# Patient Record
Sex: FEMALE | Race: WHITE | Employment: OTHER | URBAN - METROPOLITAN AREA
[De-identification: names, ages, dates, MRNs, and addresses within clinical notes are randomized per-mention and may not be internally consistent; named-entity substitution may affect disease eponyms.]

---

## 2017-02-02 DIAGNOSIS — J44.9 CHRONIC OBSTRUCTIVE PULMONARY DISEASE, UNSPECIFIED COPD TYPE (HCC): ICD-10-CM

## 2017-02-02 RX ORDER — ALBUTEROL SULFATE 90 UG/1
2 AEROSOL, METERED RESPIRATORY (INHALATION) EVERY 6 HOURS PRN
Qty: 1 INHALER | Refills: 1 | Status: SHIPPED | OUTPATIENT
Start: 2017-02-02 | End: 2018-03-15 | Stop reason: SDUPTHER

## 2017-02-15 ENCOUNTER — HOSPITAL ENCOUNTER (EMERGENCY)
Age: 57
Discharge: HOME | End: 2017-02-15
Attending: EMERGENCY MEDICINE
Payer: MEDICARE

## 2017-02-15 ENCOUNTER — APPOINTMENT (OUTPATIENT)
Dept: CT IMAGING | Age: 57
End: 2017-02-15
Payer: MEDICARE

## 2017-02-15 VITALS
WEIGHT: 158 LBS | HEIGHT: 66 IN | TEMPERATURE: 98 F | SYSTOLIC BLOOD PRESSURE: 97 MMHG | DIASTOLIC BLOOD PRESSURE: 62 MMHG | OXYGEN SATURATION: 97 % | RESPIRATION RATE: 16 BRPM | BODY MASS INDEX: 25.39 KG/M2 | HEART RATE: 67 BPM

## 2017-02-15 DIAGNOSIS — N39.0 URINARY TRACT INFECTION WITHOUT HEMATURIA, SITE UNSPECIFIED: ICD-10-CM

## 2017-02-15 DIAGNOSIS — K57.32 DIVERTICULITIS OF COLON: Primary | ICD-10-CM

## 2017-02-15 LAB
-: ABNORMAL
ABSOLUTE EOS #: 0.3 K/UL (ref 0–0.4)
ABSOLUTE LYMPH #: 2.1 K/UL (ref 1–4.8)
ABSOLUTE MONO #: 0.6 K/UL (ref 0.1–1.3)
ALBUMIN SERPL-MCNC: 4 G/DL (ref 3.5–5.2)
ALBUMIN/GLOBULIN RATIO: ABNORMAL (ref 1–2.5)
ALP BLD-CCNC: 86 U/L (ref 35–104)
ALT SERPL-CCNC: 11 U/L (ref 5–33)
AMORPHOUS: ABNORMAL
ANION GAP SERPL CALCULATED.3IONS-SCNC: 12 MMOL/L (ref 9–17)
AST SERPL-CCNC: 14 U/L
BACTERIA: ABNORMAL
BASOPHILS # BLD: 0 % (ref 0–2)
BASOPHILS ABSOLUTE: 0 K/UL (ref 0–0.2)
BILIRUB SERPL-MCNC: 0.16 MG/DL (ref 0.3–1.2)
BILIRUBIN URINE: NEGATIVE
BUN BLDV-MCNC: 8 MG/DL (ref 6–20)
BUN/CREAT BLD: ABNORMAL (ref 9–20)
CALCIUM SERPL-MCNC: 9.2 MG/DL (ref 8.6–10.4)
CASTS UA: ABNORMAL /LPF
CHLORIDE BLD-SCNC: 103 MMOL/L (ref 98–107)
CO2: 26 MMOL/L (ref 20–31)
COLOR: YELLOW
COMMENT UA: ABNORMAL
CREAT SERPL-MCNC: 0.72 MG/DL (ref 0.5–0.9)
CRYSTALS, UA: ABNORMAL /HPF
DIFFERENTIAL TYPE: ABNORMAL
EOSINOPHILS RELATIVE PERCENT: 3 % (ref 0–4)
EPITHELIAL CELLS UA: ABNORMAL /HPF
GFR AFRICAN AMERICAN: >60 ML/MIN
GFR NON-AFRICAN AMERICAN: >60 ML/MIN
GFR SERPL CREATININE-BSD FRML MDRD: ABNORMAL ML/MIN/{1.73_M2}
GFR SERPL CREATININE-BSD FRML MDRD: ABNORMAL ML/MIN/{1.73_M2}
GLUCOSE BLD-MCNC: 88 MG/DL (ref 70–99)
GLUCOSE URINE: NEGATIVE
HCT VFR BLD CALC: 40.7 % (ref 36–46)
HEMOGLOBIN: 13.6 G/DL (ref 12–16)
KETONES, URINE: NEGATIVE
LEUKOCYTE ESTERASE, URINE: ABNORMAL
LIPASE: 24 U/L (ref 13–60)
LYMPHOCYTES # BLD: 21 % (ref 24–44)
MCH RBC QN AUTO: 30 PG (ref 26–34)
MCHC RBC AUTO-ENTMCNC: 33.5 G/DL (ref 31–37)
MCV RBC AUTO: 89.5 FL (ref 80–100)
MONOCYTES # BLD: 6 % (ref 1–7)
MUCUS: ABNORMAL
NITRITE, URINE: POSITIVE
OTHER OBSERVATIONS UA: ABNORMAL
PDW BLD-RTO: 15.1 % (ref 11.5–14.9)
PH UA: 7 (ref 5–8)
PLATELET # BLD: 275 K/UL (ref 150–450)
PLATELET ESTIMATE: ABNORMAL
PMV BLD AUTO: 8.7 FL (ref 6–12)
POTASSIUM SERPL-SCNC: 2.8 MMOL/L (ref 3.7–5.3)
PROTEIN UA: NEGATIVE
RBC # BLD: 4.54 M/UL (ref 4–5.2)
RBC # BLD: ABNORMAL 10*6/UL
RBC UA: ABNORMAL /HPF
RENAL EPITHELIAL, UA: ABNORMAL /HPF
SEG NEUTROPHILS: 70 % (ref 36–66)
SEGMENTED NEUTROPHILS ABSOLUTE COUNT: 6.9 K/UL (ref 1.3–9.1)
SODIUM BLD-SCNC: 141 MMOL/L (ref 135–144)
SPECIFIC GRAVITY UA: 1.01 (ref 1–1.03)
TOTAL PROTEIN: 6.8 G/DL (ref 6.4–8.3)
TRICHOMONAS: ABNORMAL
TURBIDITY: CLEAR
URINE HGB: NEGATIVE
UROBILINOGEN, URINE: NORMAL
WBC # BLD: 9.9 K/UL (ref 3.5–11)
WBC # BLD: ABNORMAL 10*3/UL
WBC UA: ABNORMAL /HPF
YEAST: ABNORMAL

## 2017-02-15 PROCEDURE — 6370000000 HC RX 637 (ALT 250 FOR IP): Performed by: EMERGENCY MEDICINE

## 2017-02-15 PROCEDURE — 74176 CT ABD & PELVIS W/O CONTRAST: CPT

## 2017-02-15 PROCEDURE — 85025 COMPLETE CBC W/AUTO DIFF WBC: CPT

## 2017-02-15 PROCEDURE — 83690 ASSAY OF LIPASE: CPT

## 2017-02-15 PROCEDURE — 80053 COMPREHEN METABOLIC PANEL: CPT

## 2017-02-15 PROCEDURE — 99284 EMERGENCY DEPT VISIT MOD MDM: CPT

## 2017-02-15 PROCEDURE — 2580000003 HC RX 258: Performed by: EMERGENCY MEDICINE

## 2017-02-15 PROCEDURE — 6360000002 HC RX W HCPCS: Performed by: EMERGENCY MEDICINE

## 2017-02-15 PROCEDURE — 36415 COLL VENOUS BLD VENIPUNCTURE: CPT

## 2017-02-15 PROCEDURE — 81001 URINALYSIS AUTO W/SCOPE: CPT

## 2017-02-15 PROCEDURE — 96372 THER/PROPH/DIAG INJ SC/IM: CPT

## 2017-02-15 RX ORDER — METRONIDAZOLE 500 MG/1
500 TABLET ORAL ONCE
Status: COMPLETED | OUTPATIENT
Start: 2017-02-15 | End: 2017-02-15

## 2017-02-15 RX ORDER — CIPROFLOXACIN 500 MG/1
500 TABLET, FILM COATED ORAL 2 TIMES DAILY
Qty: 14 TABLET | Refills: 0 | Status: SHIPPED | OUTPATIENT
Start: 2017-02-15 | End: 2017-02-22

## 2017-02-15 RX ORDER — CIPROFLOXACIN 500 MG/1
500 TABLET, FILM COATED ORAL ONCE
Status: COMPLETED | OUTPATIENT
Start: 2017-02-15 | End: 2017-02-15

## 2017-02-15 RX ORDER — METRONIDAZOLE 500 MG/1
500 TABLET ORAL 3 TIMES DAILY
Qty: 21 TABLET | Refills: 0 | Status: SHIPPED | OUTPATIENT
Start: 2017-02-15 | End: 2017-02-22

## 2017-02-15 RX ORDER — DICYCLOMINE HYDROCHLORIDE 10 MG/ML
20 INJECTION INTRAMUSCULAR ONCE
Status: COMPLETED | OUTPATIENT
Start: 2017-02-15 | End: 2017-02-15

## 2017-02-15 RX ORDER — 0.9 % SODIUM CHLORIDE 0.9 %
1000 INTRAVENOUS SOLUTION INTRAVENOUS ONCE
Status: COMPLETED | OUTPATIENT
Start: 2017-02-15 | End: 2017-02-15

## 2017-02-15 RX ADMIN — SODIUM CHLORIDE 1000 ML: 9 INJECTION, SOLUTION INTRAVENOUS at 12:15

## 2017-02-15 RX ADMIN — METRONIDAZOLE 500 MG: 500 TABLET ORAL at 13:56

## 2017-02-15 RX ADMIN — CIPROFLOXACIN HYDROCHLORIDE 500 MG: 500 TABLET, FILM COATED ORAL at 13:57

## 2017-02-15 RX ADMIN — DICYCLOMINE HYDROCHLORIDE 20 MG: 20 INJECTION, SOLUTION INTRAMUSCULAR at 12:59

## 2017-02-15 ASSESSMENT — ENCOUNTER SYMPTOMS
ABDOMINAL PAIN: 1
BACK PAIN: 0
SHORTNESS OF BREATH: 0
RHINORRHEA: 0
COUGH: 0
EYE REDNESS: 0
NAUSEA: 0
EYE PAIN: 0
VOMITING: 0

## 2017-02-15 ASSESSMENT — PAIN DESCRIPTION - PAIN TYPE: TYPE: ACUTE PAIN

## 2017-02-15 ASSESSMENT — PAIN - FUNCTIONAL ASSESSMENT: PAIN_FUNCTIONAL_ASSESSMENT: 0-10

## 2017-02-15 ASSESSMENT — PAIN SCALES - GENERAL
PAINLEVEL_OUTOF10: 3
PAINLEVEL_OUTOF10: 7

## 2017-02-15 ASSESSMENT — PAIN DESCRIPTION - ORIENTATION: ORIENTATION: LOWER

## 2017-02-15 ASSESSMENT — PAIN DESCRIPTION - DESCRIPTORS: DESCRIPTORS: ACHING

## 2017-02-15 ASSESSMENT — PAIN DESCRIPTION - LOCATION: LOCATION: ABDOMEN

## 2017-02-15 ASSESSMENT — PAIN DESCRIPTION - FREQUENCY: FREQUENCY: CONTINUOUS

## 2017-03-01 ENCOUNTER — HOSPITAL ENCOUNTER (OUTPATIENT)
Dept: CT IMAGING | Age: 57
Discharge: HOME | End: 2017-03-01
Attending: INTERNAL MEDICINE | Admitting: INTERNAL MEDICINE
Payer: MEDICARE

## 2017-03-01 DIAGNOSIS — R91.1 PULMONARY NODULE: ICD-10-CM

## 2017-03-01 DIAGNOSIS — F17.219 CIGARETTE NICOTINE DEPENDENCE WITH NICOTINE-INDUCED DISORDER: ICD-10-CM

## 2017-03-01 PROCEDURE — 71250 CT THORAX DX C-: CPT

## 2017-03-06 ENCOUNTER — OFFICE VISIT (OUTPATIENT)
Dept: FAMILY MEDICINE CLINIC | Facility: CLINIC | Age: 57
End: 2017-03-06

## 2017-03-06 ENCOUNTER — HOSPITAL ENCOUNTER (OUTPATIENT)
Age: 57
Discharge: HOME | End: 2017-03-06
Payer: MEDICARE

## 2017-03-06 ENCOUNTER — TELEPHONE (OUTPATIENT)
Dept: GASTROENTEROLOGY | Facility: CLINIC | Age: 57
End: 2017-03-06

## 2017-03-06 VITALS
HEART RATE: 84 BPM | HEIGHT: 66 IN | RESPIRATION RATE: 18 BRPM | BODY MASS INDEX: 25.71 KG/M2 | SYSTOLIC BLOOD PRESSURE: 116 MMHG | WEIGHT: 160 LBS | TEMPERATURE: 96.8 F | OXYGEN SATURATION: 97 % | DIASTOLIC BLOOD PRESSURE: 84 MMHG

## 2017-03-06 DIAGNOSIS — R10.32 LLQ ABDOMINAL PAIN: ICD-10-CM

## 2017-03-06 DIAGNOSIS — K57.32 DIVERTICULITIS OF LARGE INTESTINE WITHOUT PERFORATION OR ABSCESS WITHOUT BLEEDING: ICD-10-CM

## 2017-03-06 DIAGNOSIS — E78.2 MIXED HYPERLIPIDEMIA: ICD-10-CM

## 2017-03-06 DIAGNOSIS — I10 ESSENTIAL HYPERTENSION: Primary | ICD-10-CM

## 2017-03-06 DIAGNOSIS — E87.6 HYPOKALEMIA: ICD-10-CM

## 2017-03-06 DIAGNOSIS — Z11.4 SCREENING FOR HIV (HUMAN IMMUNODEFICIENCY VIRUS): ICD-10-CM

## 2017-03-06 DIAGNOSIS — Z11.59 NEED FOR HEPATITIS C SCREENING TEST: ICD-10-CM

## 2017-03-06 DIAGNOSIS — G89.29 CHRONIC BILATERAL LOW BACK PAIN WITH BILATERAL SCIATICA: ICD-10-CM

## 2017-03-06 DIAGNOSIS — M54.41 CHRONIC BILATERAL LOW BACK PAIN WITH BILATERAL SCIATICA: ICD-10-CM

## 2017-03-06 DIAGNOSIS — M54.42 CHRONIC BILATERAL LOW BACK PAIN WITH BILATERAL SCIATICA: ICD-10-CM

## 2017-03-06 DIAGNOSIS — Z23 NEED FOR PNEUMOCOCCAL VACCINATION: ICD-10-CM

## 2017-03-06 LAB
CHOLESTEROL/HDL RATIO: 4.6
CHOLESTEROL: 213 MG/DL
HDLC SERPL-MCNC: 46 MG/DL
HEPATITIS C ANTIBODY: NONREACTIVE
HIV AG/AB: NONREACTIVE
LDL CHOLESTEROL: 121 MG/DL (ref 0–130)
POTASSIUM SERPL-SCNC: 3.9 MMOL/L (ref 3.7–5.3)
TRIGL SERPL-MCNC: 228 MG/DL
VLDLC SERPL CALC-MCNC: ABNORMAL MG/DL (ref 1–30)

## 2017-03-06 PROCEDURE — G0009 ADMIN PNEUMOCOCCAL VACCINE: HCPCS | Performed by: NURSE PRACTITIONER

## 2017-03-06 PROCEDURE — 90732 PPSV23 VACC 2 YRS+ SUBQ/IM: CPT | Performed by: NURSE PRACTITIONER

## 2017-03-06 PROCEDURE — 87389 HIV-1 AG W/HIV-1&-2 AB AG IA: CPT

## 2017-03-06 PROCEDURE — 80061 LIPID PANEL: CPT

## 2017-03-06 PROCEDURE — 86803 HEPATITIS C AB TEST: CPT

## 2017-03-06 PROCEDURE — 36415 COLL VENOUS BLD VENIPUNCTURE: CPT

## 2017-03-06 PROCEDURE — 84132 ASSAY OF SERUM POTASSIUM: CPT

## 2017-03-06 PROCEDURE — 99214 OFFICE O/P EST MOD 30 MIN: CPT | Performed by: NURSE PRACTITIONER

## 2017-03-06 RX ORDER — CLONIDINE HYDROCHLORIDE 0.1 MG/1
TABLET ORAL
Qty: 30 TABLET | Refills: 5 | Status: SHIPPED | OUTPATIENT
Start: 2017-03-06 | End: 2018-02-14 | Stop reason: SDUPTHER

## 2017-03-06 ASSESSMENT — ENCOUNTER SYMPTOMS
SHORTNESS OF BREATH: 0
WHEEZING: 0
BLOOD IN STOOL: 0
ABDOMINAL PAIN: 1
VOMITING: 0
BACK PAIN: 1
NAUSEA: 0

## 2017-03-13 ENCOUNTER — TELEPHONE (OUTPATIENT)
Dept: FAMILY MEDICINE CLINIC | Age: 57
End: 2017-03-13

## 2017-03-13 DIAGNOSIS — K57.93 DIVERTICULITIS OF INTESTINE WITHOUT PERFORATION OR ABSCESS WITH BLEEDING, UNSPECIFIED PART OF INTESTINAL TRACT: ICD-10-CM

## 2017-03-13 DIAGNOSIS — R10.32 LLQ ABDOMINAL PAIN: Primary | ICD-10-CM

## 2017-03-16 ENCOUNTER — OFFICE VISIT (OUTPATIENT)
Dept: GASTROENTEROLOGY | Age: 57
End: 2017-03-16
Payer: MEDICARE

## 2017-03-16 ENCOUNTER — OFFICE VISIT (OUTPATIENT)
Dept: PULMONOLOGY | Age: 57
End: 2017-03-16
Payer: MEDICARE

## 2017-03-16 VITALS
BODY MASS INDEX: 24.99 KG/M2 | HEIGHT: 66 IN | WEIGHT: 155.5 LBS | HEART RATE: 93 BPM | TEMPERATURE: 98 F | OXYGEN SATURATION: 98 %

## 2017-03-16 VITALS
HEIGHT: 66 IN | HEART RATE: 89 BPM | WEIGHT: 155 LBS | BODY MASS INDEX: 24.91 KG/M2 | RESPIRATION RATE: 18 BRPM | SYSTOLIC BLOOD PRESSURE: 106 MMHG | DIASTOLIC BLOOD PRESSURE: 76 MMHG

## 2017-03-16 DIAGNOSIS — F17.200 SMOKING: ICD-10-CM

## 2017-03-16 DIAGNOSIS — Z87.19 HISTORY OF DIVERTICULITIS: ICD-10-CM

## 2017-03-16 DIAGNOSIS — I10 ESSENTIAL HYPERTENSION: ICD-10-CM

## 2017-03-16 DIAGNOSIS — K62.5 RECTAL BLEEDING: ICD-10-CM

## 2017-03-16 DIAGNOSIS — R10.32 LEFT LOWER QUADRANT PAIN: ICD-10-CM

## 2017-03-16 DIAGNOSIS — J44.9 CHRONIC OBSTRUCTIVE PULMONARY DISEASE, UNSPECIFIED COPD TYPE (HCC): Primary | ICD-10-CM

## 2017-03-16 DIAGNOSIS — R91.1 PULMONARY NODULE: ICD-10-CM

## 2017-03-16 DIAGNOSIS — R05.3 CHRONIC COUGH: ICD-10-CM

## 2017-03-16 PROCEDURE — 99214 OFFICE O/P EST MOD 30 MIN: CPT | Performed by: INTERNAL MEDICINE

## 2017-03-16 PROCEDURE — 99204 OFFICE O/P NEW MOD 45 MIN: CPT | Performed by: INTERNAL MEDICINE

## 2017-03-16 ASSESSMENT — ENCOUNTER SYMPTOMS
SINUS PRESSURE: 0
NAUSEA: 0
BACK PAIN: 1
TROUBLE SWALLOWING: 0
ANAL BLEEDING: 1
FACIAL SWELLING: 0
VOICE CHANGE: 1
SORE THROAT: 0
CONSTIPATION: 0
ABDOMINAL PAIN: 1
VOMITING: 0
RHINORRHEA: 0
SHORTNESS OF BREATH: 1
RECTAL PAIN: 0
ABDOMINAL DISTENTION: 1
COUGH: 1
BLOOD IN STOOL: 1
DIARRHEA: 0

## 2017-03-20 RX ORDER — POLYETHYLENE GLYCOL 3350 17 G/17G
POWDER, FOR SOLUTION ORAL
Qty: 255 G | Refills: 0 | Status: SHIPPED | OUTPATIENT
Start: 2017-03-20 | End: 2017-04-15

## 2017-04-05 ENCOUNTER — HOSPITAL ENCOUNTER (OUTPATIENT)
Age: 57
Setting detail: OUTPATIENT SURGERY
Discharge: HOME | End: 2017-04-05
Attending: INTERNAL MEDICINE | Admitting: INTERNAL MEDICINE
Payer: MEDICARE

## 2017-04-05 VITALS
WEIGHT: 155 LBS | HEART RATE: 76 BPM | RESPIRATION RATE: 20 BRPM | HEIGHT: 66 IN | OXYGEN SATURATION: 93 % | BODY MASS INDEX: 24.91 KG/M2 | DIASTOLIC BLOOD PRESSURE: 78 MMHG | SYSTOLIC BLOOD PRESSURE: 106 MMHG | TEMPERATURE: 97.9 F

## 2017-04-05 PROCEDURE — 99153 MOD SED SAME PHYS/QHP EA: CPT | Performed by: INTERNAL MEDICINE

## 2017-04-05 PROCEDURE — 3609027000 HC COLONOSCOPY: Performed by: INTERNAL MEDICINE

## 2017-04-05 PROCEDURE — 7100000010 HC PHASE II RECOVERY - FIRST 15 MIN: Performed by: INTERNAL MEDICINE

## 2017-04-05 PROCEDURE — 6360000002 HC RX W HCPCS: Performed by: INTERNAL MEDICINE

## 2017-04-05 PROCEDURE — 2580000003 HC RX 258: Performed by: INTERNAL MEDICINE

## 2017-04-05 PROCEDURE — 7100000011 HC PHASE II RECOVERY - ADDTL 15 MIN: Performed by: INTERNAL MEDICINE

## 2017-04-05 PROCEDURE — 99152 MOD SED SAME PHYS/QHP 5/>YRS: CPT | Performed by: INTERNAL MEDICINE

## 2017-04-05 RX ORDER — MIDAZOLAM HYDROCHLORIDE 1 MG/ML
INJECTION INTRAMUSCULAR; INTRAVENOUS PRN
Status: DISCONTINUED | OUTPATIENT
Start: 2017-04-05 | End: 2017-04-05 | Stop reason: HOSPADM

## 2017-04-05 RX ORDER — FENTANYL CITRATE 50 UG/ML
INJECTION, SOLUTION INTRAMUSCULAR; INTRAVENOUS PRN
Status: DISCONTINUED | OUTPATIENT
Start: 2017-04-05 | End: 2017-04-05 | Stop reason: HOSPADM

## 2017-04-05 RX ORDER — SODIUM CHLORIDE 9 MG/ML
INJECTION, SOLUTION INTRAVENOUS CONTINUOUS
Status: DISCONTINUED | OUTPATIENT
Start: 2017-04-05 | End: 2017-04-05 | Stop reason: HOSPADM

## 2017-04-05 RX ADMIN — SODIUM CHLORIDE: 9 INJECTION, SOLUTION INTRAVENOUS at 10:44

## 2017-04-05 ASSESSMENT — PAIN SCALES - GENERAL: PAINLEVEL_OUTOF10: 0

## 2017-04-05 ASSESSMENT — PAIN - FUNCTIONAL ASSESSMENT: PAIN_FUNCTIONAL_ASSESSMENT: 0-10

## 2017-04-17 DIAGNOSIS — K62.5 RECTAL BLEEDING: ICD-10-CM

## 2017-04-17 DIAGNOSIS — R10.32 LEFT LOWER QUADRANT PAIN: ICD-10-CM

## 2017-05-03 ENCOUNTER — OFFICE VISIT (OUTPATIENT)
Dept: GASTROENTEROLOGY | Age: 57
End: 2017-05-03
Payer: MEDICARE

## 2017-05-03 VITALS
OXYGEN SATURATION: 97 % | TEMPERATURE: 97 F | HEIGHT: 66 IN | HEART RATE: 83 BPM | SYSTOLIC BLOOD PRESSURE: 129 MMHG | WEIGHT: 155.4 LBS | DIASTOLIC BLOOD PRESSURE: 86 MMHG | BODY MASS INDEX: 24.98 KG/M2

## 2017-05-03 DIAGNOSIS — I10 ESSENTIAL HYPERTENSION: ICD-10-CM

## 2017-05-03 DIAGNOSIS — K62.5 RECTAL BLEEDING: ICD-10-CM

## 2017-05-03 DIAGNOSIS — K57.30 DIVERTICULOSIS OF LARGE INTESTINE WITHOUT HEMORRHAGE: ICD-10-CM

## 2017-05-03 DIAGNOSIS — R10.32 LEFT LOWER QUADRANT PAIN: Primary | ICD-10-CM

## 2017-05-03 PROCEDURE — 99213 OFFICE O/P EST LOW 20 MIN: CPT | Performed by: INTERNAL MEDICINE

## 2017-05-03 ASSESSMENT — ENCOUNTER SYMPTOMS
CONSTIPATION: 0
TROUBLE SWALLOWING: 0
SHORTNESS OF BREATH: 1
RHINORRHEA: 0
BLOOD IN STOOL: 1
FACIAL SWELLING: 0
BACK PAIN: 1
NAUSEA: 0
COUGH: 1
ANAL BLEEDING: 1
ABDOMINAL DISTENTION: 1
VOMITING: 0
ABDOMINAL PAIN: 1
VOICE CHANGE: 1
SORE THROAT: 0
DIARRHEA: 0
RECTAL PAIN: 0
SINUS PRESSURE: 0

## 2017-06-06 ENCOUNTER — OFFICE VISIT (OUTPATIENT)
Dept: FAMILY MEDICINE CLINIC | Age: 57
End: 2017-06-06
Payer: MEDICARE

## 2017-06-06 VITALS
WEIGHT: 156.4 LBS | SYSTOLIC BLOOD PRESSURE: 132 MMHG | BODY MASS INDEX: 25.13 KG/M2 | DIASTOLIC BLOOD PRESSURE: 94 MMHG | HEIGHT: 66 IN | HEART RATE: 100 BPM

## 2017-06-06 DIAGNOSIS — K44.9 HIATAL HERNIA: ICD-10-CM

## 2017-06-06 DIAGNOSIS — E78.2 MIXED HYPERLIPIDEMIA: ICD-10-CM

## 2017-06-06 DIAGNOSIS — I10 ESSENTIAL HYPERTENSION: Primary | ICD-10-CM

## 2017-06-06 DIAGNOSIS — N95.1 MENOPAUSAL HOT FLUSHES: ICD-10-CM

## 2017-06-06 PROCEDURE — 99214 OFFICE O/P EST MOD 30 MIN: CPT | Performed by: NURSE PRACTITIONER

## 2017-06-06 RX ORDER — PAROXETINE 10 MG/1
10 TABLET, FILM COATED ORAL EVERY MORNING
Qty: 30 TABLET | Refills: 3 | Status: SHIPPED | OUTPATIENT
Start: 2017-06-06 | End: 2017-09-14 | Stop reason: SDUPTHER

## 2017-06-06 RX ORDER — TIOTROPIUM BROMIDE 18 UG/1
CAPSULE ORAL; RESPIRATORY (INHALATION)
COMMUNITY
Start: 2017-05-31 | End: 2017-06-29 | Stop reason: ALTCHOICE

## 2017-06-06 ASSESSMENT — ENCOUNTER SYMPTOMS
NAUSEA: 0
ABDOMINAL PAIN: 1
SHORTNESS OF BREATH: 0
VOMITING: 0
WHEEZING: 0

## 2017-06-08 ENCOUNTER — OFFICE VISIT (OUTPATIENT)
Dept: ORTHOPEDIC SURGERY | Age: 57
End: 2017-06-08
Payer: MEDICARE

## 2017-06-08 DIAGNOSIS — M54.42 CHRONIC MIDLINE LOW BACK PAIN WITH BILATERAL SCIATICA: ICD-10-CM

## 2017-06-08 DIAGNOSIS — D32.9 MENINGIOMA (HCC): Primary | ICD-10-CM

## 2017-06-08 DIAGNOSIS — G89.29 CHRONIC MIDLINE LOW BACK PAIN WITH BILATERAL SCIATICA: ICD-10-CM

## 2017-06-08 DIAGNOSIS — M54.2 NECK PAIN: ICD-10-CM

## 2017-06-08 DIAGNOSIS — M54.41 CHRONIC MIDLINE LOW BACK PAIN WITH BILATERAL SCIATICA: ICD-10-CM

## 2017-06-08 PROCEDURE — 99213 OFFICE O/P EST LOW 20 MIN: CPT | Performed by: ORTHOPAEDIC SURGERY

## 2017-06-27 RX ORDER — CALCIUM CARBONATE/VITAMIN D3 600 MG-10
TABLET ORAL
Qty: 60 TABLET | Refills: 5 | Status: SHIPPED | OUTPATIENT
Start: 2017-06-27 | End: 2017-12-29 | Stop reason: SDUPTHER

## 2017-06-27 RX ORDER — OMEPRAZOLE 20 MG/1
CAPSULE, DELAYED RELEASE ORAL
Qty: 60 CAPSULE | Refills: 3 | Status: SHIPPED | OUTPATIENT
Start: 2017-06-27 | End: 2017-10-29 | Stop reason: SDUPTHER

## 2017-06-27 RX ORDER — HYDROCHLOROTHIAZIDE 25 MG/1
TABLET ORAL
Qty: 30 TABLET | Refills: 3 | Status: SHIPPED | OUTPATIENT
Start: 2017-06-27 | End: 2017-10-29 | Stop reason: SDUPTHER

## 2017-06-27 RX ORDER — TIOTROPIUM BROMIDE 18 UG/1
CAPSULE ORAL; RESPIRATORY (INHALATION)
Qty: 30 CAPSULE | Refills: 3 | Status: SHIPPED | OUTPATIENT
Start: 2017-06-27 | End: 2017-11-29 | Stop reason: SDUPTHER

## 2017-06-27 RX ORDER — CYCLOBENZAPRINE HCL 10 MG
TABLET ORAL
Qty: 60 TABLET | Refills: 3 | Status: SHIPPED | OUTPATIENT
Start: 2017-06-27 | End: 2017-10-29 | Stop reason: SDUPTHER

## 2017-06-27 RX ORDER — LOVASTATIN 40 MG/1
TABLET ORAL
Qty: 30 TABLET | Refills: 3 | Status: SHIPPED | OUTPATIENT
Start: 2017-06-27 | End: 2017-10-29 | Stop reason: SDUPTHER

## 2017-06-29 ENCOUNTER — HOSPITAL ENCOUNTER (OUTPATIENT)
Dept: PREADMISSION TESTING | Age: 57
Discharge: HOME | End: 2017-06-29
Payer: MEDICARE

## 2017-06-29 VITALS
TEMPERATURE: 97 F | BODY MASS INDEX: 24.65 KG/M2 | HEIGHT: 66 IN | RESPIRATION RATE: 16 BRPM | OXYGEN SATURATION: 96 % | WEIGHT: 153.4 LBS | SYSTOLIC BLOOD PRESSURE: 116 MMHG | DIASTOLIC BLOOD PRESSURE: 87 MMHG | HEART RATE: 96 BPM

## 2017-06-29 LAB
ABSOLUTE EOS #: 0.2 K/UL (ref 0–0.4)
ABSOLUTE LYMPH #: 2.3 K/UL (ref 1–4.8)
ABSOLUTE MONO #: 0.5 K/UL (ref 0.1–1.3)
ANION GAP SERPL CALCULATED.3IONS-SCNC: 18 MMOL/L (ref 9–17)
BASOPHILS # BLD: 1 %
BASOPHILS ABSOLUTE: 0 K/UL (ref 0–0.2)
BUN BLDV-MCNC: 12 MG/DL (ref 6–20)
BUN/CREAT BLD: ABNORMAL (ref 9–20)
CALCIUM SERPL-MCNC: 10.4 MG/DL (ref 8.6–10.4)
CHLORIDE BLD-SCNC: 102 MMOL/L (ref 98–107)
CO2: 22 MMOL/L (ref 20–31)
CREAT SERPL-MCNC: 0.76 MG/DL (ref 0.5–0.9)
DIFFERENTIAL TYPE: NORMAL
EOSINOPHILS RELATIVE PERCENT: 3 %
GFR AFRICAN AMERICAN: >60 ML/MIN
GFR NON-AFRICAN AMERICAN: >60 ML/MIN
GFR SERPL CREATININE-BSD FRML MDRD: ABNORMAL ML/MIN/{1.73_M2}
GFR SERPL CREATININE-BSD FRML MDRD: ABNORMAL ML/MIN/{1.73_M2}
GLUCOSE BLD-MCNC: 139 MG/DL (ref 70–99)
HCT VFR BLD CALC: 45.9 % (ref 36–46)
HEMOGLOBIN: 15.5 G/DL (ref 12–16)
LYMPHOCYTES # BLD: 36 %
MCH RBC QN AUTO: 30.6 PG (ref 26–34)
MCHC RBC AUTO-ENTMCNC: 33.7 G/DL (ref 31–37)
MCV RBC AUTO: 90.8 FL (ref 80–100)
MONOCYTES # BLD: 8 %
PDW BLD-RTO: 14.9 % (ref 11.5–14.9)
PLATELET # BLD: 291 K/UL (ref 150–450)
PLATELET ESTIMATE: NORMAL
PMV BLD AUTO: 10.2 FL (ref 6–12)
POTASSIUM SERPL-SCNC: 3.2 MMOL/L (ref 3.7–5.3)
RBC # BLD: 5.06 M/UL (ref 4–5.2)
RBC # BLD: NORMAL 10*6/UL
SEG NEUTROPHILS: 52 %
SEGMENTED NEUTROPHILS ABSOLUTE COUNT: 3.4 K/UL (ref 1.3–9.1)
SODIUM BLD-SCNC: 142 MMOL/L (ref 135–144)
WBC # BLD: 6.5 K/UL (ref 3.5–11)
WBC # BLD: NORMAL 10*3/UL

## 2017-06-29 PROCEDURE — 93005 ELECTROCARDIOGRAM TRACING: CPT

## 2017-06-29 PROCEDURE — 36415 COLL VENOUS BLD VENIPUNCTURE: CPT

## 2017-06-29 PROCEDURE — 85025 COMPLETE CBC W/AUTO DIFF WBC: CPT

## 2017-06-29 PROCEDURE — 80048 BASIC METABOLIC PNL TOTAL CA: CPT

## 2017-06-29 ASSESSMENT — PAIN DESCRIPTION - PAIN TYPE: TYPE: CHRONIC PAIN

## 2017-06-29 ASSESSMENT — PAIN SCALES - GENERAL: PAINLEVEL_OUTOF10: 10

## 2017-06-29 ASSESSMENT — PAIN DESCRIPTION - ORIENTATION: ORIENTATION: LEFT

## 2017-06-29 ASSESSMENT — PAIN DESCRIPTION - LOCATION: LOCATION: NECK

## 2017-06-30 ENCOUNTER — TELEPHONE (OUTPATIENT)
Dept: FAMILY MEDICINE CLINIC | Age: 57
End: 2017-06-30

## 2017-06-30 DIAGNOSIS — R94.31 ABNORMAL EKG: Primary | ICD-10-CM

## 2017-06-30 LAB
EKG ATRIAL RATE: 89 BPM
EKG P AXIS: 84 DEGREES
EKG P-R INTERVAL: 138 MS
EKG Q-T INTERVAL: 362 MS
EKG QRS DURATION: 74 MS
EKG QTC CALCULATION (BAZETT): 440 MS
EKG R AXIS: 61 DEGREES
EKG T AXIS: 66 DEGREES
EKG VENTRICULAR RATE: 89 BPM

## 2017-06-30 RX ORDER — SODIUM CHLORIDE 0.9 % (FLUSH) 0.9 %
10 SYRINGE (ML) INJECTION EVERY 12 HOURS SCHEDULED
Status: CANCELLED | OUTPATIENT
Start: 2017-06-30

## 2017-06-30 RX ORDER — LIDOCAINE HYDROCHLORIDE 10 MG/ML
1 INJECTION, SOLUTION EPIDURAL; INFILTRATION; INTRACAUDAL; PERINEURAL
Status: CANCELLED | OUTPATIENT
Start: 2017-06-30 | End: 2017-06-30

## 2017-06-30 RX ORDER — SODIUM CHLORIDE 0.9 % (FLUSH) 0.9 %
10 SYRINGE (ML) INJECTION PRN
Status: CANCELLED | OUTPATIENT
Start: 2017-06-30

## 2017-06-30 RX ORDER — SODIUM CHLORIDE, SODIUM LACTATE, POTASSIUM CHLORIDE, CALCIUM CHLORIDE 600; 310; 30; 20 MG/100ML; MG/100ML; MG/100ML; MG/100ML
INJECTION, SOLUTION INTRAVENOUS CONTINUOUS
Status: CANCELLED | OUTPATIENT
Start: 2017-06-30

## 2017-07-06 ENCOUNTER — HOSPITAL ENCOUNTER (OUTPATIENT)
Dept: CT IMAGING | Age: 57
Discharge: HOME | End: 2017-07-06
Payer: MEDICARE

## 2017-07-06 ENCOUNTER — HOSPITAL ENCOUNTER (OUTPATIENT)
Dept: INTERVENTIONAL RADIOLOGY/VASCULAR | Age: 57
Discharge: HOME | End: 2017-07-06
Payer: MEDICARE

## 2017-07-06 VITALS
HEIGHT: 66 IN | SYSTOLIC BLOOD PRESSURE: 143 MMHG | HEART RATE: 74 BPM | RESPIRATION RATE: 16 BRPM | DIASTOLIC BLOOD PRESSURE: 86 MMHG | TEMPERATURE: 97.1 F | WEIGHT: 153 LBS | BODY MASS INDEX: 24.59 KG/M2

## 2017-07-06 DIAGNOSIS — M54.2 NECK PAIN: ICD-10-CM

## 2017-07-06 DIAGNOSIS — M54.42 CHRONIC MIDLINE LOW BACK PAIN WITH BILATERAL SCIATICA: ICD-10-CM

## 2017-07-06 DIAGNOSIS — M54.41 CHRONIC MIDLINE LOW BACK PAIN WITH BILATERAL SCIATICA: ICD-10-CM

## 2017-07-06 DIAGNOSIS — G89.29 CHRONIC MIDLINE LOW BACK PAIN WITH BILATERAL SCIATICA: ICD-10-CM

## 2017-07-06 DIAGNOSIS — D32.9 MENINGIOMA (HCC): ICD-10-CM

## 2017-07-06 LAB
INR BLD: 1
PARTIAL THROMBOPLASTIN TIME: 27.5 SEC (ref 23–31)
PLATELET # BLD: 260 K/UL (ref 150–450)
PROTHROMBIN TIME: 10.2 SEC (ref 9.7–12)

## 2017-07-06 PROCEDURE — 7100000031 HC ASPR PHASE II RECOVERY - ADDTL 15 MIN

## 2017-07-06 PROCEDURE — 72126 CT NECK SPINE W/DYE: CPT

## 2017-07-06 PROCEDURE — 36415 COLL VENOUS BLD VENIPUNCTURE: CPT

## 2017-07-06 PROCEDURE — 94760 N-INVAS EAR/PLS OXIMETRY 1: CPT

## 2017-07-06 PROCEDURE — 62305 MYELOGRAPHY LUMBAR INJECTION: CPT | Performed by: RADIOLOGY

## 2017-07-06 PROCEDURE — 85730 THROMBOPLASTIN TIME PARTIAL: CPT

## 2017-07-06 PROCEDURE — 7100000030 HC ASPR PHASE II RECOVERY - FIRST 15 MIN

## 2017-07-06 PROCEDURE — 85610 PROTHROMBIN TIME: CPT

## 2017-07-06 PROCEDURE — 2500000003 HC RX 250 WO HCPCS: Performed by: RADIOLOGY

## 2017-07-06 PROCEDURE — 72132 CT LUMBAR SPINE W/DYE: CPT

## 2017-07-06 PROCEDURE — 85049 AUTOMATED PLATELET COUNT: CPT

## 2017-07-06 PROCEDURE — 6360000004 HC RX CONTRAST MEDICATION: Performed by: ORTHOPAEDIC SURGERY

## 2017-07-06 PROCEDURE — 72129 CT CHEST SPINE W/DYE: CPT

## 2017-07-06 RX ORDER — PREGABALIN 100 MG/1
CAPSULE ORAL
Qty: 60 CAPSULE | Refills: 2 | Status: SHIPPED | OUTPATIENT
Start: 2017-07-06 | End: 2017-11-29 | Stop reason: SDUPTHER

## 2017-07-06 RX ORDER — SODIUM CHLORIDE 9 MG/ML
INJECTION, SOLUTION INTRAVENOUS CONTINUOUS
Status: DISCONTINUED | OUTPATIENT
Start: 2017-07-06 | End: 2017-07-09 | Stop reason: HOSPADM

## 2017-07-06 RX ORDER — LIDOCAINE HYDROCHLORIDE 10 MG/ML
INJECTION, SOLUTION EPIDURAL; INFILTRATION; INTRACAUDAL; PERINEURAL
Status: COMPLETED | OUTPATIENT
Start: 2017-07-06 | End: 2017-07-06

## 2017-07-06 RX ORDER — SODIUM CHLORIDE 9 MG/ML
INJECTION, SOLUTION INTRAVENOUS CONTINUOUS
Status: DISCONTINUED | OUTPATIENT
Start: 2017-07-06 | End: 2017-07-06 | Stop reason: CLARIF

## 2017-07-06 RX ORDER — ACETAMINOPHEN 325 MG/1
650 TABLET ORAL EVERY 4 HOURS PRN
Status: DISCONTINUED | OUTPATIENT
Start: 2017-07-06 | End: 2017-07-09 | Stop reason: HOSPADM

## 2017-07-06 RX ADMIN — IOPAMIDOL 30 ML: 612 INJECTION, SOLUTION INTRAVENOUS at 12:38

## 2017-07-06 RX ADMIN — LIDOCAINE HYDROCHLORIDE 5 ML: 10 INJECTION, SOLUTION EPIDURAL; INFILTRATION; INTRACAUDAL; PERINEURAL at 12:29

## 2017-07-06 ASSESSMENT — PAIN - FUNCTIONAL ASSESSMENT
PAIN_FUNCTIONAL_ASSESSMENT: 0-10
PAIN_FUNCTIONAL_ASSESSMENT: 0-10

## 2017-07-13 ENCOUNTER — OFFICE VISIT (OUTPATIENT)
Dept: ORTHOPEDIC SURGERY | Age: 57
End: 2017-07-13
Payer: MEDICARE

## 2017-07-13 DIAGNOSIS — M54.2 NECK PAIN: ICD-10-CM

## 2017-07-13 DIAGNOSIS — M54.41 CHRONIC MIDLINE LOW BACK PAIN WITH BILATERAL SCIATICA: ICD-10-CM

## 2017-07-13 DIAGNOSIS — M50.30 DDD (DEGENERATIVE DISC DISEASE), CERVICAL: ICD-10-CM

## 2017-07-13 DIAGNOSIS — G89.29 CHRONIC MIDLINE LOW BACK PAIN WITH BILATERAL SCIATICA: ICD-10-CM

## 2017-07-13 DIAGNOSIS — M54.42 CHRONIC MIDLINE LOW BACK PAIN WITH BILATERAL SCIATICA: ICD-10-CM

## 2017-07-13 DIAGNOSIS — D32.9 MENINGIOMA (HCC): Primary | ICD-10-CM

## 2017-07-13 PROCEDURE — 99213 OFFICE O/P EST LOW 20 MIN: CPT | Performed by: ORTHOPAEDIC SURGERY

## 2017-07-18 RX ORDER — TRAMADOL HYDROCHLORIDE 50 MG/1
50-100 TABLET ORAL EVERY 6 HOURS PRN
Qty: 40 TABLET | Refills: 0 | OUTPATIENT
Start: 2017-07-18 | End: 2018-07-18

## 2017-08-03 ENCOUNTER — TELEPHONE (OUTPATIENT)
Dept: FAMILY MEDICINE CLINIC | Age: 57
End: 2017-08-03

## 2017-08-04 RX ORDER — VARENICLINE TARTRATE 25 MG
KIT ORAL
Qty: 53 TABLET | Refills: 0 | Status: SHIPPED | OUTPATIENT
Start: 2017-08-04 | End: 2017-11-09 | Stop reason: ALTCHOICE

## 2017-08-08 ENCOUNTER — OFFICE VISIT (OUTPATIENT)
Dept: FAMILY MEDICINE CLINIC | Age: 57
End: 2017-08-08
Payer: MEDICARE

## 2017-08-08 ENCOUNTER — HOSPITAL ENCOUNTER (OUTPATIENT)
Age: 57
Setting detail: SPECIMEN
Discharge: HOME | End: 2017-08-08
Payer: MEDICARE

## 2017-08-08 VITALS
HEART RATE: 78 BPM | SYSTOLIC BLOOD PRESSURE: 110 MMHG | WEIGHT: 156.2 LBS | TEMPERATURE: 97.4 F | DIASTOLIC BLOOD PRESSURE: 81 MMHG | HEIGHT: 66 IN | BODY MASS INDEX: 25.1 KG/M2 | RESPIRATION RATE: 20 BRPM

## 2017-08-08 DIAGNOSIS — E87.6 HYPOKALEMIA: ICD-10-CM

## 2017-08-08 DIAGNOSIS — E78.2 MIXED HYPERLIPIDEMIA: ICD-10-CM

## 2017-08-08 DIAGNOSIS — F17.200 TOBACCO DEPENDENCE: ICD-10-CM

## 2017-08-08 DIAGNOSIS — I10 ESSENTIAL HYPERTENSION: Primary | ICD-10-CM

## 2017-08-08 DIAGNOSIS — M54.2 NECK PAIN: ICD-10-CM

## 2017-08-08 LAB — POTASSIUM SERPL-SCNC: 3.4 MMOL/L (ref 3.7–5.3)

## 2017-08-08 PROCEDURE — 36415 COLL VENOUS BLD VENIPUNCTURE: CPT

## 2017-08-08 PROCEDURE — 84132 ASSAY OF SERUM POTASSIUM: CPT

## 2017-08-08 PROCEDURE — 99214 OFFICE O/P EST MOD 30 MIN: CPT | Performed by: NURSE PRACTITIONER

## 2017-08-08 RX ORDER — LIDOCAINE 50 MG/G
OINTMENT TOPICAL
Qty: 1 TUBE | Refills: 1 | Status: SHIPPED | OUTPATIENT
Start: 2017-08-08 | End: 2017-11-09 | Stop reason: ALTCHOICE

## 2017-08-08 ASSESSMENT — ENCOUNTER SYMPTOMS
WHEEZING: 0
NAUSEA: 0
VOMITING: 0
SHORTNESS OF BREATH: 0
BLOOD IN STOOL: 0
CHEST TIGHTNESS: 0
ABDOMINAL PAIN: 0

## 2017-08-09 DIAGNOSIS — E87.6 HYPOKALEMIA: Primary | ICD-10-CM

## 2017-08-09 RX ORDER — POTASSIUM CHLORIDE 20 MEQ/1
20 TABLET, EXTENDED RELEASE ORAL 2 TIMES DAILY
Qty: 10 TABLET | Refills: 0 | Status: SHIPPED | OUTPATIENT
Start: 2017-08-09 | End: 2017-11-09 | Stop reason: ALTCHOICE

## 2017-08-10 ENCOUNTER — OFFICE VISIT (OUTPATIENT)
Dept: ORTHOPEDIC SURGERY | Age: 57
End: 2017-08-10
Payer: MEDICARE

## 2017-08-10 DIAGNOSIS — M50.30 DDD (DEGENERATIVE DISC DISEASE), CERVICAL: Primary | ICD-10-CM

## 2017-08-10 DIAGNOSIS — M48.02 SPINAL STENOSIS IN CERVICAL REGION: ICD-10-CM

## 2017-08-10 DIAGNOSIS — M54.2 CERVICALGIA: ICD-10-CM

## 2017-08-10 DIAGNOSIS — M50.30 DEGENERATION OF CERVICAL INTERVERTEBRAL DISC: ICD-10-CM

## 2017-08-10 PROCEDURE — 99213 OFFICE O/P EST LOW 20 MIN: CPT | Performed by: ORTHOPAEDIC SURGERY

## 2017-08-17 ENCOUNTER — HOSPITAL ENCOUNTER (OUTPATIENT)
Age: 57
Discharge: HOME | End: 2017-08-17
Payer: MEDICARE

## 2017-08-17 ENCOUNTER — OFFICE VISIT (OUTPATIENT)
Dept: ORTHOPEDIC SURGERY | Age: 57
End: 2017-08-17
Payer: MEDICARE

## 2017-08-17 DIAGNOSIS — M50.30 DDD (DEGENERATIVE DISC DISEASE), CERVICAL: ICD-10-CM

## 2017-08-17 DIAGNOSIS — M51.37 DEGENERATION OF LUMBAR OR LUMBOSACRAL INTERVERTEBRAL DISC: Primary | ICD-10-CM

## 2017-08-17 DIAGNOSIS — M48.02 SPINAL STENOSIS IN CERVICAL REGION: ICD-10-CM

## 2017-08-17 DIAGNOSIS — M50.30 DEGENERATION OF CERVICAL INTERVERTEBRAL DISC: ICD-10-CM

## 2017-08-17 PROCEDURE — G0480 DRUG TEST DEF 1-7 CLASSES: HCPCS

## 2017-08-17 PROCEDURE — 99213 OFFICE O/P EST LOW 20 MIN: CPT | Performed by: ORTHOPAEDIC SURGERY

## 2017-08-19 ENCOUNTER — APPOINTMENT (OUTPATIENT)
Dept: CT IMAGING | Age: 57
End: 2017-08-19
Payer: MEDICARE

## 2017-08-19 ENCOUNTER — HOSPITAL ENCOUNTER (EMERGENCY)
Age: 57
Discharge: HOME | End: 2017-08-19
Attending: EMERGENCY MEDICINE
Payer: MEDICARE

## 2017-08-19 ENCOUNTER — APPOINTMENT (OUTPATIENT)
Dept: ULTRASOUND IMAGING | Age: 57
End: 2017-08-19
Payer: MEDICARE

## 2017-08-19 VITALS
OXYGEN SATURATION: 94 % | SYSTOLIC BLOOD PRESSURE: 112 MMHG | BODY MASS INDEX: 24.75 KG/M2 | WEIGHT: 154 LBS | RESPIRATION RATE: 16 BRPM | DIASTOLIC BLOOD PRESSURE: 85 MMHG | TEMPERATURE: 98 F | HEIGHT: 66 IN | HEART RATE: 82 BPM

## 2017-08-19 DIAGNOSIS — R10.31 RIGHT LOWER QUADRANT ABDOMINAL PAIN: Primary | ICD-10-CM

## 2017-08-19 LAB
ABSOLUTE EOS #: 0.19 K/UL (ref 0–0.4)
ABSOLUTE LYMPH #: 2.49 K/UL (ref 1–4.8)
ABSOLUTE MONO #: 0.24 K/UL (ref 0.1–1.3)
ALBUMIN SERPL-MCNC: 3.9 G/DL (ref 3.5–5.2)
ALBUMIN/GLOBULIN RATIO: ABNORMAL (ref 1–2.5)
ALP BLD-CCNC: 60 U/L (ref 35–104)
ALT SERPL-CCNC: 20 U/L (ref 5–33)
ANION GAP SERPL CALCULATED.3IONS-SCNC: 14 MMOL/L (ref 9–17)
AST SERPL-CCNC: 21 U/L
ATYPICAL LYMPHOCYTE ABSOLUTE COUNT: 0.1 K/UL
ATYPICAL LYMPHOCYTES: 2 %
BASOPHILS # BLD: 0 %
BASOPHILS ABSOLUTE: 0 K/UL (ref 0–0.2)
BILIRUB SERPL-MCNC: 0.28 MG/DL (ref 0.3–1.2)
BILIRUBIN URINE: NEGATIVE
BUN BLDV-MCNC: 8 MG/DL (ref 6–20)
BUN/CREAT BLD: ABNORMAL (ref 9–20)
CALCIUM SERPL-MCNC: 8.6 MG/DL (ref 8.6–10.4)
CHLORIDE BLD-SCNC: 102 MMOL/L (ref 98–107)
CO2: 23 MMOL/L (ref 20–31)
COLOR: YELLOW
COMMENT UA: NORMAL
CREAT SERPL-MCNC: 0.67 MG/DL (ref 0.5–0.9)
DIFFERENTIAL TYPE: NORMAL
EOSINOPHILS RELATIVE PERCENT: 4 %
GFR AFRICAN AMERICAN: >60 ML/MIN
GFR NON-AFRICAN AMERICAN: >60 ML/MIN
GFR SERPL CREATININE-BSD FRML MDRD: ABNORMAL ML/MIN/{1.73_M2}
GFR SERPL CREATININE-BSD FRML MDRD: ABNORMAL ML/MIN/{1.73_M2}
GLUCOSE BLD-MCNC: 109 MG/DL (ref 70–99)
GLUCOSE URINE: NEGATIVE
HCT VFR BLD CALC: 40.9 % (ref 36–46)
HEMOGLOBIN: 13.5 G/DL (ref 12–16)
KETONES, URINE: NEGATIVE
LACTIC ACID: 1.5 MMOL/L (ref 0.5–2.2)
LEUKOCYTE ESTERASE, URINE: NEGATIVE
LIPASE: 29 U/L (ref 13–60)
LYMPHOCYTES # BLD: 52 %
MCH RBC QN AUTO: 29.9 PG (ref 26–34)
MCHC RBC AUTO-ENTMCNC: 33 G/DL (ref 31–37)
MCV RBC AUTO: 90.8 FL (ref 80–100)
MONOCYTES # BLD: 5 %
MORPHOLOGY: NORMAL
NITRITE, URINE: NEGATIVE
PDW BLD-RTO: 14.4 % (ref 11.5–14.9)
PH UA: 7.5 (ref 5–8)
PLATELET # BLD: 252 K/UL (ref 150–450)
PLATELET ESTIMATE: NORMAL
PMV BLD AUTO: 8.9 FL (ref 6–12)
POTASSIUM SERPL-SCNC: 3.3 MMOL/L (ref 3.7–5.3)
PROTEIN UA: NEGATIVE
RBC # BLD: 4.5 M/UL (ref 4–5.2)
RBC # BLD: NORMAL 10*6/UL
SEG NEUTROPHILS: 37 %
SEGMENTED NEUTROPHILS ABSOLUTE COUNT: 1.78 K/UL (ref 1.3–9.1)
SODIUM BLD-SCNC: 139 MMOL/L (ref 135–144)
SPECIFIC GRAVITY UA: 1 (ref 1–1.03)
TOTAL PROTEIN: 6.4 G/DL (ref 6.4–8.3)
TSH SERPL DL<=0.05 MIU/L-ACNC: 0.93 MIU/L (ref 0.3–5)
TURBIDITY: CLEAR
URINE HGB: NEGATIVE
UROBILINOGEN, URINE: NORMAL
WBC # BLD: 4.8 K/UL (ref 3.5–11)
WBC # BLD: NORMAL 10*3/UL

## 2017-08-19 PROCEDURE — 84443 ASSAY THYROID STIM HORMONE: CPT

## 2017-08-19 PROCEDURE — 76830 TRANSVAGINAL US NON-OB: CPT

## 2017-08-19 PROCEDURE — 96375 TX/PRO/DX INJ NEW DRUG ADDON: CPT

## 2017-08-19 PROCEDURE — 93976 VASCULAR STUDY: CPT

## 2017-08-19 PROCEDURE — 99284 EMERGENCY DEPT VISIT MOD MDM: CPT

## 2017-08-19 PROCEDURE — 85025 COMPLETE CBC W/AUTO DIFF WBC: CPT

## 2017-08-19 PROCEDURE — 81003 URINALYSIS AUTO W/O SCOPE: CPT

## 2017-08-19 PROCEDURE — 6360000004 HC RX CONTRAST MEDICATION: Performed by: EMERGENCY MEDICINE

## 2017-08-19 PROCEDURE — 6360000002 HC RX W HCPCS: Performed by: EMERGENCY MEDICINE

## 2017-08-19 PROCEDURE — 96376 TX/PRO/DX INJ SAME DRUG ADON: CPT

## 2017-08-19 PROCEDURE — 96374 THER/PROPH/DIAG INJ IV PUSH: CPT

## 2017-08-19 PROCEDURE — 80053 COMPREHEN METABOLIC PANEL: CPT

## 2017-08-19 PROCEDURE — 83605 ASSAY OF LACTIC ACID: CPT

## 2017-08-19 PROCEDURE — 36415 COLL VENOUS BLD VENIPUNCTURE: CPT

## 2017-08-19 PROCEDURE — 83690 ASSAY OF LIPASE: CPT

## 2017-08-19 PROCEDURE — 74177 CT ABD & PELVIS W/CONTRAST: CPT

## 2017-08-19 PROCEDURE — 2580000003 HC RX 258: Performed by: EMERGENCY MEDICINE

## 2017-08-19 PROCEDURE — 6370000000 HC RX 637 (ALT 250 FOR IP): Performed by: EMERGENCY MEDICINE

## 2017-08-19 RX ORDER — MORPHINE SULFATE 4 MG/ML
4 INJECTION, SOLUTION INTRAMUSCULAR; INTRAVENOUS ONCE
Status: COMPLETED | OUTPATIENT
Start: 2017-08-19 | End: 2017-08-19

## 2017-08-19 RX ORDER — SODIUM CHLORIDE 0.9 % (FLUSH) 0.9 %
10 SYRINGE (ML) INJECTION PRN
Status: DISCONTINUED | OUTPATIENT
Start: 2017-08-19 | End: 2017-08-19 | Stop reason: HOSPADM

## 2017-08-19 RX ORDER — KETOROLAC TROMETHAMINE 30 MG/ML
30 INJECTION, SOLUTION INTRAMUSCULAR; INTRAVENOUS ONCE
Status: COMPLETED | OUTPATIENT
Start: 2017-08-19 | End: 2017-08-19

## 2017-08-19 RX ORDER — HYDROCODONE BITARTRATE AND ACETAMINOPHEN 5; 325 MG/1; MG/1
1 TABLET ORAL EVERY 6 HOURS PRN
Qty: 10 TABLET | Refills: 0 | Status: SHIPPED | OUTPATIENT
Start: 2017-08-19 | End: 2017-08-26

## 2017-08-19 RX ORDER — 0.9 % SODIUM CHLORIDE 0.9 %
1000 INTRAVENOUS SOLUTION INTRAVENOUS ONCE
Status: COMPLETED | OUTPATIENT
Start: 2017-08-19 | End: 2017-08-19

## 2017-08-19 RX ORDER — HYDROCODONE BITARTRATE AND ACETAMINOPHEN 5; 325 MG/1; MG/1
1 TABLET ORAL ONCE
Status: COMPLETED | OUTPATIENT
Start: 2017-08-19 | End: 2017-08-19

## 2017-08-19 RX ORDER — 0.9 % SODIUM CHLORIDE 0.9 %
100 INTRAVENOUS SOLUTION INTRAVENOUS ONCE
Status: COMPLETED | OUTPATIENT
Start: 2017-08-19 | End: 2017-08-19

## 2017-08-19 RX ADMIN — MORPHINE SULFATE 4 MG: 4 INJECTION, SOLUTION INTRAMUSCULAR; INTRAVENOUS at 16:05

## 2017-08-19 RX ADMIN — MORPHINE SULFATE 4 MG: 4 INJECTION, SOLUTION INTRAMUSCULAR; INTRAVENOUS at 17:22

## 2017-08-19 RX ADMIN — SODIUM CHLORIDE 100 ML: 9 INJECTION, SOLUTION INTRAVENOUS at 18:18

## 2017-08-19 RX ADMIN — IOVERSOL 130 ML: 741 INJECTION INTRA-ARTERIAL; INTRAVENOUS at 18:18

## 2017-08-19 RX ADMIN — Medication 10 ML: at 18:19

## 2017-08-19 RX ADMIN — KETOROLAC TROMETHAMINE 30 MG: 30 INJECTION, SOLUTION INTRAMUSCULAR at 15:30

## 2017-08-19 RX ADMIN — HYDROCODONE BITARTRATE AND ACETAMINOPHEN 1 TABLET: 5; 325 TABLET ORAL at 19:56

## 2017-08-19 RX ADMIN — SODIUM CHLORIDE 1000 ML: 9 INJECTION, SOLUTION INTRAVENOUS at 15:30

## 2017-08-19 ASSESSMENT — ENCOUNTER SYMPTOMS
COUGH: 0
EYE REDNESS: 0
EYE DISCHARGE: 0
ABDOMINAL PAIN: 1
NAUSEA: 0
COLOR CHANGE: 0
BACK PAIN: 1
CHEST TIGHTNESS: 0
VOMITING: 0
BLOOD IN STOOL: 0
WHEEZING: 0
SORE THROAT: 0
CONSTIPATION: 0
EYE PAIN: 0
SHORTNESS OF BREATH: 0
DIARRHEA: 0
TROUBLE SWALLOWING: 0
FACIAL SWELLING: 0
RHINORRHEA: 0
SINUS PRESSURE: 0

## 2017-08-19 ASSESSMENT — PAIN SCALES - GENERAL
PAINLEVEL_OUTOF10: 8
PAINLEVEL_OUTOF10: 8
PAINLEVEL_OUTOF10: 10

## 2017-08-19 ASSESSMENT — PAIN DESCRIPTION - LOCATION: LOCATION: ABDOMEN

## 2017-08-19 ASSESSMENT — PAIN DESCRIPTION - PAIN TYPE: TYPE: ACUTE PAIN

## 2017-08-24 LAB
3-OH-COTININE URINE: 256 NG/ML
ANABASINE URINE: <3 NG/ML
COTININE, URINE: 111 NG/ML
NICOTINE URINE: 11 NG/ML
NORNICOTINE URINE: 3 NG/ML

## 2017-08-25 ENCOUNTER — TELEPHONE (OUTPATIENT)
Dept: ORTHOPEDIC SURGERY | Age: 57
End: 2017-08-25

## 2017-08-28 ENCOUNTER — HOSPITAL ENCOUNTER (OUTPATIENT)
Dept: CT IMAGING | Age: 57
Discharge: HOME | End: 2017-08-28
Payer: MEDICARE

## 2017-08-28 DIAGNOSIS — R91.1 PULMONARY NODULE: ICD-10-CM

## 2017-08-28 PROCEDURE — 71250 CT THORAX DX C-: CPT

## 2017-09-01 ENCOUNTER — APPOINTMENT (OUTPATIENT)
Dept: GENERAL RADIOLOGY | Age: 57
End: 2017-09-01
Payer: MEDICARE

## 2017-09-01 ENCOUNTER — HOSPITAL ENCOUNTER (EMERGENCY)
Age: 57
Discharge: HOME | End: 2017-09-01
Attending: EMERGENCY MEDICINE
Payer: MEDICARE

## 2017-09-01 VITALS
OXYGEN SATURATION: 93 % | TEMPERATURE: 98.2 F | SYSTOLIC BLOOD PRESSURE: 125 MMHG | HEART RATE: 106 BPM | WEIGHT: 156 LBS | BODY MASS INDEX: 25.07 KG/M2 | DIASTOLIC BLOOD PRESSURE: 86 MMHG | RESPIRATION RATE: 20 BRPM | HEIGHT: 66 IN

## 2017-09-01 DIAGNOSIS — E86.0 DEHYDRATION: Primary | ICD-10-CM

## 2017-09-01 DIAGNOSIS — E87.6 HYPOKALEMIA: ICD-10-CM

## 2017-09-01 DIAGNOSIS — R11.0 NAUSEA: ICD-10-CM

## 2017-09-01 LAB
-: ABNORMAL
ABSOLUTE EOS #: 0.1 K/UL (ref 0–0.4)
ABSOLUTE LYMPH #: 2.9 K/UL (ref 1–4.8)
ABSOLUTE MONO #: 0.7 K/UL (ref 0.1–1.3)
ALBUMIN SERPL-MCNC: 5 G/DL (ref 3.5–5.2)
ALBUMIN/GLOBULIN RATIO: ABNORMAL (ref 1–2.5)
ALP BLD-CCNC: 79 U/L (ref 35–104)
ALT SERPL-CCNC: 21 U/L (ref 5–33)
AMORPHOUS: ABNORMAL
ANION GAP SERPL CALCULATED.3IONS-SCNC: 19 MMOL/L (ref 9–17)
AST SERPL-CCNC: 22 U/L
BACTERIA: ABNORMAL
BASOPHILS # BLD: 0 %
BASOPHILS ABSOLUTE: 0 K/UL (ref 0–0.2)
BILIRUB SERPL-MCNC: 0.66 MG/DL (ref 0.3–1.2)
BILIRUBIN URINE: ABNORMAL
BUN BLDV-MCNC: 11 MG/DL (ref 6–20)
BUN/CREAT BLD: ABNORMAL (ref 9–20)
CALCIUM SERPL-MCNC: 9.9 MG/DL (ref 8.6–10.4)
CASTS UA: ABNORMAL /LPF
CHLORIDE BLD-SCNC: 98 MMOL/L (ref 98–107)
CO2: 26 MMOL/L (ref 20–31)
COLOR: YELLOW
COMMENT UA: ABNORMAL
CREAT SERPL-MCNC: 0.77 MG/DL (ref 0.5–0.9)
CRYSTALS, UA: ABNORMAL /HPF
DIFFERENTIAL TYPE: ABNORMAL
EOSINOPHILS RELATIVE PERCENT: 2 %
EPITHELIAL CELLS UA: ABNORMAL /HPF
GFR AFRICAN AMERICAN: >60 ML/MIN
GFR NON-AFRICAN AMERICAN: >60 ML/MIN
GFR SERPL CREATININE-BSD FRML MDRD: ABNORMAL ML/MIN/{1.73_M2}
GFR SERPL CREATININE-BSD FRML MDRD: ABNORMAL ML/MIN/{1.73_M2}
GLUCOSE BLD-MCNC: 107 MG/DL (ref 70–99)
GLUCOSE URINE: NEGATIVE
HCT VFR BLD CALC: 51.7 % (ref 36–46)
HEMOGLOBIN: 17.3 G/DL (ref 12–16)
KETONES, URINE: ABNORMAL
LACTIC ACID, WHOLE BLOOD: NORMAL MMOL/L (ref 0.7–2.1)
LACTIC ACID: 2 MMOL/L (ref 0.5–2.2)
LEUKOCYTE ESTERASE, URINE: NEGATIVE
LIPASE: 34 U/L (ref 13–60)
LYMPHOCYTES # BLD: 39 %
MCH RBC QN AUTO: 30.3 PG (ref 26–34)
MCHC RBC AUTO-ENTMCNC: 33.4 G/DL (ref 31–37)
MCV RBC AUTO: 90.9 FL (ref 80–100)
MONOCYTES # BLD: 10 %
MUCUS: ABNORMAL
NITRITE, URINE: NEGATIVE
OTHER OBSERVATIONS UA: ABNORMAL
PDW BLD-RTO: 14.7 % (ref 11.5–14.9)
PH UA: 6.5 (ref 5–8)
PLATELET # BLD: 355 K/UL (ref 150–450)
PLATELET ESTIMATE: ABNORMAL
PMV BLD AUTO: 8.7 FL (ref 6–12)
POTASSIUM SERPL-SCNC: 2.9 MMOL/L (ref 3.7–5.3)
PROTEIN UA: NEGATIVE
RBC # BLD: 5.69 M/UL (ref 4–5.2)
RBC # BLD: ABNORMAL 10*6/UL
RBC UA: ABNORMAL /HPF
RENAL EPITHELIAL, UA: ABNORMAL /HPF
SEG NEUTROPHILS: 49 %
SEGMENTED NEUTROPHILS ABSOLUTE COUNT: 3.6 K/UL (ref 1.3–9.1)
SODIUM BLD-SCNC: 143 MMOL/L (ref 135–144)
SPECIFIC GRAVITY UA: 1.01 (ref 1–1.03)
TOTAL PROTEIN: 8.3 G/DL (ref 6.4–8.3)
TRICHOMONAS: ABNORMAL
TURBIDITY: CLEAR
URINE HGB: NEGATIVE
UROBILINOGEN, URINE: NORMAL
WBC # BLD: 7.4 K/UL (ref 3.5–11)
WBC # BLD: ABNORMAL 10*3/UL
WBC UA: ABNORMAL /HPF
YEAST: ABNORMAL

## 2017-09-01 PROCEDURE — 85025 COMPLETE CBC W/AUTO DIFF WBC: CPT

## 2017-09-01 PROCEDURE — 96372 THER/PROPH/DIAG INJ SC/IM: CPT

## 2017-09-01 PROCEDURE — 96375 TX/PRO/DX INJ NEW DRUG ADDON: CPT

## 2017-09-01 PROCEDURE — 6360000002 HC RX W HCPCS: Performed by: EMERGENCY MEDICINE

## 2017-09-01 PROCEDURE — 36415 COLL VENOUS BLD VENIPUNCTURE: CPT

## 2017-09-01 PROCEDURE — 80053 COMPREHEN METABOLIC PANEL: CPT

## 2017-09-01 PROCEDURE — 83605 ASSAY OF LACTIC ACID: CPT

## 2017-09-01 PROCEDURE — 6370000000 HC RX 637 (ALT 250 FOR IP): Performed by: EMERGENCY MEDICINE

## 2017-09-01 PROCEDURE — 81001 URINALYSIS AUTO W/SCOPE: CPT

## 2017-09-01 PROCEDURE — 2580000003 HC RX 258: Performed by: EMERGENCY MEDICINE

## 2017-09-01 PROCEDURE — 99285 EMERGENCY DEPT VISIT HI MDM: CPT

## 2017-09-01 PROCEDURE — 96366 THER/PROPH/DIAG IV INF ADDON: CPT

## 2017-09-01 PROCEDURE — 96365 THER/PROPH/DIAG IV INF INIT: CPT

## 2017-09-01 PROCEDURE — 83690 ASSAY OF LIPASE: CPT

## 2017-09-01 RX ORDER — PROMETHAZINE HYDROCHLORIDE 25 MG/1
25 TABLET ORAL EVERY 6 HOURS PRN
Qty: 20 TABLET | Refills: 0 | Status: SHIPPED | OUTPATIENT
Start: 2017-09-01 | End: 2017-09-08

## 2017-09-01 RX ORDER — ONDANSETRON 4 MG/1
4 TABLET, ORALLY DISINTEGRATING ORAL EVERY 6 HOURS PRN
Qty: 20 TABLET | Refills: 0 | Status: SHIPPED | OUTPATIENT
Start: 2017-09-01 | End: 2017-09-14 | Stop reason: ALTCHOICE

## 2017-09-01 RX ORDER — PROMETHAZINE HYDROCHLORIDE 25 MG/ML
12.5 INJECTION, SOLUTION INTRAMUSCULAR; INTRAVENOUS ONCE
Status: DISCONTINUED | OUTPATIENT
Start: 2017-09-01 | End: 2017-09-01 | Stop reason: HOSPADM

## 2017-09-01 RX ORDER — DIPHENHYDRAMINE HYDROCHLORIDE 50 MG/ML
25 INJECTION INTRAMUSCULAR; INTRAVENOUS ONCE
Status: COMPLETED | OUTPATIENT
Start: 2017-09-01 | End: 2017-09-01

## 2017-09-01 RX ORDER — POTASSIUM CHLORIDE 7.45 MG/ML
10 INJECTION INTRAVENOUS ONCE
Status: COMPLETED | OUTPATIENT
Start: 2017-09-01 | End: 2017-09-01

## 2017-09-01 RX ORDER — ONDANSETRON 2 MG/ML
4 INJECTION INTRAMUSCULAR; INTRAVENOUS ONCE
Status: COMPLETED | OUTPATIENT
Start: 2017-09-01 | End: 2017-09-01

## 2017-09-01 RX ORDER — POTASSIUM CHLORIDE 20 MEQ/1
40 TABLET, EXTENDED RELEASE ORAL ONCE
Status: COMPLETED | OUTPATIENT
Start: 2017-09-01 | End: 2017-09-01

## 2017-09-01 RX ORDER — 0.9 % SODIUM CHLORIDE 0.9 %
1000 INTRAVENOUS SOLUTION INTRAVENOUS ONCE
Status: COMPLETED | OUTPATIENT
Start: 2017-09-01 | End: 2017-09-01

## 2017-09-01 RX ORDER — PROMETHAZINE HYDROCHLORIDE 25 MG/ML
25 INJECTION, SOLUTION INTRAMUSCULAR; INTRAVENOUS ONCE
Status: COMPLETED | OUTPATIENT
Start: 2017-09-01 | End: 2017-09-01

## 2017-09-01 RX ADMIN — PROMETHAZINE HYDROCHLORIDE 25 MG: 25 INJECTION INTRAMUSCULAR; INTRAVENOUS at 19:46

## 2017-09-01 RX ADMIN — SODIUM CHLORIDE 1000 ML: 9 INJECTION, SOLUTION INTRAVENOUS at 18:06

## 2017-09-01 RX ADMIN — SODIUM CHLORIDE 1000 ML: 9 INJECTION, SOLUTION INTRAVENOUS at 17:00

## 2017-09-01 RX ADMIN — POTASSIUM CHLORIDE 10 MEQ: 7.46 INJECTION, SOLUTION INTRAVENOUS at 18:06

## 2017-09-01 RX ADMIN — DIPHENHYDRAMINE HYDROCHLORIDE 25 MG: 50 INJECTION, SOLUTION INTRAMUSCULAR; INTRAVENOUS at 19:22

## 2017-09-01 RX ADMIN — ONDANSETRON 4 MG: 2 INJECTION, SOLUTION INTRAMUSCULAR; INTRAVENOUS at 17:00

## 2017-09-01 RX ADMIN — POTASSIUM CHLORIDE 40 MEQ: 20 TABLET, EXTENDED RELEASE ORAL at 18:06

## 2017-09-01 ASSESSMENT — ENCOUNTER SYMPTOMS
WHEEZING: 0
ABDOMINAL PAIN: 0
DIARRHEA: 0
BLOOD IN STOOL: 0
SHORTNESS OF BREATH: 0
VOMITING: 1
BACK PAIN: 0
RHINORRHEA: 0
NAUSEA: 1

## 2017-09-01 ASSESSMENT — PAIN SCALES - GENERAL
PAINLEVEL_OUTOF10: 3
PAINLEVEL_OUTOF10: 5

## 2017-09-01 ASSESSMENT — PAIN DESCRIPTION - LOCATION: LOCATION: ABDOMEN;BACK

## 2017-09-01 ASSESSMENT — PAIN DESCRIPTION - PAIN TYPE: TYPE: ACUTE PAIN

## 2017-09-01 ASSESSMENT — PAIN DESCRIPTION - ORIENTATION: ORIENTATION: OTHER (COMMENT)

## 2017-09-05 ENCOUNTER — TELEPHONE (OUTPATIENT)
Dept: FAMILY MEDICINE CLINIC | Age: 57
End: 2017-09-05

## 2017-09-06 RX ORDER — VARENICLINE TARTRATE 1 MG/1
1 TABLET, FILM COATED ORAL 2 TIMES DAILY
Qty: 60 TABLET | Refills: 3 | Status: SHIPPED | OUTPATIENT
Start: 2017-09-06 | End: 2018-03-12 | Stop reason: SDUPTHER

## 2017-09-14 ENCOUNTER — OFFICE VISIT (OUTPATIENT)
Dept: PULMONOLOGY | Age: 57
End: 2017-09-14
Payer: MEDICARE

## 2017-09-14 ENCOUNTER — TELEPHONE (OUTPATIENT)
Dept: FAMILY MEDICINE CLINIC | Age: 57
End: 2017-09-14

## 2017-09-14 VITALS
DIASTOLIC BLOOD PRESSURE: 86 MMHG | OXYGEN SATURATION: 98 % | BODY MASS INDEX: 24.43 KG/M2 | HEART RATE: 90 BPM | RESPIRATION RATE: 20 BRPM | SYSTOLIC BLOOD PRESSURE: 117 MMHG | HEIGHT: 66 IN | WEIGHT: 152 LBS

## 2017-09-14 DIAGNOSIS — J44.9 CHRONIC OBSTRUCTIVE PULMONARY DISEASE, UNSPECIFIED COPD TYPE (HCC): Primary | ICD-10-CM

## 2017-09-14 DIAGNOSIS — I10 ESSENTIAL HYPERTENSION: ICD-10-CM

## 2017-09-14 DIAGNOSIS — Z87.891 SMOKING HISTORY: ICD-10-CM

## 2017-09-14 DIAGNOSIS — R91.1 PULMONARY NODULE: ICD-10-CM

## 2017-09-14 PROCEDURE — 90688 IIV4 VACCINE SPLT 0.5 ML IM: CPT | Performed by: INTERNAL MEDICINE

## 2017-09-14 PROCEDURE — 99214 OFFICE O/P EST MOD 30 MIN: CPT | Performed by: INTERNAL MEDICINE

## 2017-09-14 PROCEDURE — G0008 ADMIN INFLUENZA VIRUS VAC: HCPCS | Performed by: INTERNAL MEDICINE

## 2017-09-14 RX ORDER — PAROXETINE HYDROCHLORIDE 20 MG/1
20 TABLET, FILM COATED ORAL EVERY MORNING
Qty: 30 TABLET | Refills: 3 | Status: SHIPPED | OUTPATIENT
Start: 2017-09-14 | End: 2018-01-28 | Stop reason: SDUPTHER

## 2017-09-22 ENCOUNTER — TELEPHONE (OUTPATIENT)
Dept: FAMILY MEDICINE CLINIC | Age: 57
End: 2017-09-22

## 2017-10-04 ENCOUNTER — HOSPITAL ENCOUNTER (OUTPATIENT)
Age: 57
Discharge: HOME | End: 2017-10-04
Payer: MEDICARE

## 2017-10-04 DIAGNOSIS — Z72.0 SMOKING TRYING TO QUIT: Primary | ICD-10-CM

## 2017-10-04 PROCEDURE — 36415 COLL VENOUS BLD VENIPUNCTURE: CPT

## 2017-10-04 PROCEDURE — G0480 DRUG TEST DEF 1-7 CLASSES: HCPCS

## 2017-10-05 PROBLEM — Z98.51 HISTORY OF TUBAL LIGATION: Status: ACTIVE | Noted: 2017-10-05

## 2017-10-06 ENCOUNTER — ANESTHESIA (OUTPATIENT)
Dept: OPERATING ROOM | Age: 57
End: 2017-10-06
Payer: MEDICARE

## 2017-10-06 ENCOUNTER — ANESTHESIA EVENT (OUTPATIENT)
Dept: OPERATING ROOM | Age: 57
End: 2017-10-06
Payer: MEDICARE

## 2017-10-06 ENCOUNTER — HOSPITAL ENCOUNTER (OUTPATIENT)
Age: 57
Setting detail: OUTPATIENT SURGERY
Discharge: HOME | End: 2017-10-06
Attending: SURGERY | Admitting: SURGERY
Payer: MEDICARE

## 2017-10-06 VITALS — SYSTOLIC BLOOD PRESSURE: 111 MMHG | OXYGEN SATURATION: 96 % | DIASTOLIC BLOOD PRESSURE: 73 MMHG

## 2017-10-06 VITALS
DIASTOLIC BLOOD PRESSURE: 88 MMHG | TEMPERATURE: 97 F | OXYGEN SATURATION: 99 % | BODY MASS INDEX: 24.75 KG/M2 | WEIGHT: 154 LBS | HEIGHT: 66 IN | RESPIRATION RATE: 16 BRPM | HEART RATE: 61 BPM | SYSTOLIC BLOOD PRESSURE: 140 MMHG

## 2017-10-06 PROCEDURE — 3700000000 HC ANESTHESIA ATTENDED CARE: Performed by: SURGERY

## 2017-10-06 PROCEDURE — 7100000000 HC PACU RECOVERY - FIRST 15 MIN: Performed by: SURGERY

## 2017-10-06 PROCEDURE — 6370000000 HC RX 637 (ALT 250 FOR IP): Performed by: SURGERY

## 2017-10-06 PROCEDURE — 6360000002 HC RX W HCPCS

## 2017-10-06 PROCEDURE — 88305 TISSUE EXAM BY PATHOLOGIST: CPT

## 2017-10-06 PROCEDURE — 3700000001 HC ADD 15 MINUTES (ANESTHESIA): Performed by: SURGERY

## 2017-10-06 PROCEDURE — 2580000003 HC RX 258: Performed by: SURGERY

## 2017-10-06 PROCEDURE — 88342 IMHCHEM/IMCYTCHM 1ST ANTB: CPT

## 2017-10-06 PROCEDURE — 7100000030 HC ASPR PHASE II RECOVERY - FIRST 15 MIN: Performed by: SURGERY

## 2017-10-06 PROCEDURE — 7100000031 HC ASPR PHASE II RECOVERY - ADDTL 15 MIN: Performed by: SURGERY

## 2017-10-06 PROCEDURE — 7100000001 HC PACU RECOVERY - ADDTL 15 MIN: Performed by: SURGERY

## 2017-10-06 PROCEDURE — 3609012400 HC EGD TRANSORAL BIOPSY SINGLE/MULTIPLE: Performed by: SURGERY

## 2017-10-06 PROCEDURE — 2500000003 HC RX 250 WO HCPCS

## 2017-10-06 RX ORDER — OXYCODONE HYDROCHLORIDE AND ACETAMINOPHEN 5; 325 MG/1; MG/1
1 TABLET ORAL PRN
Status: DISCONTINUED | OUTPATIENT
Start: 2017-10-06 | End: 2017-10-06 | Stop reason: HOSPADM

## 2017-10-06 RX ORDER — FENTANYL CITRATE 50 UG/ML
25 INJECTION, SOLUTION INTRAMUSCULAR; INTRAVENOUS EVERY 5 MIN PRN
Status: DISCONTINUED | OUTPATIENT
Start: 2017-10-06 | End: 2017-10-06 | Stop reason: HOSPADM

## 2017-10-06 RX ORDER — DIPHENHYDRAMINE HYDROCHLORIDE 50 MG/ML
12.5 INJECTION INTRAMUSCULAR; INTRAVENOUS
Status: DISCONTINUED | OUTPATIENT
Start: 2017-10-06 | End: 2017-10-06 | Stop reason: HOSPADM

## 2017-10-06 RX ORDER — SODIUM CHLORIDE, SODIUM LACTATE, POTASSIUM CHLORIDE, CALCIUM CHLORIDE 600; 310; 30; 20 MG/100ML; MG/100ML; MG/100ML; MG/100ML
INJECTION, SOLUTION INTRAVENOUS CONTINUOUS
Status: DISCONTINUED | OUTPATIENT
Start: 2017-10-06 | End: 2017-10-06 | Stop reason: HOSPADM

## 2017-10-06 RX ORDER — PROPOFOL 10 MG/ML
INJECTION, EMULSION INTRAVENOUS PRN
Status: DISCONTINUED | OUTPATIENT
Start: 2017-10-06 | End: 2017-10-06 | Stop reason: SDUPTHER

## 2017-10-06 RX ORDER — MORPHINE SULFATE 2 MG/ML
1 INJECTION, SOLUTION INTRAMUSCULAR; INTRAVENOUS EVERY 5 MIN PRN
Status: DISCONTINUED | OUTPATIENT
Start: 2017-10-06 | End: 2017-10-06 | Stop reason: HOSPADM

## 2017-10-06 RX ORDER — OXYCODONE HYDROCHLORIDE AND ACETAMINOPHEN 5; 325 MG/1; MG/1
2 TABLET ORAL PRN
Status: DISCONTINUED | OUTPATIENT
Start: 2017-10-06 | End: 2017-10-06 | Stop reason: HOSPADM

## 2017-10-06 RX ORDER — MEPERIDINE HYDROCHLORIDE 25 MG/ML
12.5 INJECTION INTRAMUSCULAR; INTRAVENOUS; SUBCUTANEOUS EVERY 5 MIN PRN
Status: DISCONTINUED | OUTPATIENT
Start: 2017-10-06 | End: 2017-10-06 | Stop reason: HOSPADM

## 2017-10-06 RX ORDER — LIDOCAINE HYDROCHLORIDE 10 MG/ML
INJECTION, SOLUTION EPIDURAL; INFILTRATION; INTRACAUDAL; PERINEURAL PRN
Status: DISCONTINUED | OUTPATIENT
Start: 2017-10-06 | End: 2017-10-06 | Stop reason: SDUPTHER

## 2017-10-06 RX ORDER — PROMETHAZINE HYDROCHLORIDE 25 MG/ML
6.25 INJECTION, SOLUTION INTRAMUSCULAR; INTRAVENOUS
Status: DISCONTINUED | OUTPATIENT
Start: 2017-10-06 | End: 2017-10-06 | Stop reason: HOSPADM

## 2017-10-06 RX ADMIN — PROPOFOL 50 MG: 10 INJECTION, EMULSION INTRAVENOUS at 11:21

## 2017-10-06 RX ADMIN — SODIUM CHLORIDE, POTASSIUM CHLORIDE, SODIUM LACTATE AND CALCIUM CHLORIDE: 600; 310; 30; 20 INJECTION, SOLUTION INTRAVENOUS at 09:18

## 2017-10-06 RX ADMIN — LIDOCAINE HYDROCHLORIDE 50 MG: 10 INJECTION, SOLUTION EPIDURAL; INFILTRATION; INTRACAUDAL; PERINEURAL at 11:15

## 2017-10-06 RX ADMIN — PROPOFOL 50 MG: 10 INJECTION, EMULSION INTRAVENOUS at 11:17

## 2017-10-06 RX ADMIN — PROPOFOL 50 MG: 10 INJECTION, EMULSION INTRAVENOUS at 11:19

## 2017-10-06 RX ADMIN — PROPOFOL 50 MG: 10 INJECTION, EMULSION INTRAVENOUS at 11:15

## 2017-10-06 RX ADMIN — LIDOCAINE HYDROCHLORIDE 15 ML: 20 SOLUTION ORAL; TOPICAL at 10:46

## 2017-10-06 RX ADMIN — PROPOFOL 20 MG: 10 INJECTION, EMULSION INTRAVENOUS at 11:22

## 2017-10-06 ASSESSMENT — PAIN SCALES - GENERAL
PAINLEVEL_OUTOF10: 0

## 2017-10-06 ASSESSMENT — PAIN - FUNCTIONAL ASSESSMENT: PAIN_FUNCTIONAL_ASSESSMENT: 0-10

## 2017-10-06 ASSESSMENT — COPD QUESTIONNAIRES: CAT_SEVERITY: NO INTERVAL CHANGE

## 2017-10-06 NOTE — ANESTHESIA PRE PROCEDURE
Department of Anesthesiology  Preprocedure Note       Name:  David Felix   Age:  65 y.o.  :  1960                                          MRN:  [id number]         Date:  10/6/2017      Surgeon: Kelly Norton):  MD Felice    Procedure: Procedure(s):  EGD ESOPHAGOGASTRODUODENOSCOPY    Medications prior to admission:   Prior to Admission medications    Medication Sig Start Date End Date Taking? Authorizing Provider   PARoxetine (PAXIL) 20 MG tablet Take 1 tablet by mouth every morning 17  Yes Verenice Ng CNP   varenicline (CHANTIX CONTINUING MONTH RIYA) 1 MG tablet Take 1 tablet by mouth 2 times daily 17  Yes Verenice Ng CNP   pregabalin (LYRICA) 100 MG capsule take 1 capsule by mouth twice a day 17  Yes Verenice Ng CNP   SPIRIVA HANDIHALER 18 MCG inhalation capsule inhale the contents of one capsule in the handihaler once daily 17  Yes Verenice Ng CNP   RA CALCIUM 600/VITAMIN D-3 600-400 MG-UNIT TABS take 1 tablet by mouth twice a day 17  Yes Verenice Ng CNP   omeprazole (PRILOSEC) 20 MG delayed release capsule take 1 capsule by mouth twice a day 17  Yes Verenice Ng CNP   diclofenac (VOLTAREN) 50 MG EC tablet take 1 tablet by mouth twice a day 17  Yes Verenice Ng CNP   lovastatin (MEVACOR) 40 MG tablet take 1 tablet by mouth every evening 17  Yes Verenice Ng CNP   cyclobenzaprine (FLEXERIL) 10 MG tablet take 1 tablet by mouth twice a day if needed for muscle spasm 17  Yes Verenice Ng CNP   hydrochlorothiazide (HYDRODIURIL) 25 MG tablet take 1 tablet by mouth once daily 17  Yes Verenice Ng CNP   cloNIDine (CATAPRES) 0.1 MG tablet take 1 tablet by mouth at bedtime 3/6/17  Yes Verenice Ng CNP   albuterol sulfate HFA (PROVENTIL HFA) 108 (90 BASE) MCG/ACT inhaler Inhale 2 puffs into the lungs every 6 hours as needed for Wheezing or Shortness of Breath 17  Yes Verenice Ng CNP   aspirin 81 MG EC tablet Take 81 mg by mouth daily.      Yes Historical Provider, MD   potassium chloride (KLOR-CON M20) 20 MEQ extended release tablet Take 1 tablet by mouth 2 times daily for 5 days 8/9/17 8/14/17  Ronaldo Figueroa CNP   lidocaine (XYLOCAINE) 5 % ointment Apply topically to neck area bid to tid as needed. 8/8/17   Ronaldo Figueroa CNP   varenicline (CHANTIX STARTING MONTH PAK) 0.5 MG X 11 & 1 MG X 42 tablet Take by mouth. 8/4/17   Ronaldo Figueroa CNP   Multiple Vitamin (MULTIVITAMIN PO) Take  by mouth daily.       Historical Provider, MD       Current medications:    Current Facility-Administered Medications   Medication Dose Route Frequency Provider Last Rate Last Dose    lactated ringers infusion   Intravenous Continuous Kateryna Crisostomo  mL/hr at 10/06/17 0918      lidocaine viscous (XYLOCAINE) 2 % solution 15 mL  15 mL Mouth/Throat PRN Kateryna Crisostomo MD           Allergies:  No Known Allergies    Problem List:    Patient Active Problem List   Diagnosis Code    Renal calculi N20.0    Hyposmia R43.8    Diverticulosis K57.90    Uterine prolapse N81.4    Hyperlipidemia E78.5    Hypertension I10    Depression F32.9    GI bleed K92.2    STD (sexually transmitted disease) A64    Bronchitis, chronic ([facility] [address] J42    Tobacco abuse Z72.0    Cocaine abuse F14.10    Hemorrhoids, internal K64.8    Renal cyst N28.1    COPD (chronic obstructive pulmonary disease) ([facility]) J44.9    Pulmonary emphysema ([facility]) J43.9    Chronic back pain M54.9, G89.29    Chronic low back pain M54.5, G89.29    Knee pain, right M25.561    Lumbago M54.5    Degeneration of lumbar or lumbosacral intervertebral disc M51.37    Other affections of shoulder region, not elsewhere classified M75.80    Degeneration of cervical intervertebral disc M50.30    Spinal stenosis in cervical region M48.02    Cervicalgia M54.2    Pain in joint, shoulder region M25.519    Carpal tunnel syndrome G56.00    Shoulder impingement M75.40    Fibromyalgia M79.7    Pulmonary nodule R91.1    Chronic obstructive pulmonary disease (HCC) J44.9    Smoking F17.200    Chronic cough R05    Essential hypertension I10    Rectal bleeding K62.5    Left lower quadrant pain R10.32    History of diverticulitis Z87.19    Meningioma (HCC) D32.9    Neck pain M54.2    Chronic midline low back pain with bilateral sciatica M54.41, M54.42, G89.29    DDD (degenerative disc disease), cervical M50.30    History of tubal ligation Z98.51       Past Medical History:        Diagnosis Date    Arthritis     Bronchitis, chronic (HCC)     Chronic back pain     Cocaine abuse     many years ago    COPD (chronic obstructive pulmonary disease) (HCC)     Depression     Diverticulosis     Emphysema     Fibromyalgia     GERD (gastroesophageal reflux disease)     GI bleed     colon    Hemorrhoids, internal     Hiatal hernia     Hyperlipidemia     Hypertension     Meningioma (HCC)     Orbital fracture (HCC)     left side     Renal calculi     Renal cyst     STD (sexually transmitted disease)     Tobacco abuse     Uterine prolapse        Past Surgical History:        Procedure Laterality Date    BACK SURGERY  2011    thoracic laminectomy, T12, S1    CARDIAC CATHETERIZATION  10/24/14    Normal coronaries     COLONOSCOPY  04/05/2017    diverticulosis, ,poor prep    EYE SURGERY      left    ORBITAL FRACTURE SURGERY Left     VA COLON CA SCRN NOT HI RSK IND N/A 4/5/2017    COLONOSCOPY performed by Atul Bello MD at [address] [facility] [address]         Social History:    Social History   Substance Use Topics    Smoking status: Former Smoker     Packs/day: 0.50     Years: 38.00     Types: Cigarettes     Quit date: 8/24/2017    Smokeless tobacco: Never Used    Alcohol use 0.0 oz/week     0 Standard drinks or equivalent per week      Comment: rare                                Counseling given: Not Answered      Vital Signs (Current):   Vitals:    10/06/17 0905   BP: 135/78   Pulse: 80   Resp: 18   Temp: 97.9 °F (36.6 °C)   TempSrc: Oral   SpO2: 97%   Weight: 154 full  Mouth opening: > = 3 FB Dental: normal exam         Pulmonary:normal exam  breath sounds clear to auscultation  (+) COPD: no interval change,         Cardiovascular:    (+) hypertension: no interval change,       ECG reviewed  Rhythm: regular  Rate: normal                 Neuro/Psych:   (+) neuromuscular disease:, psychiatric history:   GI/Hepatic/Renal:   (+) hiatal hernia, GERD:, renal disease: kidney stones,         Endo/Other:    (+) : arthritis: OA and no interval change. Abdominal:                    Anesthesia Plan    ASA 3     MAC and general     intravenous induction   Anesthetic plan and risks discussed with patient. Plan discussed with CRNA.             Tomas Ga MD   10/6/2017

## 2017-10-08 LAB
3-OH-COTININE URINE: 55 NG/ML
ANABASINE URINE: <3 NG/ML
COTININE, URINE: 28 NG/ML
NICOTINE URINE: 4 NG/ML
NORNICOTINE URINE: <2 NG/ML

## 2017-10-09 LAB — SURGICAL PATHOLOGY REPORT: NORMAL

## 2017-10-10 ENCOUNTER — HOSPITAL ENCOUNTER (OUTPATIENT)
Age: 57
Discharge: HOME | End: 2017-10-10
Payer: MEDICARE

## 2017-10-10 ENCOUNTER — TELEPHONE (OUTPATIENT)
Dept: ORTHOPEDIC SURGERY | Age: 57
End: 2017-10-10

## 2017-10-10 ENCOUNTER — OFFICE VISIT (OUTPATIENT)
Dept: OBGYN CLINIC | Age: 57
End: 2017-10-10
Payer: MEDICARE

## 2017-10-10 VITALS
WEIGHT: 150 LBS | DIASTOLIC BLOOD PRESSURE: 86 MMHG | BODY MASS INDEX: 24.11 KG/M2 | HEART RATE: 78 BPM | HEIGHT: 66 IN | SYSTOLIC BLOOD PRESSURE: 128 MMHG

## 2017-10-10 DIAGNOSIS — N83.8 OVARIAN MASS, RIGHT: ICD-10-CM

## 2017-10-10 DIAGNOSIS — Z80.3 FAMILY HISTORY OF BREAST CANCER: ICD-10-CM

## 2017-10-10 DIAGNOSIS — Z98.51 H/O TUBAL LIGATION: ICD-10-CM

## 2017-10-10 DIAGNOSIS — N83.8 OVARIAN MASS, RIGHT: Primary | ICD-10-CM

## 2017-10-10 LAB
CA 125: 20 U/ML
CA 19-9: 28 U/ML (ref 0–35)
CARCINOEMBRYONIC ANTIGEN: 6.6 NG/ML

## 2017-10-10 PROCEDURE — 36415 COLL VENOUS BLD VENIPUNCTURE: CPT

## 2017-10-10 PROCEDURE — 99204 OFFICE O/P NEW MOD 45 MIN: CPT | Performed by: OBSTETRICS & GYNECOLOGY

## 2017-10-10 PROCEDURE — 86301 IMMUNOASSAY TUMOR CA 19-9: CPT

## 2017-10-10 PROCEDURE — 82378 CARCINOEMBRYONIC ANTIGEN: CPT

## 2017-10-10 PROCEDURE — 86336 INHIBIN A: CPT

## 2017-10-10 PROCEDURE — 83520 IMMUNOASSAY QUANT NOS NONAB: CPT

## 2017-10-10 PROCEDURE — 86304 IMMUNOASSAY TUMOR CA 125: CPT

## 2017-10-10 PROCEDURE — 81503 ONCO (OVAR) FIVE PROTEINS: CPT

## 2017-10-10 PROCEDURE — 86305 HUMAN EPIDIDYMIS PROTEIN 4: CPT

## 2017-10-10 NOTE — PROGRESS NOTES
(sexually transmitted disease)     Tobacco abuse     Uterine prolapse                                                                    Past Surgical History:   Procedure Laterality Date    BACK SURGERY  2011    thoracic laminectomy, T12, S1    CARDIAC CATHETERIZATION  10/24/14    Normal coronaries     COLONOSCOPY  04/05/2017    diverticulosis, ,poor prep    EYE SURGERY      left    ORBITAL FRACTURE SURGERY Left     NC COLON CA SCRN NOT HI RSK IND N/A 4/5/2017    COLONOSCOPY performed by Dolores Menard MD at [address] [facility] EGD TRANSORAL BIOPSY SINGLE/MULTIPLE N/A 10/6/2017    EGD BIOPSY performed by Christine Taylor MD at [address] [facility]       Family History   Problem Relation Age of Onset    Cancer Mother 61     breast    Bipolar Disorder Mother     Heart Disease Mother     Diabetes Father     Heart Disease Father      Death due to MI    Cancer Paternal Grandfather      stomach cancer     Heart Disease Paternal Grandfather      Social History     Social History    Marital status:      Spouse name: N/A    Number of children: N/A    Years of education: N/A     Occupational History    Not on file.      Social History Main Topics    Smoking status: Former Smoker     Packs/day: 0.50     Years: 38.00     Types: Cigarettes     Quit date: 8/24/2017    Smokeless tobacco: Never Used    Alcohol use 0.0 oz/week      Comment: rare    Drug use:      Types: Marijuana      Comment: marijuana occ    Sexual activity: Yes     Partners: Male     Birth control/ protection: Post-menopausal     Other Topics Concern    Not on file     Social History Narrative    No narrative on file       MEDICATIONS:  Current Outpatient Prescriptions   Medication Sig Dispense Refill    PARoxetine (PAXIL) 20 MG tablet Take 1 tablet by mouth every morning 30 tablet 3    varenicline (CHANTIX CONTINUING MONTH RIYA) 1 MG tablet Take 1 tablet by mouth 2 times daily 60 tablet 3    lidocaine (XYLOCAINE) 5 % ointment Apply topically to neck area bid to tid as needed. 1 Tube 1    varenicline (CHANTIX STARTING MONTH RIYA) 0.5 MG X 11 & 1 MG X 42 tablet Take by mouth. 53 tablet 0    pregabalin (LYRICA) 100 MG capsule take 1 capsule by mouth twice a day 60 capsule 2    SPIRIVA HANDIHALER 18 MCG inhalation capsule inhale the contents of one capsule in the handihaler once daily 30 capsule 3    RA CALCIUM 600/VITAMIN D-3 600-400 MG-UNIT TABS take 1 tablet by mouth twice a day 60 tablet 5    omeprazole (PRILOSEC) 20 MG delayed release capsule take 1 capsule by mouth twice a day 60 capsule 3    diclofenac (VOLTAREN) 50 MG EC tablet take 1 tablet by mouth twice a day 60 tablet 3    lovastatin (MEVACOR) 40 MG tablet take 1 tablet by mouth every evening 30 tablet 3    cyclobenzaprine (FLEXERIL) 10 MG tablet take 1 tablet by mouth twice a day if needed for muscle spasm 60 tablet 3    hydrochlorothiazide (HYDRODIURIL) 25 MG tablet take 1 tablet by mouth once daily 30 tablet 3    cloNIDine (CATAPRES) 0.1 MG tablet take 1 tablet by mouth at bedtime 30 tablet 5    albuterol sulfate HFA (PROVENTIL HFA) 108 (90 BASE) MCG/ACT inhaler Inhale 2 puffs into the lungs every 6 hours as needed for Wheezing or Shortness of Breath 1 Inhaler 1    Multiple Vitamin (MULTIVITAMIN PO) Take  by mouth daily.  aspirin 81 MG EC tablet Take 81 mg by mouth daily.  potassium chloride (KLOR-CON M20) 20 MEQ extended release tablet Take 1 tablet by mouth 2 times daily for 5 days 10 tablet 0     No current facility-administered medications for this visit. ALLERGIES:  Allergies as of 10/10/2017    (No Known Allergies)       Symptoms of decreased mood absent  Symptoms of anhedonia absent    **If either question is answered in a  positive fashion then complete the PHQ9 Scoring Evaluation and make the appropriate referral**      Immunization status: stated as current, but no records available.       Gynecologic History:  Menarche: 16 yo  Menopause at 38 yo     No LMP recorded. Patient is postmenopausal.    Sexually Active: Yes    STD History: YES HX (Chlamydia and GC)    Permanent Sterilization: Yes TL   Reversible Birth Control: No        Hormone Replacement Exposure: No      Genetic Qualified Family History of Breast, Ovarian , Colon or Uterine Cancer: No     If YES see scanned worksheet. Preventative Health Testing:    Health Maintenance:  Health Maintenance Due   Topic Date Due    DTaP/Tdap/Td vaccine (1 - Tdap) 04/26/1980       Date of Last Pap Smear: Current through PCP per PT  Abnormal Pap Smear History: NONE  Colposcopy History:   Date of Last Mammogram: Through PCP current  Date of Last Colonoscopy: Current through GI  Date of Last Bone Density:na      ________________________________________________________________________        REVIEW OF SYSTEMS:      A minimum of an eleven point review of systems was completed. Review Of Systems (11 point):  Constitutional: No fever, chills or malaise; No weight change or fatigue  Head and Eyes: No  Headache, Dizziness or trauma in last 12 months; + Glasses  ENT ROS: No hearing, Tinnitis, sinus or taste problems  Hematological and Lymphatic ROS:No Lymphoma, Von Willebrand's, Hemophillia or Bleeding History  Psych ROS: No Depression, Homicidal thoughts,suicidal thoughts, or anxiety  Breast ROS: + right breast BX x 2  Respiratory ROS: No SOB, Pneumoniae,Cough, or Pulmonary Embolism History  Cardiovascular ROS: No Chest Pain with Exertion, Palpitations, Syncope, Edema, Arrhythmia  Gastrointestinal ROS: No Nausea, vomiting, Diarrhea, Constipation,or Bowel Changes; No Bloody Stools or melena; +Diverticular DZ; + GERD ? Hiatal Hernia  Genito-Urinary ROS: No Dysuria, Hematuria or Nocturia.  No Urinary Incontinence or Vaginal Discharge  Musculoskeletal ROS: No Arthralgia, Arthritis,Gout,Osteoporosis or Rheumatism  Neurological ROS: No CVA, Migraines, Epilepsy, Seizure Hx, or Limb Weakness  Dermatological ROS: No Rash, Itching, Hives, Mole Changes or Cancer                                                                                                                                                                                                                                  PHYSICAL Exam:     Constitutional:  Vitals:    10/10/17 1236   BP: 128/86   Site: Right Arm   Position: Sitting   Cuff Size: Medium Adult   Pulse: 78   Weight: 150 lb (68 kg)   Height: 5' 6\" (1.676 m)         General Appearance: This  is a well Developed, well Nourished, well groomed female. Her BMI was reviewed. Nutritional decision making was discussed. Skin:  There was a Normal Inspection of the skin without rashes or lesions. There were no rashes. (Papular, Maculopapular, Hives, Pustular, Macular)     There were no lesions (Ulcers, Erythema, Abn. Appearing Nevi)            Lymphatic:  No Lymph Nodes were Palpable in the neck , axilla or groin.  0 # Of Lymph Nodes; Location ; Character [Normal]  [Shotty] [Tender] [Enlarged]     Neck and EENT:  The neck was supple. There were no masses   The thyroid was not enlarged and had no masses. Perrla, EOMI B/L, TMI B/L No Abnormalities. Throat inspected-No exudates or Masses, Nares Patent No Masses        Respiratory: The lungs were auscultated and found to be clear. There were no rales, rhonchi or wheezes. There was a good respiratory effort. Cardiovascular: The heart was in a regular rate and rhythm. . No S3 or S4. There was no murmur appreciated. Location, grade, and radiation are not applicable. Extremities: The patients extremities were without calf tenderness, edema, or varicosities. There was full range of motion in all four extremities. Pulses in all four extremities were appreciated and are 2/4. Abdomen: The abdomen was soft and non-tender. There were good bowel sounds in all quadrants and there was no guarding, rebound or rigidity.   On evaluation there was no evidence of hepatosplenomegaly and there was no costal vertebral toyin tenderness bilaterally. No hernias were appreciated. Abdominal Scars: TL    Psych: The patient had a normal Orientation to: Time, Place, Person, and Situation  There is no Mood / Affect changes    Breast:  (Chest)  Declined  Self breast exams were reviewed in detail. Literature was given. Pelvic Exam:  Declined    Rectal Exam:  exam declined by patient          Musculosk:  Normal Gait and station was noted. Digits were evaluated without abnormal findings. Range of motion, stability and strength were evaluated and found to be appropriate for the patients age. OMM Structural Component:  The patient did not complain of a Chief complaint requiring OMM. Chief Complaint:none    Structural Exam: No Interest        Diagnostics:  EXAMINATION:   PELVIC ULTRASOUND; DOPPLER EVALUATION       8/19/2017       TECHNIQUE:   Transvaginal pelvic ultrasound was performed.; Doppler interrogation was   performed       COMPARISON:   10/21/2009       HISTORY:   ORDERING SYSTEM PROVIDED HISTORY: Right pelvic pain, sudden onset   TECHNOLOGIST PROVIDED HISTORY:   Acuity: Acute   Type of Exam: Initial       FINDINGS:   Measurements:       Uterus:  5.4 x 2.1 x 3.9 cm       Endometrial stripe:  3 mm       Right Ovary:  1.7 x 1.1 x 1.3 cm       Left Ovary:  1.9 x 1.6 x 2 cm       Ultrasound Findings:       Uterus: Uterus demonstrates normal myometrial echotexture.       Endometrial stripe: Endometrial stripe is within normal limits.       Right Ovary: The right ovary appears lobulated but no distinct mass is seen. There is a 10 mm hypoechoic rounded region associated with the right ovary. Normal blood flow seen to the right ovary.       Left Ovary:  Left ovary is within normal limits. Normal blood flow seen to   the left ovary       Free Fluid: No evidence of free fluid.           Impression   1.  Normal appearing uterus and left ovary   2. 10 mm hypoechoic region associated with the right ovary could represent a   focal lobulation.  It does not represent a simple cyst.  Follow-up pelvic   ultrasound is suggested in 3 months         EXAMINATION:   CT OF THE ABDOMEN AND PELVIS WITH CONTRAST, 8/19/2017 6:24 pm       TECHNIQUE:   CT of the abdomen and pelvis was performed with the administration of   intravenous contrast. Multiplanar reformatted images are provided for review. Dose modulation, iterative reconstruction, and/or weight based adjustment of   the mA/kV was utilized to reduce the radiation dose to as low as reasonably   achievable.       COMPARISON:   02/15/2017       HISTORY:   ORDERING SYSTEM PROVIDED HISTORY: RLQ abd pain   TECHNOLOGIST PROVIDED HISTORY:   IV Only Contrast   Ordering Physician Provided Reason for Exam: PATIENT STATES LOWER ABDOMINAL   PAIN SINCE THIS MORNING   Acuity: Acute   Type of Exam: Initial       FINDINGS:   Lower Chest: Scarring at the lung bases.  No acute airspace disease.  No   pleural or pericardial effusion.       Organs: The liver is of low-attenuation.  No focal hepatic abnormality.  The   gallbladder appears normal.  Stable left renal cyst.  The kidneys, adrenal   glands, spleen and pancreas appear unremarkable.       GI/Bowel: Colonic diverticulosis.  No evidence for diverticulitis.  The bowel   loops are not dilated.  No focal bowel wall thickening.  Normal appearing   appendix.  A gastric diverticulum is again noted.       Pelvis: No mass lesions.  No abnormal fluid collections.  No bladder or   ureteral stones.       Peritoneum/Retroperitoneum: No adenopathy.  Atherosclerotic changes.  No   extraluminal gas or abnormal fluid collections.       Bones/Soft Tissues: No soft tissue hernia.  No acute fractures or focal bony   lesions.           Impression   1. No acute abnormalities are seen in the abdomen or pelvis. 2. Fatty infiltration of the liver.    3. Colonic diverticulosis without 07/10/2012    Degeneration of cervical intervertebral disc 07/10/2012    Spinal stenosis in cervical region 07/10/2012    Cervicalgia 07/10/2012    Pain in joint, shoulder region 07/10/2012    Carpal tunnel syndrome 07/10/2012    Lumbago 04/17/2012    Degeneration of lumbar or lumbosacral intervertebral disc 04/17/2012    Chronic low back pain 01/05/2012    Knee pain, right 01/05/2012    Renal calculi     Hyposmia     Diverticulosis     Uterine prolapse     Hyperlipidemia     Hypertension     Depression     GI bleed     STD (sexually transmitted disease)     Bronchitis, chronic (HCC)     Tobacco abuse     Cocaine abuse     Hemorrhoids, internal     Renal cyst     COPD (chronic obstructive pulmonary disease) (HCC)     Pulmonary emphysema (HCC)      replace inactive diagnosis      Chronic back pain           Hereditary Breast, Ovarian, Colon and Uterine Cancer screening Done. Tobacco & Secondary smoke risks reviewed; instructed on cessation and avoidance      Counseling Completed:  Preventative Health Recommendations and Follow up. PLAN:  Return in about 2 weeks (around 10/24/2017) for Follow-Up On Current Problem. RTO 2-3 weeks lab review  FU sono ordered 3 months  Repeat Annual every 1 year  Cervical Cytology Evaluation begins at 24years old. If Negative Cytology, Follow-up screening per current guidelines. Mammograms every 1 year. If 35 yo and last mammogram was negative. Calcium and Vitamin D dosing reviewed. Colonoscopy screening reviewed as well as onset for bone density testing. Birth control and barrier recommendations discussed. STD counseling and prevention reviewed. Routine health maintenance per patients PCP. Orders Placed This Encounter   Procedures    US Non OB Transvaginal     Begin with transabdominal imaging.      Standing Status:   Future     Standing Expiration Date:   2/10/2018     Order Specific Question:   Reason for exam:     Answer: ammenorhea         Standing Status:   Future     Standing Expiration Date:   10/10/2018    CEA     Standing Status:   Future     Standing Expiration Date:   10/10/2018    Cancer Antigen 19-9     Standing Status:   Future     Standing Expiration Date:   10/10/2018    Inhibin A     Standing Status:   Future     Standing Expiration Date:   12/9/2017    Inhibin B     Standing Status:   Future     Standing Expiration Date:   12/9/2017    Miscellaneous Sendout 1     Standing Status:   Future     Standing Expiration Date:   10/10/2018     Order Specific Question:   Specify Req.  Test (1 Test/Order)     Answer:   Randy Boas Human Epididymis Protein 4 (HE-4)     Standing Status:   Future     Standing Expiration Date:   10/11/2018

## 2017-10-11 ENCOUNTER — TELEPHONE (OUTPATIENT)
Dept: OBGYN CLINIC | Age: 57
End: 2017-10-11

## 2017-10-11 DIAGNOSIS — R97.0 ELEVATED CEA: Primary | ICD-10-CM

## 2017-10-11 NOTE — TELEPHONE ENCOUNTER
Pt given results and recommendations. Pt referred to Dr Tri Morrison. Follow up made to RTO to folow up with Dr Arlette Reid.

## 2017-10-11 NOTE — TELEPHONE ENCOUNTER
----- Message from Yaquelin Daniels DO sent at 10/11/2017  9:54 AM EDT -----  Pt needs to fu with GI or Dr. Britney Lizarraga for elevated CEA. Pt is a Smoker. Social History     Tobacco History     Smoking Status  Former Smoker Quit date  8/24/2017 Smoking Frequency  0.5 packs/day for 38 years (19 pk yrs) Smoking Tobacco Type  Cigarettes    Smokeless Tobacco Use  Never Used          Await Lorna Study.   Have pt RTO for discussion of gyn onc referral; or Laparoscopy with washings and possible biopsy

## 2017-10-12 LAB
HUMAN EPIDIDYMIS PROTEIN 4: 86 PMOL/L (ref 0–150)
INHIBIN A: 1 PG/ML

## 2017-10-13 LAB — INHIBIN B: <10 PG/ML

## 2017-10-23 LAB
SEND OUT REPORT: NORMAL
TEST NAME: NORMAL

## 2017-10-30 ENCOUNTER — OFFICE VISIT (OUTPATIENT)
Dept: OBGYN CLINIC | Age: 57
End: 2017-10-30
Payer: MEDICARE

## 2017-10-30 VITALS
SYSTOLIC BLOOD PRESSURE: 132 MMHG | WEIGHT: 150 LBS | DIASTOLIC BLOOD PRESSURE: 84 MMHG | BODY MASS INDEX: 24.11 KG/M2 | HEIGHT: 66 IN | HEART RATE: 78 BPM

## 2017-10-30 DIAGNOSIS — J42 CHRONIC BRONCHITIS, UNSPECIFIED CHRONIC BRONCHITIS TYPE (HCC): ICD-10-CM

## 2017-10-30 DIAGNOSIS — N94.89 ADNEXAL MASS: ICD-10-CM

## 2017-10-30 DIAGNOSIS — Z00.00 PREVENTATIVE HEALTH CARE: ICD-10-CM

## 2017-10-30 DIAGNOSIS — R97.0 ELEVATED CEA: ICD-10-CM

## 2017-10-30 DIAGNOSIS — R89.9 ABNORMAL LABORATORY TEST: Primary | ICD-10-CM

## 2017-10-30 DIAGNOSIS — Z98.51 H/O TUBAL LIGATION: ICD-10-CM

## 2017-10-30 DIAGNOSIS — N83.8 OVARIAN MASS, RIGHT: Primary | ICD-10-CM

## 2017-10-30 DIAGNOSIS — R89.9 ABNORMAL LABORATORY TEST: ICD-10-CM

## 2017-10-30 DIAGNOSIS — F12.10 TETRAHYDROCANNABINOL (THC) USE DISORDER, MILD, ABUSE: ICD-10-CM

## 2017-10-30 PROCEDURE — 99214 OFFICE O/P EST MOD 30 MIN: CPT | Performed by: OBSTETRICS & GYNECOLOGY

## 2017-10-30 RX ORDER — LOVASTATIN 40 MG/1
TABLET ORAL
Qty: 30 TABLET | Refills: 3 | Status: SHIPPED | OUTPATIENT
Start: 2017-10-30 | End: 2018-02-06 | Stop reason: SDUPTHER

## 2017-10-30 RX ORDER — OMEPRAZOLE 20 MG/1
CAPSULE, DELAYED RELEASE ORAL
Qty: 60 CAPSULE | Refills: 3 | Status: SHIPPED | OUTPATIENT
Start: 2017-10-30 | End: 2018-02-06 | Stop reason: SDUPTHER

## 2017-10-30 RX ORDER — CYCLOBENZAPRINE HCL 10 MG
TABLET ORAL
Qty: 60 TABLET | Refills: 3 | Status: SHIPPED | OUTPATIENT
Start: 2017-10-30 | End: 2018-02-06 | Stop reason: SDUPTHER

## 2017-10-30 RX ORDER — HYDROCHLOROTHIAZIDE 25 MG/1
TABLET ORAL
Qty: 30 TABLET | Refills: 3 | Status: ON HOLD | OUTPATIENT
Start: 2017-10-30 | End: 2017-11-21 | Stop reason: HOSPADM

## 2017-10-30 RX ORDER — PAROXETINE 10 MG/1
10 TABLET, FILM COATED ORAL EVERY MORNING
Qty: 30 TABLET | Refills: 3 | Status: SHIPPED | OUTPATIENT
Start: 2017-10-30 | End: 2017-11-09 | Stop reason: ALTCHOICE

## 2017-10-30 NOTE — PROGRESS NOTES
Ele Wilkes  10/30/2017      Ele Wilkes is a 65 y.o. female [id number]      The patient was seen today. She was here to follow-up regarding her labs and diagnostics ordered at her last visit for the diagnosis of:    ICD-10-CM ICD-9-CM    1. Ovarian mass, right N83.9 620.9    2. Elevated CEA R97.0 795.81    3. H/O tubal ligation Z98.51 V26.51    4. Chronic bronchitis, unspecified chronic bronchitis type ([facility] [address] J42 491.9    5. Preventative health care Z00.00 V70.0 TRAE DIGITAL SCREEN W CAD BILATERAL   6. Elevated OVA-1 (4.5)    She does not have any specific chief complaint today. Her bowels are regular and she is voiding without difficulty.        Past Medical History:   Diagnosis Date    Arthritis     Bronchitis, chronic (HCC)     Chronic back pain     Cocaine abuse     many years ago    COPD (chronic obstructive pulmonary disease) (HCC)     Depression     Diverticulosis     Emphysema     Family history of breast cancer     mom @ 61    Fibromyalgia     GERD (gastroesophageal reflux disease)     GI bleed     colon    H/O tubal ligation     Hemorrhoids, internal     Hiatal hernia     Hyperlipidemia     Hypertension     Meningioma (HCC)     Orbital fracture (HCC)     left side     Renal calculi     Renal cyst     STD (sexually transmitted disease)     Tobacco abuse     Uterine prolapse          Past Surgical History:   Procedure Laterality Date    BACK SURGERY  2011    thoracic laminectomy, T12, S1    CARDIAC CATHETERIZATION  10/24/14    Normal coronaries     COLONOSCOPY  04/05/2017    diverticulosis, ,poor prep    EYE SURGERY      left    ORBITAL FRACTURE SURGERY Left     ND COLON CA SCRN NOT HI RSK IND N/A 4/5/2017    COLONOSCOPY performed by Yana Floyd MD at [address] [facility] EGD TRANSORAL BIOPSY SINGLE/MULTIPLE N/A 10/6/2017    EGD BIOPSY performed by Matt Austin MD at [address] [facility] ENDOSCOPY  10/06/2017     take 1 tablet by mouth at bedtime 30 tablet 5    albuterol sulfate HFA (PROVENTIL HFA) 108 (90 BASE) MCG/ACT inhaler Inhale 2 puffs into the lungs every 6 hours as needed for Wheezing or Shortness of Breath 1 Inhaler 1    Multiple Vitamin (MULTIVITAMIN PO) Take  by mouth daily.  aspirin 81 MG EC tablet Take 81 mg by mouth daily.  potassium chloride (KLOR-CON M20) 20 MEQ extended release tablet Take 1 tablet by mouth 2 times daily for 5 days 10 tablet 0     No current facility-administered medications for this visit. ALLERGIES:  Allergies as of 10/30/2017    (No Known Allergies)         Blood pressure 132/84, pulse 78, height 5' 6\" (1.676 m), weight 150 lb (68 kg), not currently breastfeeding. Abdomen: Soft non-tender; good bowel sounds. No guarding, rebound or rigidity. No CVA tenderness bilaterally. Extremities: No calf tenderness, DTR 2/4, and No edema bilaterally    Pelvic: Declined         Diagnostics:  EXAMINATION:   PELVIC ULTRASOUND; DOPPLER EVALUATION       8/19/2017       TECHNIQUE:   Transvaginal pelvic ultrasound was performed.; Doppler interrogation was   performed       COMPARISON:   10/21/2009       HISTORY:   ORDERING SYSTEM PROVIDED HISTORY: Right pelvic pain, sudden onset   TECHNOLOGIST PROVIDED HISTORY:   Acuity: Acute   Type of Exam: Initial       FINDINGS:   Measurements:       Uterus:  5.4 x 2.1 x 3.9 cm       Endometrial stripe:  3 mm       Right Ovary:  1.7 x 1.1 x 1.3 cm       Left Ovary:  1.9 x 1.6 x 2 cm       Ultrasound Findings:       Uterus: Uterus demonstrates normal myometrial echotexture.       Endometrial stripe: Endometrial stripe is within normal limits.       Right Ovary: The right ovary appears lobulated but no distinct mass is seen. There is a 10 mm hypoechoic rounded region associated with the right ovary. Normal blood flow seen to the right ovary.       Left Ovary:  Left ovary is within normal limits.  Normal blood flow seen to   the left seen in the abdomen or pelvis. 2. Fatty infiltration of the liver. 3. Colonic diverticulosis without evidence for diverticulitis. 4. Normal appearing appendix. 5. The gallbladder appears unremarkable.         EXAMINATION:   LOW DOSE OF THE CT  CHEST WITHOUT CONTRAST 8/28/2017 1:05 pm       TECHNIQUE:   Low Dose of the CT Chest without the administration of intravenous contrast   (MA=40).  Multiplanar reformatted images are provided for review. Dose   modulation, iterative reconstruction, and/or weight based adjustment of the   mA/kV was utilized to reduce the radiation dose to as low as reasonably   achievable.       COMPARISON:   None.       HISTORY:   ORDERING SYSTEM PROVIDED HISTORY: Pulmonary nodule   TECHNOLOGIST PROVIDED HISTORY:   Reason for exam:->f/u of lung nodule   Ordering Physician Provided Reason for Exam: PT STATES THIS IS A FOLLOW UP,   NO NEW COMPLAINTS       FINDINGS:   Mediastinum: 7 mm right pretracheal lymph node.  6 mm subcarinal lymph node.       Lungs/pleura: Large bulla in the left apex.  Multifocal areas of scarring are   again identified within the lungs, including a somewhat stellate appearing 28   x 14 mm area in the right apex.  Fissural nodule around the superior segment   of the right lower lobe appears similar.       Upper Abdomen: Adrenal glands are not enlarged.  Diverticulum involving the   gastric fundus.       Soft Tissues/Bones: Posterior element non fusion at L1.           Impression   Similar-appearing patterns scarring throughout the lung parenchyma is   identified.  Overall, the pattern of disease has not significantly changed. Advanced, diffuse centrilobular emphysema is identified, and continued annual   low-dose lung screening is recommended.       RECOMMENDATIONS:   USPSTF recommendations for annual screening for lung cancer with low-dose   computed tomography (LDCT) in high risk patients:       1. Age 46-80 years   2. 30 year pack history of smoking   3. Currently smoking or has quit within the last 15 years. For more information in enrollment in [facility] lung nodule monitoring program,   please call the program nurse coordinator at [phone]).          Lab Results:  Results for orders placed or performed during the hospital encounter of 10/10/17      Result Value Ref Range     20 <38 U/mL   CEA   Result Value Ref Range    CEA 6.6 (H) <3.9 ng/mL   Cancer Antigen 19-9   Result Value Ref Range    CA 19-9 28 0 - 35 U/mL   Inhibin A   Result Value Ref Range    Inhibin A 1.0 pg/mL   Inhibin B   Result Value Ref Range    Inhibin B <10 pg/mL   Miscellaneous Sendout 1   Result Value Ref Range    Test Name OVA     Send Out Report SEE SEPARATE REPORT FROM [facility]    Human Epididymis Protein 4 (HE-4)   Result Value Ref Range    Human Epididymis Protein 4 86 0 - 150 pmol/L     OVA-1 resulted as 4.5 (ABNORMAL for Post Menopausal pt)      Assessment:  1. Ovarian mass, right     2. Elevated CEA     3. H/O tubal ligation     4. Chronic bronchitis, unspecified chronic bronchitis type ([address]     5. Preventative health care  TRAE DIGITAL SCREEN W CAD BILATERAL   6.      Ova-1 Abnormal 4.5  7.      FU general surgery/GI for Elevated CEA -Smoker ? ?   With WNL CT chest 8/2017 Colonoscopy results noted limited exam with 25% unable to evaluate d/t plugging      Chief Complaint   Patient presents with    Follow-up     labs         Patient Active Problem List    Diagnosis Date Noted    Family history of breast cancer      mom @ 61      H/O tubal ligation     History of tubal ligation 10/05/2017    Meningioma ([address] 07/13/2017    Neck pain 07/13/2017    Chronic midline low back pain with bilateral sciatica 07/13/2017    DDD (degenerative disc disease), cervical 07/13/2017    Rectal bleeding 03/16/2017    Left lower quadrant pain 03/16/2017    History of diverticulitis 03/16/2017    Pulmonary nodule 09/22/2016    Chronic obstructive pulmonary Diagnoses of Elevated CEA, H/O tubal ligation, Chronic bronchitis, unspecified chronic bronchitis type ([facility] [address] and Preventative health care were also pertinent to this visit. and Follow-up (labs)   as well as  counseling on preventative health maintenance follow-up.

## 2017-11-01 ENCOUNTER — HOSPITAL ENCOUNTER (OUTPATIENT)
Age: 57
Discharge: HOME | End: 2017-11-01
Payer: MEDICARE

## 2017-11-01 ENCOUNTER — HOSPITAL ENCOUNTER (OUTPATIENT)
Dept: WOMENS IMAGING | Age: 57
Discharge: HOME | End: 2017-11-01
Payer: MEDICARE

## 2017-11-01 DIAGNOSIS — Z00.00 PREVENTATIVE HEALTH CARE: ICD-10-CM

## 2017-11-01 DIAGNOSIS — F17.200 SMOKING: Primary | ICD-10-CM

## 2017-11-01 PROCEDURE — 77063 BREAST TOMOSYNTHESIS BI: CPT

## 2017-11-01 PROCEDURE — G0480 DRUG TEST DEF 1-7 CLASSES: HCPCS

## 2017-11-06 ENCOUNTER — TELEPHONE (OUTPATIENT)
Dept: GYNECOLOGIC ONCOLOGY | Age: 57
End: 2017-11-06

## 2017-11-06 LAB
3-OH-COTININE URINE: 163 NG/ML
ANABASINE URINE: <3 NG/ML
COTININE, URINE: 146 NG/ML
NICOTINE URINE: 48 NG/ML
NORNICOTINE URINE: POSITIVE NG/ML

## 2017-11-09 ENCOUNTER — OFFICE VISIT (OUTPATIENT)
Dept: FAMILY MEDICINE CLINIC | Age: 57
End: 2017-11-09
Payer: MEDICARE

## 2017-11-09 ENCOUNTER — HOSPITAL ENCOUNTER (OUTPATIENT)
Age: 57
Discharge: HOME | End: 2017-11-09
Payer: MEDICARE

## 2017-11-09 VITALS
TEMPERATURE: 97.9 F | BODY MASS INDEX: 24.75 KG/M2 | RESPIRATION RATE: 19 BRPM | WEIGHT: 154 LBS | HEIGHT: 66 IN | OXYGEN SATURATION: 98 % | SYSTOLIC BLOOD PRESSURE: 118 MMHG | DIASTOLIC BLOOD PRESSURE: 84 MMHG | HEART RATE: 78 BPM

## 2017-11-09 DIAGNOSIS — M79.7 FIBROMYALGIA: ICD-10-CM

## 2017-11-09 DIAGNOSIS — E78.2 MIXED HYPERLIPIDEMIA: ICD-10-CM

## 2017-11-09 DIAGNOSIS — N83.201 RIGHT OVARIAN CYST: ICD-10-CM

## 2017-11-09 DIAGNOSIS — I10 ESSENTIAL HYPERTENSION: Primary | ICD-10-CM

## 2017-11-09 DIAGNOSIS — E87.6 HYPOKALEMIA: ICD-10-CM

## 2017-11-09 LAB — POTASSIUM SERPL-SCNC: 3 MMOL/L (ref 3.7–5.3)

## 2017-11-09 PROCEDURE — 36415 COLL VENOUS BLD VENIPUNCTURE: CPT

## 2017-11-09 PROCEDURE — 99214 OFFICE O/P EST MOD 30 MIN: CPT | Performed by: NURSE PRACTITIONER

## 2017-11-09 PROCEDURE — 84132 ASSAY OF SERUM POTASSIUM: CPT

## 2017-11-09 ASSESSMENT — ENCOUNTER SYMPTOMS
SHORTNESS OF BREATH: 0
NAUSEA: 0
WHEEZING: 0
VOMITING: 0

## 2017-11-09 NOTE — PROGRESS NOTES
Subjective:      Patient ID: Rani Collins is a 65 y.o. female. HPI  65year old white female presents with HTN HLD hypokalemia and right ovarian cyst with medication refills. Currently is on dual therapy but states she only takes clonidine as needed at HS. bp is borderline today. Is compliant with routine medications and tolerates them well. States she is on lyrica for fibromyalgia and it helped her a lot. Noted to have low potassium level and currently is on daily supplements. Is going to have hysterectomy secondary to right ovarian cyst.   Review of Systems   Constitutional: Negative for chills and fever. Respiratory: Negative for shortness of breath and wheezing. Cardiovascular: Negative for chest pain and leg swelling. Gastrointestinal: Positive for abdominal pain. Negative for nausea and vomiting. Musculoskeletal: Positive for myalgias. Neurological: Negative for dizziness, weakness and numbness. Objective:   Physical Exam   Constitutional: She appears well-nourished. No distress. HENT:   Nose: Nose normal.   Mouth/Throat: Oropharynx is clear and moist.   Eyes: Conjunctivae are normal.   Neck: Neck supple. Cardiovascular: Normal rate, regular rhythm and normal heart sounds. Pulmonary/Chest: Effort normal and breath sounds normal. No respiratory distress. Abdominal: Soft. There is no tenderness. Musculoskeletal: Normal range of motion. Lymphadenopathy:     She has no cervical adenopathy. Neurological: She is alert. No cranial nerve deficit. Skin: Skin is warm and dry. Psychiatric: She has a normal mood and affect. Her behavior is normal.   Nursing note and vitals reviewed. Assessment:      1. Essential hypertension    2. Mixed hyperlipidemia    3. Hypokalemia    4. Fibromyalgia    5.  Right ovarian cyst            Plan:      BP Readings from Last 3 Encounters:   11/10/17 (!) 134/90   11/09/17 118/84   10/30/17 132/84     /84 (Site: Right Arm, Position: medications. Barriers to medication compliance addressed. Patient given educational materials - see patient instructions. All patient questions answered. Patient voiced understanding.

## 2017-11-09 NOTE — PROGRESS NOTES
HYPERTENSION visit     BP Readings from Last 3 Encounters:   10/30/17 132/84   10/10/17 128/86   10/06/17 (!) 140/88       LDL Cholesterol (mg/dL)   Date Value   03/06/2017 121     HDL (mg/dL)   Date Value   03/06/2017 46     BUN (mg/dL)   Date Value   09/01/2017 11     CREATININE (mg/dL)   Date Value   09/01/2017 0.77     Glucose (mg/dL)   Date Value   09/01/2017 107 (H)   05/08/2012 103              Have you changed or started any medications since your last visit including any over-the-counter medicines, vitamins, or herbal medicines? no   Have you stopped taking any of your medications? Is so, why? -  no  Are you having any side effects from any of your medications? - no    Have you seen any other physician or provider since your last visit? yes - Dr. Adan Evans     Have you had any other diagnostic tests since your last visit? yes - labs    Have you been seen in the emergency room and/or had an admission in a hospital since we last saw you?  no   Have you had your routine dental cleaning in the past 6 months? Yes      Do you have an active MyChart account? If no, what is the barrier?   Yes    Patient Care Team:  South Pérez CNP as PCP - General Justyna Gregory RN as   Pia Melendez MD as Consulting Physician (Pulmonology)    Medical History Review  Past Medical, Family, and Social History reviewed and does contribute to the patient presenting condition    Health Maintenance   Topic Date Due    DTaP/Tdap/Td vaccine (1 - Tdap) 04/26/1980    Colon cancer screen colonoscopy  04/05/2018    Cervical cancer screen  12/01/2018    Breast cancer screen  11/01/2019    Lipid screen  03/06/2022    Flu vaccine  Completed    Pneumococcal med risk  Completed    Hepatitis C screen  Completed    HIV screen  Completed

## 2017-11-09 NOTE — LETTER
disease. Overdose or dangerous interactions with alcohol and other medications may occur, leading to death. Hyperalgesia may develop, in which patients receiving opioids for the treatment of pain may actually become more sensitive to certain painful stimuli, and in some cases, experience pain from ordinarily non-painful stimuli. Women between the ages of 14-53 who could become pregnant should carefully weigh the risks and benefits of opioids with their physicians, as these medications increase the risk of pregnancy complications, including miscarriage,  delivery and stillbirth. It is also possible for babies to be born addicted to opioids. Opioid dependence withdrawal symptoms may include; feelings of uneasiness, increased pain, irritability, belly pain, diarrhea, sweats and goose-flesh. Benzodiazepines and non-benzodiazepine sleep medications: These medications can lead to problems such as addiction/dependence, sedation, fatigue, lightheadedness, dizziness, incoordination, falls, depression, hallucinations, and impaired judgment, memory and concentration. The ability to drive and operate machinery may also be affected. Abnormal sleep-related behaviors have been reported, including sleep walking, driving, making telephone calls, eating, or having sex while not fully awake. These medications can suppress breathing and worsen sleep apnea, particularly when combined with alcohol or other sedating medications, potentially leading to death. Dependence withdrawal symptoms may include tremors, anxiety, hallucinations and seizures. Stimulants:  Common adverse effects include addiction/dependence, increased blood pressure and heart rate, decreased appetite, nausea, involuntary weight loss, insomnia, irritability, and headaches.   These risks may increase when these medications are combined with other stimulants, such as caffeine pills or energy drinks, certain weight loss which may also result in my being prevented from receiving further care from this office. · Other:____________________________________________________________________    AGREEMENT:    I have read the above and have had all of my questions answered. For chronic disease management, I know that my symptoms can be managed with many types of treatments. A chronic medication trial may be part of my treatment, but I must be an active participant in my care. Medication therapy is only one part of my symptom management plan. In some cases, there may be limited scientific evidence to support the chronic use of certain medications to improve symptoms and daily function. Furthermore, in certain circumstances, there may be scientific information that suggests that use of chronic controlled substances may actually worsen my symptoms and increase my risk of unintentional death directly related to this medication therapy. I know that if my provider feels my risk from controlled medications is greater than my benefit, I will have my controlled substance medication(s) compassionately lowered or removed altogether. I agree to a controlled substance medication trial.      I further agree to allow this office to contact family or friends if there are concerns about my safety and use of the controlled medications. I have agreed to use the following medications above as instructed by my physician and as stated in this [id number].      Patient Signature:  ______________________  Date:11/9/2017 or _____________    Provider Signature:______________________  Date:11/9/2017 or _____________

## 2017-11-10 ENCOUNTER — TELEPHONE (OUTPATIENT)
Dept: GYNECOLOGIC ONCOLOGY | Age: 57
End: 2017-11-10

## 2017-11-10 ENCOUNTER — OFFICE VISIT (OUTPATIENT)
Dept: GASTROENTEROLOGY | Age: 57
End: 2017-11-10
Payer: MEDICARE

## 2017-11-10 VITALS
DIASTOLIC BLOOD PRESSURE: 90 MMHG | BODY MASS INDEX: 24.43 KG/M2 | HEIGHT: 66 IN | TEMPERATURE: 97.5 F | OXYGEN SATURATION: 96 % | SYSTOLIC BLOOD PRESSURE: 134 MMHG | WEIGHT: 152 LBS | HEART RATE: 81 BPM

## 2017-11-10 DIAGNOSIS — R97.0 ELEVATED CEA: ICD-10-CM

## 2017-11-10 DIAGNOSIS — K57.30 DIVERTICULOSIS OF LARGE INTESTINE WITHOUT HEMORRHAGE: ICD-10-CM

## 2017-11-10 DIAGNOSIS — R10.32 LEFT LOWER QUADRANT PAIN: Primary | ICD-10-CM

## 2017-11-10 PROCEDURE — 99213 OFFICE O/P EST LOW 20 MIN: CPT | Performed by: INTERNAL MEDICINE

## 2017-11-10 RX ORDER — POTASSIUM CHLORIDE 20 MEQ/1
20 TABLET, EXTENDED RELEASE ORAL DAILY
Qty: 60 TABLET | Refills: 2 | Status: SHIPPED | OUTPATIENT
Start: 2017-11-10 | End: 2018-03-15 | Stop reason: SDUPTHER

## 2017-11-10 RX ORDER — POLYETHYLENE GLYCOL 3350 17 G/17G
POWDER, FOR SOLUTION ORAL
Qty: 255 G | Refills: 0 | Status: ON HOLD | OUTPATIENT
Start: 2017-11-10 | End: 2017-11-19

## 2017-11-10 ASSESSMENT — ENCOUNTER SYMPTOMS
WHEEZING: 0
RECTAL PAIN: 0
DIARRHEA: 0
COUGH: 0
CONSTIPATION: 1
FACIAL SWELLING: 0
BLOOD IN STOOL: 0
TROUBLE SWALLOWING: 0
ABDOMINAL DISTENTION: 1
SORE THROAT: 0
SHORTNESS OF BREATH: 0
BACK PAIN: 1
ABDOMINAL PAIN: 1
RHINORRHEA: 0
NAUSEA: 0
RESPIRATORY NEGATIVE: 1
ANAL BLEEDING: 0
SINUS PAIN: 0
VOMITING: 0
VOICE CHANGE: 1
SINUS PRESSURE: 0

## 2017-11-10 NOTE — PROGRESS NOTES
2. Diverticulosis of large intestine without hemorrhage     3. Elevated CEA  COLONOSCOPY W/ OR W/O BIOPSY             Plan: At present patient's exam nonspecific. Her CEA level is elevated. Etiology of this is not clear. EGD negative by general surgeon recently. She may need repeat EGD with adequate preparation. I had a long discussion with the patient and the patient's  regarding this. They understood agreed. Mallampati score1. ASA score 2    The Endoscopic procedure was explained to the patient in detail  The prep and NPO were explained  All the Risks, Benefits, and Alternatives were explained  Risk of Bleeding, Perforation and Cardio Respiratory risks were explained  her questions were answered  The procedure has been scheduled with the  in the office  Patient was asked to give us a call for any questions  The patient has verbalized understanding and agreement to this plan.

## 2017-11-12 ASSESSMENT — ENCOUNTER SYMPTOMS: ABDOMINAL PAIN: 1

## 2017-11-13 ENCOUNTER — HOSPITAL ENCOUNTER (OUTPATIENT)
Age: 57
Setting detail: SPECIMEN
Discharge: HOME | End: 2017-11-13
Payer: MEDICARE

## 2017-11-13 ENCOUNTER — OFFICE VISIT (OUTPATIENT)
Dept: OBGYN CLINIC | Age: 57
End: 2017-11-13
Payer: MEDICARE

## 2017-11-13 VITALS
WEIGHT: 152 LBS | HEART RATE: 80 BPM | SYSTOLIC BLOOD PRESSURE: 110 MMHG | BODY MASS INDEX: 24.43 KG/M2 | DIASTOLIC BLOOD PRESSURE: 72 MMHG | HEIGHT: 66 IN

## 2017-11-13 DIAGNOSIS — E28.39 OVARIAN FAILURE: ICD-10-CM

## 2017-11-13 DIAGNOSIS — Z01.419 WELL WOMAN EXAM: Primary | ICD-10-CM

## 2017-11-13 DIAGNOSIS — Z13.820 SCREENING FOR OSTEOPOROSIS: ICD-10-CM

## 2017-11-13 DIAGNOSIS — Z11.51 SPECIAL SCREENING EXAMINATION FOR HUMAN PAPILLOMAVIRUS (HPV): ICD-10-CM

## 2017-11-13 PROCEDURE — 87624 HPV HI-RISK TYP POOLED RSLT: CPT

## 2017-11-13 PROCEDURE — G0101 CA SCREEN;PELVIC/BREAST EXAM: HCPCS | Performed by: NURSE PRACTITIONER

## 2017-11-13 PROCEDURE — G0145 SCR C/V CYTO,THINLAYER,RESCR: HCPCS

## 2017-11-13 RX ORDER — IBUPROFEN 200 MG/1
TABLET ORAL
Refills: 0 | Status: ON HOLD | COMMUNITY
Start: 2017-11-11 | End: 2017-11-19

## 2017-11-13 ASSESSMENT — PATIENT HEALTH QUESTIONNAIRE - PHQ9
SUM OF ALL RESPONSES TO PHQ9 QUESTIONS 1 & 2: 0
SUM OF ALL RESPONSES TO PHQ QUESTIONS 1-9: 0
2. FEELING DOWN, DEPRESSED OR HOPELESS: 0
1. LITTLE INTEREST OR PLEASURE IN DOING THINGS: 0

## 2017-11-13 NOTE — PROGRESS NOTES
History and Physical  [address], [address] [facility] [address] [phone]   Fax [phone]  Jaxon Coates  2017              65 y.o. Chief Complaint   Patient presents with    Gynecologic Exam       No LMP recorded. Patient is postmenopausal.             Primary Care Physician: Erin Kumar CNP    The patient was seen and examined. She has no chief complaint today and is here for her annual exam. Patient is following up with Dr Katerina Bojorquez for right ovarian mass- appointment scheduled. Her bowels are regular. There are no voiding complaints. She denies any bloating. She denies vaginal discharge and was counseled on STD's and the need for barrier contraception.      HPI : Jaxon Coates is a 65 y.o. female [id number]    Annual exam, no chief complaint  ________________________________________________________________________  Obstetric History       T2      L2     SAB0   TAB0   Ectopic0   Molar0   Multiple0   Live Births2       # Outcome Date GA Lbr Paramjit/2nd Weight Sex Delivery Anes PTL Lv   3 Term      Vag-Spont  N VINAYAK   2 Term      Vag-Spont  N VINAYAK   1 SAB                 Past Medical History:   Diagnosis Date    Arthritis     Bronchitis, chronic (HCC)     Chronic back pain     Cocaine abuse     many years ago    COPD [facility] disease) ([facility] [address]     Depression     Diverticulosis     Emphysema     Family history of breast cancer     mom @ 61    Fibromyalgia     GERD (gastroesophageal reflux disease)     GI bleed     colon    H/O tubal ligation     Hemorrhoids, internal     Hiatal hernia     Hyperlipidemia     Hypertension     Meningioma ([address]     Orbital fracture (HCC)     left side     Renal calculi     Renal cyst     STD (sexually transmitted disease)     Tobacco abuse     Uterine prolapse                                                                    Past Surgical History:   Procedure yo  Menopause at 38 yo     No LMP recorded. Patient is postmenopausal.    Sexually Active: Yes    STD History: Yes history of STD (gonorrhea)    Permanent Sterilization: Yes tubal ligation   Reversible Birth Control: No        Hormone Replacement Exposure: No      Genetic Qualified Family History of Breast, Ovarian , Colon or Uterine Cancer: No      If YES see scanned worksheet. Preventative Health Testing:    Health Maintenance:  Health Maintenance Due   Topic Date Due    DTaP/Tdap/Td vaccine (1 - Tdap) 04/26/1980       Date of Last Pap Smear: 10/9/2013 normal  Abnormal Pap Smear History: denies  Colposcopy History:   Date of Last Mammogram: 11/1/2017 normal  Date of Last Colonoscopy: 4/5/2017- has another colonoscopy scheduled this Wednesday 11/15/2017  Date of Last Bone Density:      ________________________________________________________________________      REVIEW OF SYSTEMS:    yes   A minimum of an eleven point review of systems was completed. Review Of Systems (11 point):  Constitutional: No fever, chills or malaise; No weight change or fatigue  Head and Eyes: No Headache, Dizziness or trauma in last 12 months. + glasses  ENT ROS: No hearing, Tinnitis, sinus or taste problems  Hematological and Lymphatic ROS:No Lymphoma, Von Willebrand's, Hemophillia or Bleeding History  Psych ROS: No Homicidal thoughts,suicidal thoughts, or anxiety. + depression  Breast ROS: No prior breast abnormalities or lumps  Respiratory ROS: No SOB, Pneumoniae,Cough, or Pulmonary Embolism History. +COPD  Cardiovascular ROS: No Chest Pain with Exertion, Palpitations, Syncope, Edema, Arrhythmia. + hyperlipidemia, hypertension  Gastrointestinal ROS: No Indigestion, Heartburn, Nausea, vomiting, Diarrhea, Constipation,or Bowel Changes; No Bloody Stools or melena  Genito-Urinary ROS: No Dysuria, Hematuria or Nocturia.  No Urinary Incontinence or Vaginal Discharge  Musculoskeletal ROS: No Arthralgia, Arthritis,Gout,Osteoporosis or

## 2017-11-14 LAB
HPV SAMPLE: NORMAL
HPV SOURCE: NORMAL
HPV, GENOTYPE 16: NOT DETECTED
HPV, GENOTYPE 18: NOT DETECTED
HPV, HIGH RISK OTHER: NOT DETECTED
HPV, INTERPRETATION: NORMAL

## 2017-11-15 ENCOUNTER — HOSPITAL ENCOUNTER (OUTPATIENT)
Age: 57
Setting detail: OUTPATIENT SURGERY
Discharge: HOME | End: 2017-11-15
Attending: INTERNAL MEDICINE | Admitting: INTERNAL MEDICINE
Payer: MEDICARE

## 2017-11-15 ENCOUNTER — HOSPITAL ENCOUNTER (OUTPATIENT)
Dept: ULTRASOUND IMAGING | Age: 57
Discharge: HOME | End: 2017-11-15
Payer: MEDICARE

## 2017-11-15 VITALS
OXYGEN SATURATION: 97 % | HEIGHT: 66 IN | RESPIRATION RATE: 18 BRPM | SYSTOLIC BLOOD PRESSURE: 130 MMHG | HEART RATE: 77 BPM | DIASTOLIC BLOOD PRESSURE: 90 MMHG | TEMPERATURE: 97.3 F

## 2017-11-15 DIAGNOSIS — N83.8 OVARIAN MASS, RIGHT: ICD-10-CM

## 2017-11-15 DIAGNOSIS — N83.8 OVARIAN MASS: ICD-10-CM

## 2017-11-15 PROCEDURE — 99153 MOD SED SAME PHYS/QHP EA: CPT | Performed by: INTERNAL MEDICINE

## 2017-11-15 PROCEDURE — 76830 TRANSVAGINAL US NON-OB: CPT

## 2017-11-15 PROCEDURE — 76856 US EXAM PELVIC COMPLETE: CPT

## 2017-11-15 PROCEDURE — 7100000011 HC PHASE II RECOVERY - ADDTL 15 MIN: Performed by: INTERNAL MEDICINE

## 2017-11-15 PROCEDURE — 99152 MOD SED SAME PHYS/QHP 5/>YRS: CPT | Performed by: INTERNAL MEDICINE

## 2017-11-15 PROCEDURE — C1773 RET DEV, INSERTABLE: HCPCS | Performed by: INTERNAL MEDICINE

## 2017-11-15 PROCEDURE — 88305 TISSUE EXAM BY PATHOLOGIST: CPT

## 2017-11-15 PROCEDURE — 7100000010 HC PHASE II RECOVERY - FIRST 15 MIN: Performed by: INTERNAL MEDICINE

## 2017-11-15 PROCEDURE — 2580000003 HC RX 258: Performed by: INTERNAL MEDICINE

## 2017-11-15 PROCEDURE — 3609010400 HC COLONOSCOPY POLYPECTOMY HOT BIOPSY: Performed by: INTERNAL MEDICINE

## 2017-11-15 PROCEDURE — 6360000002 HC RX W HCPCS: Performed by: INTERNAL MEDICINE

## 2017-11-15 RX ORDER — MIDAZOLAM HYDROCHLORIDE 1 MG/ML
INJECTION INTRAMUSCULAR; INTRAVENOUS PRN
Status: DISCONTINUED | OUTPATIENT
Start: 2017-11-15 | End: 2017-11-15 | Stop reason: HOSPADM

## 2017-11-15 RX ORDER — FENTANYL CITRATE 50 UG/ML
INJECTION, SOLUTION INTRAMUSCULAR; INTRAVENOUS PRN
Status: DISCONTINUED | OUTPATIENT
Start: 2017-11-15 | End: 2017-11-15 | Stop reason: HOSPADM

## 2017-11-15 RX ORDER — 0.9 % SODIUM CHLORIDE 0.9 %
VIAL (ML) INJECTION PRN
Status: DISCONTINUED | OUTPATIENT
Start: 2017-11-15 | End: 2017-11-15 | Stop reason: HOSPADM

## 2017-11-15 RX ORDER — SODIUM CHLORIDE 9 MG/ML
INJECTION, SOLUTION INTRAVENOUS CONTINUOUS
Status: DISCONTINUED | OUTPATIENT
Start: 2017-11-15 | End: 2017-11-15 | Stop reason: HOSPADM

## 2017-11-15 RX ADMIN — SODIUM CHLORIDE: 9 INJECTION, SOLUTION INTRAVENOUS at 07:03

## 2017-11-15 ASSESSMENT — PAIN - FUNCTIONAL ASSESSMENT: PAIN_FUNCTIONAL_ASSESSMENT: 0-10

## 2017-11-15 ASSESSMENT — PAIN SCALES - GENERAL: PAINLEVEL_OUTOF10: 0

## 2017-11-15 NOTE — OP NOTE
Transferred to recovery room in satisfactory condition. The colon was decompressed and the scope was removed. The patient tolerated the procedure well. Recommendations/Plan:   1. F/U Biopsies  2. F/U In Office as instructed  3. Discussed with the family  4. High fiber diet   5. Precautions to avoid constipation  Next colonoscopy: 1 years. If Colonoscopy is less than 10 years the recommended reason is due:polyps, flat polyp removed with EMR technique.     Electronically signed by Morales Briceno MD  on 11/15/2017 at 8:08 AM

## 2017-11-15 NOTE — H&P
HISTORY and [id number]       NAME:  Ele Wilkes  MRN: [id number]   YOB: 1960   Date: 11/15/2017   Age: 65 y.o. Gender: female       COMPLAINT AND PRESENT HISTORY:     Ele Wilkes is 65 y.o.,   female, having a colonoscopy. Pt C/O LLQ discomfort, elevated CEA. Pt states LLQ discomfort no correlation to meal and sometimes worse when pt has BM. Pt has hx diverticulosis. Pt had colonoscopy in 4/2017, poor prep. Pt had EGD in 10/2017, nonspecific. Patient denies any other GI symptoms. No nausea / vomiting. No diarrhea or constipation. No abdominal pains or cramping. No heartburn, no changes in the color, caliber or consistency of the stools. No fever or chills.     PAST MEDICAL HISTORY     Past Medical History:   Diagnosis Date    Arthritis     Bronchitis, chronic (HCC)     Chronic back pain     Cocaine abuse     many years ago    COPD (chronic obstructive pulmonary disease) ([facility] [address]     Depression     Diverticulosis     Emphysema     Family history of breast cancer     mom @ 61    Fibromyalgia     GERD (gastroesophageal reflux disease)     GI bleed     colon    H/O tubal ligation     Hemorrhoids, internal     Hiatal hernia     Hyperlipidemia     Hypertension     Meningioma ([facility] [address]     Orbital fracture (HCC)     left side     Renal calculi     Renal cyst     STD (sexually transmitted disease)     Tobacco abuse     Uterine prolapse        Pt denies any history of Diabetes mellitus type 2, stroke, heart disease, Asthma, Cancer, Seizures,Thyroid disease, Kidney Disease, Hepatitis, TB.    SURGICAL HISTORY       Past Surgical History:   Procedure Laterality Date    BACK SURGERY  2011    thoracic laminectomy, T12, S1    CARDIAC CATHETERIZATION  10/24/14    Normal coronaries     COLONOSCOPY  04/05/2017    diverticulosis, ,poor prep    EYE SURGERY      left    ORBITAL FRACTURE SURGERY Left     NV COLON CA SCRN NOT HI RSK IND N/A 4/5/2017 lovastatin (MEVACOR) 40 MG tablet take 1 tablet by mouth every evening 30 tablet 3    hydrochlorothiazide (HYDRODIURIL) 25 MG tablet take 1 tablet by mouth once daily 30 tablet 3    diclofenac (VOLTAREN) 50 MG EC tablet take 1 tablet by mouth twice a day 60 tablet 3    omeprazole (PRILOSEC) 20 MG delayed release capsule take 1 capsule by mouth twice a day 60 capsule 3    PARoxetine (PAXIL) 20 MG tablet Take 1 tablet by mouth every morning 30 tablet 3    varenicline (CHANTIX CONTINUING MONTH RIYA) 1 MG tablet Take 1 tablet by mouth 2 times daily 60 tablet 3    pregabalin (LYRICA) 100 MG capsule take 1 capsule by mouth twice a day 60 capsule 2    SPIRIVA HANDIHALER 18 MCG inhalation capsule inhale the contents of one capsule in the handihaler once daily 30 capsule 3    RA CALCIUM 600/VITAMIN D-3 600-400 MG-UNIT TABS take 1 tablet by mouth twice a day 60 tablet 5    cloNIDine (CATAPRES) 0.1 MG tablet take 1 tablet by mouth at bedtime 30 tablet 5    Multiple Vitamin (MULTIVITAMIN PO) Take  by mouth daily.  albuterol sulfate HFA (PROVENTIL HFA) 108 (90 BASE) MCG/ACT inhaler Inhale 2 puffs into the lungs every 6 hours as needed for Wheezing or Shortness of Breath 1 Inhaler 1    aspirin 81 MG EC tablet Take 81 mg by mouth daily. General health:  Fairly good. No fever or chills. Skin:  No itching, redness or rash. HEENT:  No headache, epistaxis or sore throat. Neck:  No pain, stiffness or masses. Cardiovascular/Respiratory system:  Cough with clear sputum. No chest pain, palpitation or shortness of breath. Gastrointestinal tract: See HPI. Genitourinary:  No burning on micturition. No hesitancy, urgency, frequency or discoloration of urine. Locomotor:  No bone or joint pains. No swelling. Neuropsychiatric:  No referable complaints.                      GENERAL PHYSICAL EXAM:     Vitals: /86   Pulse 92   Temp 97.3 °F (36.3 °C) (Oral)   Resp 16   Ht 5' 6\" (1.676 m)   SpO2 95%  There is no height or weight on file to calculate BMI. GENERAL APPEARANCE:   Martita Dobbins is 65 y.o.,  female, nourished, conscious, alert. Does not appear to be distress or pain at this time. SKIN:  Warm, dry, no cyanosis or jaundice. HEAD:  Normocephalic, atraumatic, no swelling or tenderness. EYES:  Glasses. Pupils equal, reactive to light. EARS:  No discharge, no marked hearing loss. NOSE:  No rhinorrhea, epistaxis or septal deformity. THROAT:  Not congested. No ulceration bleeding or discharge. NECK:  No stiffness, trachea central.  No palpable masses or L.N.                 CHEST:  Symmetrical and equal on expansion. HEART:  RRR S1 > S2. No audible murmurs or gallops. LUNGS:  Equal on expansion, normal breath sounds. No adventitious sounds. ABDOMEN:  LLQ discomfort. Soft on palpation. No localized tenderness. No guarding or rigidity. No palpable organomegaly. LYMPHATICS:  No palpable cervical lymphadenopathy. LOCOMOTOR, BACK AND SPINE:  No tenderness or deformities. EXTREMITIES:  Symmetrical, no pedal edema. Edwards sign negative. No discoloration or ulcerations. NEUROLOGIC:  The patient is conscious, alert, oriented, No apparent focal sensory or motor deficits. PROVISIONAL DIAGNOSES / SURGERY:      LLQ Pain, CEA. Colonoscopy.     Patient Active Problem List    Diagnosis Date Noted    Elevated CEA of 6.6 and OVA-1 of 4.5 10/30/2017     Priority: High    Tetrahydrocannabinol [facility]) use disorder, mild, abuse 10/30/2017     Priority: High    Elevated CEA 11/10/2017    Family history of breast

## 2017-11-16 ENCOUNTER — TELEPHONE (OUTPATIENT)
Dept: OBGYN CLINIC | Age: 57
End: 2017-11-16

## 2017-11-16 DIAGNOSIS — N83.201 CYST OF RIGHT OVARY: Primary | ICD-10-CM

## 2017-11-16 LAB — SURGICAL PATHOLOGY REPORT: NORMAL

## 2017-11-19 ENCOUNTER — APPOINTMENT (OUTPATIENT)
Dept: GENERAL RADIOLOGY | Age: 57
DRG: 194 | End: 2017-11-19
Payer: MEDICARE

## 2017-11-19 ENCOUNTER — HOSPITAL ENCOUNTER (INPATIENT)
Age: 57
LOS: 2 days | Discharge: HOME | DRG: 194 | End: 2017-11-21
Attending: EMERGENCY MEDICINE | Admitting: INTERNAL MEDICINE
Payer: MEDICARE

## 2017-11-19 DIAGNOSIS — J18.9 PNEUMONIA OF RIGHT UPPER LOBE DUE TO INFECTIOUS ORGANISM: Primary | ICD-10-CM

## 2017-11-19 DIAGNOSIS — A41.9 SEPSIS, DUE TO UNSPECIFIED ORGANISM: ICD-10-CM

## 2017-11-19 DIAGNOSIS — R10.30 LOWER ABDOMINAL PAIN: ICD-10-CM

## 2017-11-19 LAB
ABSOLUTE BANDS #: 0.03 K/UL (ref 0–1)
ABSOLUTE EOS #: 0.08 K/UL (ref 0–0.4)
ABSOLUTE IMMATURE GRANULOCYTE: ABNORMAL K/UL (ref 0–0.3)
ABSOLUTE LYMPH #: 1.38 K/UL (ref 1–4.8)
ABSOLUTE MONO #: 0.25 K/UL (ref 0.1–1.3)
ALBUMIN SERPL-MCNC: 4.1 G/DL (ref 3.5–5.2)
ALBUMIN/GLOBULIN RATIO: ABNORMAL (ref 1–2.5)
ALP BLD-CCNC: 71 U/L (ref 35–104)
ALT SERPL-CCNC: 21 U/L (ref 5–33)
ANION GAP SERPL CALCULATED.3IONS-SCNC: 18 MMOL/L (ref 9–17)
AST SERPL-CCNC: 28 U/L
BANDS: 1 %
BASOPHILS # BLD: 0 %
BASOPHILS ABSOLUTE: 0 K/UL (ref 0–0.2)
BILIRUB SERPL-MCNC: 0.23 MG/DL (ref 0.3–1.2)
BUN BLDV-MCNC: 12 MG/DL (ref 6–20)
BUN/CREAT BLD: ABNORMAL (ref 9–20)
CALCIUM SERPL-MCNC: 10 MG/DL (ref 8.6–10.4)
CHLORIDE BLD-SCNC: 105 MMOL/L (ref 98–107)
CO2: 23 MMOL/L (ref 20–31)
CREAT SERPL-MCNC: 0.72 MG/DL (ref 0.5–0.9)
DIFFERENTIAL TYPE: ABNORMAL
EOSINOPHILS RELATIVE PERCENT: 3 %
GFR AFRICAN AMERICAN: >60 ML/MIN
GFR NON-AFRICAN AMERICAN: >60 ML/MIN
GFR SERPL CREATININE-BSD FRML MDRD: ABNORMAL ML/MIN/{1.73_M2}
GFR SERPL CREATININE-BSD FRML MDRD: ABNORMAL ML/MIN/{1.73_M2}
GLUCOSE BLD-MCNC: 149 MG/DL (ref 70–99)
HCT VFR BLD CALC: 48.4 % (ref 36–46)
HEMOGLOBIN: 16.7 G/DL (ref 12–16)
IMMATURE GRANULOCYTES: ABNORMAL %
LACTIC ACID, WHOLE BLOOD: NORMAL MMOL/L (ref 0.7–2.1)
LACTIC ACID: 1.2 MMOL/L (ref 0.5–2.2)
LACTIC ACID: 2.1 MMOL/L (ref 0.5–2.2)
LACTIC ACID: 2.2 MMOL/L (ref 0.5–2.2)
LIPASE: 45 U/L (ref 13–60)
LYMPHOCYTES # BLD: 49 %
MCH RBC QN AUTO: 31 PG (ref 26–34)
MCHC RBC AUTO-ENTMCNC: 34.4 G/DL (ref 31–37)
MCV RBC AUTO: 90.2 FL (ref 80–100)
MONOCYTES # BLD: 9 %
MORPHOLOGY: NORMAL
MYOGLOBIN: 35 NG/ML (ref 25–58)
PDW BLD-RTO: 13.5 % (ref 11.5–14.9)
PLATELET # BLD: 214 K/UL (ref 150–450)
PLATELET ESTIMATE: ABNORMAL
PMV BLD AUTO: 9.1 FL (ref 6–12)
POTASSIUM SERPL-SCNC: 3.1 MMOL/L (ref 3.7–5.3)
PROCALCITONIN: 1.67 NG/ML
RBC # BLD: 5.37 M/UL (ref 4–5.2)
RBC # BLD: ABNORMAL 10*6/UL
SEG NEUTROPHILS: 38 %
SEGMENTED NEUTROPHILS ABSOLUTE COUNT: 1.06 K/UL (ref 1.3–9.1)
SODIUM BLD-SCNC: 146 MMOL/L (ref 135–144)
TOTAL PROTEIN: 7.2 G/DL (ref 6.4–8.3)
TROPONIN INTERP: NORMAL
TROPONIN T: <0.03 NG/ML
WBC # BLD: 2.8 K/UL (ref 3.5–11)
WBC # BLD: ABNORMAL 10*3/UL

## 2017-11-19 PROCEDURE — 84484 ASSAY OF TROPONIN QUANT: CPT

## 2017-11-19 PROCEDURE — 83690 ASSAY OF LIPASE: CPT

## 2017-11-19 PROCEDURE — 83874 ASSAY OF MYOGLOBIN: CPT

## 2017-11-19 PROCEDURE — 6370000000 HC RX 637 (ALT 250 FOR IP): Performed by: HOSPITALIST

## 2017-11-19 PROCEDURE — 80053 COMPREHEN METABOLIC PANEL: CPT

## 2017-11-19 PROCEDURE — 6370000000 HC RX 637 (ALT 250 FOR IP): Performed by: RADIOLOGY

## 2017-11-19 PROCEDURE — 2580000003 HC RX 258: Performed by: EMERGENCY MEDICINE

## 2017-11-19 PROCEDURE — 96368 THER/DIAG CONCURRENT INF: CPT

## 2017-11-19 PROCEDURE — 96375 TX/PRO/DX INJ NEW DRUG ADDON: CPT

## 2017-11-19 PROCEDURE — 84145 PROCALCITONIN (PCT): CPT

## 2017-11-19 PROCEDURE — 36415 COLL VENOUS BLD VENIPUNCTURE: CPT

## 2017-11-19 PROCEDURE — 87040 BLOOD CULTURE FOR BACTERIA: CPT

## 2017-11-19 PROCEDURE — 94760 N-INVAS EAR/PLS OXIMETRY 1: CPT

## 2017-11-19 PROCEDURE — 99285 EMERGENCY DEPT VISIT HI MDM: CPT

## 2017-11-19 PROCEDURE — 85025 COMPLETE CBC W/AUTO DIFF WBC: CPT

## 2017-11-19 PROCEDURE — 71020 XR CHEST STANDARD TWO VW: CPT

## 2017-11-19 PROCEDURE — 87205 SMEAR GRAM STAIN: CPT

## 2017-11-19 PROCEDURE — 99223 1ST HOSP IP/OBS HIGH 75: CPT | Performed by: INTERNAL MEDICINE

## 2017-11-19 PROCEDURE — 83605 ASSAY OF LACTIC ACID: CPT

## 2017-11-19 PROCEDURE — 87070 CULTURE OTHR SPECIMN AEROBIC: CPT

## 2017-11-19 PROCEDURE — 2580000003 HC RX 258: Performed by: HOSPITALIST

## 2017-11-19 PROCEDURE — 6370000000 HC RX 637 (ALT 250 FOR IP): Performed by: EMERGENCY MEDICINE

## 2017-11-19 PROCEDURE — 94640 AIRWAY INHALATION TREATMENT: CPT

## 2017-11-19 PROCEDURE — 96365 THER/PROPH/DIAG IV INF INIT: CPT

## 2017-11-19 PROCEDURE — 6360000002 HC RX W HCPCS: Performed by: EMERGENCY MEDICINE

## 2017-11-19 PROCEDURE — 2060000000 HC ICU INTERMEDIATE R&B

## 2017-11-19 RX ORDER — ALBUTEROL SULFATE 90 UG/1
2 AEROSOL, METERED RESPIRATORY (INHALATION) EVERY 6 HOURS PRN
Status: DISCONTINUED | OUTPATIENT
Start: 2017-11-19 | End: 2017-11-21 | Stop reason: HOSPADM

## 2017-11-19 RX ORDER — PROMETHAZINE HYDROCHLORIDE 25 MG/1
25 TABLET ORAL EVERY 6 HOURS PRN
COMMUNITY
End: 2018-03-12 | Stop reason: ALTCHOICE

## 2017-11-19 RX ORDER — PREGABALIN 100 MG/1
100 CAPSULE ORAL 2 TIMES DAILY
Status: DISCONTINUED | OUTPATIENT
Start: 2017-11-19 | End: 2017-11-21 | Stop reason: HOSPADM

## 2017-11-19 RX ORDER — DOCUSATE SODIUM 100 MG/1
100 CAPSULE, LIQUID FILLED ORAL 2 TIMES DAILY
Status: DISCONTINUED | OUTPATIENT
Start: 2017-11-19 | End: 2017-11-21 | Stop reason: HOSPADM

## 2017-11-19 RX ORDER — DICYCLOMINE HYDROCHLORIDE 10 MG/1
10 CAPSULE ORAL ONCE
Status: COMPLETED | OUTPATIENT
Start: 2017-11-19 | End: 2017-11-19

## 2017-11-19 RX ORDER — PROMETHAZINE HYDROCHLORIDE 25 MG/1
12.5 TABLET ORAL EVERY 6 HOURS PRN
Status: DISCONTINUED | OUTPATIENT
Start: 2017-11-19 | End: 2017-11-21 | Stop reason: HOSPADM

## 2017-11-19 RX ORDER — ONDANSETRON 2 MG/ML
4 INJECTION INTRAMUSCULAR; INTRAVENOUS EVERY 6 HOURS PRN
Status: DISCONTINUED | OUTPATIENT
Start: 2017-11-19 | End: 2017-11-21 | Stop reason: HOSPADM

## 2017-11-19 RX ORDER — BISACODYL 10 MG
10 SUPPOSITORY, RECTAL RECTAL DAILY PRN
Status: DISCONTINUED | OUTPATIENT
Start: 2017-11-19 | End: 2017-11-21 | Stop reason: HOSPADM

## 2017-11-19 RX ORDER — ACETAMINOPHEN 325 MG/1
650 TABLET ORAL EVERY 4 HOURS PRN
Status: DISCONTINUED | OUTPATIENT
Start: 2017-11-19 | End: 2017-11-21 | Stop reason: HOSPADM

## 2017-11-19 RX ORDER — GUAIFENESIN 600 MG/1
600 TABLET, EXTENDED RELEASE ORAL 2 TIMES DAILY
Status: DISCONTINUED | OUTPATIENT
Start: 2017-11-19 | End: 2017-11-21 | Stop reason: HOSPADM

## 2017-11-19 RX ORDER — SODIUM CHLORIDE 0.9 % (FLUSH) 0.9 %
10 SYRINGE (ML) INJECTION PRN
Status: DISCONTINUED | OUTPATIENT
Start: 2017-11-19 | End: 2017-11-21 | Stop reason: HOSPADM

## 2017-11-19 RX ORDER — SODIUM CHLORIDE 0.9 % (FLUSH) 0.9 %
10 SYRINGE (ML) INJECTION EVERY 12 HOURS SCHEDULED
Status: DISCONTINUED | OUTPATIENT
Start: 2017-11-19 | End: 2017-11-21 | Stop reason: HOSPADM

## 2017-11-19 RX ORDER — CLONIDINE HYDROCHLORIDE 0.1 MG/1
0.1 TABLET ORAL DAILY
Status: DISCONTINUED | OUTPATIENT
Start: 2017-11-19 | End: 2017-11-21 | Stop reason: HOSPADM

## 2017-11-19 RX ORDER — NICOTINE 21 MG/24HR
1 PATCH, TRANSDERMAL 24 HOURS TRANSDERMAL DAILY PRN
Status: DISCONTINUED | OUTPATIENT
Start: 2017-11-19 | End: 2017-11-21 | Stop reason: HOSPADM

## 2017-11-19 RX ORDER — ALBUTEROL SULFATE 2.5 MG/3ML
2.5 SOLUTION RESPIRATORY (INHALATION)
Status: DISCONTINUED | OUTPATIENT
Start: 2017-11-19 | End: 2017-11-21 | Stop reason: HOSPADM

## 2017-11-19 RX ORDER — ASPIRIN 81 MG/1
81 TABLET ORAL DAILY
Status: DISCONTINUED | OUTPATIENT
Start: 2017-11-19 | End: 2017-11-21 | Stop reason: HOSPADM

## 2017-11-19 RX ORDER — ONDANSETRON 2 MG/ML
4 INJECTION INTRAMUSCULAR; INTRAVENOUS ONCE
Status: COMPLETED | OUTPATIENT
Start: 2017-11-19 | End: 2017-11-19

## 2017-11-19 RX ORDER — 0.9 % SODIUM CHLORIDE 0.9 %
1000 INTRAVENOUS SOLUTION INTRAVENOUS ONCE
Status: COMPLETED | OUTPATIENT
Start: 2017-11-19 | End: 2017-11-19

## 2017-11-19 RX ORDER — DEXTROSE, SODIUM CHLORIDE, AND POTASSIUM CHLORIDE 5; .45; .15 G/100ML; G/100ML; G/100ML
INJECTION INTRAVENOUS CONTINUOUS
Status: DISCONTINUED | OUTPATIENT
Start: 2017-11-19 | End: 2017-11-21

## 2017-11-19 RX ORDER — FAMOTIDINE 20 MG/1
20 TABLET, FILM COATED ORAL 2 TIMES DAILY
Status: DISCONTINUED | OUTPATIENT
Start: 2017-11-19 | End: 2017-11-21 | Stop reason: HOSPADM

## 2017-11-19 RX ORDER — PAROXETINE HYDROCHLORIDE 20 MG/1
20 TABLET, FILM COATED ORAL EVERY MORNING
Status: DISCONTINUED | OUTPATIENT
Start: 2017-11-20 | End: 2017-11-21 | Stop reason: HOSPADM

## 2017-11-19 RX ORDER — MORPHINE SULFATE 10 MG/ML
4 INJECTION, SOLUTION INTRAMUSCULAR; INTRAVENOUS ONCE
Status: DISCONTINUED | OUTPATIENT
Start: 2017-11-19 | End: 2017-11-21 | Stop reason: HOSPADM

## 2017-11-19 RX ORDER — IPRATROPIUM BROMIDE AND ALBUTEROL SULFATE 2.5; .5 MG/3ML; MG/3ML
1 SOLUTION RESPIRATORY (INHALATION)
Status: DISCONTINUED | OUTPATIENT
Start: 2017-11-19 | End: 2017-11-21 | Stop reason: HOSPADM

## 2017-11-19 RX ADMIN — FAMOTIDINE 20 MG: 20 TABLET, FILM COATED ORAL at 21:06

## 2017-11-19 RX ADMIN — DICYCLOMINE HYDROCHLORIDE 10 MG: 10 CAPSULE ORAL at 12:54

## 2017-11-19 RX ADMIN — PREGABALIN 100 MG: 100 CAPSULE ORAL at 21:06

## 2017-11-19 RX ADMIN — GUAIFENESIN 600 MG: 600 TABLET, EXTENDED RELEASE ORAL at 21:06

## 2017-11-19 RX ADMIN — IPRATROPIUM BROMIDE AND ALBUTEROL SULFATE 1 AMPULE: .5; 3 SOLUTION RESPIRATORY (INHALATION) at 20:38

## 2017-11-19 RX ADMIN — AZITHROMYCIN MONOHYDRATE 500 MG: 500 INJECTION, POWDER, LYOPHILIZED, FOR SOLUTION INTRAVENOUS at 14:34

## 2017-11-19 RX ADMIN — TIOTROPIUM BROMIDE 18 MCG: 18 CAPSULE ORAL; RESPIRATORY (INHALATION) at 18:07

## 2017-11-19 RX ADMIN — SODIUM CHLORIDE 1000 ML: 9 INJECTION, SOLUTION INTRAVENOUS at 14:30

## 2017-11-19 RX ADMIN — ONDANSETRON 4 MG: 2 INJECTION INTRAMUSCULAR; INTRAVENOUS at 12:54

## 2017-11-19 RX ADMIN — CEFTRIAXONE SODIUM 1 G: 1 INJECTION, POWDER, FOR SOLUTION INTRAMUSCULAR; INTRAVENOUS at 14:31

## 2017-11-19 RX ADMIN — POTASSIUM CHLORIDE, DEXTROSE MONOHYDRATE AND SODIUM CHLORIDE: 150; 5; 450 INJECTION, SOLUTION INTRAVENOUS at 18:05

## 2017-11-19 ASSESSMENT — ENCOUNTER SYMPTOMS
EYE PAIN: 0
SINUS PRESSURE: 0
BLOOD IN STOOL: 0
COLOR CHANGE: 0
TROUBLE SWALLOWING: 0
SHORTNESS OF BREATH: 0
RHINORRHEA: 0
DIARRHEA: 1
VOMITING: 1
SORE THROAT: 0
ABDOMINAL PAIN: 1
COUGH: 1
SPUTUM PRODUCTION: 1
BACK PAIN: 0
EYE REDNESS: 0
CONSTIPATION: 0
EYE DISCHARGE: 0
NAUSEA: 1
WHEEZING: 0
CHEST TIGHTNESS: 0
FACIAL SWELLING: 0
SHORTNESS OF BREATH: 1

## 2017-11-19 ASSESSMENT — PAIN DESCRIPTION - LOCATION: LOCATION: ABDOMEN

## 2017-11-19 ASSESSMENT — PAIN DESCRIPTION - PAIN TYPE: TYPE: ACUTE PAIN

## 2017-11-19 ASSESSMENT — PAIN SCALES - GENERAL: PAINLEVEL_OUTOF10: 8

## 2017-11-19 NOTE — CARE COORDINATION
ADMISSION NOTE       Patient admitted to room  2086. Time of admit: PRIOR TO ARRIVAL    Admit from:  ER    Reason for admission:  PNEUMONIA    Where patient has been residing for the last 24 hrs:  [facility] [facility]    Has the patient been admitted to any facility in the last 4 weeks, which one:  NO    Family at bedside:  NO    Patient is currently in pain YES  Patient has been oriented to room, educated on how to use call light, and to call for assistance prior to getting up. Bed in lowest and locked position. 2 side rails up for safety. Call light within reach. admission DATA BASE COMPLETED.  pt IN BED WITH NO S/S OF DISTRESS.

## 2017-11-19 NOTE — ED NOTES
Pt states onset of s/s's was 09:00 today. Pt c/o loose stools with bright red blood/emesis x 1. Pt states the blood was mixed with mucous. Pt arrives A+O x 4, GCS = 15, PMS x 4 intact, eupneic, and PWD. Pt's pulse is slightly tachycardic and regular. Lung sounds clear t/o bilat. Abdomen is soft et nondistended. Pt denies bloody stools or urine.                Danny Motta RN  11/19/17 [id number]

## 2017-11-19 NOTE — ED PROVIDER NOTES
[address] ED  eMERGENCY dEPARTMENT eNCOUnter      Pt Name: Jaylene Marquez  MRN: [id number]  [place] 1960  Date of evaluation: 11/19/17      CHIEF COMPLAINT       Chief Complaint   Patient presents with    Shortness of Breath    Abdominal Pain     polyp removed Wed    Hematemesis         HISTORY OF PRESENT ILLNESS    Jaylene Marquez is a 65 y.o. female who presents complaining of Feeling ill. Patient states she had a colonoscopy done on Wednesday for screening for colon cancer. Patient did have a polyp removed. Patient states starting the next day she started having a cough with some diarrhea and just feeling not well. Patient assumed that she was coming down with the flu area patient started having fevers. Patient is here today because she started having significant diarrhea and had one episode of vomiting that she noticed blood in. Patient states that she is been coughing and coughing up sputum but has not noticed any blood in the sputum. Patient's had no nosebleeds. Patient states she's never had any bleeding like this before. Patient does have some abdominal discomfort from all the diarrhea but no real pain. REVIEW OF SYSTEMS       Review of Systems   Constitutional: Positive for fatigue and fever. Negative for activity change, appetite change, chills and diaphoresis. HENT: Positive for congestion. Negative for ear pain, facial swelling, nosebleeds, rhinorrhea, sinus pressure, sore throat and trouble swallowing. Eyes: Negative for pain, discharge and redness. Respiratory: Positive for cough and shortness of breath. Negative for chest tightness and wheezing. Cardiovascular: Negative for chest pain, palpitations and leg swelling. Gastrointestinal: Positive for abdominal pain, diarrhea, nausea and vomiting. Negative for blood in stool and constipation. Genitourinary: Negative for difficulty urinating, dysuria, flank pain, frequency, genital sores and hematuria.

## 2017-11-19 NOTE — FLOWSHEET NOTE
11/19/17 1312   Encounter Summary   Services provided to: Patient and family together   Referral/Consult From: [address] [facility] Significant other   Continue Visiting (11-19-17)   Complexity of Encounter Low   Length of Encounter 15 minutes   Spiritual Assessment Completed Yes   Routine   Type Initial   Assessment Calm; Approachable   Intervention Active listening;Explored feelings, thoughts, concerns;[place];Sustaining presence/ Ministry of presence   Outcome Expressed gratitude;Engaged in conversation

## 2017-11-19 NOTE — CARE COORDINATION
[facility]  2Pneumonia Core Measure  Work List  2  Antibiotics  Blood Cultures obtained before antibiotic initiated: Yes  Antibiotics ordered Yes  If no antibiotic ordered,was notify physician.   not applicable              2

## 2017-11-19 NOTE — ED NOTES
1st set of blood cultures drawn from IV start in [facility] by this RN.      Tyler Linda RN  11/19/17 [id number]

## 2017-11-20 ENCOUNTER — APPOINTMENT (OUTPATIENT)
Dept: CT IMAGING | Age: 57
DRG: 194 | End: 2017-11-20
Payer: MEDICARE

## 2017-11-20 LAB
ANION GAP SERPL CALCULATED.3IONS-SCNC: 12 MMOL/L (ref 9–17)
ANION GAP SERPL CALCULATED.3IONS-SCNC: 12 MMOL/L (ref 9–17)
BUN BLDV-MCNC: 6 MG/DL (ref 6–20)
BUN BLDV-MCNC: 8 MG/DL (ref 6–20)
BUN/CREAT BLD: ABNORMAL (ref 9–20)
BUN/CREAT BLD: ABNORMAL (ref 9–20)
C DIFFICILE TOXINS, PCR: ABNORMAL
CALCIUM SERPL-MCNC: 8.3 MG/DL (ref 8.6–10.4)
CALCIUM SERPL-MCNC: 8.4 MG/DL (ref 8.6–10.4)
CAMPYLOBACTER PCR: NORMAL
CHLORIDE BLD-SCNC: 107 MMOL/L (ref 98–107)
CHLORIDE BLD-SCNC: 108 MMOL/L (ref 98–107)
CO2: 24 MMOL/L (ref 20–31)
CO2: 25 MMOL/L (ref 20–31)
CREAT SERPL-MCNC: 0.6 MG/DL (ref 0.5–0.9)
CREAT SERPL-MCNC: 0.61 MG/DL (ref 0.5–0.9)
CYTOLOGY REPORT: NORMAL
DIRECT EXAM: NORMAL
GFR AFRICAN AMERICAN: >60 ML/MIN
GFR AFRICAN AMERICAN: >60 ML/MIN
GFR NON-AFRICAN AMERICAN: >60 ML/MIN
GFR NON-AFRICAN AMERICAN: >60 ML/MIN
GFR SERPL CREATININE-BSD FRML MDRD: ABNORMAL ML/MIN/{1.73_M2}
GLUCOSE BLD-MCNC: 107 MG/DL (ref 70–99)
GLUCOSE BLD-MCNC: 109 MG/DL (ref 70–99)
HCT VFR BLD CALC: 39.3 % (ref 36–46)
HEMOGLOBIN: 13.4 G/DL (ref 12–16)
LACTOFERRIN, QUAL: NORMAL
Lab: NORMAL
Lab: NORMAL
MAGNESIUM: 1.7 MG/DL (ref 1.6–2.6)
MCH RBC QN AUTO: 31.4 PG (ref 26–34)
MCHC RBC AUTO-ENTMCNC: 34 G/DL (ref 31–37)
MCV RBC AUTO: 92.4 FL (ref 80–100)
PDW BLD-RTO: 13.9 % (ref 11.5–14.9)
PLATELET # BLD: 213 K/UL (ref 150–450)
PMV BLD AUTO: 8.4 FL (ref 6–12)
POTASSIUM SERPL-SCNC: 3.3 MMOL/L (ref 3.7–5.3)
POTASSIUM SERPL-SCNC: 3.7 MMOL/L (ref 3.7–5.3)
RBC # BLD: 4.25 M/UL (ref 4–5.2)
SALMONELLA PCR: NORMAL
SHIGATOXIN GENE PCR: NORMAL
SHIGELLA SP PCR: NORMAL
SODIUM BLD-SCNC: 143 MMOL/L (ref 135–144)
SODIUM BLD-SCNC: 145 MMOL/L (ref 135–144)
SPECIMEN DESCRIPTION: ABNORMAL
SPECIMEN DESCRIPTION: NORMAL
SPECIMEN: NORMAL
STATUS: NORMAL
STATUS: NORMAL
WBC # BLD: 3.7 K/UL (ref 3.5–11)

## 2017-11-20 PROCEDURE — 6370000000 HC RX 637 (ALT 250 FOR IP): Performed by: RADIOLOGY

## 2017-11-20 PROCEDURE — 6370000000 HC RX 637 (ALT 250 FOR IP): Performed by: HOSPITALIST

## 2017-11-20 PROCEDURE — 80048 BASIC METABOLIC PNL TOTAL CA: CPT

## 2017-11-20 PROCEDURE — 2060000000 HC ICU INTERMEDIATE R&B

## 2017-11-20 PROCEDURE — 70450 CT HEAD/BRAIN W/O DYE: CPT

## 2017-11-20 PROCEDURE — 82947 ASSAY GLUCOSE BLOOD QUANT: CPT

## 2017-11-20 PROCEDURE — 87505 NFCT AGENT DETECTION GI: CPT

## 2017-11-20 PROCEDURE — 36415 COLL VENOUS BLD VENIPUNCTURE: CPT

## 2017-11-20 PROCEDURE — 6360000004 HC RX CONTRAST MEDICATION: Performed by: PSYCHIATRY & NEUROLOGY

## 2017-11-20 PROCEDURE — 85027 COMPLETE CBC AUTOMATED: CPT

## 2017-11-20 PROCEDURE — 87493 C DIFF AMPLIFIED PROBE: CPT

## 2017-11-20 PROCEDURE — 2580000003 HC RX 258: Performed by: HOSPITALIST

## 2017-11-20 PROCEDURE — 83735 ASSAY OF MAGNESIUM: CPT

## 2017-11-20 PROCEDURE — 6360000002 HC RX W HCPCS: Performed by: HOSPITALIST

## 2017-11-20 PROCEDURE — 70496 CT ANGIOGRAPHY HEAD: CPT

## 2017-11-20 PROCEDURE — 70498 CT ANGIOGRAPHY NECK: CPT

## 2017-11-20 PROCEDURE — 94761 N-INVAS EAR/PLS OXIMETRY MLT: CPT

## 2017-11-20 PROCEDURE — 94640 AIRWAY INHALATION TREATMENT: CPT

## 2017-11-20 PROCEDURE — 83630 LACTOFERRIN FECAL (QUAL): CPT

## 2017-11-20 PROCEDURE — 2580000003 HC RX 258: Performed by: PSYCHIATRY & NEUROLOGY

## 2017-11-20 PROCEDURE — 99232 SBSQ HOSP IP/OBS MODERATE 35: CPT | Performed by: INTERNAL MEDICINE

## 2017-11-20 RX ORDER — POTASSIUM CHLORIDE 20 MEQ/1
40 TABLET, EXTENDED RELEASE ORAL PRN
Status: DISCONTINUED | OUTPATIENT
Start: 2017-11-20 | End: 2017-11-21 | Stop reason: HOSPADM

## 2017-11-20 RX ORDER — POTASSIUM CHLORIDE 20MEQ/15ML
40 LIQUID (ML) ORAL PRN
Status: DISCONTINUED | OUTPATIENT
Start: 2017-11-20 | End: 2017-11-21 | Stop reason: HOSPADM

## 2017-11-20 RX ORDER — ATORVASTATIN CALCIUM 80 MG/1
80 TABLET, FILM COATED ORAL DAILY
Status: DISCONTINUED | OUTPATIENT
Start: 2017-11-20 | End: 2017-11-21 | Stop reason: HOSPADM

## 2017-11-20 RX ORDER — HYDROCODONE BITARTRATE AND ACETAMINOPHEN 5; 325 MG/1; MG/1
1 TABLET ORAL EVERY 6 HOURS PRN
Status: DISCONTINUED | OUTPATIENT
Start: 2017-11-20 | End: 2017-11-21

## 2017-11-20 RX ORDER — METRONIDAZOLE 500 MG/1
500 TABLET ORAL EVERY 8 HOURS SCHEDULED
Status: DISCONTINUED | OUTPATIENT
Start: 2017-11-20 | End: 2017-11-21 | Stop reason: HOSPADM

## 2017-11-20 RX ORDER — SODIUM CHLORIDE 0.9 % (FLUSH) 0.9 %
10 SYRINGE (ML) INJECTION PRN
Status: DISCONTINUED | OUTPATIENT
Start: 2017-11-20 | End: 2017-11-21 | Stop reason: HOSPADM

## 2017-11-20 RX ORDER — 0.9 % SODIUM CHLORIDE 0.9 %
100 INTRAVENOUS SOLUTION INTRAVENOUS ONCE
Status: COMPLETED | OUTPATIENT
Start: 2017-11-20 | End: 2017-11-20

## 2017-11-20 RX ORDER — POTASSIUM CHLORIDE 7.45 MG/ML
10 INJECTION INTRAVENOUS PRN
Status: DISCONTINUED | OUTPATIENT
Start: 2017-11-20 | End: 2017-11-21 | Stop reason: HOSPADM

## 2017-11-20 RX ADMIN — CLONIDINE HYDROCHLORIDE 0.1 MG: 0.1 TABLET ORAL at 08:38

## 2017-11-20 RX ADMIN — IPRATROPIUM BROMIDE AND ALBUTEROL SULFATE 1 AMPULE: .5; 3 SOLUTION RESPIRATORY (INHALATION) at 08:15

## 2017-11-20 RX ADMIN — METRONIDAZOLE 500 MG: 500 TABLET ORAL at 20:56

## 2017-11-20 RX ADMIN — IPRATROPIUM BROMIDE AND ALBUTEROL SULFATE 1 AMPULE: .5; 3 SOLUTION RESPIRATORY (INHALATION) at 15:42

## 2017-11-20 RX ADMIN — FAMOTIDINE 20 MG: 20 TABLET, FILM COATED ORAL at 20:56

## 2017-11-20 RX ADMIN — POTASSIUM CHLORIDE, DEXTROSE MONOHYDRATE AND SODIUM CHLORIDE: 150; 5; 450 INJECTION, SOLUTION INTRAVENOUS at 22:55

## 2017-11-20 RX ADMIN — IPRATROPIUM BROMIDE AND ALBUTEROL SULFATE 1 AMPULE: .5; 3 SOLUTION RESPIRATORY (INHALATION) at 21:02

## 2017-11-20 RX ADMIN — FAMOTIDINE 20 MG: 20 TABLET, FILM COATED ORAL at 08:38

## 2017-11-20 RX ADMIN — PAROXETINE HYDROCHLORIDE 20 MG: 20 TABLET, FILM COATED ORAL at 08:38

## 2017-11-20 RX ADMIN — IOPAMIDOL 100 ML: 755 INJECTION, SOLUTION INTRAVENOUS at 15:29

## 2017-11-20 RX ADMIN — ASPIRIN 81 MG: 81 TABLET, COATED ORAL at 08:38

## 2017-11-20 RX ADMIN — IPRATROPIUM BROMIDE AND ALBUTEROL SULFATE 1 AMPULE: .5; 3 SOLUTION RESPIRATORY (INHALATION) at 11:44

## 2017-11-20 RX ADMIN — POTASSIUM CHLORIDE, DEXTROSE MONOHYDRATE AND SODIUM CHLORIDE: 150; 5; 450 INJECTION, SOLUTION INTRAVENOUS at 06:21

## 2017-11-20 RX ADMIN — METRONIDAZOLE 500 MG: 500 TABLET ORAL at 16:53

## 2017-11-20 RX ADMIN — POTASSIUM CHLORIDE 40 MEQ: 40 SOLUTION ORAL at 13:28

## 2017-11-20 RX ADMIN — PREGABALIN 100 MG: 100 CAPSULE ORAL at 20:56

## 2017-11-20 RX ADMIN — WATER 1 G: 1 INJECTION INTRAMUSCULAR; INTRAVENOUS; SUBCUTANEOUS at 13:28

## 2017-11-20 RX ADMIN — Medication 10 ML: at 15:30

## 2017-11-20 RX ADMIN — ATORVASTATIN CALCIUM 80 MG: 80 TABLET, FILM COATED ORAL at 16:53

## 2017-11-20 RX ADMIN — GUAIFENESIN 600 MG: 600 TABLET, EXTENDED RELEASE ORAL at 08:38

## 2017-11-20 RX ADMIN — SODIUM CHLORIDE 100 ML: 9 INJECTION, SOLUTION INTRAVENOUS at 15:29

## 2017-11-20 RX ADMIN — PREGABALIN 100 MG: 100 CAPSULE ORAL at 08:38

## 2017-11-20 RX ADMIN — AZITHROMYCIN MONOHYDRATE 500 MG: 500 INJECTION, POWDER, LYOPHILIZED, FOR SOLUTION INTRAVENOUS at 14:07

## 2017-11-20 RX ADMIN — GUAIFENESIN 600 MG: 600 TABLET, EXTENDED RELEASE ORAL at 20:56

## 2017-11-20 NOTE — PLAN OF CARE
Problem: Falls - Risk of  Goal: Absence of falls  Outcome: Ongoing  PT remains injury and fall free  PT uses call light correctly  PT has steady gait

## 2017-11-20 NOTE — CARE COORDINATION
CASE MANAGEMENT NOTE:    Admission Date:  11/19/2017 Renee Holder is a 65 y.o.  female    Admitted for : Pneumonia [J18.9]    Met with:  Patient    PCP:  Balbina Chappell CNP                                Insurance:  ADVANTAGE [employer]/MEDICAID      Current Residence/ Living Arrangements:  independently at home in apt with elevator            Current Services PTA:  No    Is patient agreeable to VNS: No    Freedom of choice provided: NA         VNS chosen:  NA    DME:  none    Home Oxygen: No    Nebulizer: No    Supplier: N/A    Potential Assistance Needed: No    SNF needed: No    Pharmacy:  [facility]       Does Patient want to use MEDS to BEDS? No    Family Members/Caregivers that pt would like involved in their care:    No    If yes, list name here:  n/a    Transportation Provider:  Patient- car in parking lot                      Discharge Plan:  11/20/2017 ADVANTAGE [employer]/MEDICAID; Independent at home alone in apartment with elevator; DME-None; Declines VNS and discharge needs; Admitted with pneumonia- On IV zithromax and rocephin; Will continue to follow for discharge needs//IGNACIO               Readmission Risk              Readmission Risk:        16.75       Age 72 or Greater:  0    Admitted from SNF or Requires Paid or Family Care:  0    Currently has CHF,COPD,ARF,CRI,or is on dialysis:  4    Takes more than 5 Prescription Medications:  4    Takes Digoxin,Insulin,Anticoagulants,Narcotics or ASA/Plavix:  [address] [place] [address] in Past 12 Months:  0    On Disability:  3    Patient Considers own Health:  3.75          Electronically signed by:  Jessica Castle RN on 11/20/2017 at 9:35 AM

## 2017-11-20 NOTE — PROGRESS NOTES
[facility]    Progress Note    11/20/2017    10:22 AM    Name:   Guillermina Tong  MRN:     [id number]     [place]:      [de-identified]   Room:   [facility] [place] [address] Day:  1  Admit Date:  11/19/2017 12:07 PM    PCP:   Mariela Escoto CNP  Code Status:  Full Code    Subjective:     C/C:   Chief Complaint   Patient presents with    Shortness of Breath    Abdominal Pain     polyp removed Wed    Hematemesis     Interval History Status: improved. Continues to have increased cough this morning. Per patient not more sputum than baseline due to COPD. Continues left lower quadrant discomfort. Continues to have diarrhea. Chronic cervical neck pain. No nausea, no emesis. Brief History:     The patient is a 65 y.o. female who presents with Shortness of Breath; Abdominal Pain; and Hematemesis and she is admitted to the hospital for the management of community acquired pneumonia. Pt endorses a few days of flu like symptoms starting 11/16, including fevers/chills, malaise, cough. States she continued to have cough, with increasing severity, nausea, non-bloody diarrhea, and 1 episode of emesis of bloody mucus earlier today. Denies chest pain, sob when not coughing, current fever/chills. Endorses some LLQ abdominal pain at baseline. Pt had recent UGI and colonoscopy for persistent LLQ pain, elevated CEA. One polyp removed, diverticulosis noted. Review of Systems:     ROS  Constitutional: Negative for fevers and chills. Positive for malaise. Respiratory: Positive for cough and sputum production (clear/white). Negative for shortness of breath. Cardiovascular: Negative for chest pain. Gastrointestinal: Positive for abdominal pain (LLQ at baseline), diarrhea. Negative for nausea or vomiting. Negative for constipation. Genitourinary: Negative for dysuria. Musculoskeletal: Negative for myalgias. Neurological: Negative for dizziness and headaches. Medications: Allergies:  No Known Allergies    Current Meds:   Scheduled Meds:    morphine  4 mg Intravenous Once    aspirin  81 mg Oral Daily    cloNIDine  0.1 mg Oral Daily    PARoxetine  20 mg Oral QAM    pregabalin  100 mg Oral BID    tiotropium  18 mcg Inhalation Daily    sodium chloride flush  10 mL Intravenous 2 times per day    docusate sodium  100 mg Oral BID    famotidine  20 mg Oral BID    enoxaparin  40 mg Subcutaneous Daily    ipratropium-albuterol  1 ampule Inhalation Q4H WA    azithromycin  500 mg Intravenous Q24H    And    cefTRIAXone (ROCEPHIN) IV  1 g Intravenous Q24H    guaiFENesin  600 mg Oral BID     Continuous Infusions:    dextrose 5% and 0.45% NaCl with KCl 20 mEq 75 mL/hr at 11/20/17 0621     PRN Meds: HYDROcodone 5 mg - acetaminophen, albuterol sulfate HFA, sodium chloride flush, acetaminophen, magnesium hydroxide, bisacodyl, ondansetron, nicotine, albuterol, promethazine    Data:     Past Medical History:   has a past medical history of Arthritis; Bronchitis, chronic ([facility] [address] Chronic back pain; Cocaine abuse; COPD (chronic obstructive pulmonary disease) ([facility] [address] Depression; Diverticulosis; Emphysema; Family history of breast cancer; Fibromyalgia; GERD (gastroesophageal reflux disease); GI bleed; H/O tubal ligation; Hemorrhoids, internal; Hyperlipidemia; Hypertension; Meningioma ([facility] [address] Orbital fracture ([facility] [address] Osteopenia; Renal calculi; Renal cyst; STD (sexually transmitted disease); Tobacco abuse; and Uterine prolapse. Social History:   reports that she quit smoking about 2 months ago. Her smoking use included Cigarettes. She has a 19.00 pack-year smoking history. She has never used smokeless tobacco. She reports that she drinks alcohol. She reports that she uses drugs, including Marijuana and Cocaine.      Family History:   Family History   Problem Relation Age of Onset    Bipolar Disorder Mother     Heart Disease Mother     Cancer Mother      breast and lung cancer  Diabetes Father     Heart Disease Father      Death due to MI    Cancer Paternal Grandfather      stomach cancer     Heart Disease Paternal Grandfather     Cancer Sister      ovarian cancer        Vitals:  /84   Pulse 72   Temp 97.9 °F (36.6 °C) (Oral)   Resp 14   Ht 5' 6\" (1.676 m)   Wt 154 lb 12.2 oz (70.2 kg)   SpO2 96%   BMI 24.98 kg/m²   Temp (24hrs), Av °F (36.7 °C), Min:97.8 °F (36.6 °C), Max:98.1 °F (36.7 °C)    No results for input(s): POCGLU in the last 72 hours. I/O (24Hr): Intake/Output Summary (Last 24 hours) at 17 1022  Last data filed at 17 1809   Gross per 24 hour   Intake              350 ml   Output                0 ml   Net              350 ml       Labs:    [unfilled]    Lab Results   Component Value Date/Time    SPECIAL NOT REPORTED 2017 09:11 AM     Lab Results   Component Value Date/Time    CULTURE Pending 2017 10:56 PM       [facility]    Radiology:    Xr Chest Standard (2 Vw)    Result Date: 2017  EXAMINATION: TWO VIEWS OF THE CHEST 2017 12:36 pm COMPARISON: 8 May 2012 HISTORY: ORDERING SYSTEM PROVIDED HISTORY: Cough TECHNOLOGIST PROVIDED HISTORY: Reason for exam:->Cough FINDINGS: Findings of generalized COPD are noted. The patient has some reticulonodular changes in the right upper lobe, possibly acute airspace disease as well is airspace disease at the left base, apparently related to left lower lobe and lingular infiltrate. No vascular congestion, effusion or pneumothorax is noted. Heart size is normal.  Osseous and mediastinal structures are age-appropriate. COPD. Airspace disease in the lingula and left lower lobe with probable patchy airspace disease the right upper lobe. RECOMMENDATION: Radiographic follow-up to resolution of infiltrates is advised. Us Non Ob Transvaginal    Result Date: 11/15/2017  EXAMINATION: PELVIC ULTRASOUND 11/15/2017 TECHNIQUE: Transvaginal pelvic ultrasound was performed.   No colonoscopy which may have limited ultrasonographic evaluation. A repeat ultrasound is suggested to further assess the abnormalities seen in the right ovary on the previous exam.     Bo Digital Screen W Cad Bilateral    Result Date: 11/1/2017  EXAMINATION: BILATERAL DIGITAL SCREENING MAMMOGRAM 11/1/2017 TECHNIQUE: CC and MLO views of the left and right breasts were obtained. Computer aided detection was utilized in the interpretation of this exam. 3D tomosynthesis images were acquired. COMPARISON: 10/25/2016, 10/23/2015, 10/12/2015 HISTORY: Screening. Right breast stereotactic biopsy. FINDINGS: There are scattered areas of fibroglandular density. There is no new dominant mass, suspicious microcalcification, or area of architectural distortion. There is a biopsy clip in the right breast, stable. No significant change has occurred. No mammographic evidence of malignancy. BIRADS: BIRADS - CATEGORY 1 Negative, no evidence of malignancy. Normal interval follow-up is recommended in 12 months. OVERALL ASSESSMENT - NEGATIVE A letter of notification will be sent to the patient regarding the results. The [employer] of [employer] recommends annual mammograms for women 40 years and older. Physical Examination:        Physical Exam  Thin age appropriate woman in no distress. Constitutional: No diaphoresis. Actively coughing with sputum production   Cardiovascular: Normal rate, regular rhythm and normal heart sounds. Pulmonary/Chest: Effort normal. No respiratory distress. Scattered crackles in bases, no wheezing. She has no rales. Abdominal: Soft. Bowel sounds are normal. She exhibits no distension. Tenderness in left lower quadrant. There is no rebound and no guarding. Musculoskeletal: Normal range of motion. Skin: Skin is warm and dry.      Assessment:        Primary Problem  Pneumonia    Active Hospital Problems    Diagnosis Date Noted    Pneumonia [J18.9] 11/19/2017    Neck pain [M54.2]

## 2017-11-20 NOTE — PLAN OF CARE
Problem: Falls - Risk of  Goal: Absence of falls  Outcome: Ongoing  Patient remained free from falls. Call light within reach.

## 2017-11-20 NOTE — PROGRESS NOTES
Rapid response called for facial pins and needles/tingling sensation in bilateral V2 distribution. Neuro exam bedside: no visual change, CN 3-6 intact (no decreased sensation V1-V3, patient continues to have pins and needles sensation around cheeks bilaterally), no facial weakness, no change in hearing, shoulder shrug strong and equal, tongue protrusion midline. No pronator drift. 5/5 strength bilateral upper and lower extremities. No speech difficulty, reads and repeats well; no dysarthria, no aphasia. Patient able to get up off bed immediately after neuro exam to use restroom, assisted by nurse, steady on feet, requested pain medication.

## 2017-11-21 VITALS
TEMPERATURE: 98.5 F | OXYGEN SATURATION: 99 % | DIASTOLIC BLOOD PRESSURE: 60 MMHG | RESPIRATION RATE: 16 BRPM | BODY MASS INDEX: 26.01 KG/M2 | WEIGHT: 161.82 LBS | SYSTOLIC BLOOD PRESSURE: 109 MMHG | HEIGHT: 66 IN | HEART RATE: 70 BPM

## 2017-11-21 PROBLEM — D32.9 MENINGIOMA (HCC): Status: ACTIVE | Noted: 2017-11-21

## 2017-11-21 LAB
ANION GAP SERPL CALCULATED.3IONS-SCNC: 12 MMOL/L (ref 9–17)
BUN BLDV-MCNC: 5 MG/DL (ref 6–20)
BUN/CREAT BLD: ABNORMAL (ref 9–20)
CALCIUM SERPL-MCNC: 8.4 MG/DL (ref 8.6–10.4)
CHLORIDE BLD-SCNC: 109 MMOL/L (ref 98–107)
CO2: 26 MMOL/L (ref 20–31)
CREAT SERPL-MCNC: 0.62 MG/DL (ref 0.5–0.9)
GFR AFRICAN AMERICAN: >60 ML/MIN
GFR NON-AFRICAN AMERICAN: >60 ML/MIN
GFR SERPL CREATININE-BSD FRML MDRD: ABNORMAL ML/MIN/{1.73_M2}
GFR SERPL CREATININE-BSD FRML MDRD: ABNORMAL ML/MIN/{1.73_M2}
GLUCOSE BLD-MCNC: 134 MG/DL (ref 70–99)
GLUCOSE BLD-MCNC: 161 MG/DL (ref 65–105)
HCT VFR BLD CALC: 38.8 % (ref 36–46)
HEMOGLOBIN: 13.1 G/DL (ref 12–16)
MCH RBC QN AUTO: 30.9 PG (ref 26–34)
MCHC RBC AUTO-ENTMCNC: 33.7 G/DL (ref 31–37)
MCV RBC AUTO: 91.8 FL (ref 80–100)
PDW BLD-RTO: 14.1 % (ref 11.5–14.9)
PLATELET # BLD: 208 K/UL (ref 150–450)
PMV BLD AUTO: 8.6 FL (ref 6–12)
POTASSIUM SERPL-SCNC: 4.1 MMOL/L (ref 3.7–5.3)
RBC # BLD: 4.23 M/UL (ref 4–5.2)
SODIUM BLD-SCNC: 147 MMOL/L (ref 135–144)
WBC # BLD: 4.1 K/UL (ref 3.5–11)

## 2017-11-21 PROCEDURE — 99239 HOSP IP/OBS DSCHRG MGMT >30: CPT | Performed by: INTERNAL MEDICINE

## 2017-11-21 PROCEDURE — 6370000000 HC RX 637 (ALT 250 FOR IP): Performed by: HOSPITALIST

## 2017-11-21 PROCEDURE — 2580000003 HC RX 258: Performed by: HOSPITALIST

## 2017-11-21 PROCEDURE — 6370000000 HC RX 637 (ALT 250 FOR IP): Performed by: RADIOLOGY

## 2017-11-21 PROCEDURE — 6360000002 HC RX W HCPCS: Performed by: HOSPITALIST

## 2017-11-21 PROCEDURE — 80048 BASIC METABOLIC PNL TOTAL CA: CPT

## 2017-11-21 PROCEDURE — 94640 AIRWAY INHALATION TREATMENT: CPT

## 2017-11-21 PROCEDURE — 85027 COMPLETE CBC AUTOMATED: CPT

## 2017-11-21 PROCEDURE — 36415 COLL VENOUS BLD VENIPUNCTURE: CPT

## 2017-11-21 PROCEDURE — 94761 N-INVAS EAR/PLS OXIMETRY MLT: CPT

## 2017-11-21 RX ORDER — AZITHROMYCIN 500 MG/1
500 TABLET, FILM COATED ORAL DAILY
Qty: 3 TABLET | Refills: 0 | Status: SHIPPED | OUTPATIENT
Start: 2017-11-21 | End: 2017-11-24

## 2017-11-21 RX ORDER — METRONIDAZOLE 500 MG/1
500 TABLET ORAL EVERY 8 HOURS SCHEDULED
Qty: 27 TABLET | Refills: 0 | Status: SHIPPED | OUTPATIENT
Start: 2017-11-21 | End: 2017-11-30

## 2017-11-21 RX ORDER — CEFUROXIME AXETIL 250 MG/1
500 TABLET ORAL 2 TIMES DAILY
Qty: 12 TABLET | Refills: 0 | Status: CANCELLED | OUTPATIENT
Start: 2017-11-21 | End: 2017-11-24

## 2017-11-21 RX ORDER — GUAIFENESIN 600 MG/1
600 TABLET, EXTENDED RELEASE ORAL 2 TIMES DAILY
Qty: 14 TABLET | Refills: 0 | Status: SHIPPED | OUTPATIENT
Start: 2017-11-21 | End: 2018-03-12 | Stop reason: ALTCHOICE

## 2017-11-21 RX ADMIN — PREGABALIN 100 MG: 100 CAPSULE ORAL at 08:07

## 2017-11-21 RX ADMIN — GUAIFENESIN 600 MG: 600 TABLET, EXTENDED RELEASE ORAL at 08:06

## 2017-11-21 RX ADMIN — PAROXETINE HYDROCHLORIDE 20 MG: 20 TABLET, FILM COATED ORAL at 08:07

## 2017-11-21 RX ADMIN — METRONIDAZOLE 500 MG: 500 TABLET ORAL at 05:46

## 2017-11-21 RX ADMIN — IPRATROPIUM BROMIDE AND ALBUTEROL SULFATE 1 AMPULE: .5; 3 SOLUTION RESPIRATORY (INHALATION) at 11:50

## 2017-11-21 RX ADMIN — TIOTROPIUM BROMIDE 18 MCG: 18 CAPSULE ORAL; RESPIRATORY (INHALATION) at 08:07

## 2017-11-21 RX ADMIN — CLONIDINE HYDROCHLORIDE 0.1 MG: 0.1 TABLET ORAL at 08:06

## 2017-11-21 RX ADMIN — WATER 1 G: 1 INJECTION INTRAMUSCULAR; INTRAVENOUS; SUBCUTANEOUS at 13:15

## 2017-11-21 RX ADMIN — FAMOTIDINE 20 MG: 20 TABLET, FILM COATED ORAL at 08:06

## 2017-11-21 RX ADMIN — ATORVASTATIN CALCIUM 80 MG: 80 TABLET, FILM COATED ORAL at 08:06

## 2017-11-21 ASSESSMENT — PAIN SCALES - GENERAL: PAINLEVEL_OUTOF10: 3

## 2017-11-21 ASSESSMENT — PAIN DESCRIPTION - LOCATION: LOCATION: NECK

## 2017-11-21 ASSESSMENT — PAIN DESCRIPTION - PAIN TYPE: TYPE: CHRONIC PAIN

## 2017-11-21 NOTE — PROGRESS NOTES
Musculoskeletal: Negative for myalgias. Neurological: Negative for dizziness and headaches. Medications: Allergies:  No Known Allergies    Current Meds:   Scheduled Meds:    atorvastatin  80 mg Oral Daily    metroNIDAZOLE  500 mg Oral 3 times per day    morphine  4 mg Intravenous Once    aspirin  81 mg Oral Daily    cloNIDine  0.1 mg Oral Daily    PARoxetine  20 mg Oral QAM    pregabalin  100 mg Oral BID    tiotropium  18 mcg Inhalation Daily    sodium chloride flush  10 mL Intravenous 2 times per day    docusate sodium  100 mg Oral BID    famotidine  20 mg Oral BID    enoxaparin  40 mg Subcutaneous Daily    ipratropium-albuterol  1 ampule Inhalation Q4H WA    azithromycin  500 mg Intravenous Q24H    And    cefTRIAXone (ROCEPHIN) IV  1 g Intravenous Q24H    guaiFENesin  600 mg Oral BID     Continuous Infusions:    dextrose 5% and 0.45% NaCl with KCl 20 mEq 75 mL/hr at 11/20/17 2255     PRN Meds: HYDROcodone 5 mg - acetaminophen, potassium chloride **OR** potassium chloride **OR** potassium chloride, sodium chloride flush, albuterol sulfate HFA, sodium chloride flush, acetaminophen, magnesium hydroxide, bisacodyl, ondansetron, nicotine, albuterol, promethazine    Data:     Past Medical History:   has a past medical history of Arthritis; Bronchitis, chronic ([facility] Chronic back pain; Cocaine abuse; COPD (chronic obstructive pulmonary disease) ([facility] Depression; Diverticulosis; Emphysema; Family history of breast cancer; Fibromyalgia; GERD (gastroesophageal reflux disease); GI bleed; H/O tubal ligation; Hemorrhoids, internal; Hyperlipidemia; Hypertension; Meningioma ([facility] Orbital fracture ([facility] Osteopenia; Renal calculi; Renal cyst; STD (sexually transmitted disease); Tobacco abuse; and Uterine prolapse. Social History:   reports that she quit smoking about 2 months ago. Her smoking use included Cigarettes. She has a 19.00 pack-year smoking history.  She has never used smokeless tobacco. disease the right upper lobe. RECOMMENDATION: Radiographic follow-up to resolution of infiltrates is advised. Us Non Ob Transvaginal    Result Date: 11/15/2017  EXAMINATION: PELVIC ULTRASOUND 11/15/2017 TECHNIQUE: Transvaginal pelvic ultrasound was performed. No color Doppler evaluation was performed. COMPARISON: 08/19/2017 HISTORY: ORDERING SYSTEM PROVIDED HISTORY: Ovarian mass, right TECHNOLOGIST PROVIDED HISTORY: Begin with transabdominal imaging. [facility] exam[facility] Provided Reason for Exam: ovarian mass Acuity: Chronic Type of Exam: Subsequent/Follow-up FINDINGS: Measurements: Uterus:  5.2 x 2.2 x 4.2 cm Endometrial stripe:  5 mm Right Ovary:  Not visualized Left Ovary:  Not visualized Ultrasound Findings: Uterus: Uterus demonstrates normal myometrial echotexture. Endometrial stripe: Endometrial stripe is within normal limits. No free fluid is seen. The ovaries were not visualized. Limited evaluation as the ovaries were not visualized. The uterus appears unremarkable. The examination was performed after recent colonoscopy which may have limited ultrasonographic evaluation. A repeat ultrasound is suggested to further assess the abnormalities seen in the right ovary on the previous exam.     Us Pelvis Complete    Result Date: 11/15/2017  EXAMINATION: PELVIC ULTRASOUND 11/15/2017 TECHNIQUE: Transvaginal pelvic ultrasound was performed. No color Doppler evaluation was performed. COMPARISON: 08/19/2017 HISTORY: ORDERING SYSTEM PROVIDED HISTORY: Ovarian mass, right TECHNOLOGIST PROVIDED HISTORY: Begin with transabdominal imaging. Reason for exam:->[facility] Ordering Physician Provided Reason for Exam: ovarian mass Acuity: Chronic Type of Exam: Subsequent/Follow-up FINDINGS: Measurements: Uterus:  5.2 x 2.2 x 4.2 cm Endometrial stripe:  5 mm Right Ovary:  Not visualized Left Ovary:  Not visualized Ultrasound Findings: Uterus: Uterus demonstrates normal myometrial echotexture. Endometrial stripe: Endometrial stripe is within normal limits. No free fluid is seen. The ovaries were not visualized. Limited evaluation as the ovaries were not visualized. The uterus appears unremarkable. The examination was performed after recent colonoscopy which may have limited ultrasonographic evaluation. A repeat ultrasound is suggested to further assess the abnormalities seen in the right ovary on the previous exam.     Bo Digital Screen W Cad Bilateral    Result Date: 11/1/2017  EXAMINATION: BILATERAL DIGITAL SCREENING MAMMOGRAM 11/1/2017 TECHNIQUE: CC and MLO views of the left and right breasts were obtained. Computer aided detection was utilized in the interpretation of this exam. 3D tomosynthesis images were acquired. COMPARISON: 10/25/2016, 10/23/2015, 10/12/2015 HISTORY: Screening. Right breast stereotactic biopsy. FINDINGS: There are scattered areas of fibroglandular density. There is no new dominant mass, suspicious microcalcification, or area of architectural distortion. There is a biopsy clip in the right breast, stable. No significant change has occurred. No mammographic evidence of malignancy. BIRADS: BIRADS - CATEGORY 1 Negative, no evidence of malignancy. Normal interval follow-up is recommended in 12 months. OVERALL ASSESSMENT - NEGATIVE A letter of notification will be sent to the patient regarding the results. The [address] [place] [address] of Radiology recommends annual mammograms for women 40 years and older. Physical Examination:        Physical Exam  Thin age appropriate woman in no distress. Constitutional: No diaphoresis. Actively coughing  Neck: Palpable soft, mobile right cervical lymph node. Tender on palpation. Cardiovascular: Normal rate, regular rhythm and normal heart sounds. Pulmonary/Chest: Effort normal. No respiratory distress. Scattered crackles in bases, no wheezing. She has no rales. Abdominal: Soft.  Bowel sounds are normal. She exhibits no distension. There is no rebound and no guarding. Musculoskeletal: Normal range of motion. Neurologic: No sensory changes in V1-V3. No focal neurologic deficits. Skin: Skin is warm and dry.      Assessment:        Primary Problem  Pneumonia of right upper lobe due to infectious organism [facility])    Active Hospital Problems    Diagnosis Date Noted    [facility] to [facility] [J18.1] 11/19/2017    Neck pain [M54.2] 07/13/2017    Chronic obstructive pulmonary disease ([facility] [J44.9] 09/22/2016    Essential hypertension [I10] 09/22/2016    Spinal stenosis in cervical region [M48.02] 07/10/2012    Depression [F32.9]        Plan:        CAP without SIRS  - WBC 2.8 -> 3.7 -> 4.1  - Lactate 2.2 > 1.2 -> 2.1  - Procalcitonin 1.67  - BP, HR stable  - CXR showing lingula, LLL airspace disease, poss RUL airspace disease  - Mucinex  - Blood cultures no growth 2 days, sputum culture  - Continue rocephin, zithromax  - Improvement of cough and SOB, planning D/C today    Facial paresthesia, resolved  - Rapid response, stroke alert called 11/20  - Pins and needles sensation bilateral V2 distribution with no sensory or motor deficits  - CT head and CTA head/neck with no acute pathology    COPD  - >50 pack year smoking hx, per patient quit earlier this year  - Home Spiriva, albuterol prn  - Duonebs while awake    Hypernatremia/hypokalemia  - Holding HCTZ  - Continue monitor    HTN  - Resume Catapres 0.1mg    Diarrhea  - C Diff toxin (+), Flagyl started  - Fecal leukocytes/lactoferrin  - Stool culture    Neck pain, 2/2 cervical stenosis  - Following Dr. Sharmin Gerardo    Disposition: Home today       Fermin Ruff MD  11/21/2017  9:42 AM

## 2017-11-21 NOTE — PLAN OF CARE
Problem: Falls - Risk of  Goal: Absence of falls  Outcome: Ongoing  Patient remains free from falls this shift. Bed locked in lowest position with side rails up x2. Call light within reach.

## 2017-11-21 NOTE — CARE COORDINATION
ONGOING DISCHARGE PLAN:    Spoke with patient regarding discharge plan and patient confirms that plan is still to go home w/ no needs. Possible D/C today. Will continue to follow for additional discharge needs.     Electronically signed by Shira Boinlla RN on 11/21/2017 at 12:41 PM

## 2017-11-21 NOTE — DISCHARGE SUMMARY
[address]    Discharge Summary     Patient ID: Atul Medina  :  1960   MRN: [id number]     ACCOUNT:  [de-identified]   Patient's PCP: Hodan Becerra CNP  Admit Date: 2017   Discharge Date: 2017  Length of Stay: 2  Code Status:  Full Code  Admitting Physician: Milly Zayas MD  Discharge Physician: Patience Corbett MD     Active Discharge Diagnoses:       Primary Problem  Pneumonia of right upper lobe due to infectious organism [facility])      [place] Problems    Diagnosis Date Noted    Meningioma [facility]) [D32.9] 2017    Pneumonia of right upper lobe due to infectious organism ([facility] [J18.1] 2017    Neck pain [M54.2] 2017    Chronic obstructive pulmonary disease ([facility] [J44.9] 2016    Essential hypertension [I10] 2016    Spinal stenosis in cervical region [M48.02] 07/10/2012    Depression [F32.9]      Admission Condition:  fair     Discharged Condition: stable    Hospital Stay:       Hospital Course:  Atul Medina is a 65 y.o. female who was admitted for the management of Pneumonia of right upper lobe due to infectious organism [facility]), presented to ER with Shortness of Breath; Abdominal Pain (polyp removed Wed); and Hematemesis    Started on Zithromax and Rocephin for CAP with improvement of SOB. Continues to have cough with clear sputum. Resolved nausea/emesis. Continued diarrhea. C. Diff toxin positive and started on Flagyl. On first day of admission patient complained of bilateral pins and needles sensation in V2 distribution; stroke alert called with CT head, CTA head/neck negative for acute pathology. 2.1 cm left frontal meningioma noted. On second day patient improving, near baseline. During admission pt hypernatremic with hypokalemia, normotensive, holding HCTZ. Continue antibiotics on discharge, follow up with PCP in 1 week.       Significant therapeutic interventions: chest radiograph, CT head, CTA head/neck  Significant Diagnostic Studies:   Labs / Micro:  CBC:   Lab Results   Component Value Date    WBC 4.1 11/21/2017    RBC 4.23 11/21/2017    RBC 4.61 05/08/2012    HGB 13.1 11/21/2017    HCT 38.8 11/21/2017    MCV 91.8 11/21/2017    MCH 30.9 11/21/2017    MCHC 33.7 11/21/2017    RDW 14.1 11/21/2017     11/21/2017     05/08/2012     BMP:    Lab Results   Component Value Date    GLUCOSE 134 11/21/2017    GLUCOSE 103 05/08/2012     11/21/2017    K 4.1 11/21/2017     11/21/2017    CO2 26 11/21/2017    ANIONGAP 12 11/21/2017    BUN 5 11/21/2017    CREATININE 0.62 11/21/2017    BUNCRER NOT REPORTED 11/21/2017    CALCIUM 8.4 11/21/2017    LABGLOM >60 11/21/2017    GFRAA >60 11/21/2017    GFR      11/21/2017    GFR NOT REPORTED 11/21/2017     U/A:    Lab Results   Component Value Date    COLORU YELLOW 09/01/2017    TURBIDITY CLEAR 09/01/2017    SPECGRAV 1.015 09/01/2017    HGBUR NEGATIVE 09/01/2017    PHUR 6.5 09/01/2017    PROTEINU NEGATIVE 09/01/2017    GLUCOSEU NEGATIVE 09/01/2017    GLUCOSEU NEGATIVE 09/16/2011    KETUA TRACE 09/01/2017    BILIRUBINUR NEGATIVE  Verified by ictotest. 09/01/2017    BILIRUBINUR NEGATIVE 09/16/2011    UROBILINOGEN Normal 09/01/2017    NITRU NEGATIVE 09/01/2017    LEUKOCYTESUR NEGATIVE 09/01/2017     Blood culture: negative  Stool culture: negative   C. Diff toxin: positive    Radiology:  Xr Chest Standard (2 Vw)    Result Date: 11/19/2017  EXAMINATION: TWO VIEWS OF THE CHEST 11/19/2017 12:36 pm COMPARISON: 8 May 2012 HISTORY: ORDERING SYSTEM PROVIDED HISTORY: Cough TECHNOLOGIST PROVIDED HISTORY: Reason for exam:->Cough FINDINGS: Findings of generalized COPD are noted. The patient has some reticulonodular changes in the right upper lobe, possibly acute airspace disease as well is airspace disease at the left base, apparently related to left lower lobe and lingular infiltrate.   No vascular congestion, effusion modulation, iterative reconstruction, and/or weight based adjustment of the mA/kV was utilized to reduce the radiation dose to as low as reasonably achievable.; CTA of the neck was performed with the administration of intravenous contrast. Multiplanar reformatted images are provided for review. MIP images are provided for review. Stenosis of the internal carotid arteries measured using NASCET criteria. Dose modulation, iterative reconstruction, and/or weight based adjustment of the mA/kV was utilized to reduce the radiation dose to as low as reasonably achievable. COMPARISON: Chest CT from 08/28/2017 HISTORY: ORDERING SYSTEM PROVIDED HISTORY: numbness to face TECHNOLOGIST PROVIDED HISTORY: Ordering Physician Provided Reason for Exam: FACIAL NUMBNESS Acuity: Acute Type of Exam: Initial; ORDERING SYSTEM PROVIDED HISTORY: numbness TECHNOLOGIST PROVIDED HISTORY: Ordering Physician Provided Reason for Exam: FACIAL NUMBNESS Acuity: Acute Type of Exam: Initial FINDINGS: CTA NECK: AORTIC ARCH/GREAT VESSELS: There is a normal branch pattern of the aortic arch. No significant stenosis is seen of the innominate artery or subclavian arteries. CAROTID ARTERIES: The common carotid arteries are normal in appearance without evidence of a flow limiting stenosis. The internal carotid arteries are normal in appearance without evidence of a flow limiting stenosis by NASCET criteria. No dissection or arterial injury is seen. VERTEBRAL ARTERIES: The vertebral arteries both arise from the subclavian arteries and are normal in caliber without evidence of flow limiting stenosis or dissection. SOFT TISSUES: A spiculated lesion in the right upper lung is similar to prior chest CT. There is upper lung emphysema with a large left apical bulla. BONES: There is multilevel cervical spondylosis with reversal of the normal cervical lordosis.  CTA HEAD: ANTERIOR CIRCULATION: The internal carotid arteries are normal in course and caliber without focal stenosis. The anterior cerebral and middle cerebral arteries demonstrate no focal stenosis. POSTERIOR CIRCULATION: The posterior cerebral arteries demonstrate no focal stenosis. The vertebral and basilar arteries appear unremarkable. ANEURYSM: No intracranial aneurysm is seen. Patent head and neck arteries. Critical results were called by Dr. Mary Azevedo MD to [address] [place] [address] on 11/20/2017 at 16:11. Cta Head W Contrast    Result Date: 11/20/2017  EXAMINATION: CTA OF THE HEAD WITH CONTRAST; CTA OF THE NECK 11/20/2017 3:31 pm: TECHNIQUE: CTA of the head/brain was performed with the administration of intravenous contrast. Multiplanar reformatted images are provided for review. MIP images are provided for review. Dose modulation, iterative reconstruction, and/or weight based adjustment of the mA/kV was utilized to reduce the radiation dose to as low as reasonably achievable.; CTA of the neck was performed with the administration of intravenous contrast. Multiplanar reformatted images are provided for review. MIP images are provided for review. Stenosis of the internal carotid arteries measured using NASCET criteria. Dose modulation, iterative reconstruction, and/or weight based adjustment of the mA/kV was utilized to reduce the radiation dose to as low as reasonably achievable. COMPARISON: Chest CT from 08/28/2017 HISTORY: ORDERING SYSTEM PROVIDED HISTORY: numbness to face TECHNOLOGIST PROVIDED HISTORY: Ordering Physician Provided Reason for Exam: FACIAL NUMBNESS Acuity: Acute Type of Exam: Initial; ORDERING SYSTEM PROVIDED HISTORY: numbness TECHNOLOGIST PROVIDED HISTORY: Ordering Physician Provided Reason for Exam: FACIAL NUMBNESS Acuity: Acute Type of Exam: Initial FINDINGS: CTA NECK: AORTIC ARCH/GREAT VESSELS: There is a normal branch pattern of the aortic arch. No significant stenosis is seen of the innominate artery or subclavian arteries.  CAROTID ARTERIES: The common carotid arteries are normal

## 2017-11-21 NOTE — FLOWSHEET NOTE
responded to RRT and Stroke Alert; notified patient's step-mother on the telephone of patient's medical circumstances; step-mother stated she was coming to the hospital; listening presence and prayer support with patient bedside as she waited to be taken to CT; follow up visit with patient and her sig other; patient hopeful as she waits for testing results;      11/20/17 1912   Encounter Summary   Services provided to: Patient and family together   Referral/Consult From: Multi-disciplinary team   Support System Significant other;Parent   Continue Visiting (11/20/17)   Complexity of Encounter Moderate   Length of Encounter 30 minutes   Spiritual Assessment Completed Yes   Crisis   Type Rapid response;Stroke Alert   Assessment Approachable; Anxious; Hopeful;Coping   Intervention Active listening;Explored feelings, thoughts, concerns;Nurtured hope;Prayer;Sustaining presence/ Ministry of presence; Discussed illness/injury and it's impact   Outcome Comfort;Expressed gratitude;Engaged in conversation;Expressed feelings/needs/concerns;Coping; Hopeful;Receptive

## 2017-11-22 ENCOUNTER — TELEPHONE (OUTPATIENT)
Dept: FAMILY MEDICINE CLINIC | Age: 57
End: 2017-11-22

## 2017-11-22 LAB
CULTURE: ABNORMAL
CULTURE: ABNORMAL
DIRECT EXAM: ABNORMAL
Lab: ABNORMAL
SPECIMEN DESCRIPTION: ABNORMAL
SPECIMEN DESCRIPTION: ABNORMAL
STATUS: ABNORMAL

## 2017-11-25 LAB
CULTURE: NORMAL
Lab: NORMAL
Lab: NORMAL
SPECIMEN DESCRIPTION: NORMAL
STATUS: NORMAL
STATUS: NORMAL

## 2017-11-28 ENCOUNTER — TELEPHONE (OUTPATIENT)
Dept: GYNECOLOGIC ONCOLOGY | Age: 57
End: 2017-11-28

## 2017-11-30 ENCOUNTER — OFFICE VISIT (OUTPATIENT)
Dept: GYNECOLOGIC ONCOLOGY | Age: 57
End: 2017-11-30
Payer: MEDICARE

## 2017-11-30 VITALS
BODY MASS INDEX: 24.53 KG/M2 | HEIGHT: 66 IN | DIASTOLIC BLOOD PRESSURE: 89 MMHG | WEIGHT: 152.6 LBS | SYSTOLIC BLOOD PRESSURE: 126 MMHG | TEMPERATURE: 97 F | HEART RATE: 97 BPM | OXYGEN SATURATION: 97 %

## 2017-11-30 DIAGNOSIS — R97.8 OTHER ABNORMAL TUMOR MARKERS: ICD-10-CM

## 2017-11-30 DIAGNOSIS — R97.0 ELEVATED CEA: Primary | ICD-10-CM

## 2017-11-30 DIAGNOSIS — Z15.09 GENETIC PREDISPOSITION TO CANCER: ICD-10-CM

## 2017-11-30 PROCEDURE — 99203 OFFICE O/P NEW LOW 30 MIN: CPT | Performed by: OBSTETRICS & GYNECOLOGY

## 2017-11-30 ASSESSMENT — ENCOUNTER SYMPTOMS
WHEEZING: 0
TROUBLE SWALLOWING: 0
ABDOMINAL PAIN: 0
ABDOMINAL DISTENTION: 0
CONSTIPATION: 0
CHOKING: 0
COUGH: 1
BLOOD IN STOOL: 0
ANAL BLEEDING: 0
SHORTNESS OF BREATH: 0

## 2017-11-30 NOTE — COMMUNICATION BODY
Assessment:     1. Elevated CEA  CEA    US NON OB TRANSVAGINAL   2. Other abnormal tumor markers  CEA    US NON OB TRANSVAGINAL    [facility]   3. Genetic predisposition to cancer  [address], [facility]   The patient has a history of a elevated CEA, minimally elevated ova 1 and a questionable right adnexal lesion on previous ultrasound. She states she is unable to have MRIs due to metal in her left eye. Clinical examination is negative. Elevation of CEA is likely related to her history of tobacco abuse. Gastroenterology evaluation has been negative. I see no indication for surgical evaluation at this time. I have recommended a repeat pelvic ultrasound and CEA in 2 weeks to allow complete treatment of her recent C. difficile. She'll be notified when those results are available. As long as no significant change or abnormalities identified, she can follow up with Dr. Char Vega for surgery if indicated for her pelvic prolapse and stress urinary incontinence. The patient is unsure if her sister had any genetic testing for her ovarian cancer diagnosis. The patient is interested in meeting with the genetic counselor regarding potential screening for herself. Plan:     Return in about 2 weeks (around 12/14/2017) for telephone follow up/test results. Orders Placed This Encounter   Procedures    US NON OB TRANSVAGINAL    CEA   [address] [facility]     No orders of the defined types were placed in this encounter.

## 2017-11-30 NOTE — PROGRESS NOTES
difficulty urinating, dysuria, urgency, vaginal bleeding and vaginal discharge. Neurological: Negative for dizziness and weakness. Psychiatric/Behavioral: Negative for confusion and dysphoric mood.        Past Medical History:   Diagnosis Date    Arthritis     Bronchitis, chronic (HCC)     Chronic back pain     Cocaine abuse     many years ago    COPD (chronic obstructive pulmonary disease) (HCC)     Depression     Diverticulosis     Emphysema     Family history of breast cancer     mom @ 61    Fibromyalgia     GERD (gastroesophageal reflux disease)     GI bleed     colon    H/O tubal ligation     Hemorrhoids, internal     Hyperlipidemia     Hypertension     Meningioma ([address]     Orbital fracture (HCC)     left side     Osteopenia     Renal calculi     Renal cyst     STD (sexually transmitted disease)     Tobacco abuse     Uterine prolapse        Past Surgical History:   Procedure Laterality Date    BACK SURGERY  2011    thoracic laminectomy, T12, S1    CARDIAC CATHETERIZATION  10/24/14    Normal coronaries     COLONOSCOPY  04/05/2017    diverticulosis, ,poor prep    EYE SURGERY      left    ORBITAL FRACTURE SURGERY Left     MA COLON CA SCRN NOT HI RSK IND N/A 4/5/2017    COLONOSCOPY performed by Shiraz Gallagher MD at [facility] W/REMOVAL LESION BY HOT BX FORCEPS N/A 11/15/2017    COLONOSCOPY POLYPECTOMY SNARE and saline injection performed by Shiraz Gallagher MD at [address] [place] [address] EGD TRANSORAL BIOPSY SINGLE/MULTIPLE N/A 10/6/2017    EGD BIOPSY performed by Danita Valencia MD at [facility] ENDOSCOPY  10/06/2017    Dr Payne Favors diverticulum gastric fundus, pathology inactive gastritis       Family History   Problem Relation Age of Onset    Bipolar Disorder Mother     Heart Disease Mother     Cancer Mother      breast and lung cancer    Diabetes Father     Heart Disease Father      Death due to [place]    Cancer

## 2017-12-01 ENCOUNTER — OFFICE VISIT (OUTPATIENT)
Dept: FAMILY MEDICINE CLINIC | Age: 57
End: 2017-12-01
Payer: MEDICARE

## 2017-12-01 VITALS
TEMPERATURE: 98.1 F | HEIGHT: 66 IN | WEIGHT: 153 LBS | DIASTOLIC BLOOD PRESSURE: 70 MMHG | HEART RATE: 95 BPM | BODY MASS INDEX: 24.59 KG/M2 | OXYGEN SATURATION: 97 % | SYSTOLIC BLOOD PRESSURE: 110 MMHG | RESPIRATION RATE: 20 BRPM

## 2017-12-01 DIAGNOSIS — F17.200 TOBACCO DEPENDENCE: ICD-10-CM

## 2017-12-01 DIAGNOSIS — M79.7 FIBROMYALGIA: ICD-10-CM

## 2017-12-01 DIAGNOSIS — J18.9 PNEUMONIA OF LEFT LOWER LOBE DUE TO INFECTIOUS ORGANISM: Primary | ICD-10-CM

## 2017-12-01 DIAGNOSIS — A04.72 C. DIFFICILE DIARRHEA: ICD-10-CM

## 2017-12-01 PROCEDURE — 99495 TRANSJ CARE MGMT MOD F2F 14D: CPT | Performed by: NURSE PRACTITIONER

## 2017-12-01 PROCEDURE — 1111F DSCHRG MED/CURRENT MED MERGE: CPT | Performed by: NURSE PRACTITIONER

## 2017-12-01 NOTE — PROGRESS NOTES
Visit Information    Have you changed or started any medications since your last visit including any over-the-counter medicines, vitamins, or herbal medicines? no   Have you stopped taking any of your medications? Is so, why? -  no  Are you having any side effects from any of your medications? - no    Have you seen any other physician or provider since your last visit? yes -  [facility]   Have you had any other diagnostic tests since your last visit? yes -  BLOOD   Have you been seen in the emergency room and/or had an admission in a hospital since we last saw you?  yes -  ADMISSION  11/19/2017  Have you had your routine dental cleaning in the past 6 months?  yes -      Do you have an active MyChart account? If no, what is the barrier?   Yes    Patient Care Team:  Verenice Ng CNP as PCP - General Zurdo Salgado RN as   Waldemar Suh MD as Consulting Physician (Pulmonology)    Medical History Review  Past Medical, Family, and Social History reviewed and does contribute to the patient presenting condition    Health Maintenance   Topic Date Due    DTaP/Tdap/Td vaccine (1 - Tdap) 04/26/1980    Colon cancer screen colonoscopy  11/15/2018    Breast cancer screen  11/01/2019    Lipid screen  03/06/2022    Cervical cancer screen  11/13/2022    Flu vaccine  Completed    Pneumococcal med risk  Completed    Hepatitis C screen  Completed    HIV screen  Completed         Post-Discharge Transitional Care Management Services      [facility]   YOB: 1960    Date of Office Visit:  12/1/2017  Date of Hospital Admission: 11/19/17  Date of Hospital Discharge: 11/21/17  [facility] Risk Score [risk of hospital readmission >=10  medium risk (chance of readmission ~ 12%) >14  high risk (chance of readmission ~18%)]:Risk Score: 16.75    Care management risk score Rising risk (score 2-5) and Complex Care (Scores >=6): No Risk Score On File       Patient Active Problem List   Diagnosis bedtime             cyclobenzaprine (FLEXERIL) 10 MG tablet  take 1 tablet by mouth twice a day if needed             diclofenac (VOLTAREN) 50 MG EC tablet  take 1 tablet by mouth twice a day             guaiFENesin (MUCINEX) 600 MG extended release tablet  Take 1 tablet by mouth 2 times daily             lovastatin (MEVACOR) 40 MG tablet  take 1 tablet by mouth every evening             LYRICA 100 MG capsule  take 1 capsule by mouth twice a day             Multiple Vitamin (MULTIVITAMIN PO)  Take  by mouth daily.                omeprazole (PRILOSEC) 20 MG delayed release capsule  take 1 capsule by mouth twice a day             PARoxetine (PAXIL) 20 MG tablet  Take 1 tablet by mouth every morning             potassium chloride (KLOR-CON M) 20 MEQ extended release tablet  Take 1 tablet by mouth daily             Probiotic Product (ALIGN PO)  Take by mouth daily             promethazine (PHENERGAN) 25 MG tablet  Take 25 mg by mouth every 6 hours as needed for Nausea             RA CALCIUM 600/VITAMIN D-3 600-400 MG-UNIT TABS  take 1 tablet by mouth twice a day             SPIRIVA HANDIHALER 18 MCG inhalation capsule  inhale the contents of one capsule in the handihaler once daily             varenicline (CHANTIX CONTINUING MONTH RIYA) 1 MG tablet  Take 1 tablet by mouth 2 times daily                   Medications marked \"taking\" at this time  Outpatient Prescriptions Marked as Taking for the 12/1/17 encounter (Office Visit) with Patricia Iyer CNP   Medication Sig Dispense Refill    Probiotic Product (ALIGN PO) Take by mouth daily      SPIRIVA HANDIHALER 18 MCG inhalation capsule inhale the contents of one capsule in the handihaler once daily 30 capsule 3    LYRICA 100 MG capsule take 1 capsule by mouth twice a day 60 capsule 0    guaiFENesin (MUCINEX) 600 MG extended release tablet Take 1 tablet by mouth 2 times daily 14 tablet 0    promethazine (PHENERGAN) 25 MG tablet Take 25 mg by mouth every 6 hours as needed for Nausea      potassium chloride (KLOR-CON M) 20 MEQ extended release tablet Take 1 tablet by mouth daily 60 tablet 2    cyclobenzaprine (FLEXERIL) 10 MG tablet take 1 tablet by mouth twice a day if needed 60 tablet 3    lovastatin (MEVACOR) 40 MG tablet take 1 tablet by mouth every evening 30 tablet 3    diclofenac (VOLTAREN) 50 MG EC tablet take 1 tablet by mouth twice a day 60 tablet 3    omeprazole (PRILOSEC) 20 MG delayed release capsule take 1 capsule by mouth twice a day 60 capsule 3    PARoxetine (PAXIL) 20 MG tablet Take 1 tablet by mouth every morning 30 tablet 3    RA CALCIUM 600/VITAMIN D-3 600-400 MG-UNIT TABS take 1 tablet by mouth twice a day 60 tablet 5    cloNIDine (CATAPRES) 0.1 MG tablet take 1 tablet by mouth at bedtime 30 tablet 5    albuterol sulfate HFA (PROVENTIL HFA) 108 (90 BASE) MCG/ACT inhaler Inhale 2 puffs into the lungs every 6 hours as needed for Wheezing or Shortness of Breath 1 Inhaler 1    Multiple Vitamin (MULTIVITAMIN PO) Take  by mouth daily.  aspirin 81 MG EC tablet Take 81 mg by mouth daily. Medications patient taking as of now reconciled against medications ordered at time of hospital discharge    Vitals:    12/01/17 1410   BP: 110/70   Pulse: 95   Resp: 20   Temp: 98.1 °F (36.7 °C)   TempSrc: Tympanic   SpO2: 97%   Weight: 153 lb (69.4 kg)   Height: 5' 6\" (1.676 m)     Body mass index is 24.69 kg/m². Wt Readings from Last 3 Encounters:   12/01/17 153 lb (69.4 kg)   11/30/17 152 lb 9.6 oz (69.2 kg)   11/21/17 161 lb 13.1 oz (73.4 kg)     BP Readings from Last 3 Encounters:   12/01/17 110/70   11/30/17 126/89   11/21/17 109/60        Inpatient course: Discharge summary reviewed- see chart. Chief Complaint   Patient presents with    Pneumonia     transitional visit   also had c- diff       HPI   65year old white female presents with follow up s/p hospital discharge for   For pneumonia and c diff and fibromyalgia with medication refills.  Was admitted 2 weeks ago for hemoptysis and fever sob cough. CXR showed LLL pneumonia. Subsequently developed c diff. Finished flagyl today and states she is feeling better and bowels are formed. Denies recurrent fever. Has intermittent cough but denies sob or cp or nv abd pain. Has been lyrica for years for fibromyalgia. States it works well. Pt is a current smoker and quit smoking but resumed on a week ago. Review of Systems    Constitutional: Negative for activity change and fatigue. Negative for fever, chills, appetite change and unexpected weight change. HENT: Negative for congestion, mouth sores, postnasal drip, sinus pressure, sore throat and voice change. Eyes: Negative for visual disturbance. Respiratory: intermittent cough, negative for shortness of breath and wheezing. Cardiovascular: negative chest pain or palpitations. Gastrointestinal: negative for abd pain hematochezia melena. Negative for nausea, vomiting, diarrhea and constipation. Genitourinary: positive for dysuria, urgency, frequency. Musculoskeletal: Negative for myalgias, joint swelling and arthralgias. Neurological: Negative for dizziness, tremors, speech difficulty, light-headedness and headaches. Non face to face  following discharge, date last encounter closed (first attempt may have been earlier): 11/22/2017 11:04 AM 11/22/2017 11:04 AM    Call initiated 2 business days of discharge: Yes     Interval history/Current status: stable. Physical Exam    Constitutional: She is oriented to person, place, and time. She appears well-developed and well-nourished. No distress. HENT:   Mouth/Throat: No oropharyngeal exudate. Eyes: Conjunctivae are normal. Pupils are equal, round, and reactive to light. No scleral icterus. Neck: No JVD present. No thyromegaly present. Cardiovascular: Normal rate, regular rhythm and normal heart sounds. Exam reveals no gallop and no friction rub.    No murmur heard.  Pulmonary/Chest: Effort normal. No respiratory distress. She has no wheezes. She has no rales. Abdominal: Soft. Bowel sounds are normal. She exhibits no distension and no mass. There is no tenderness. There is no rebound and no guarding. Lymphadenopathy:   She has no cervical adenopathy. Neurological: She is alert and oriented to person, place, and time. No cranial nerve deficit. She exhibits normal muscle tone. Skin: She is not diaphoretic. Assessment/Plan:  There are no diagnoses linked to this encounter. 1. Pneumonia of left lower lobe due to infectious organism ([address]    2. C. difficile diarrhea    3. Fibromyalgia    4. Tobacco dependence              BP Readings from Last 3 Encounters:   12/01/17 110/70   11/30/17 126/89   11/21/17 109/60     /70   Pulse 95   Temp 98.1 °F (36.7 °C) (Tympanic)   Resp 20   Ht 5' 6\" (1.676 m)   Wt 153 lb (69.4 kg)   SpO2 97%   BMI 24.69 kg/m²   Lab Results   Component Value Date    WBC 4.1 11/21/2017    HGB 13.1 11/21/2017    HCT 38.8 11/21/2017     11/21/2017    CHOL 213 (H) 03/06/2017    TRIG 228 (H) 03/06/2017    HDL 46 03/06/2017    ALT 21 11/19/2017    AST 28 11/19/2017     (H) 11/21/2017    K 4.1 11/21/2017     (H) 11/21/2017    CREATININE 0.62 11/21/2017    BUN 5 (L) 11/21/2017    CO2 26 11/21/2017    TSH 0.93 08/19/2017    INR 1.0 07/06/2017    LABA1C 5.5 10/25/2016    LABMICR Unable to calculate or report. 04/04/2012     Lab Results   Component Value Date    CALCIUM 8.4 (L) 11/21/2017     Lab Results   Component Value Date    LDLCHOLESTEROL 121 03/06/2017         1. Pneumonia of left lower lobe due to infectious organism ([address]  - repeat XR CHEST STANDARD (2 VW); Future in one month  - advised to call the office if symptoms are recurrent. 2. C. difficile diarrhea  - resolving. Cont probiotics   - increase fluid and rest.     3. Fibromyalgia  - stable. cont lyrica  -  medication contract signed.      4. Tobacco

## 2017-12-06 NOTE — TELEPHONE ENCOUNTER
Called patient multiple times to remind her that her U/S should be completed before her next appointment.

## 2017-12-06 NOTE — TELEPHONE ENCOUNTER
Called Patient multiple times and left messages for patient to call office to have her come in this week to be seen by Larry Kim. Also contacted emergency contact and was given the same number I previously called and is still going straight to voicemail.

## 2017-12-19 DIAGNOSIS — R97.0 ELEVATED CEA: ICD-10-CM

## 2017-12-19 DIAGNOSIS — R10.32 LEFT LOWER QUADRANT PAIN: ICD-10-CM

## 2017-12-19 PROBLEM — K63.5 HYPERPLASTIC COLON POLYP: Status: ACTIVE | Noted: 2017-11-15

## 2017-12-29 RX ORDER — CALCIUM CARBONATE/VITAMIN D3 600 MG-10
TABLET ORAL
Qty: 60 TABLET | Refills: 5 | Status: SHIPPED | OUTPATIENT
Start: 2017-12-29 | End: 2018-06-12 | Stop reason: SDUPTHER

## 2017-12-29 NOTE — TELEPHONE ENCOUNTER
Please Approve or Refuse. Next Visit Date:  Visit date not found    Hemoglobin A1C (%)   Date Value   10/25/2016 5.5             ( goal A1C is < 7)   Microalb/Crt. Ratio (mcg/mg creat)   Date Value   04/04/2012 Unable to calculate or report.      LDL Cholesterol (mg/dL)   Date Value   03/06/2017 121       (goal LDL is <100)   AST (U/L)   Date Value   11/19/2017 28     ALT (U/L)   Date Value   11/19/2017 21     BUN (mg/dL)   Date Value   11/21/2017 5 (L)     BP Readings from Last 3 Encounters:   12/01/17 110/70   11/30/17 126/89   11/21/17 109/60          (goal 120/80)        Patient Active Problem List:     Renal calculi     Hyposmia     Diverticulosis     Uterine prolapse     Hyperlipidemia     Hypertension     Depression     GI bleed     STD (sexually transmitted disease)     Bronchitis, chronic (HCC)     Tobacco abuse     Cocaine abuse     Hemorrhoids, internal     Renal cyst     COPD (chronic obstructive pulmonary disease) (HCC)     Pulmonary emphysema (HCC)     Chronic back pain     Chronic low back pain     Knee pain, right     Lumbago     Degeneration of lumbar or lumbosacral intervertebral disc     Other affections of shoulder region, not elsewhere classified     Degeneration of cervical intervertebral disc     Spinal stenosis in cervical region     Cervicalgia     Pain in joint, shoulder region     Carpal tunnel syndrome     Shoulder impingement     Fibromyalgia     Pulmonary nodule     Chronic obstructive pulmonary disease (HCC)     Smoking     Chronic cough     Essential hypertension     Rectal bleeding     Left lower quadrant pain     History of diverticulitis     Meningioma (HCC)     Neck pain     Chronic midline low back pain with bilateral sciatica     DDD (degenerative disc disease), cervical     History of tubal ligation     Family history of breast cancer     H/O tubal ligation     Elevated CEA of 6.6 and OVA-1 of 4.5     Tetrahydrocannabinol (THC) use disorder, mild, abuse     Elevated CEA Pneumonia of right upper lobe due to infectious organism [facility])     Meningioma ([facility]     Hyperplastic colon polyp

## 2018-01-10 RX ORDER — CLONIDINE HYDROCHLORIDE 0.1 MG/1
TABLET ORAL
Qty: 30 TABLET | Refills: 5 | Status: SHIPPED | OUTPATIENT
Start: 2018-01-10 | End: 2020-10-14 | Stop reason: SDUPTHER

## 2018-01-10 NOTE — TELEPHONE ENCOUNTER
Please Approve or Refuse. Next Visit Date:  Visit date not found    Hemoglobin A1C (%)   Date Value   10/25/2016 5.5             ( goal A1C is < 7)   Microalb/Crt. Ratio (mcg/mg creat)   Date Value   04/04/2012 Unable to calculate or report.      LDL Cholesterol (mg/dL)   Date Value   03/06/2017 121       (goal LDL is <100)   AST (U/L)   Date Value   11/19/2017 28     ALT (U/L)   Date Value   11/19/2017 21     BUN (mg/dL)   Date Value   11/21/2017 5 (L)     BP Readings from Last 3 Encounters:   12/01/17 110/70   11/30/17 126/89   11/21/17 109/60          (goal 120/80)        Patient Active Problem List:     Renal calculi     Hyposmia     Diverticulosis     Uterine prolapse     Hyperlipidemia     Hypertension     Depression     GI bleed     STD (sexually transmitted disease)     Bronchitis, chronic (HCC)     Tobacco abuse     Cocaine abuse     Hemorrhoids, internal     Renal cyst     COPD (chronic obstructive pulmonary disease) (HCC)     Pulmonary emphysema (HCC)     Chronic back pain     Chronic low back pain     Knee pain, right     Lumbago     Degeneration of lumbar or lumbosacral intervertebral disc     Other affections of shoulder region, not elsewhere classified     Degeneration of cervical intervertebral disc     Spinal stenosis in cervical region     Cervicalgia     Pain in joint, shoulder region     Carpal tunnel syndrome     Shoulder impingement     Fibromyalgia     Pulmonary nodule     Chronic obstructive pulmonary disease (HCC)     Smoking     Chronic cough     Essential hypertension     Rectal bleeding     Left lower quadrant pain     History of diverticulitis     Meningioma (HCC)     Neck pain     Chronic midline low back pain with bilateral sciatica     DDD (degenerative disc disease), cervical     History of tubal ligation     Family history of breast cancer     H/O tubal ligation     Elevated CEA of 6.6 and OVA-1 of 4.5     Tetrahydrocannabinol (THC) use disorder, mild, abuse     Elevated CEA

## 2018-01-25 ENCOUNTER — INITIAL CONSULT (OUTPATIENT)
Dept: ONCOLOGY | Age: 58
End: 2018-01-25
Payer: MEDICARE

## 2018-01-25 ENCOUNTER — HOSPITAL ENCOUNTER (OUTPATIENT)
Age: 58
Discharge: HOME | End: 2018-01-25
Payer: MEDICARE

## 2018-01-25 DIAGNOSIS — R97.0 ELEVATED CEA: ICD-10-CM

## 2018-01-25 DIAGNOSIS — Z80.3 FAMILY HISTORY OF BREAST CANCER: ICD-10-CM

## 2018-01-25 DIAGNOSIS — R97.8 OTHER ABNORMAL TUMOR MARKERS: ICD-10-CM

## 2018-01-25 DIAGNOSIS — Z80.41 FAMILY HISTORY OF OVARIAN CANCER: Primary | ICD-10-CM

## 2018-01-25 LAB — CARCINOEMBRYONIC ANTIGEN: 7.7 NG/ML

## 2018-01-25 PROCEDURE — 82378 CARCINOEMBRYONIC ANTIGEN: CPT

## 2018-01-25 PROCEDURE — 96040 PR GENETIC COUNSELING, EACH 30 MIN: CPT | Performed by: GENETIC COUNSELOR, MS

## 2018-01-25 PROCEDURE — 36415 COLL VENOUS BLD VENIPUNCTURE: CPT

## 2018-01-29 RX ORDER — PAROXETINE HYDROCHLORIDE 20 MG/1
20 TABLET, FILM COATED ORAL EVERY MORNING
Qty: 30 TABLET | Refills: 3 | Status: SHIPPED | OUTPATIENT
Start: 2018-01-29 | End: 2018-05-13 | Stop reason: SDUPTHER

## 2018-02-01 ENCOUNTER — HOSPITAL ENCOUNTER (OUTPATIENT)
Dept: WOMENS IMAGING | Age: 58
Discharge: HOME | End: 2018-02-03
Payer: MEDICARE

## 2018-02-01 ENCOUNTER — TELEPHONE (OUTPATIENT)
Dept: OBGYN CLINIC | Age: 58
End: 2018-02-01

## 2018-02-01 DIAGNOSIS — Z13.820 SCREENING FOR OSTEOPOROSIS: ICD-10-CM

## 2018-02-01 DIAGNOSIS — N83.201 CYST OF RIGHT OVARY: ICD-10-CM

## 2018-02-01 DIAGNOSIS — E28.39 OVARIAN FAILURE: ICD-10-CM

## 2018-02-01 DIAGNOSIS — R97.0 ELEVATED CEA: ICD-10-CM

## 2018-02-01 DIAGNOSIS — R97.8 OTHER ABNORMAL TUMOR MARKERS: ICD-10-CM

## 2018-02-01 PROBLEM — M85.89 OSTEOPENIA OF MULTIPLE SITES: Status: ACTIVE | Noted: 2018-02-01

## 2018-02-01 PROCEDURE — 77080 DXA BONE DENSITY AXIAL: CPT

## 2018-02-01 PROCEDURE — 76830 TRANSVAGINAL US NON-OB: CPT

## 2018-02-05 ENCOUNTER — TELEPHONE (OUTPATIENT)
Dept: FAMILY MEDICINE CLINIC | Age: 58
End: 2018-02-05

## 2018-02-05 RX ORDER — VARENICLINE TARTRATE 25 MG
KIT ORAL
Qty: 53 TABLET | Refills: 0 | Status: SHIPPED | OUTPATIENT
Start: 2018-02-05 | End: 2018-03-12 | Stop reason: ALTCHOICE

## 2018-02-06 ENCOUNTER — TELEPHONE (OUTPATIENT)
Dept: GYNECOLOGIC ONCOLOGY | Age: 58
End: 2018-02-06

## 2018-02-06 DIAGNOSIS — R97.0 ELEVATED CEA: Primary | ICD-10-CM

## 2018-02-06 RX ORDER — LOVASTATIN 40 MG/1
TABLET ORAL
Qty: 30 TABLET | Refills: 3 | Status: SHIPPED | OUTPATIENT
Start: 2018-02-06 | End: 2018-06-12 | Stop reason: SDUPTHER

## 2018-02-06 RX ORDER — CYCLOBENZAPRINE HCL 10 MG
TABLET ORAL
Qty: 60 TABLET | Refills: 3 | Status: SHIPPED | OUTPATIENT
Start: 2018-02-06 | End: 2018-06-12 | Stop reason: SDUPTHER

## 2018-02-06 RX ORDER — OMEPRAZOLE 20 MG/1
CAPSULE, DELAYED RELEASE ORAL
Qty: 60 CAPSULE | Refills: 3 | Status: SHIPPED | OUTPATIENT
Start: 2018-02-06 | End: 2018-06-12 | Stop reason: SDUPTHER

## 2018-02-14 ENCOUNTER — HOSPITAL ENCOUNTER (OUTPATIENT)
Age: 58
Discharge: HOME | End: 2018-02-14
Payer: MEDICARE

## 2018-02-14 ENCOUNTER — OFFICE VISIT (OUTPATIENT)
Dept: GASTROENTEROLOGY | Age: 58
End: 2018-02-14
Payer: MEDICARE

## 2018-02-14 VITALS
OXYGEN SATURATION: 97 % | HEART RATE: 68 BPM | SYSTOLIC BLOOD PRESSURE: 128 MMHG | WEIGHT: 150.5 LBS | BODY MASS INDEX: 24.19 KG/M2 | DIASTOLIC BLOOD PRESSURE: 84 MMHG | HEIGHT: 66 IN

## 2018-02-14 DIAGNOSIS — R97.0 ELEVATED CEA: ICD-10-CM

## 2018-02-14 DIAGNOSIS — K63.5 HYPERPLASTIC COLONIC POLYP, UNSPECIFIED PART OF COLON: Primary | ICD-10-CM

## 2018-02-14 LAB
ABSOLUTE EOS #: 0.2 K/UL (ref 0–0.4)
ABSOLUTE IMMATURE GRANULOCYTE: ABNORMAL K/UL (ref 0–0.3)
ABSOLUTE LYMPH #: 2.5 K/UL (ref 1–4.8)
ABSOLUTE MONO #: 0.5 K/UL (ref 0.1–1.3)
ALBUMIN SERPL-MCNC: 4.3 G/DL (ref 3.5–5.2)
ALBUMIN/GLOBULIN RATIO: NORMAL (ref 1–2.5)
ALP BLD-CCNC: 82 U/L (ref 35–104)
ALT SERPL-CCNC: 11 U/L (ref 5–33)
AST SERPL-CCNC: 16 U/L
BASOPHILS # BLD: 1 % (ref 0–2)
BASOPHILS ABSOLUTE: 0 K/UL (ref 0–0.2)
BILIRUB SERPL-MCNC: 0.3 MG/DL (ref 0.3–1.2)
BILIRUBIN DIRECT: 0.09 MG/DL
BILIRUBIN, INDIRECT: 0.21 MG/DL (ref 0–1)
CARCINOEMBRYONIC ANTIGEN: 8.1 NG/ML
DIFFERENTIAL TYPE: ABNORMAL
EOSINOPHILS RELATIVE PERCENT: 3 % (ref 0–4)
GLOBULIN: NORMAL G/DL (ref 1.5–3.8)
HCT VFR BLD CALC: 45.1 % (ref 36–46)
HEMOGLOBIN: 15.2 G/DL (ref 12–16)
IMMATURE GRANULOCYTES: ABNORMAL %
LIPASE: 39 U/L (ref 13–60)
LYMPHOCYTES # BLD: 41 % (ref 24–44)
MCH RBC QN AUTO: 31.1 PG (ref 26–34)
MCHC RBC AUTO-ENTMCNC: 33.6 G/DL (ref 31–37)
MCV RBC AUTO: 92.5 FL (ref 80–100)
MONOCYTES # BLD: 8 % (ref 1–7)
NRBC AUTOMATED: ABNORMAL PER 100 WBC
PDW BLD-RTO: 14.6 % (ref 11.5–14.9)
PLATELET # BLD: 289 K/UL (ref 150–450)
PLATELET ESTIMATE: ABNORMAL
PMV BLD AUTO: 9.4 FL (ref 6–12)
RBC # BLD: 4.87 M/UL (ref 4–5.2)
RBC # BLD: ABNORMAL 10*6/UL
SEG NEUTROPHILS: 47 % (ref 36–66)
SEGMENTED NEUTROPHILS ABSOLUTE COUNT: 2.9 K/UL (ref 1.3–9.1)
TOTAL PROTEIN: 7.3 G/DL (ref 6.4–8.3)
WBC # BLD: 6.1 K/UL (ref 3.5–11)
WBC # BLD: ABNORMAL 10*3/UL

## 2018-02-14 PROCEDURE — 36415 COLL VENOUS BLD VENIPUNCTURE: CPT

## 2018-02-14 PROCEDURE — 85025 COMPLETE CBC W/AUTO DIFF WBC: CPT

## 2018-02-14 PROCEDURE — 83690 ASSAY OF LIPASE: CPT

## 2018-02-14 PROCEDURE — 82378 CARCINOEMBRYONIC ANTIGEN: CPT

## 2018-02-14 PROCEDURE — 83516 IMMUNOASSAY NONANTIBODY: CPT

## 2018-02-14 PROCEDURE — 99214 OFFICE O/P EST MOD 30 MIN: CPT | Performed by: INTERNAL MEDICINE

## 2018-02-14 PROCEDURE — 80076 HEPATIC FUNCTION PANEL: CPT

## 2018-02-14 ASSESSMENT — ENCOUNTER SYMPTOMS
SINUS PAIN: 0
COUGH: 1
SHORTNESS OF BREATH: 0
VOMITING: 0
RHINORRHEA: 0
VOICE CHANGE: 1
ANAL BLEEDING: 0
RECTAL PAIN: 0
DIARRHEA: 0
ABDOMINAL PAIN: 1
ABDOMINAL DISTENTION: 0
TROUBLE SWALLOWING: 0
NAUSEA: 0
SORE THROAT: 0
CONSTIPATION: 0
BLOOD IN STOOL: 0
SINUS PRESSURE: 0
BACK PAIN: 1
FACIAL SWELLING: 0
WHEEZING: 0

## 2018-02-14 NOTE — PROGRESS NOTES
Elevated CEA  EGD    CT ABDOMEN PELVIS W IV CONTRAST    Hepatic Function Panel    Lipase    CBC Auto Differential    CEA    Tissue Transglutaminase, IgA           Plan:      The etiology of mildly elevated CEA level not clear. She may need CT scan of the abdomen, and also EGD and labs as outlined above. After discussion she understood and agreed. The Endoscopic procedure was explained to the patient in detail  The prep and NPO were explained  All the Risks, Benefits, and Alternatives were explained  Risk of Bleeding, Perforation and Cardio Respiratory risks were explained  her questions were answered  The procedure has been scheduled with the  in the office  Patient was asked to give us a call for any questions  The patient has verbalized understanding and agreement to this plan.

## 2018-02-15 LAB — TISSUE TRANSGLUTAMINASE IGA: 0.2 U/ML

## 2018-02-27 ENCOUNTER — HOSPITAL ENCOUNTER (OUTPATIENT)
Age: 58
Setting detail: OUTPATIENT SURGERY
Discharge: HOME | End: 2018-02-27
Attending: INTERNAL MEDICINE | Admitting: INTERNAL MEDICINE
Payer: MEDICARE

## 2018-02-27 VITALS
RESPIRATION RATE: 16 BRPM | HEIGHT: 66 IN | WEIGHT: 148 LBS | SYSTOLIC BLOOD PRESSURE: 148 MMHG | TEMPERATURE: 97.7 F | DIASTOLIC BLOOD PRESSURE: 81 MMHG | HEART RATE: 65 BPM | OXYGEN SATURATION: 95 % | BODY MASS INDEX: 23.78 KG/M2

## 2018-02-27 PROCEDURE — 2580000003 HC RX 258: Performed by: INTERNAL MEDICINE

## 2018-02-27 PROCEDURE — 6370000000 HC RX 637 (ALT 250 FOR IP): Performed by: INTERNAL MEDICINE

## 2018-02-27 PROCEDURE — 7100000011 HC PHASE II RECOVERY - ADDTL 15 MIN: Performed by: INTERNAL MEDICINE

## 2018-02-27 PROCEDURE — 99152 MOD SED SAME PHYS/QHP 5/>YRS: CPT | Performed by: INTERNAL MEDICINE

## 2018-02-27 PROCEDURE — 6360000002 HC RX W HCPCS: Performed by: INTERNAL MEDICINE

## 2018-02-27 PROCEDURE — 99153 MOD SED SAME PHYS/QHP EA: CPT | Performed by: INTERNAL MEDICINE

## 2018-02-27 PROCEDURE — 7100000010 HC PHASE II RECOVERY - FIRST 15 MIN: Performed by: INTERNAL MEDICINE

## 2018-02-27 PROCEDURE — 3609017100 HC EGD: Performed by: INTERNAL MEDICINE

## 2018-02-27 RX ORDER — FENTANYL CITRATE 50 UG/ML
INJECTION, SOLUTION INTRAMUSCULAR; INTRAVENOUS PRN
Status: DISCONTINUED | OUTPATIENT
Start: 2018-02-27 | End: 2018-02-27 | Stop reason: HOSPADM

## 2018-02-27 RX ORDER — MIDAZOLAM HYDROCHLORIDE 1 MG/ML
INJECTION INTRAMUSCULAR; INTRAVENOUS PRN
Status: DISCONTINUED | OUTPATIENT
Start: 2018-02-27 | End: 2018-02-27 | Stop reason: HOSPADM

## 2018-02-27 RX ORDER — SODIUM CHLORIDE 9 MG/ML
INJECTION, SOLUTION INTRAVENOUS CONTINUOUS
Status: DISCONTINUED | OUTPATIENT
Start: 2018-02-27 | End: 2018-02-27 | Stop reason: HOSPADM

## 2018-02-27 RX ADMIN — SODIUM CHLORIDE: 9 INJECTION, SOLUTION INTRAVENOUS at 07:45

## 2018-02-27 ASSESSMENT — PAIN SCALES - GENERAL: PAINLEVEL_OUTOF10: 0

## 2018-02-27 ASSESSMENT — PAIN - FUNCTIONAL ASSESSMENT: PAIN_FUNCTIONAL_ASSESSMENT: 0-10

## 2018-02-27 NOTE — H&P
HISTORY and [id number]       NAME:  Cristiana Fine  MRN: [id number]   YOB: 1960   Date: 2/27/2018   Age: 65 y.o. Gender: female       COMPLAINT AND PRESENT HISTORY:   EGD              Cristiana Fine is 65 y.o.,  female, undergoing for EGD. Pt has a hx of elevated CEA, and GERD. Pt complains of LLQ pain for over 2 mos. Patient denies  heartburn, no changes in consistency of the stools. No fever or chills.       PAST MEDICAL HISTORY     Past Medical History:   Diagnosis Date    Arthritis     Bronchitis, chronic (HCC)     Chronic back pain     Cocaine abuse     many years ago    COPD (ch[facility] obstructive [facility] disease) ([facility]     Depression     Diverticulosis     Emphysema     Family history of breast cancer     mom @ 61    Fibromyalgia     GERD (gastroesophageal reflux disease)     GI bleed     colon    H/O tubal ligation     Hemorrhoids, internal     Hyperlipidemia     Hyperplastic colon polyp 11/15/2017    Hypertension     Meningioma ([facility]     Orbital fracture (HCC)     left side     Osteopenia     Renal calculi     Renal cyst     STD (sexually transmitted disease)     Tobacco abuse     Uterine prolapse          SURGICAL HISTORY       Past Surgical History:   Procedure Laterality Date    BACK SURGERY  2011    thoracic laminectomy, T12, S1    CARDIAC CATHETERIZATION  10/24/14    Normal coronaries     COLONOSCOPY  04/05/2017    diverticulosis, ,poor prep    COLONOSCOPY  11/15/2017    flat polypoid lesion in the base of the cecum, about 1 cm.-hyperplastic    EYE SURGERY      left    ORBITAL FRACTURE SURGERY Left     [place] COLON CA SCRN NOT HI RSK IND N/A 4/5/2017    COLONOSCOPY performed by Rajan Valverde MD at [facility] W/REMOVAL LESION BY HOT [address] [place] [address] N/A 11/15/2017    COLONOSCOPY POLYPECTOMY SNARE and saline injection performed by Rajan Valverde MD at [address] [place] [address] EGD TRANSORAL BIOPSY

## 2018-02-28 ENCOUNTER — APPOINTMENT (OUTPATIENT)
Dept: CT IMAGING | Age: 58
End: 2018-02-28
Payer: MEDICARE

## 2018-02-28 ENCOUNTER — TELEPHONE (OUTPATIENT)
Dept: GASTROENTEROLOGY | Age: 58
End: 2018-02-28

## 2018-02-28 ENCOUNTER — HOSPITAL ENCOUNTER (EMERGENCY)
Age: 58
Discharge: HOME | End: 2018-02-28
Attending: EMERGENCY MEDICINE
Payer: MEDICARE

## 2018-02-28 VITALS
DIASTOLIC BLOOD PRESSURE: 81 MMHG | BODY MASS INDEX: 23.78 KG/M2 | RESPIRATION RATE: 20 BRPM | HEART RATE: 64 BPM | SYSTOLIC BLOOD PRESSURE: 143 MMHG | OXYGEN SATURATION: 86 % | WEIGHT: 148 LBS | HEIGHT: 66 IN | TEMPERATURE: 98.1 F

## 2018-02-28 DIAGNOSIS — R10.32 LEFT LOWER QUADRANT PAIN: ICD-10-CM

## 2018-02-28 DIAGNOSIS — R11.0 NAUSEA: Primary | ICD-10-CM

## 2018-02-28 LAB
ABSOLUTE EOS #: 0 K/UL (ref 0–0.4)
ABSOLUTE IMMATURE GRANULOCYTE: ABNORMAL K/UL (ref 0–0.3)
ABSOLUTE LYMPH #: 2.24 K/UL (ref 1–4.8)
ABSOLUTE MONO #: 0.31 K/UL (ref 0.1–1.3)
ALBUMIN SERPL-MCNC: 4.4 G/DL (ref 3.5–5.2)
ALBUMIN/GLOBULIN RATIO: ABNORMAL (ref 1–2.5)
ALP BLD-CCNC: 93 U/L (ref 35–104)
ALT SERPL-CCNC: 11 U/L (ref 5–33)
ANION GAP SERPL CALCULATED.3IONS-SCNC: 15 MMOL/L (ref 9–17)
AST SERPL-CCNC: 16 U/L
ATYPICAL LYMPHOCYTE ABSOLUTE COUNT: 0.1 K/UL
ATYPICAL LYMPHOCYTES: 2 %
BASOPHILS # BLD: 0 % (ref 0–2)
BASOPHILS ABSOLUTE: 0 K/UL (ref 0–0.2)
BILIRUB SERPL-MCNC: 0.38 MG/DL (ref 0.3–1.2)
BUN BLDV-MCNC: 7 MG/DL (ref 6–20)
BUN/CREAT BLD: ABNORMAL (ref 9–20)
CALCIUM SERPL-MCNC: 9.8 MG/DL (ref 8.6–10.4)
CHLORIDE BLD-SCNC: 101 MMOL/L (ref 98–107)
CO2: 22 MMOL/L (ref 20–31)
CREAT SERPL-MCNC: 0.66 MG/DL (ref 0.5–0.9)
DIFFERENTIAL TYPE: ABNORMAL
EOSINOPHILS RELATIVE PERCENT: 0 % (ref 0–4)
GFR AFRICAN AMERICAN: >60 ML/MIN
GFR NON-AFRICAN AMERICAN: >60 ML/MIN
GFR SERPL CREATININE-BSD FRML MDRD: ABNORMAL ML/MIN/{1.73_M2}
GFR SERPL CREATININE-BSD FRML MDRD: ABNORMAL ML/MIN/{1.73_M2}
GLUCOSE BLD-MCNC: 102 MG/DL (ref 70–99)
HCT VFR BLD CALC: 46.3 % (ref 36–46)
HEMOGLOBIN: 15.7 G/DL (ref 12–16)
IMMATURE GRANULOCYTES: ABNORMAL %
LIPASE: 32 U/L (ref 13–60)
LYMPHOCYTES # BLD: 43 % (ref 24–44)
MCH RBC QN AUTO: 30.9 PG (ref 26–34)
MCHC RBC AUTO-ENTMCNC: 33.9 G/DL (ref 31–37)
MCV RBC AUTO: 91.2 FL (ref 80–100)
MONOCYTES # BLD: 6 % (ref 1–7)
MORPHOLOGY: NORMAL
NRBC AUTOMATED: ABNORMAL PER 100 WBC
PDW BLD-RTO: 14.1 % (ref 11.5–14.9)
PLATELET # BLD: 285 K/UL (ref 150–450)
PLATELET ESTIMATE: ABNORMAL
PMV BLD AUTO: 8.6 FL (ref 6–12)
POTASSIUM SERPL-SCNC: 3.4 MMOL/L (ref 3.7–5.3)
RBC # BLD: 5.08 M/UL (ref 4–5.2)
RBC # BLD: ABNORMAL 10*6/UL
SEG NEUTROPHILS: 49 % (ref 36–66)
SEGMENTED NEUTROPHILS ABSOLUTE COUNT: 2.55 K/UL (ref 1.3–9.1)
SODIUM BLD-SCNC: 138 MMOL/L (ref 135–144)
TOTAL PROTEIN: 7.2 G/DL (ref 6.4–8.3)
WBC # BLD: 5.2 K/UL (ref 3.5–11)
WBC # BLD: ABNORMAL 10*3/UL

## 2018-02-28 PROCEDURE — 6360000004 HC RX CONTRAST MEDICATION: Performed by: EMERGENCY MEDICINE

## 2018-02-28 PROCEDURE — 6360000002 HC RX W HCPCS: Performed by: EMERGENCY MEDICINE

## 2018-02-28 PROCEDURE — 36415 COLL VENOUS BLD VENIPUNCTURE: CPT

## 2018-02-28 PROCEDURE — 96361 HYDRATE IV INFUSION ADD-ON: CPT

## 2018-02-28 PROCEDURE — 80053 COMPREHEN METABOLIC PANEL: CPT

## 2018-02-28 PROCEDURE — 85025 COMPLETE CBC W/AUTO DIFF WBC: CPT

## 2018-02-28 PROCEDURE — 74177 CT ABD & PELVIS W/CONTRAST: CPT

## 2018-02-28 PROCEDURE — 83690 ASSAY OF LIPASE: CPT

## 2018-02-28 PROCEDURE — 99284 EMERGENCY DEPT VISIT MOD MDM: CPT

## 2018-02-28 PROCEDURE — 2580000003 HC RX 258: Performed by: EMERGENCY MEDICINE

## 2018-02-28 PROCEDURE — 96374 THER/PROPH/DIAG INJ IV PUSH: CPT

## 2018-02-28 RX ORDER — SODIUM CHLORIDE 0.9 % (FLUSH) 0.9 %
10 SYRINGE (ML) INJECTION PRN
Status: DISCONTINUED | OUTPATIENT
Start: 2018-02-28 | End: 2018-02-28 | Stop reason: HOSPADM

## 2018-02-28 RX ORDER — 0.9 % SODIUM CHLORIDE 0.9 %
1000 INTRAVENOUS SOLUTION INTRAVENOUS ONCE
Status: COMPLETED | OUTPATIENT
Start: 2018-02-28 | End: 2018-02-28

## 2018-02-28 RX ORDER — 0.9 % SODIUM CHLORIDE 0.9 %
100 INTRAVENOUS SOLUTION INTRAVENOUS ONCE
Status: COMPLETED | OUTPATIENT
Start: 2018-02-28 | End: 2018-02-28

## 2018-02-28 RX ORDER — ONDANSETRON 2 MG/ML
4 INJECTION INTRAMUSCULAR; INTRAVENOUS ONCE
Status: COMPLETED | OUTPATIENT
Start: 2018-02-28 | End: 2018-02-28

## 2018-02-28 RX ADMIN — Medication 10 ML: at 16:10

## 2018-02-28 RX ADMIN — ONDANSETRON 4 MG: 2 INJECTION INTRAMUSCULAR; INTRAVENOUS at 15:58

## 2018-02-28 RX ADMIN — SODIUM CHLORIDE 100 ML: 9 INJECTION, SOLUTION INTRAVENOUS at 16:10

## 2018-02-28 RX ADMIN — SODIUM CHLORIDE 1000 ML: 9 INJECTION, SOLUTION INTRAVENOUS at 15:59

## 2018-02-28 RX ADMIN — IOPAMIDOL 100 ML: 755 INJECTION, SOLUTION INTRAVENOUS at 16:10

## 2018-02-28 ASSESSMENT — PAIN DESCRIPTION - DESCRIPTORS: DESCRIPTORS: CRAMPING

## 2018-02-28 ASSESSMENT — PAIN DESCRIPTION - ORIENTATION: ORIENTATION: LEFT;LOWER

## 2018-02-28 ASSESSMENT — ENCOUNTER SYMPTOMS
ABDOMINAL PAIN: 1
COUGH: 0
NAUSEA: 1
RESPIRATORY NEGATIVE: 1
SHORTNESS OF BREATH: 0
EYES NEGATIVE: 1
BACK PAIN: 0

## 2018-02-28 ASSESSMENT — PAIN DESCRIPTION - ONSET: ONSET: ON-GOING

## 2018-02-28 ASSESSMENT — PAIN DESCRIPTION - PAIN TYPE: TYPE: ACUTE PAIN

## 2018-02-28 ASSESSMENT — PAIN SCALES - GENERAL: PAINLEVEL_OUTOF10: 6

## 2018-02-28 NOTE — TELEPHONE ENCOUNTER
Received a voicemail from patient to call her. Call her and she states that she is already in the ER, does not feel well, vomited her meds up the morning.

## 2018-03-01 NOTE — ED PROVIDER NOTES
EC tablet take 1 tablet by mouth twice a day, Disp-60 tablet, R-3Normal      cyclobenzaprine (FLEXERIL) 10 MG tablet take 1 tablet by mouth twice a day if needed, Disp-60 tablet, R-3Normal      varenicline (CHANTIX STARTING MONTH RIYA) 0.5 MG X 11 & 1 MG X 42 tablet Take by mouth., Disp-53 tablet, R-0Normal      PARoxetine (PAXIL) 20 MG tablet take 1 tablet by mouth every morning, Disp-30 tablet, R-3Normal      LYRICA 100 MG capsule take 1 capsule by mouth twice a day, Disp-60 capsule, R-0Normal      cloNIDine (CATAPRES) 0.1 MG tablet take 1 tablet by mouth at bedtime, Disp-30 tablet, R-5Normal      RA CALCIUM 600/VITAMIN D-3 600-400 MG-UNIT TABS take 1 tablet by mouth twice a day, Disp-60 tablet, R-5Normal      SPIRIVA HANDIHALER 18 MCG inhalation capsule inhale the contents of one capsule in the handihaler once daily, Disp-30 capsule, R-3Normal      guaiFENesin (MUCINEX) 600 MG extended release tablet Take 1 tablet by mouth 2 times daily, Disp-14 tablet, R-0Normal      promethazine (PHENERGAN) 25 MG tablet Take 25 mg by mouth every 6 hours as needed for NauseaHistorical Med      potassium chloride (KLOR-CON M) 20 MEQ extended release tablet Take 1 tablet by mouth daily, Disp-60 tablet, R-2Normal      varenicline (CHANTIX CONTINUING MONTH RIYA) 1 MG tablet Take 1 tablet by mouth 2 times daily, Disp-60 tablet, R-3Normal      albuterol sulfate HFA (PROVENTIL HFA) 108 (90 BASE) MCG/ACT inhaler Inhale 2 puffs into the lungs every 6 hours as needed for Wheezing or Shortness of Breath, Disp-1 Inhaler, R-1      Multiple Vitamin (MULTIVITAMIN PO) Take  by mouth daily. aspirin 81 MG EC tablet Take 81 mg by mouth daily. ALLERGIES     has No Known Allergies. FAMILY HISTORY     indicated that her mother is . She indicated that her father is . She indicated that her sister is alive. She indicated that the status of her maternal grandmother is unknown.  She indicated that her paternal grandmother

## 2018-03-06 ENCOUNTER — TELEPHONE (OUTPATIENT)
Dept: ONCOLOGY | Age: 58
End: 2018-03-06

## 2018-03-07 ENCOUNTER — OFFICE VISIT (OUTPATIENT)
Dept: GASTROENTEROLOGY | Age: 58
End: 2018-03-07
Payer: MEDICARE

## 2018-03-07 ENCOUNTER — TELEPHONE (OUTPATIENT)
Dept: GASTROENTEROLOGY | Age: 58
End: 2018-03-07

## 2018-03-07 VITALS
HEART RATE: 51 BPM | TEMPERATURE: 97.6 F | BODY MASS INDEX: 24.27 KG/M2 | WEIGHT: 151 LBS | DIASTOLIC BLOOD PRESSURE: 71 MMHG | SYSTOLIC BLOOD PRESSURE: 107 MMHG | OXYGEN SATURATION: 96 % | HEIGHT: 66 IN

## 2018-03-07 DIAGNOSIS — R89.9 ABNORMAL LABORATORY TEST: ICD-10-CM

## 2018-03-07 DIAGNOSIS — R10.32 LEFT LOWER QUADRANT PAIN: ICD-10-CM

## 2018-03-07 DIAGNOSIS — K63.5 HYPERPLASTIC COLONIC POLYP, UNSPECIFIED PART OF COLON: Primary | ICD-10-CM

## 2018-03-07 DIAGNOSIS — R97.0 ELEVATED CEA: ICD-10-CM

## 2018-03-07 PROCEDURE — 99214 OFFICE O/P EST MOD 30 MIN: CPT | Performed by: INTERNAL MEDICINE

## 2018-03-07 ASSESSMENT — ENCOUNTER SYMPTOMS
FACIAL SWELLING: 0
ABDOMINAL DISTENTION: 0
SHORTNESS OF BREATH: 0
CONSTIPATION: 0
NAUSEA: 0
VOICE CHANGE: 1
SINUS PAIN: 0
WHEEZING: 0
RECTAL PAIN: 0
ANAL BLEEDING: 0
RHINORRHEA: 0
ABDOMINAL PAIN: 1
SINUS PRESSURE: 0
COUGH: 1
VOMITING: 0
BACK PAIN: 1
SORE THROAT: 0
TROUBLE SWALLOWING: 0
DIARRHEA: 0
BLOOD IN STOOL: 0

## 2018-03-07 NOTE — PROGRESS NOTES
vitals reviewed. Assessment:      1. Hyperplastic colonic polyp, unspecified part of colon     2. Left lower quadrant pain     3. Elevated CEA  EGD    Tereza Logan MD, Hematology/Oncology [place]   4. Elevated CEA of 6.6 and OVA-1 of 4.5             Plan: At present not able to identify etiology for her abnormal CEA levels. Advised to see oncologist.  Also advised to have repeat EGD in the next few weeks as her previous EGD inconclusive because of retained food. After discussion patient and her  understood, agreed.

## 2018-03-08 ENCOUNTER — TELEPHONE (OUTPATIENT)
Dept: GASTROENTEROLOGY | Age: 58
End: 2018-03-08

## 2018-03-10 ENCOUNTER — HOSPITAL ENCOUNTER (OUTPATIENT)
Dept: GENERAL RADIOLOGY | Age: 58
Discharge: HOME | End: 2018-03-12
Payer: MEDICARE

## 2018-03-10 ENCOUNTER — HOSPITAL ENCOUNTER (OUTPATIENT)
Age: 58
Discharge: HOME | End: 2018-03-12
Payer: MEDICARE

## 2018-03-10 DIAGNOSIS — J18.9 PNEUMONIA OF LEFT LOWER LOBE DUE TO INFECTIOUS ORGANISM: ICD-10-CM

## 2018-03-10 PROCEDURE — 71046 X-RAY EXAM CHEST 2 VIEWS: CPT

## 2018-03-12 ENCOUNTER — OFFICE VISIT (OUTPATIENT)
Dept: FAMILY MEDICINE CLINIC | Age: 58
End: 2018-03-12
Payer: MEDICARE

## 2018-03-12 VITALS
BODY MASS INDEX: 24.08 KG/M2 | SYSTOLIC BLOOD PRESSURE: 130 MMHG | RESPIRATION RATE: 20 BRPM | HEIGHT: 66 IN | OXYGEN SATURATION: 9 % | WEIGHT: 149.8 LBS | DIASTOLIC BLOOD PRESSURE: 80 MMHG | TEMPERATURE: 97.8 F | HEART RATE: 60 BPM

## 2018-03-12 DIAGNOSIS — M79.7 FIBROMYALGIA: ICD-10-CM

## 2018-03-12 DIAGNOSIS — F17.200 SMOKING: ICD-10-CM

## 2018-03-12 DIAGNOSIS — Z23 NEED FOR PROPHYLACTIC VACCINATION AND INOCULATION AGAINST VARICELLA: ICD-10-CM

## 2018-03-12 DIAGNOSIS — I10 ESSENTIAL HYPERTENSION: ICD-10-CM

## 2018-03-12 DIAGNOSIS — J41.8 MIXED SIMPLE AND MUCOPURULENT CHRONIC BRONCHITIS (HCC): ICD-10-CM

## 2018-03-12 DIAGNOSIS — N83.201 RIGHT OVARIAN CYST: ICD-10-CM

## 2018-03-12 DIAGNOSIS — R89.9 ABNORMAL LABORATORY TEST: Primary | ICD-10-CM

## 2018-03-12 PROBLEM — K62.5 RECTAL BLEEDING: Status: RESOLVED | Noted: 2017-03-16 | Resolved: 2018-03-12

## 2018-03-12 PROBLEM — J18.9 PNEUMONIA OF RIGHT UPPER LOBE DUE TO INFECTIOUS ORGANISM: Status: RESOLVED | Noted: 2017-11-19 | Resolved: 2018-03-12

## 2018-03-12 PROCEDURE — 99214 OFFICE O/P EST MOD 30 MIN: CPT | Performed by: FAMILY MEDICINE

## 2018-03-12 RX ORDER — VARENICLINE TARTRATE 1 MG/1
1 TABLET, FILM COATED ORAL 2 TIMES DAILY
Qty: 60 TABLET | Refills: 3 | Status: SHIPPED | OUTPATIENT
Start: 2018-03-12 | End: 2018-05-01 | Stop reason: SINTOL

## 2018-03-12 ASSESSMENT — ENCOUNTER SYMPTOMS
BACK PAIN: 0
WHEEZING: 0
SINUS PRESSURE: 0
CONSTIPATION: 0
ABDOMINAL PAIN: 1
BLOOD IN STOOL: 0
RECTAL PAIN: 0
RHINORRHEA: 0
SHORTNESS OF BREATH: 0
PHOTOPHOBIA: 0
COUGH: 0
NAUSEA: 0
DIARRHEA: 0

## 2018-03-12 NOTE — PROGRESS NOTES
Chief Complaint   Patient presents with    Hypertension    Hyperlipidemia    Elbow Pain     left elbos stared 1 week ago         Nichelle Buck  here today for follow up on chronic medical problems, go over labs and/or diagnostic studies, and medication refills. Hypertension; Hyperlipidemia; and Elbow Pain (left elbos stared 1 week ago)      HPI:Patient is here for follow-up on high CEA levels. Patient has been persistently increasing CEA levels and she has been evaluated by multiple specialist and has all blood work and testing done which did not show anything. She does have chronic abdominal pain on and off. Patient follows with GI and ObGyn. She has appointment with oncologist next week and also she has followed with oncologist in the past.      She had right ovarian cyst on ultrasound in the past which was complex, recent ultrasound is normal.    Patient cannot have MRI done as she has to clips in the brain. Patient has history of meningioma in her problem list, but patient is not a very of that and does not see any imaging which will show meningioma. Hypertension and COPD stable on current treatment. /80   Pulse 60   Temp 97.8 °F (36.6 °C) (Tympanic)   Resp 20   Ht 5' 6\" (1.676 m)   Wt 149 lb 12.8 oz (67.9 kg)   SpO2 (!) 9%   BMI 24.18 kg/m²   Body mass index is 24.18 kg/m². Wt Readings from Last 3 Encounters:   03/12/18 149 lb 12.8 oz (67.9 kg)   03/07/18 151 lb (68.5 kg)   02/28/18 148 lb (67.1 kg)        [x]Negative depression screening. PHQ Scores 11/13/2017   PHQ2 Score 0   PHQ9 Score 0      []1-4 = Minimal depression   []5-9 = Mild depression   []10-14 = Moderate depression   []15-19 = Moderately severe depression   []20-27 = Severe depression    Discussed testing with the patient and all questions fully answered.     Admission on 02/28/2018, Discharged on 02/28/2018   Component Date Value Ref Range Status    WBC 02/28/2018 5.2  3.5 - 11.0 k/uL Final    RBC levels recent test showing 8.1, continue to follow with oncologist    2. Essential hypertension  -Controlled continue same medications    3. Fibromyalgia  -Stable continue Neurontin as needed    4. Mixed simple and mucopurulent chronic bronchitis (HCC)  -Stable, counseling and education done on smoking    5. Right ovarian cyst  -Resolved on recent ultrasound    6. Need for prophylactic vaccination and inoculation against varicella    - zoster recombinant adjuvanted vaccine (SHINGRIX) 50 MCG SUSR injection; Inject 0.5 mLs into the muscle once for 1 dose  Dispense: 1 each; Refill: 0      No orders of the defined types were placed in this encounter. Medications Discontinued During This Encounter   Medication Reason    guaiFENesin (MUCINEX) 600 MG extended release tablet Therapy completed    varenicline (CHANTIX STARTING MONTH PAK) 0.5 MG X 11 & 1 MG X 42 tablet Therapy completed    promethazine (PHENERGAN) 25 MG tablet Therapy completed       Star Cazares received counseling on the following healthy behaviors: nutrition, exercise, medication adherence and tobacco cessation  Reviewed prior labs and health maintenance  Continue current medications, diet and exercise. Discussed use, benefit, and side effects of prescribed medications. Barriers to medication compliance addressed. Patient given educational materials - see patient instructions  Was a self-tracking handout given in paper form or via [employer]? Yes    Requested Prescriptions     Signed Prescriptions Disp Refills    varenicline (CHANTIX CONTINUING MONTH PAK) 1 MG tablet 60 tablet 3     Sig: Take 1 tablet by mouth 2 times daily       All patient questions answered. Patient voiced understanding. Quality Measures    Body mass index is 24.18 kg/m². Elevated. Weight control planned discussed Healthy diet and regular exercise. BP: 130/80 Blood pressure is normal. Treatment plan consists of No treatment change needed.     Lab Results   Component Value

## 2018-03-12 NOTE — PROGRESS NOTES
Visit Information    Have you changed or started any medications since your last visit including any over-the-counter medicines, vitamins, or herbal medicines? Yes vit c   Have you stopped taking any of your medications? Is so, why? -  no  Are you having any side effects from any of your medications? - no    Have you seen any other physician or provider since your last visit? yes -    Have you had any other diagnostic tests since your last visit? yes -  Blood ct ab pelvis, vag us egd,t    Have you been seen in the emergency room and/or had an admission in a hospital since we last saw you?  yes - 2/28/2019    Have you had your routine dental cleaning in the past 6 months?  yes -      Do you have an active MyChart account? If no, what is the barrier?   Yes    Patient Care Team:  Basil Villatoro CNP as PCP - General  Lalit Tejeda RN as   Kanchan Braxton MD as Consulting Physician (Pulmonology)  Addison Mooney MD as Consulting Physician (Hematology and Oncology)    Medical History Review  Past Medical, Family, and Social History reviewed and does contribute to the patient presenting condition    Health Maintenance   Topic Date Due    DTaP/Tdap/Td vaccine (1 - Tdap) 04/26/1980    Shingles Vaccine (1 of 2 - 2 Dose Series) 04/26/2011    Colon cancer screen colonoscopy  11/18/2018    Potassium monitoring  02/28/2019    Creatinine monitoring  02/28/2019    Breast cancer screen  11/01/2019    Lipid screen  03/06/2022    Cervical cancer screen  11/13/2022    Flu vaccine  Completed    Pneumococcal med risk  Completed    Hepatitis C screen  Completed    HIV screen  Completed

## 2018-03-14 ENCOUNTER — TELEPHONE (OUTPATIENT)
Dept: ONCOLOGY | Age: 58
End: 2018-03-14

## 2018-03-15 ENCOUNTER — TELEPHONE (OUTPATIENT)
Dept: GASTROENTEROLOGY | Age: 58
End: 2018-03-15

## 2018-03-15 ENCOUNTER — OFFICE VISIT (OUTPATIENT)
Dept: PULMONOLOGY | Age: 58
End: 2018-03-15
Payer: MEDICARE

## 2018-03-15 ENCOUNTER — INITIAL CONSULT (OUTPATIENT)
Dept: ONCOLOGY | Age: 58
End: 2018-03-15
Payer: MEDICARE

## 2018-03-15 VITALS
BODY MASS INDEX: 23.75 KG/M2 | TEMPERATURE: 97.9 F | HEART RATE: 82 BPM | HEIGHT: 66 IN | DIASTOLIC BLOOD PRESSURE: 95 MMHG | WEIGHT: 147.8 LBS | RESPIRATION RATE: 16 BRPM | SYSTOLIC BLOOD PRESSURE: 133 MMHG

## 2018-03-15 VITALS
RESPIRATION RATE: 16 BRPM | SYSTOLIC BLOOD PRESSURE: 135 MMHG | HEIGHT: 66 IN | HEART RATE: 66 BPM | TEMPERATURE: 97.6 F | WEIGHT: 142.8 LBS | BODY MASS INDEX: 22.95 KG/M2 | OXYGEN SATURATION: 100 % | DIASTOLIC BLOOD PRESSURE: 82 MMHG

## 2018-03-15 DIAGNOSIS — J44.9 CHRONIC OBSTRUCTIVE PULMONARY DISEASE, UNSPECIFIED COPD TYPE (HCC): ICD-10-CM

## 2018-03-15 DIAGNOSIS — I10 ESSENTIAL HYPERTENSION: ICD-10-CM

## 2018-03-15 DIAGNOSIS — R59.0 MEDIASTINAL LYMPHADENOPATHY: Primary | ICD-10-CM

## 2018-03-15 DIAGNOSIS — R05.3 CHRONIC COUGH: ICD-10-CM

## 2018-03-15 DIAGNOSIS — F17.200 SMOKING: ICD-10-CM

## 2018-03-15 DIAGNOSIS — R91.1 LUNG NODULE: ICD-10-CM

## 2018-03-15 DIAGNOSIS — R97.0 ELEVATED CEA: ICD-10-CM

## 2018-03-15 DIAGNOSIS — R91.1 PULMONARY NODULE: Primary | ICD-10-CM

## 2018-03-15 PROCEDURE — 99204 OFFICE O/P NEW MOD 45 MIN: CPT | Performed by: INTERNAL MEDICINE

## 2018-03-15 PROCEDURE — 99201 HC NEW PT, E/M LEVEL 1: CPT

## 2018-03-15 PROCEDURE — 99215 OFFICE O/P EST HI 40 MIN: CPT | Performed by: INTERNAL MEDICINE

## 2018-03-15 RX ORDER — ALBUTEROL SULFATE 90 UG/1
2 AEROSOL, METERED RESPIRATORY (INHALATION) EVERY 6 HOURS PRN
Qty: 1 INHALER | Refills: 11 | Status: SHIPPED | OUTPATIENT
Start: 2018-03-15

## 2018-03-15 NOTE — PROGRESS NOTES
30.9 02/28/2018    MCHC 33.9 02/28/2018    RDW 14.1 02/28/2018    MONOPCT 6 02/28/2018    BASOPCT 0 02/28/2018    NEUTROABS 2.55 02/28/2018    LYMPHSABS 2.24 02/28/2018    MONOSABS 0.31 02/28/2018    EOSABS 0.00 02/28/2018    BASOSABS 0.00 02/28/2018       Chemistry        Component Value Date/Time     02/28/2018 1530    K 3.4 (L) 02/28/2018 1530     02/28/2018 1530    CO2 22 02/28/2018 1530    BUN 7 02/28/2018 1530    CREATININE 0.66 02/28/2018 1530        Component Value Date/Time    CALCIUM 9.8 02/28/2018 1530    ALKPHOS 93 02/28/2018 1530    AST 16 02/28/2018 1530    ALT 11 02/28/2018 1530    BILITOT 0.38 02/28/2018 1530        Lab Results   Component Value Date    CEA 8.1 (H) 02/14/2018     PATHOLOGY DATA:   11/15/17  Final Diagnosis   CECUM, POLYP, POLYPECTOMY:         HYPERPLASTIC POLYP     IMAGING DATA:    CT abd pelvis 2/28/18  Impression   1.  Diverticulosis without evidence for acute diverticulitis.       2.  Unchanged chronic findings as above.         CT chest 8/28/17     Impression   Similar-appearing patterns scarring throughout the lung parenchyma is   identified.  Overall, the pattern of disease has not significantly changed. Advanced, diffuse centrilobular emphysema is identified, and continued annual   low-dose lung screening is recommended. ASSESSMENT:      Ángel Pierre is 66 YO female with elevated CEA, tobacco abuse, family history of breast and ovarian cancer, Lung nodules. Elevated CEA: Pt has elevated CEA, and had negative work up including EGD and colonoscopy. CT abd did not show acute pathology. I explained that CEA could be non specific. So far work up has been negative. Will get PEt scan, to evaluate for occult malignancy. She also has history lung nodule and mediastinal Lymphadenopathy. Lung nodule: High risk for malignnacy given tobacco abuse history.     Family history of breast and ovarian cancer: She is seen by [facility] and had my Risk panel which is

## 2018-03-15 NOTE — TELEPHONE ENCOUNTER
on family history and personal risk factors, Ms. Leeanna Bill estimated breast cancer risk is as follows:   · 5 year breast cancer risk = 2.3% (based on the [facility])   · Lifetime breast cancer risk = 12-14.5% (based on the [employer] and [address] (NCC[employer]) recommends that women with a 5 year [facility] risk >1.7% consider the following:   · Clinical breast exam every 6-12 months  · Screening mammogram (consider tomosynthesis) every year   · Consider risk reduction strategies, including anti-hormone medications (ex. Tamoxifen)     OVARIAN CANCER  While Ms. Elías Granados does not carry a known hereditary mutation, her risk for ovarian cancer may still be elevated based on her family history of ovarian cancer. This includes her sister who was diagnosed with ovarian cancer at age 52. We recommend that Ms. Elías Granados continue gynecologic cancer screening as directed by her physicians. GENERAL CANCER   Ms. Elías Granados should continue general population cancer screening guidelines as directed by her physicians. RECOMMENDATIONS FOR FAMILY MEMBERS   1) Genetic testing is not recommended for Ms. Leeanna Bill children based on her negative test results. However, this recommendation does not take into consideration any family history of cancer in their paternal family. 2) Genetic counseling and possible testing is recommended for Ms. Leeanna Bill sister due to her personal history of ovarian cancer. It is possible that the family history of cancer is due to a hereditary mutation which Ms. Elías Granados did not inherit herself. Relatives may contact the [address] [place] [address] [employer] at [phone] or locate a genetic counselor at www. [email or web address].     3) We encourage Ms. Leeanna Bill relatives to discuss their family history of cancer with their physicians to determine the most appropriate cancer screening recommendations.  Ms. Leeanna Bill female relatives may benefit from a formal

## 2018-03-15 NOTE — LETTER
[facility]  [address] [place] [address],  [address]   [address]  [place], [address] [place] [address]  Phone: [phone]  Fax: [phone]      Sanna Payne M.D. SURGERY CLEARANCE FORM      Catalina Turnerdianne  1960      3/18/2018      Dear Dr. Jelena Lovell    Thank you for having me participate in the preoperative evaluation of your patient, Catalina Jett. Catalina Jett is cleared for surgery    I have made the following assessment:    Minimal Risk    Catalina Jett will need:  Preoperative Coricosteroids, Incentive Spirometry, Cough and Deep Breathing and  pain control  She has to continue her inhalers. If you have any questions, please feel free to call me.      Sincerely,      Sanna Payne MD  3/18/2018, 6:53 AM

## 2018-03-16 ENCOUNTER — TELEPHONE (OUTPATIENT)
Dept: ONCOLOGY | Age: 58
End: 2018-03-16

## 2018-03-16 RX ORDER — POTASSIUM CHLORIDE 20 MEQ/1
20 TABLET, EXTENDED RELEASE ORAL DAILY
Qty: 60 TABLET | Refills: 2 | Status: SHIPPED | OUTPATIENT
Start: 2018-03-16 | End: 2022-07-20 | Stop reason: ALTCHOICE

## 2018-03-18 NOTE — PROGRESS NOTES
negative  Musculoskeletal ROS:  Completed and except as mentioned above were negative  Neurological ROS:  Completed and except as mentioned above were negative  Dermatological ROS:  Completed and except as mentioned above were negative    Allergies:  No Known Allergies    Medications:    Current Outpatient Prescriptions:     albuterol sulfate HFA (PROVENTIL HFA) 108 (90 Base) MCG/ACT inhaler, Inhale 2 puffs into the lungs every 6 hours as needed for Wheezing or Shortness of Breath, Disp: 1 Inhaler, Rfl: 11    varenicline (CHANTIX CONTINUING MONTH RIYA) 1 MG tablet, Take 1 tablet by mouth 2 times daily, Disp: 60 tablet, Rfl: 3    LYRICA 100 MG capsule, take 1 capsule by mouth twice a day, Disp: 60 capsule, Rfl: 0    lovastatin (MEVACOR) 40 MG tablet, take 1 tablet by mouth every evening, Disp: 30 tablet, Rfl: 3    omeprazole (PRILOSEC) 20 MG delayed release capsule, take 1 capsule by mouth twice a day, Disp: 60 capsule, Rfl: 3    diclofenac (VOLTAREN) 50 MG EC tablet, take 1 tablet by mouth twice a day, Disp: 60 tablet, Rfl: 3    cyclobenzaprine (FLEXERIL) 10 MG tablet, take 1 tablet by mouth twice a day if needed, Disp: 60 tablet, Rfl: 3    PARoxetine (PAXIL) 20 MG tablet, take 1 tablet by mouth every morning, Disp: 30 tablet, Rfl: 3    cloNIDine (CATAPRES) 0.1 MG tablet, take 1 tablet by mouth at bedtime, Disp: 30 tablet, Rfl: 5    RA CALCIUM 600/VITAMIN D-3 600-400 MG-UNIT TABS, take 1 tablet by mouth twice a day, Disp: 60 tablet, Rfl: 5    SPIRIVA HANDIHALER 18 MCG inhalation capsule, inhale the contents of one capsule in the handihaler once daily, Disp: 30 capsule, Rfl: 3    Multiple Vitamin (MULTIVITAMIN PO), Take  by mouth daily. , Disp: , Rfl:     aspirin 81 MG EC tablet, Take 81 mg by mouth daily.   , Disp: , Rfl:     potassium chloride (KLOR-CON M) 20 MEQ extended release tablet, Take 1 tablet by mouth daily, Disp: 60 tablet, Rfl: 2      Objective:    Physical Exam:  Vitals:   /82 (Site:

## 2018-03-20 DIAGNOSIS — R97.0 ELEVATED CEA: ICD-10-CM

## 2018-03-26 ENCOUNTER — HOSPITAL ENCOUNTER (OUTPATIENT)
Dept: NUCLEAR MEDICINE | Age: 58
Discharge: HOME | End: 2018-03-28
Payer: MEDICARE

## 2018-03-26 DIAGNOSIS — R91.1 LUNG NODULE: ICD-10-CM

## 2018-03-26 PROCEDURE — A9552 F18 FDG: HCPCS | Performed by: INTERNAL MEDICINE

## 2018-03-26 PROCEDURE — 3430000000 HC RX DIAGNOSTIC RADIOPHARMACEUTICAL: Performed by: INTERNAL MEDICINE

## 2018-03-26 PROCEDURE — 78815 PET IMAGE W/CT SKULL-THIGH: CPT

## 2018-03-26 RX ADMIN — FLUDEOXYGLUCOSE F 18 16.41 MILLICURIE: 200 INJECTION, SOLUTION INTRAVENOUS at 16:42

## 2018-03-27 RX ORDER — FLUDEOXYGLUCOSE F 18 200 MCI/ML
16.41 INJECTION, SOLUTION INTRAVENOUS
Status: COMPLETED | OUTPATIENT
Start: 2018-03-27 | End: 2018-03-26

## 2018-04-03 ENCOUNTER — OFFICE VISIT (OUTPATIENT)
Dept: ONCOLOGY | Age: 58
End: 2018-04-03
Payer: MEDICARE

## 2018-04-03 VITALS
HEART RATE: 79 BPM | RESPIRATION RATE: 16 BRPM | BODY MASS INDEX: 23.69 KG/M2 | WEIGHT: 146.8 LBS | SYSTOLIC BLOOD PRESSURE: 121 MMHG | TEMPERATURE: 98.2 F | DIASTOLIC BLOOD PRESSURE: 82 MMHG

## 2018-04-03 DIAGNOSIS — R97.0 ELEVATED CEA: Primary | ICD-10-CM

## 2018-04-03 PROCEDURE — 99214 OFFICE O/P EST MOD 30 MIN: CPT | Performed by: INTERNAL MEDICINE

## 2018-04-03 PROCEDURE — G8427 DOCREV CUR MEDS BY ELIG CLIN: HCPCS | Performed by: INTERNAL MEDICINE

## 2018-04-03 PROCEDURE — 1036F TOBACCO NON-USER: CPT | Performed by: INTERNAL MEDICINE

## 2018-04-03 PROCEDURE — G8420 CALC BMI NORM PARAMETERS: HCPCS | Performed by: INTERNAL MEDICINE

## 2018-04-03 PROCEDURE — 3014F SCREEN MAMMO DOC REV: CPT | Performed by: INTERNAL MEDICINE

## 2018-04-03 PROCEDURE — 3017F COLORECTAL CA SCREEN DOC REV: CPT | Performed by: INTERNAL MEDICINE

## 2018-04-04 ENCOUNTER — APPOINTMENT (OUTPATIENT)
Dept: GENERAL RADIOLOGY | Age: 58
End: 2018-04-04
Payer: MEDICARE

## 2018-04-04 ENCOUNTER — TELEPHONE (OUTPATIENT)
Dept: GASTROENTEROLOGY | Age: 58
End: 2018-04-04

## 2018-04-04 ENCOUNTER — TELEPHONE (OUTPATIENT)
Dept: OBGYN CLINIC | Age: 58
End: 2018-04-04

## 2018-04-04 ENCOUNTER — HOSPITAL ENCOUNTER (OUTPATIENT)
Age: 58
Setting detail: SPECIMEN
Discharge: HOME | End: 2018-04-04
Payer: MEDICARE

## 2018-04-04 ENCOUNTER — HOSPITAL ENCOUNTER (EMERGENCY)
Age: 58
Discharge: HOME | End: 2018-04-04
Attending: EMERGENCY MEDICINE
Payer: MEDICARE

## 2018-04-04 VITALS
HEIGHT: 66 IN | WEIGHT: 146 LBS | RESPIRATION RATE: 16 BRPM | HEART RATE: 66 BPM | SYSTOLIC BLOOD PRESSURE: 125 MMHG | OXYGEN SATURATION: 100 % | BODY MASS INDEX: 23.46 KG/M2 | TEMPERATURE: 97.5 F | DIASTOLIC BLOOD PRESSURE: 77 MMHG

## 2018-04-04 DIAGNOSIS — J40 BRONCHITIS: Primary | ICD-10-CM

## 2018-04-04 DIAGNOSIS — R97.0 ELEVATED CEA: ICD-10-CM

## 2018-04-04 DIAGNOSIS — E87.6 HYPOKALEMIA: ICD-10-CM

## 2018-04-04 DIAGNOSIS — R94.8 ABNORMAL PET SCAN OF COLON: ICD-10-CM

## 2018-04-04 LAB
ABSOLUTE EOS #: 0.1 K/UL (ref 0–0.4)
ABSOLUTE IMMATURE GRANULOCYTE: ABNORMAL K/UL (ref 0–0.3)
ABSOLUTE LYMPH #: 1.5 K/UL (ref 1–4.8)
ABSOLUTE MONO #: 0.3 K/UL (ref 0.1–1.3)
ALBUMIN SERPL-MCNC: 4.5 G/DL (ref 3.5–5.2)
ALBUMIN/GLOBULIN RATIO: NORMAL (ref 1–2.5)
ALP BLD-CCNC: 88 U/L (ref 35–104)
ALT SERPL-CCNC: 13 U/L (ref 5–33)
ANION GAP SERPL CALCULATED.3IONS-SCNC: 13 MMOL/L (ref 9–17)
AST SERPL-CCNC: 19 U/L
BASOPHILS # BLD: 1 % (ref 0–2)
BASOPHILS ABSOLUTE: 0 K/UL (ref 0–0.2)
BILIRUB SERPL-MCNC: 0.53 MG/DL (ref 0.3–1.2)
BILIRUBIN DIRECT: 0.11 MG/DL
BILIRUBIN, INDIRECT: 0.42 MG/DL (ref 0–1)
BUN BLDV-MCNC: 11 MG/DL (ref 6–20)
BUN/CREAT BLD: ABNORMAL (ref 9–20)
CALCIUM SERPL-MCNC: 9.4 MG/DL (ref 8.6–10.4)
CARCINOEMBRYONIC ANTIGEN: 7.7 NG/ML
CHLORIDE BLD-SCNC: 104 MMOL/L (ref 98–107)
CO2: 24 MMOL/L (ref 20–31)
CREAT SERPL-MCNC: 0.65 MG/DL (ref 0.5–0.9)
DIFFERENTIAL TYPE: ABNORMAL
EKG ATRIAL RATE: 69 BPM
EKG P AXIS: 76 DEGREES
EKG P-R INTERVAL: 134 MS
EKG Q-T INTERVAL: 416 MS
EKG QRS DURATION: 76 MS
EKG QTC CALCULATION (BAZETT): 445 MS
EKG R AXIS: 59 DEGREES
EKG T AXIS: 57 DEGREES
EKG VENTRICULAR RATE: 69 BPM
EOSINOPHILS RELATIVE PERCENT: 3 % (ref 0–4)
GFR AFRICAN AMERICAN: >60 ML/MIN
GFR NON-AFRICAN AMERICAN: >60 ML/MIN
GFR SERPL CREATININE-BSD FRML MDRD: ABNORMAL ML/MIN/{1.73_M2}
GFR SERPL CREATININE-BSD FRML MDRD: ABNORMAL ML/MIN/{1.73_M2}
GLOBULIN: NORMAL G/DL (ref 1.5–3.8)
GLUCOSE BLD-MCNC: 130 MG/DL (ref 70–99)
HCT VFR BLD CALC: 46.7 % (ref 36–46)
HEMOGLOBIN: 15.7 G/DL (ref 12–16)
IMMATURE GRANULOCYTES: ABNORMAL %
LYMPHOCYTES # BLD: 32 % (ref 24–44)
MAGNESIUM: 1.8 MG/DL (ref 1.6–2.6)
MCH RBC QN AUTO: 31.2 PG (ref 26–34)
MCHC RBC AUTO-ENTMCNC: 33.7 G/DL (ref 31–37)
MCV RBC AUTO: 92.7 FL (ref 80–100)
MONOCYTES # BLD: 7 % (ref 1–7)
NRBC AUTOMATED: ABNORMAL PER 100 WBC
PDW BLD-RTO: 13.5 % (ref 11.5–14.9)
PHOSPHORUS: 2.7 MG/DL (ref 2.6–4.5)
PLATELET # BLD: 279 K/UL (ref 150–450)
PLATELET ESTIMATE: ABNORMAL
PMV BLD AUTO: 8.7 FL (ref 6–12)
POTASSIUM SERPL-SCNC: 3.4 MMOL/L (ref 3.7–5.3)
RBC # BLD: 5.03 M/UL (ref 4–5.2)
RBC # BLD: ABNORMAL 10*6/UL
SEG NEUTROPHILS: 57 % (ref 36–66)
SEGMENTED NEUTROPHILS ABSOLUTE COUNT: 2.6 K/UL (ref 1.3–9.1)
SODIUM BLD-SCNC: 141 MMOL/L (ref 135–144)
TOTAL PROTEIN: 7.4 G/DL (ref 6.4–8.3)
TROPONIN INTERP: NORMAL
TROPONIN T: <0.03 NG/ML
WBC # BLD: 4.6 K/UL (ref 3.5–11)
WBC # BLD: ABNORMAL 10*3/UL

## 2018-04-04 PROCEDURE — 71046 X-RAY EXAM CHEST 2 VIEWS: CPT

## 2018-04-04 PROCEDURE — 80048 BASIC METABOLIC PNL TOTAL CA: CPT

## 2018-04-04 PROCEDURE — 36415 COLL VENOUS BLD VENIPUNCTURE: CPT

## 2018-04-04 PROCEDURE — 6360000002 HC RX W HCPCS: Performed by: EMERGENCY MEDICINE

## 2018-04-04 PROCEDURE — 6370000000 HC RX 637 (ALT 250 FOR IP): Performed by: EMERGENCY MEDICINE

## 2018-04-04 PROCEDURE — 2580000003 HC RX 258: Performed by: EMERGENCY MEDICINE

## 2018-04-04 PROCEDURE — 83735 ASSAY OF MAGNESIUM: CPT

## 2018-04-04 PROCEDURE — 84484 ASSAY OF TROPONIN QUANT: CPT

## 2018-04-04 PROCEDURE — 99285 EMERGENCY DEPT VISIT HI MDM: CPT

## 2018-04-04 PROCEDURE — 85025 COMPLETE CBC W/AUTO DIFF WBC: CPT

## 2018-04-04 PROCEDURE — 93005 ELECTROCARDIOGRAM TRACING: CPT

## 2018-04-04 PROCEDURE — 80076 HEPATIC FUNCTION PANEL: CPT

## 2018-04-04 PROCEDURE — 84100 ASSAY OF PHOSPHORUS: CPT

## 2018-04-04 PROCEDURE — 82378 CARCINOEMBRYONIC ANTIGEN: CPT

## 2018-04-04 PROCEDURE — 96374 THER/PROPH/DIAG INJ IV PUSH: CPT

## 2018-04-04 RX ORDER — 0.9 % SODIUM CHLORIDE 0.9 %
1000 INTRAVENOUS SOLUTION INTRAVENOUS ONCE
Status: COMPLETED | OUTPATIENT
Start: 2018-04-04 | End: 2018-04-04

## 2018-04-04 RX ORDER — AZITHROMYCIN 250 MG/1
250 TABLET, FILM COATED ORAL DAILY
Qty: 4 TABLET | Refills: 0 | Status: SHIPPED | OUTPATIENT
Start: 2018-04-05 | End: 2018-04-09

## 2018-04-04 RX ORDER — PROMETHAZINE HYDROCHLORIDE 25 MG/1
25 TABLET ORAL EVERY 6 HOURS PRN
COMMUNITY
End: 2018-06-08 | Stop reason: SDUPTHER

## 2018-04-04 RX ORDER — AZITHROMYCIN 250 MG/1
500 TABLET, FILM COATED ORAL ONCE
Status: COMPLETED | OUTPATIENT
Start: 2018-04-04 | End: 2018-04-04

## 2018-04-04 RX ORDER — ONDANSETRON 2 MG/ML
4 INJECTION INTRAMUSCULAR; INTRAVENOUS ONCE
Status: COMPLETED | OUTPATIENT
Start: 2018-04-04 | End: 2018-04-04

## 2018-04-04 RX ORDER — POTASSIUM CHLORIDE 20 MEQ/1
40 TABLET, EXTENDED RELEASE ORAL 2 TIMES DAILY WITH MEALS
Status: DISCONTINUED | OUTPATIENT
Start: 2018-04-04 | End: 2018-04-04 | Stop reason: HOSPADM

## 2018-04-04 RX ADMIN — AZITHROMYCIN 500 MG: 250 TABLET, FILM COATED ORAL at 14:34

## 2018-04-04 RX ADMIN — SODIUM CHLORIDE 1000 ML: 9 INJECTION, SOLUTION INTRAVENOUS at 13:13

## 2018-04-04 RX ADMIN — ONDANSETRON 4 MG: 2 INJECTION INTRAMUSCULAR; INTRAVENOUS at 13:16

## 2018-04-04 ASSESSMENT — ENCOUNTER SYMPTOMS
DIARRHEA: 0
RECTAL PAIN: 0
ABDOMINAL PAIN: 0
VOMITING: 0
NAUSEA: 1
COUGH: 1
SHORTNESS OF BREATH: 0

## 2018-04-04 ASSESSMENT — PAIN DESCRIPTION - ORIENTATION: ORIENTATION: LOWER

## 2018-04-04 ASSESSMENT — PAIN DESCRIPTION - LOCATION: LOCATION: BACK;NECK

## 2018-04-04 ASSESSMENT — PAIN SCALES - GENERAL: PAINLEVEL_OUTOF10: 7

## 2018-04-04 ASSESSMENT — PAIN DESCRIPTION - DESCRIPTORS: DESCRIPTORS: ACHING

## 2018-04-04 ASSESSMENT — PAIN DESCRIPTION - PAIN TYPE: TYPE: CHRONIC PAIN

## 2018-04-04 ASSESSMENT — PAIN DESCRIPTION - FREQUENCY: FREQUENCY: CONTINUOUS

## 2018-04-10 ENCOUNTER — TELEPHONE (OUTPATIENT)
Dept: ONCOLOGY | Age: 58
End: 2018-04-10

## 2018-04-24 ENCOUNTER — HOSPITAL ENCOUNTER (OUTPATIENT)
Age: 58
Setting detail: OUTPATIENT SURGERY
Discharge: HOME | End: 2018-04-24
Attending: INTERNAL MEDICINE | Admitting: INTERNAL MEDICINE
Payer: MEDICARE

## 2018-04-24 VITALS
HEIGHT: 66 IN | SYSTOLIC BLOOD PRESSURE: 130 MMHG | OXYGEN SATURATION: 92 % | BODY MASS INDEX: 23.46 KG/M2 | RESPIRATION RATE: 20 BRPM | WEIGHT: 146 LBS | DIASTOLIC BLOOD PRESSURE: 82 MMHG | HEART RATE: 72 BPM | TEMPERATURE: 97.7 F

## 2018-04-24 PROCEDURE — 7100000010 HC PHASE II RECOVERY - FIRST 15 MIN: Performed by: INTERNAL MEDICINE

## 2018-04-24 PROCEDURE — 2580000003 HC RX 258: Performed by: INTERNAL MEDICINE

## 2018-04-24 PROCEDURE — 88305 TISSUE EXAM BY PATHOLOGIST: CPT

## 2018-04-24 PROCEDURE — 3609017100 HC EGD: Performed by: INTERNAL MEDICINE

## 2018-04-24 PROCEDURE — 7100000011 HC PHASE II RECOVERY - ADDTL 15 MIN: Performed by: INTERNAL MEDICINE

## 2018-04-24 PROCEDURE — 99153 MOD SED SAME PHYS/QHP EA: CPT | Performed by: INTERNAL MEDICINE

## 2018-04-24 PROCEDURE — 6370000000 HC RX 637 (ALT 250 FOR IP): Performed by: INTERNAL MEDICINE

## 2018-04-24 PROCEDURE — 99152 MOD SED SAME PHYS/QHP 5/>YRS: CPT | Performed by: INTERNAL MEDICINE

## 2018-04-24 PROCEDURE — 6360000002 HC RX W HCPCS: Performed by: INTERNAL MEDICINE

## 2018-04-24 PROCEDURE — 3609027000 HC COLONOSCOPY: Performed by: INTERNAL MEDICINE

## 2018-04-24 RX ORDER — SODIUM CHLORIDE 9 MG/ML
INJECTION, SOLUTION INTRAVENOUS CONTINUOUS
Status: DISCONTINUED | OUTPATIENT
Start: 2018-04-24 | End: 2018-04-24 | Stop reason: HOSPADM

## 2018-04-24 RX ORDER — MIDAZOLAM HYDROCHLORIDE 1 MG/ML
INJECTION INTRAMUSCULAR; INTRAVENOUS PRN
Status: DISCONTINUED | OUTPATIENT
Start: 2018-04-24 | End: 2018-04-24 | Stop reason: HOSPADM

## 2018-04-24 RX ORDER — FENTANYL CITRATE 50 UG/ML
INJECTION, SOLUTION INTRAMUSCULAR; INTRAVENOUS PRN
Status: DISCONTINUED | OUTPATIENT
Start: 2018-04-24 | End: 2018-04-24 | Stop reason: HOSPADM

## 2018-04-24 RX ADMIN — SODIUM CHLORIDE: 9 INJECTION, SOLUTION INTRAVENOUS at 08:10

## 2018-04-24 ASSESSMENT — PAIN SCALES - GENERAL: PAINLEVEL_OUTOF10: 0

## 2018-04-24 ASSESSMENT — PAIN DESCRIPTION - DESCRIPTORS: DESCRIPTORS: ACHING

## 2018-04-24 ASSESSMENT — PAIN - FUNCTIONAL ASSESSMENT: PAIN_FUNCTIONAL_ASSESSMENT: 0-10

## 2018-04-25 LAB — SURGICAL PATHOLOGY REPORT: NORMAL

## 2018-05-01 ENCOUNTER — OFFICE VISIT (OUTPATIENT)
Dept: GASTROENTEROLOGY | Age: 58
End: 2018-05-01
Payer: MEDICARE

## 2018-05-01 ENCOUNTER — TELEPHONE (OUTPATIENT)
Dept: GASTROENTEROLOGY | Age: 58
End: 2018-05-01

## 2018-05-01 VITALS
SYSTOLIC BLOOD PRESSURE: 129 MMHG | WEIGHT: 146 LBS | OXYGEN SATURATION: 96 % | DIASTOLIC BLOOD PRESSURE: 85 MMHG | HEART RATE: 86 BPM | BODY MASS INDEX: 23.46 KG/M2 | HEIGHT: 66 IN

## 2018-05-01 DIAGNOSIS — K63.5 HYPERPLASTIC COLONIC POLYP, UNSPECIFIED PART OF COLON: ICD-10-CM

## 2018-05-01 DIAGNOSIS — R97.0 ELEVATED CEA: ICD-10-CM

## 2018-05-01 DIAGNOSIS — R94.8 ABNORMAL PET SCAN OF COLON: Primary | ICD-10-CM

## 2018-05-01 DIAGNOSIS — R10.31 RIGHT LOWER QUADRANT ABDOMINAL PAIN: ICD-10-CM

## 2018-05-01 PROCEDURE — G8427 DOCREV CUR MEDS BY ELIG CLIN: HCPCS | Performed by: INTERNAL MEDICINE

## 2018-05-01 PROCEDURE — 3017F COLORECTAL CA SCREEN DOC REV: CPT | Performed by: INTERNAL MEDICINE

## 2018-05-01 PROCEDURE — 3014F SCREEN MAMMO DOC REV: CPT | Performed by: INTERNAL MEDICINE

## 2018-05-01 PROCEDURE — 4004F PT TOBACCO SCREEN RCVD TLK: CPT | Performed by: INTERNAL MEDICINE

## 2018-05-01 PROCEDURE — G8420 CALC BMI NORM PARAMETERS: HCPCS | Performed by: INTERNAL MEDICINE

## 2018-05-01 PROCEDURE — 99214 OFFICE O/P EST MOD 30 MIN: CPT | Performed by: INTERNAL MEDICINE

## 2018-05-01 ASSESSMENT — ENCOUNTER SYMPTOMS
CONSTIPATION: 0
RECTAL PAIN: 0
ANAL BLEEDING: 0
VOMITING: 0
SHORTNESS OF BREATH: 0
ABDOMINAL PAIN: 1
ABDOMINAL DISTENTION: 0
BACK PAIN: 1
SINUS PAIN: 0
DIARRHEA: 0
FACIAL SWELLING: 0
WHEEZING: 0
TROUBLE SWALLOWING: 0
SINUS PRESSURE: 0
NAUSEA: 0
VOICE CHANGE: 1
COUGH: 1
SORE THROAT: 0
BLOOD IN STOOL: 0
RHINORRHEA: 0

## 2018-05-02 ENCOUNTER — TELEPHONE (OUTPATIENT)
Dept: GASTROENTEROLOGY | Age: 58
End: 2018-05-02

## 2018-05-02 DIAGNOSIS — M51.37 DEGENERATION OF LUMBAR OR LUMBOSACRAL INTERVERTEBRAL DISC: ICD-10-CM

## 2018-05-02 DIAGNOSIS — M50.30 DEGENERATION OF CERVICAL INTERVERTEBRAL DISC: ICD-10-CM

## 2018-05-02 DIAGNOSIS — M48.02 SPINAL STENOSIS IN CERVICAL REGION: ICD-10-CM

## 2018-05-02 RX ORDER — TIOTROPIUM BROMIDE 18 UG/1
CAPSULE ORAL; RESPIRATORY (INHALATION)
Qty: 30 CAPSULE | Refills: 3 | Status: SHIPPED | OUTPATIENT
Start: 2018-05-02 | End: 2019-06-17 | Stop reason: ALTCHOICE

## 2018-05-09 ENCOUNTER — TELEPHONE (OUTPATIENT)
Dept: GASTROENTEROLOGY | Age: 58
End: 2018-05-09

## 2018-05-11 ENCOUNTER — HOSPITAL ENCOUNTER (OUTPATIENT)
Age: 58
Discharge: HOME | End: 2018-05-11
Payer: MEDICARE

## 2018-05-11 ENCOUNTER — OFFICE VISIT (OUTPATIENT)
Dept: GASTROENTEROLOGY | Age: 58
End: 2018-05-11
Payer: MEDICARE

## 2018-05-11 VITALS
SYSTOLIC BLOOD PRESSURE: 108 MMHG | WEIGHT: 145.1 LBS | HEIGHT: 66 IN | DIASTOLIC BLOOD PRESSURE: 80 MMHG | BODY MASS INDEX: 23.32 KG/M2

## 2018-05-11 DIAGNOSIS — R11.2 NAUSEA AND VOMITING, INTRACTABILITY OF VOMITING NOT SPECIFIED, UNSPECIFIED VOMITING TYPE: ICD-10-CM

## 2018-05-11 DIAGNOSIS — R11.0 NAUSEA: ICD-10-CM

## 2018-05-11 DIAGNOSIS — K57.30 DIVERTICULOSIS OF LARGE INTESTINE WITHOUT HEMORRHAGE: Primary | ICD-10-CM

## 2018-05-11 DIAGNOSIS — J02.9 SORE THROAT: ICD-10-CM

## 2018-05-11 DIAGNOSIS — R10.31 RIGHT LOWER QUADRANT ABDOMINAL PAIN: ICD-10-CM

## 2018-05-11 DIAGNOSIS — K63.5 HYPERPLASTIC COLONIC POLYP, UNSPECIFIED PART OF COLON: ICD-10-CM

## 2018-05-11 PROBLEM — R11.10 VOMITING: Status: ACTIVE | Noted: 2018-05-11

## 2018-05-11 LAB
ABSOLUTE EOS #: 0.1 K/UL (ref 0–0.4)
ABSOLUTE IMMATURE GRANULOCYTE: ABNORMAL K/UL (ref 0–0.3)
ABSOLUTE LYMPH #: 2.1 K/UL (ref 1–4.8)
ABSOLUTE MONO #: 0.5 K/UL (ref 0.1–1.3)
ALBUMIN SERPL-MCNC: 4.1 G/DL (ref 3.5–5.2)
ALBUMIN/GLOBULIN RATIO: NORMAL (ref 1–2.5)
ALP BLD-CCNC: 81 U/L (ref 35–104)
ALT SERPL-CCNC: 9 U/L (ref 5–33)
AST SERPL-CCNC: 12 U/L
BASOPHILS # BLD: 1 % (ref 0–2)
BASOPHILS ABSOLUTE: 0 K/UL (ref 0–0.2)
BILIRUB SERPL-MCNC: 0.32 MG/DL (ref 0.3–1.2)
BILIRUBIN DIRECT: 0.1 MG/DL
BILIRUBIN, INDIRECT: 0.22 MG/DL (ref 0–1)
DIFFERENTIAL TYPE: ABNORMAL
EOSINOPHILS RELATIVE PERCENT: 3 % (ref 0–4)
GLOBULIN: NORMAL G/DL (ref 1.5–3.8)
HCT VFR BLD CALC: 43.8 % (ref 36–46)
HEMOGLOBIN: 14.7 G/DL (ref 12–16)
IMMATURE GRANULOCYTES: ABNORMAL %
LIPASE: 37 U/L (ref 13–60)
LYMPHOCYTES # BLD: 37 % (ref 24–44)
MCH RBC QN AUTO: 31.2 PG (ref 26–34)
MCHC RBC AUTO-ENTMCNC: 33.6 G/DL (ref 31–37)
MCV RBC AUTO: 92.8 FL (ref 80–100)
MONOCYTES # BLD: 8 % (ref 1–7)
NRBC AUTOMATED: ABNORMAL PER 100 WBC
PDW BLD-RTO: 13.8 % (ref 11.5–14.9)
PLATELET # BLD: 311 K/UL (ref 150–450)
PLATELET ESTIMATE: ABNORMAL
PMV BLD AUTO: 8.5 FL (ref 6–12)
RBC # BLD: 4.72 M/UL (ref 4–5.2)
RBC # BLD: ABNORMAL 10*6/UL
SEDIMENTATION RATE, ERYTHROCYTE: 6 MM (ref 0–20)
SEG NEUTROPHILS: 51 % (ref 36–66)
SEGMENTED NEUTROPHILS ABSOLUTE COUNT: 2.9 K/UL (ref 1.3–9.1)
TOTAL PROTEIN: 6.6 G/DL (ref 6.4–8.3)
WBC # BLD: 5.7 K/UL (ref 3.5–11)
WBC # BLD: ABNORMAL 10*3/UL

## 2018-05-11 PROCEDURE — 3014F SCREEN MAMMO DOC REV: CPT | Performed by: INTERNAL MEDICINE

## 2018-05-11 PROCEDURE — 85025 COMPLETE CBC W/AUTO DIFF WBC: CPT

## 2018-05-11 PROCEDURE — 85651 RBC SED RATE NONAUTOMATED: CPT

## 2018-05-11 PROCEDURE — 83690 ASSAY OF LIPASE: CPT

## 2018-05-11 PROCEDURE — 1036F TOBACCO NON-USER: CPT | Performed by: INTERNAL MEDICINE

## 2018-05-11 PROCEDURE — 3017F COLORECTAL CA SCREEN DOC REV: CPT | Performed by: INTERNAL MEDICINE

## 2018-05-11 PROCEDURE — G8427 DOCREV CUR MEDS BY ELIG CLIN: HCPCS | Performed by: INTERNAL MEDICINE

## 2018-05-11 PROCEDURE — G8420 CALC BMI NORM PARAMETERS: HCPCS | Performed by: INTERNAL MEDICINE

## 2018-05-11 PROCEDURE — 99214 OFFICE O/P EST MOD 30 MIN: CPT | Performed by: INTERNAL MEDICINE

## 2018-05-11 PROCEDURE — 80076 HEPATIC FUNCTION PANEL: CPT

## 2018-05-11 PROCEDURE — 36415 COLL VENOUS BLD VENIPUNCTURE: CPT

## 2018-05-11 ASSESSMENT — ENCOUNTER SYMPTOMS
ANAL BLEEDING: 0
WHEEZING: 1
RECTAL PAIN: 0
VOMITING: 1
CHEST TIGHTNESS: 1
SINUS PRESSURE: 0
NAUSEA: 1
TROUBLE SWALLOWING: 0
SORE THROAT: 1
DIARRHEA: 0
SINUS PAIN: 0
SHORTNESS OF BREATH: 1
BACK PAIN: 0
COLOR CHANGE: 0
BLOOD IN STOOL: 0
COUGH: 1
ABDOMINAL DISTENTION: 1
ABDOMINAL PAIN: 1
CONSTIPATION: 0

## 2018-05-14 DIAGNOSIS — R97.0 ELEVATED CEA: ICD-10-CM

## 2018-05-14 DIAGNOSIS — R94.8 ABNORMAL PET SCAN OF COLON: ICD-10-CM

## 2018-05-15 ENCOUNTER — OFFICE VISIT (OUTPATIENT)
Dept: ONCOLOGY | Age: 58
End: 2018-05-15
Payer: MEDICARE

## 2018-05-15 VITALS
RESPIRATION RATE: 18 BRPM | SYSTOLIC BLOOD PRESSURE: 109 MMHG | TEMPERATURE: 98.5 F | HEART RATE: 64 BPM | WEIGHT: 145.5 LBS | DIASTOLIC BLOOD PRESSURE: 73 MMHG | BODY MASS INDEX: 23.48 KG/M2

## 2018-05-15 DIAGNOSIS — R59.0 MEDIASTINAL LYMPHADENOPATHY: ICD-10-CM

## 2018-05-15 DIAGNOSIS — R97.0 ELEVATED CEA: Primary | ICD-10-CM

## 2018-05-15 PROCEDURE — 3017F COLORECTAL CA SCREEN DOC REV: CPT | Performed by: INTERNAL MEDICINE

## 2018-05-15 PROCEDURE — 3014F SCREEN MAMMO DOC REV: CPT | Performed by: INTERNAL MEDICINE

## 2018-05-15 PROCEDURE — 1036F TOBACCO NON-USER: CPT | Performed by: INTERNAL MEDICINE

## 2018-05-15 PROCEDURE — 99214 OFFICE O/P EST MOD 30 MIN: CPT | Performed by: INTERNAL MEDICINE

## 2018-05-15 PROCEDURE — G8427 DOCREV CUR MEDS BY ELIG CLIN: HCPCS | Performed by: INTERNAL MEDICINE

## 2018-05-15 PROCEDURE — G8420 CALC BMI NORM PARAMETERS: HCPCS | Performed by: INTERNAL MEDICINE

## 2018-05-15 RX ORDER — PAROXETINE HYDROCHLORIDE 20 MG/1
20 TABLET, FILM COATED ORAL EVERY MORNING
Qty: 30 TABLET | Refills: 3 | Status: SHIPPED | OUTPATIENT
Start: 2018-05-15 | End: 2018-11-11 | Stop reason: SDUPTHER

## 2018-05-17 ENCOUNTER — HOSPITAL ENCOUNTER (OUTPATIENT)
Dept: GENERAL RADIOLOGY | Age: 58
Discharge: HOME | End: 2018-05-19
Payer: MEDICARE

## 2018-05-17 DIAGNOSIS — R11.2 NAUSEA AND VOMITING, INTRACTABILITY OF VOMITING NOT SPECIFIED, UNSPECIFIED VOMITING TYPE: ICD-10-CM

## 2018-05-17 PROCEDURE — 74249 FL UGI W SMALL BOWEL W DOUBLE CONTRAST: CPT

## 2018-05-17 PROCEDURE — 2500000003 HC RX 250 WO HCPCS: Performed by: INTERNAL MEDICINE

## 2018-05-17 RX ADMIN — BARIUM SULFATE 140 ML: 980 POWDER, FOR SUSPENSION ORAL at 13:23

## 2018-05-17 RX ADMIN — BARIUM SULFATE 160 ML: 960 POWDER, FOR SUSPENSION ORAL at 13:23

## 2018-05-29 ENCOUNTER — HOSPITAL ENCOUNTER (OUTPATIENT)
Dept: CT IMAGING | Age: 58
Discharge: HOME | End: 2018-05-31
Payer: MEDICARE

## 2018-05-29 DIAGNOSIS — R97.0 ELEVATED CEA: ICD-10-CM

## 2018-05-29 PROCEDURE — 74177 CT ABD & PELVIS W/CONTRAST: CPT

## 2018-05-29 PROCEDURE — 6360000004 HC RX CONTRAST MEDICATION: Performed by: INTERNAL MEDICINE

## 2018-05-29 PROCEDURE — 2580000003 HC RX 258: Performed by: INTERNAL MEDICINE

## 2018-05-29 RX ORDER — 0.9 % SODIUM CHLORIDE 0.9 %
100 INTRAVENOUS SOLUTION INTRAVENOUS ONCE
Status: DISCONTINUED | OUTPATIENT
Start: 2018-05-29 | End: 2018-05-29

## 2018-05-29 RX ORDER — SODIUM CHLORIDE 0.9 % (FLUSH) 0.9 %
10 SYRINGE (ML) INJECTION PRN
Status: DISCONTINUED | OUTPATIENT
Start: 2018-05-29 | End: 2018-06-01 | Stop reason: HOSPADM

## 2018-05-29 RX ORDER — 0.9 % SODIUM CHLORIDE 0.9 %
80 INTRAVENOUS SOLUTION INTRAVENOUS ONCE
Status: COMPLETED | OUTPATIENT
Start: 2018-05-29 | End: 2018-05-29

## 2018-05-29 RX ADMIN — IOPAMIDOL 75 ML: 755 INJECTION, SOLUTION INTRAVENOUS at 11:49

## 2018-05-29 RX ADMIN — SODIUM CHLORIDE 80 ML: 9 INJECTION, SOLUTION INTRAVENOUS at 11:49

## 2018-05-29 RX ADMIN — IOHEXOL 50 ML: 240 INJECTION, SOLUTION INTRATHECAL; INTRAVASCULAR; INTRAVENOUS; ORAL at 11:49

## 2018-05-31 ENCOUNTER — OFFICE VISIT (OUTPATIENT)
Dept: GASTROENTEROLOGY | Age: 58
End: 2018-05-31
Payer: MEDICARE

## 2018-05-31 VITALS
HEIGHT: 66 IN | SYSTOLIC BLOOD PRESSURE: 115 MMHG | OXYGEN SATURATION: 97 % | HEART RATE: 84 BPM | DIASTOLIC BLOOD PRESSURE: 83 MMHG | WEIGHT: 143.6 LBS | BODY MASS INDEX: 23.08 KG/M2

## 2018-05-31 DIAGNOSIS — R97.0 ELEVATED CEA: Primary | ICD-10-CM

## 2018-05-31 DIAGNOSIS — K63.5 HYPERPLASTIC COLONIC POLYP, UNSPECIFIED PART OF COLON: ICD-10-CM

## 2018-05-31 PROBLEM — R11.10 VOMITING: Status: RESOLVED | Noted: 2018-05-11 | Resolved: 2018-05-31

## 2018-05-31 PROBLEM — R11.0 NAUSEA: Status: RESOLVED | Noted: 2018-05-11 | Resolved: 2018-05-31

## 2018-05-31 PROBLEM — J02.9 SORE THROAT: Status: RESOLVED | Noted: 2018-05-11 | Resolved: 2018-05-31

## 2018-05-31 PROCEDURE — 3017F COLORECTAL CA SCREEN DOC REV: CPT | Performed by: INTERNAL MEDICINE

## 2018-05-31 PROCEDURE — 99213 OFFICE O/P EST LOW 20 MIN: CPT | Performed by: INTERNAL MEDICINE

## 2018-05-31 PROCEDURE — 1036F TOBACCO NON-USER: CPT | Performed by: INTERNAL MEDICINE

## 2018-05-31 PROCEDURE — 3014F SCREEN MAMMO DOC REV: CPT | Performed by: INTERNAL MEDICINE

## 2018-05-31 PROCEDURE — G8427 DOCREV CUR MEDS BY ELIG CLIN: HCPCS | Performed by: INTERNAL MEDICINE

## 2018-05-31 PROCEDURE — G8420 CALC BMI NORM PARAMETERS: HCPCS | Performed by: INTERNAL MEDICINE

## 2018-05-31 ASSESSMENT — ENCOUNTER SYMPTOMS
BACK PAIN: 0
VOICE CHANGE: 1
NAUSEA: 0
SINUS PRESSURE: 0
ABDOMINAL PAIN: 0
ABDOMINAL DISTENTION: 1
RHINORRHEA: 0
VOMITING: 0
CHEST TIGHTNESS: 1
SHORTNESS OF BREATH: 1
DIARRHEA: 0
SINUS PAIN: 0
BLOOD IN STOOL: 0
COUGH: 1
WHEEZING: 1
ANAL BLEEDING: 0
FACIAL SWELLING: 0
COLOR CHANGE: 0
SORE THROAT: 1
TROUBLE SWALLOWING: 0
CONSTIPATION: 0
RECTAL PAIN: 0

## 2018-06-08 ENCOUNTER — OFFICE VISIT (OUTPATIENT)
Dept: ONCOLOGY | Age: 58
End: 2018-06-08
Payer: MEDICARE

## 2018-06-08 VITALS
SYSTOLIC BLOOD PRESSURE: 120 MMHG | HEART RATE: 69 BPM | WEIGHT: 143.19 LBS | DIASTOLIC BLOOD PRESSURE: 76 MMHG | BODY MASS INDEX: 23.11 KG/M2 | TEMPERATURE: 98.3 F

## 2018-06-08 DIAGNOSIS — R97.0 ELEVATED CEA: ICD-10-CM

## 2018-06-08 PROCEDURE — 99211 OFF/OP EST MAY X REQ PHY/QHP: CPT | Performed by: INTERNAL MEDICINE

## 2018-06-08 PROCEDURE — 3014F SCREEN MAMMO DOC REV: CPT | Performed by: INTERNAL MEDICINE

## 2018-06-08 PROCEDURE — 3017F COLORECTAL CA SCREEN DOC REV: CPT | Performed by: INTERNAL MEDICINE

## 2018-06-08 PROCEDURE — 4004F PT TOBACCO SCREEN RCVD TLK: CPT | Performed by: INTERNAL MEDICINE

## 2018-06-08 PROCEDURE — 99214 OFFICE O/P EST MOD 30 MIN: CPT | Performed by: INTERNAL MEDICINE

## 2018-06-08 PROCEDURE — G8427 DOCREV CUR MEDS BY ELIG CLIN: HCPCS | Performed by: INTERNAL MEDICINE

## 2018-06-08 PROCEDURE — G8420 CALC BMI NORM PARAMETERS: HCPCS | Performed by: INTERNAL MEDICINE

## 2018-06-08 RX ORDER — PROMETHAZINE HYDROCHLORIDE 25 MG/1
25 TABLET ORAL EVERY 6 HOURS PRN
Qty: 90 TABLET | Refills: 0 | Status: SHIPPED | OUTPATIENT
Start: 2018-06-08 | End: 2020-01-28

## 2018-06-12 ENCOUNTER — OFFICE VISIT (OUTPATIENT)
Dept: FAMILY MEDICINE CLINIC | Age: 58
End: 2018-06-12
Payer: MEDICARE

## 2018-06-12 ENCOUNTER — TELEPHONE (OUTPATIENT)
Dept: FAMILY MEDICINE CLINIC | Age: 58
End: 2018-06-12

## 2018-06-12 VITALS
HEART RATE: 85 BPM | HEIGHT: 66 IN | SYSTOLIC BLOOD PRESSURE: 148 MMHG | WEIGHT: 142 LBS | BODY MASS INDEX: 22.82 KG/M2 | DIASTOLIC BLOOD PRESSURE: 97 MMHG | OXYGEN SATURATION: 96 %

## 2018-06-12 DIAGNOSIS — Z23 NEED FOR PROPHYLACTIC VACCINATION AGAINST DIPHTHERIA-TETANUS-PERTUSSIS (DTP): ICD-10-CM

## 2018-06-12 DIAGNOSIS — J41.8 MIXED SIMPLE AND MUCOPURULENT CHRONIC BRONCHITIS (HCC): Primary | ICD-10-CM

## 2018-06-12 DIAGNOSIS — I10 ESSENTIAL HYPERTENSION: ICD-10-CM

## 2018-06-12 DIAGNOSIS — J43.2 CENTRILOBULAR EMPHYSEMA (HCC): ICD-10-CM

## 2018-06-12 DIAGNOSIS — Z23 NEED FOR PROPHYLACTIC VACCINATION AND INOCULATION AGAINST VARICELLA: ICD-10-CM

## 2018-06-12 DIAGNOSIS — D32.9 MENINGIOMA (HCC): ICD-10-CM

## 2018-06-12 PROCEDURE — 3014F SCREEN MAMMO DOC REV: CPT | Performed by: FAMILY MEDICINE

## 2018-06-12 PROCEDURE — 4004F PT TOBACCO SCREEN RCVD TLK: CPT | Performed by: FAMILY MEDICINE

## 2018-06-12 PROCEDURE — G8420 CALC BMI NORM PARAMETERS: HCPCS | Performed by: FAMILY MEDICINE

## 2018-06-12 PROCEDURE — 3023F SPIROM DOC REV: CPT | Performed by: FAMILY MEDICINE

## 2018-06-12 PROCEDURE — 3017F COLORECTAL CA SCREEN DOC REV: CPT | Performed by: FAMILY MEDICINE

## 2018-06-12 PROCEDURE — G8427 DOCREV CUR MEDS BY ELIG CLIN: HCPCS | Performed by: FAMILY MEDICINE

## 2018-06-12 PROCEDURE — 99214 OFFICE O/P EST MOD 30 MIN: CPT | Performed by: FAMILY MEDICINE

## 2018-06-12 PROCEDURE — G8926 SPIRO NO PERF OR DOC: HCPCS | Performed by: FAMILY MEDICINE

## 2018-06-12 ASSESSMENT — ENCOUNTER SYMPTOMS
DIARRHEA: 1
ABDOMINAL DISTENTION: 0
SHORTNESS OF BREATH: 0
BACK PAIN: 1
WHEEZING: 0
RHINORRHEA: 0
NAUSEA: 1
ABDOMINAL PAIN: 1
SINUS PRESSURE: 0
CONSTIPATION: 0

## 2018-06-16 ENCOUNTER — APPOINTMENT (OUTPATIENT)
Dept: GENERAL RADIOLOGY | Age: 58
End: 2018-06-16
Payer: MEDICARE

## 2018-06-16 ENCOUNTER — HOSPITAL ENCOUNTER (EMERGENCY)
Age: 58
Discharge: HOME | End: 2018-06-16
Attending: EMERGENCY MEDICINE
Payer: MEDICARE

## 2018-06-16 VITALS
HEIGHT: 66 IN | RESPIRATION RATE: 18 BRPM | BODY MASS INDEX: 22.98 KG/M2 | HEART RATE: 70 BPM | OXYGEN SATURATION: 98 % | WEIGHT: 143 LBS | SYSTOLIC BLOOD PRESSURE: 136 MMHG | DIASTOLIC BLOOD PRESSURE: 53 MMHG | TEMPERATURE: 97.7 F

## 2018-06-16 DIAGNOSIS — K57.32 DIVERTICULITIS OF COLON: Primary | ICD-10-CM

## 2018-06-16 LAB
ABSOLUTE EOS #: 0 K/UL (ref 0–0.4)
ABSOLUTE IMMATURE GRANULOCYTE: ABNORMAL K/UL (ref 0–0.3)
ABSOLUTE LYMPH #: 1.8 K/UL (ref 1–4.8)
ABSOLUTE MONO #: 0.5 K/UL (ref 0.1–1.3)
ALBUMIN SERPL-MCNC: 4.1 G/DL (ref 3.5–5.2)
ALBUMIN/GLOBULIN RATIO: ABNORMAL (ref 1–2.5)
ALP BLD-CCNC: 84 U/L (ref 35–104)
ALT SERPL-CCNC: 8 U/L (ref 5–33)
ANION GAP SERPL CALCULATED.3IONS-SCNC: 14 MMOL/L (ref 9–17)
AST SERPL-CCNC: 14 U/L
BASOPHILS # BLD: 0 % (ref 0–2)
BASOPHILS ABSOLUTE: 0 K/UL (ref 0–0.2)
BILIRUB SERPL-MCNC: 0.37 MG/DL (ref 0.3–1.2)
BUN BLDV-MCNC: 12 MG/DL (ref 6–20)
BUN/CREAT BLD: ABNORMAL (ref 9–20)
CALCIUM SERPL-MCNC: 9.5 MG/DL (ref 8.6–10.4)
CHLORIDE BLD-SCNC: 105 MMOL/L (ref 98–107)
CO2: 23 MMOL/L (ref 20–31)
CREAT SERPL-MCNC: 0.72 MG/DL (ref 0.5–0.9)
DIFFERENTIAL TYPE: ABNORMAL
EOSINOPHILS RELATIVE PERCENT: 1 % (ref 0–4)
GFR AFRICAN AMERICAN: >60 ML/MIN
GFR NON-AFRICAN AMERICAN: >60 ML/MIN
GFR SERPL CREATININE-BSD FRML MDRD: ABNORMAL ML/MIN/{1.73_M2}
GFR SERPL CREATININE-BSD FRML MDRD: ABNORMAL ML/MIN/{1.73_M2}
GLUCOSE BLD-MCNC: 112 MG/DL (ref 70–99)
HCT VFR BLD CALC: 43.7 % (ref 36–46)
HEMOGLOBIN: 14.9 G/DL (ref 12–16)
IMMATURE GRANULOCYTES: ABNORMAL %
LACTIC ACID: 1.3 MMOL/L (ref 0.5–2.2)
LYMPHOCYTES # BLD: 20 % (ref 24–44)
MCH RBC QN AUTO: 31.6 PG (ref 26–34)
MCHC RBC AUTO-ENTMCNC: 34.2 G/DL (ref 31–37)
MCV RBC AUTO: 92.4 FL (ref 80–100)
MONOCYTES # BLD: 6 % (ref 1–7)
NRBC AUTOMATED: ABNORMAL PER 100 WBC
PDW BLD-RTO: 13.7 % (ref 11.5–14.9)
PLATELET # BLD: 262 K/UL (ref 150–450)
PLATELET ESTIMATE: ABNORMAL
PMV BLD AUTO: 9.4 FL (ref 6–12)
POTASSIUM SERPL-SCNC: 3.5 MMOL/L (ref 3.7–5.3)
RBC # BLD: 4.73 M/UL (ref 4–5.2)
RBC # BLD: ABNORMAL 10*6/UL
SEG NEUTROPHILS: 73 % (ref 36–66)
SEGMENTED NEUTROPHILS ABSOLUTE COUNT: 6.8 K/UL (ref 1.3–9.1)
SODIUM BLD-SCNC: 142 MMOL/L (ref 135–144)
TOTAL PROTEIN: 7.1 G/DL (ref 6.4–8.3)
WBC # BLD: 9.2 K/UL (ref 3.5–11)
WBC # BLD: ABNORMAL 10*3/UL

## 2018-06-16 PROCEDURE — 6370000000 HC RX 637 (ALT 250 FOR IP): Performed by: EMERGENCY MEDICINE

## 2018-06-16 PROCEDURE — 2580000003 HC RX 258: Performed by: EMERGENCY MEDICINE

## 2018-06-16 PROCEDURE — 36415 COLL VENOUS BLD VENIPUNCTURE: CPT

## 2018-06-16 PROCEDURE — 80053 COMPREHEN METABOLIC PANEL: CPT

## 2018-06-16 PROCEDURE — 74018 RADEX ABDOMEN 1 VIEW: CPT

## 2018-06-16 PROCEDURE — 96374 THER/PROPH/DIAG INJ IV PUSH: CPT

## 2018-06-16 PROCEDURE — 99283 EMERGENCY DEPT VISIT LOW MDM: CPT

## 2018-06-16 PROCEDURE — 83605 ASSAY OF LACTIC ACID: CPT

## 2018-06-16 PROCEDURE — 85025 COMPLETE CBC W/AUTO DIFF WBC: CPT

## 2018-06-16 PROCEDURE — 6360000002 HC RX W HCPCS: Performed by: EMERGENCY MEDICINE

## 2018-06-16 RX ORDER — CIPROFLOXACIN 500 MG/1
500 TABLET, FILM COATED ORAL 2 TIMES DAILY
Qty: 14 TABLET | Refills: 0 | Status: SHIPPED | OUTPATIENT
Start: 2018-06-16 | End: 2018-06-23

## 2018-06-16 RX ORDER — DICYCLOMINE HYDROCHLORIDE 10 MG/1
10 CAPSULE ORAL EVERY 6 HOURS PRN
Qty: 20 CAPSULE | Refills: 0 | Status: SHIPPED | OUTPATIENT
Start: 2018-06-16 | End: 2019-09-17 | Stop reason: SDUPTHER

## 2018-06-16 RX ORDER — METRONIDAZOLE 500 MG/1
500 TABLET ORAL ONCE
Status: COMPLETED | OUTPATIENT
Start: 2018-06-16 | End: 2018-06-16

## 2018-06-16 RX ORDER — 0.9 % SODIUM CHLORIDE 0.9 %
1000 INTRAVENOUS SOLUTION INTRAVENOUS ONCE
Status: COMPLETED | OUTPATIENT
Start: 2018-06-16 | End: 2018-06-16

## 2018-06-16 RX ORDER — MORPHINE SULFATE 4 MG/ML
4 INJECTION, SOLUTION INTRAMUSCULAR; INTRAVENOUS ONCE
Status: COMPLETED | OUTPATIENT
Start: 2018-06-16 | End: 2018-06-16

## 2018-06-16 RX ORDER — METRONIDAZOLE 500 MG/1
500 TABLET ORAL 3 TIMES DAILY
Qty: 21 TABLET | Refills: 0 | Status: SHIPPED | OUTPATIENT
Start: 2018-06-16 | End: 2018-06-23

## 2018-06-16 RX ORDER — CIPROFLOXACIN 500 MG/1
500 TABLET, FILM COATED ORAL ONCE
Status: COMPLETED | OUTPATIENT
Start: 2018-06-16 | End: 2018-06-16

## 2018-06-16 RX ADMIN — SODIUM CHLORIDE 1000 ML: 9 INJECTION, SOLUTION INTRAVENOUS at 14:58

## 2018-06-16 RX ADMIN — MORPHINE SULFATE 4 MG: 4 INJECTION INTRAVENOUS at 14:58

## 2018-06-16 RX ADMIN — METRONIDAZOLE 500 MG: 500 TABLET ORAL at 15:43

## 2018-06-16 RX ADMIN — CIPROFLOXACIN 500 MG: 500 TABLET, FILM COATED ORAL at 15:43

## 2018-06-16 ASSESSMENT — ENCOUNTER SYMPTOMS
BLOOD IN STOOL: 1
DIARRHEA: 1
NAUSEA: 1
COUGH: 0
ABDOMINAL PAIN: 1
VOMITING: 0

## 2018-06-16 ASSESSMENT — PAIN DESCRIPTION - PAIN TYPE: TYPE: ACUTE PAIN;CHRONIC PAIN

## 2018-06-16 ASSESSMENT — PAIN DESCRIPTION - LOCATION: LOCATION: ABDOMEN

## 2018-06-16 ASSESSMENT — PAIN SCALES - GENERAL
PAINLEVEL_OUTOF10: 10
PAINLEVEL_OUTOF10: 10

## 2018-06-16 ASSESSMENT — PAIN DESCRIPTION - ORIENTATION: ORIENTATION: LEFT;LOWER

## 2018-07-06 DIAGNOSIS — M48.02 SPINAL STENOSIS IN CERVICAL REGION: ICD-10-CM

## 2018-07-06 DIAGNOSIS — M51.37 DEGENERATION OF LUMBAR OR LUMBOSACRAL INTERVERTEBRAL DISC: ICD-10-CM

## 2018-07-06 DIAGNOSIS — M50.30 DEGENERATION OF CERVICAL INTERVERTEBRAL DISC: ICD-10-CM

## 2018-07-06 NOTE — TELEPHONE ENCOUNTER
Controlled Substances Monitoring:     RX Monitoring 7/6/2018   Attestation The Prescription Monitoring Report for this patient was reviewed today. Documentation No signs of potential drug abuse or diversion identified.    Chronic Pain -

## 2018-11-12 RX ORDER — PAROXETINE HYDROCHLORIDE 20 MG/1
20 TABLET, FILM COATED ORAL EVERY MORNING
Qty: 30 TABLET | Refills: 1 | Status: SHIPPED | OUTPATIENT
Start: 2018-11-12 | End: 2020-01-28

## 2018-11-12 NOTE — TELEPHONE ENCOUNTER
Please Approve or Refuse.   Send to Pharmacy per Pt's Request:      Next Visit Date:  Visit date not found   Last Visit Date: 6/12/2018    Hemoglobin A1C (%)   Date Value   10/25/2016 5.5             ( goal A1C is < 7)   BP Readings from Last 3 Encounters:   06/16/18 (!) 136/53   06/12/18 (!) 148/97   06/08/18 120/76          (goal 120/80)  BUN   Date Value Ref Range Status   06/16/2018 12 6 - 20 mg/dL Final     CREATININE   Date Value Ref Range Status   06/16/2018 0.72 0.50 - 0.90 mg/dL Final     Potassium   Date Value Ref Range Status   06/16/2018 3.5 (L) 3.7 - 5.3 mmol/L Final

## 2019-03-05 ENCOUNTER — OFFICE VISIT (OUTPATIENT)
Dept: FAMILY MEDICINE CLINIC | Age: 59
End: 2019-03-05
Payer: MEDICARE

## 2019-03-05 VITALS
BODY MASS INDEX: 20.25 KG/M2 | DIASTOLIC BLOOD PRESSURE: 80 MMHG | WEIGHT: 126 LBS | HEART RATE: 103 BPM | SYSTOLIC BLOOD PRESSURE: 110 MMHG | OXYGEN SATURATION: 98 % | HEIGHT: 66 IN

## 2019-03-05 DIAGNOSIS — C34.2 MALIGNANT NEOPLASM OF MIDDLE LOBE OF RIGHT LUNG (HCC): ICD-10-CM

## 2019-03-05 DIAGNOSIS — I10 ESSENTIAL HYPERTENSION: ICD-10-CM

## 2019-03-05 DIAGNOSIS — J41.8 MIXED SIMPLE AND MUCOPURULENT CHRONIC BRONCHITIS (HCC): ICD-10-CM

## 2019-03-05 DIAGNOSIS — R11.2 INTRACTABLE VOMITING WITH NAUSEA, UNSPECIFIED VOMITING TYPE: Primary | ICD-10-CM

## 2019-03-05 PROCEDURE — G8926 SPIRO NO PERF OR DOC: HCPCS | Performed by: FAMILY MEDICINE

## 2019-03-05 PROCEDURE — 99214 OFFICE O/P EST MOD 30 MIN: CPT | Performed by: FAMILY MEDICINE

## 2019-03-05 PROCEDURE — G8420 CALC BMI NORM PARAMETERS: HCPCS | Performed by: FAMILY MEDICINE

## 2019-03-05 PROCEDURE — 4004F PT TOBACCO SCREEN RCVD TLK: CPT | Performed by: FAMILY MEDICINE

## 2019-03-05 PROCEDURE — 3023F SPIROM DOC REV: CPT | Performed by: FAMILY MEDICINE

## 2019-03-05 PROCEDURE — 3017F COLORECTAL CA SCREEN DOC REV: CPT | Performed by: FAMILY MEDICINE

## 2019-03-05 PROCEDURE — G8484 FLU IMMUNIZE NO ADMIN: HCPCS | Performed by: FAMILY MEDICINE

## 2019-03-05 PROCEDURE — G8427 DOCREV CUR MEDS BY ELIG CLIN: HCPCS | Performed by: FAMILY MEDICINE

## 2019-03-05 RX ORDER — OMEPRAZOLE 20 MG/1
CAPSULE, DELAYED RELEASE ORAL
Qty: 180 CAPSULE | Refills: 3 | Status: SHIPPED | OUTPATIENT
Start: 2019-03-05 | End: 2020-04-01

## 2019-03-05 RX ORDER — ONDANSETRON 2 MG/ML
4 INJECTION INTRAMUSCULAR; INTRAVENOUS EVERY 6 HOURS PRN
Status: DISCONTINUED | OUTPATIENT
Start: 2019-03-05 | End: 2021-03-09

## 2019-03-05 ASSESSMENT — ENCOUNTER SYMPTOMS
PHOTOPHOBIA: 0
CONSTIPATION: 0
RHINORRHEA: 0
ABDOMINAL PAIN: 1
SINUS PRESSURE: 0
DIARRHEA: 0
COUGH: 1
WHEEZING: 0
NAUSEA: 1
VOMITING: 1
RECTAL PAIN: 0
SHORTNESS OF BREATH: 0

## 2019-06-17 ENCOUNTER — OFFICE VISIT (OUTPATIENT)
Dept: FAMILY MEDICINE CLINIC | Age: 59
End: 2019-06-17
Payer: MEDICARE

## 2019-06-17 VITALS
WEIGHT: 126.4 LBS | HEART RATE: 94 BPM | DIASTOLIC BLOOD PRESSURE: 72 MMHG | BODY MASS INDEX: 20.31 KG/M2 | HEIGHT: 66 IN | OXYGEN SATURATION: 100 % | SYSTOLIC BLOOD PRESSURE: 104 MMHG

## 2019-06-17 DIAGNOSIS — Z23 NEED FOR PROPHYLACTIC VACCINATION AND INOCULATION AGAINST VARICELLA: ICD-10-CM

## 2019-06-17 DIAGNOSIS — J43.2 CENTRILOBULAR EMPHYSEMA (HCC): ICD-10-CM

## 2019-06-17 DIAGNOSIS — I10 ESSENTIAL HYPERTENSION: ICD-10-CM

## 2019-06-17 DIAGNOSIS — C34.2 MALIGNANT NEOPLASM OF MIDDLE LOBE OF RIGHT LUNG (HCC): Primary | ICD-10-CM

## 2019-06-17 DIAGNOSIS — N81.4 UTERINE PROLAPSE: ICD-10-CM

## 2019-06-17 PROBLEM — R10.32 LEFT LOWER QUADRANT PAIN: Status: RESOLVED | Noted: 2017-03-16 | Resolved: 2019-06-17

## 2019-06-17 PROCEDURE — 90715 TDAP VACCINE 7 YRS/> IM: CPT | Performed by: FAMILY MEDICINE

## 2019-06-17 PROCEDURE — 3017F COLORECTAL CA SCREEN DOC REV: CPT | Performed by: FAMILY MEDICINE

## 2019-06-17 PROCEDURE — 99214 OFFICE O/P EST MOD 30 MIN: CPT | Performed by: FAMILY MEDICINE

## 2019-06-17 PROCEDURE — G8420 CALC BMI NORM PARAMETERS: HCPCS | Performed by: FAMILY MEDICINE

## 2019-06-17 PROCEDURE — 90471 IMMUNIZATION ADMIN: CPT | Performed by: FAMILY MEDICINE

## 2019-06-17 PROCEDURE — G8427 DOCREV CUR MEDS BY ELIG CLIN: HCPCS | Performed by: FAMILY MEDICINE

## 2019-06-17 PROCEDURE — 3023F SPIROM DOC REV: CPT | Performed by: FAMILY MEDICINE

## 2019-06-17 PROCEDURE — G8926 SPIRO NO PERF OR DOC: HCPCS | Performed by: FAMILY MEDICINE

## 2019-06-17 PROCEDURE — 4004F PT TOBACCO SCREEN RCVD TLK: CPT | Performed by: FAMILY MEDICINE

## 2019-06-17 RX ORDER — LIDOCAINE AND PRILOCAINE 25; 25 MG/G; MG/G
CREAM TOPICAL
COMMUNITY

## 2019-06-17 ASSESSMENT — ENCOUNTER SYMPTOMS
WHEEZING: 1
RHINORRHEA: 0
COUGH: 1
ANAL BLEEDING: 0
CHEST TIGHTNESS: 0
COLOR CHANGE: 0
PHOTOPHOBIA: 0
NAUSEA: 0
SHORTNESS OF BREATH: 1
CONSTIPATION: 0
ABDOMINAL PAIN: 0
SINUS PRESSURE: 0

## 2019-06-17 NOTE — PROGRESS NOTES
Chief Complaint   Patient presents with    Lung Cancer    [address]  here today for follow up on chronic medical problems, go over labs and/or diagnostic studies, and medication refills. Lung Cancer and 3 Month Follow-Up      HPI: Patient is here for follow-up on stage III lung cancer, has completed radiation therapy and is currently on chemotherapy. Patient follows with [facility] oncologist and also pulmonologist.    Patient reports she is doing good, is able to tolerate chemo, denies any side effects. Patient also had elevated CEA levels, follows with GI with [facility]. Patient is planning to have surgery for uterine prolapse which is pending due to diagnosis of lung cancer. /72   Pulse 94   Ht 5' 6\" (1.676 m)   Wt 126 lb 6.4 oz (57.3 kg)   SpO2 100%   BMI 20.40 kg/m²    Body mass index is 20.4 kg/m². Wt Readings from Last 3 Encounters:   06/17/19 126 lb 6.4 oz (57.3 kg)   03/05/19 126 lb (57.2 kg)   06/16/18 143 lb (64.9 kg)        [x]Negative depression screening. PHQ Scores 11/13/2017   PHQ2 Score 0   PHQ9 Score 0      []1-4 = Minimal depression   []5-9 = Milddepression   []10-14 = Moderate depression   []15-19 = Moderately severe depression   []20-27 = Severe depression    Discussed testing with the patient and all questions fully answered.     Orders Only on 05/14/2019   Component Date Value Ref Range Status    WBC 05/14/2019 5.05  4.00 - 10.60 10*3/uL Final    RBC 05/14/2019 4.37  3.80 - 5.00 10*6/uL Final    Hemoglobin 05/14/2019 13.7  12.0 - 15.0 g/dL Final    Hematocrit 05/14/2019 42.5  36.0 - 45.0 % Final    MCV 05/14/2019 97.3  82.0 - 98.0 fL Final    MCH 05/14/2019 31.4  27.0 - 33.0 pg Final    MCHC 05/14/2019 32.2  32.0 - 35.0 g/dL Final    RDW 05/14/2019 15.0  11.5 - 15.0 % Final    Platelets 05/38/5545 358  150 - 400 10*3/uL Final    Neutrophils % 05/14/2019 66.3  40.0 - 72.0 % Final    Immature Granulocytes 05/14/2019 0.6  0.0 - @BRIEFLAB(NA,K,CL,CO2,BUN,CREATININE,GLUCOSE,CALCIUM)@     Lab Results   Component Value Date    ALT 16 05/14/2019    AST 16 05/14/2019    ALKPHOS 82 05/14/2019    BILITOT 0.2 (L) 05/14/2019       Lab Results   Component Value Date    TSH 0.93 08/19/2017       Lab Results   Component Value Date    CHOL 213 (H) 03/06/2017    CHOL 189 06/20/2016    CHOL 228 (H) 11/06/2014     Lab Results   Component Value Date    TRIG 228 (H) 03/06/2017    TRIG 382 (H) 06/20/2016    TRIG 376 (H) 11/06/2014     Lab Results   Component Value Date    HDL 46 03/06/2017    HDL 42 06/20/2016    HDL 46 11/06/2014     Lab Results   Component Value Date    LDLCHOLESTEROL 121 03/06/2017    LDLCHOLESTEROL 71 06/20/2016    LDLCHOLESTEROL 107 11/06/2014     Lab Results   Component Value Date    VLDL NOT REPORTED 03/06/2017    VLDL NOT REPORTED 06/20/2016    VLDL NOT REPORTED 11/06/2014     Lab Results   Component Value Date    CHOLHDLRATIO 4.6 03/06/2017    CHOLHDLRATIO 4.5 06/20/2016    CHOLHDLRATIO 5.0 (H) 11/06/2014       Lab Results   Component Value Date    LABA1C 5.5 10/25/2016       No results found for: [id number]    No results found for: FOLATE    No results found for: IRON, TIBC, FERRITIN    No results found for: VITD25          Current Outpatient Medications   Medication Sig Dispense Refill    umeclidinium-vilanterol (ANORO ELLIPTA) 62.5-25 MCG/INH AEPB inhaler Anoro Ellipta 62.5 mcg-25 mcg/actuation powder for inhalation   Inhale 1 puff every day by inhalation route.       lidocaine-prilocaine (EMLA) 2.5-2.5 % cream lidocaine-prilocaine 2.5 %-2.5 % topical cream   Apply to port site and cover 30-45 minutes prior to needle access      omeprazole (PRILOSEC) 20 MG delayed release capsule take 1 capsule by mouth twice a day 180 capsule 3    PARoxetine (PAXIL) 20 MG tablet take 1 tablet by mouth every morning 30 tablet 1    dicyclomine (BENTYL) 10 MG capsule Take 1 capsule by mouth every 6 hours as needed (cramps) 20 capsule 0     Physical activity:     Days per week: Not on file     Minutes per session: Not on file    Stress: Not on file   Relationships    Social connections:     Talks on phone: Not on file     Gets together: Not on file     Attends [employer] service: Not on file     Active member of club or organization: Not on file     Attends meetings of clubs or organizations: Not on file     Relationship status: Not on file    Intimate partner violence:     Fear of current or ex partner: Not on file     Emotionally abused: Not on file     Physically abused: Not on file     Forced sexual activity: Not on file   Other Topics Concern    Not on file   Social History Narrative    Not on file     Ready to quit: No  Counseling given: Yes        Family History   Problem Relation Age of Onset    Bipolar Disorder Mother     Heart Disease Mother     Cancer Mother         breast and lung cancer    Diabetes Father     Heart Disease Father         Death due to MI    Cancer Paternal Grandfather         stomach cancer     Heart Disease Paternal Grandfather     Cancer Sister         ovarian cancer     Cancer Maternal Grandmother         female cancer             -rest of complaints with corresponding details per ROS    The patient's past medical, surgical, social, and family history as well as her current medications and allergies were reviewed as documented intoday's encounter. Review of Systems   Constitutional: Positive for unexpected weight change. Negative for activity change, appetite change, fatigue and fever. HENT: Negative for congestion, postnasal drip, rhinorrhea and sinus pressure. Eyes: Negative for photophobia and visual disturbance. Respiratory: Positive for cough, shortness of breath and wheezing. Negative for chest tightness. Cardiovascular: Negative for chest pain, palpitations and leg swelling. Gastrointestinal: Negative for abdominal pain, anal bleeding, constipation and nausea.    Endocrine: Negative for polyphagia and polyuria. Genitourinary: Negative for dyspareunia, flank pain, frequency, hematuria, pelvic pain, urgency and vaginal pain. Musculoskeletal: Positive for arthralgias, myalgias and neck pain. Skin: Negative for color change, pallor and wound. Neurological: Positive for dizziness, weakness and numbness. Negative for speech difficulty and headaches. Psychiatric/Behavioral: Positive for decreased concentration and dysphoric mood. Negative for agitation, behavioral problems, self-injury, sleep disturbance and suicidal ideas. The patient is not nervous/anxious. Physical Exam   Constitutional: She is oriented to person, place, and time. She appears well-developed. HENT:   Head: Normocephalic and atraumatic. Eyes: Pupils are equal, round, and reactive to light. EOM are normal.   Neck: Normal range of motion. Neck supple. No thyromegaly present. Cardiovascular: Normal rate, regular rhythm and normal heart sounds. Pulmonary/Chest: Effort normal. She has decreased breath sounds in the right middle field, the right lower field, the left middle field and the left lower field. She has wheezes in the left lower field. She has no rhonchi. She has no rales. Abdominal: Soft. Normal appearance. Musculoskeletal:        Lumbar back: She exhibits decreased range of motion, tenderness and pain. She exhibits no swelling and no edema. Neurological: She is alert and oriented to person, place, and time. Skin: She is diaphoretic. Psychiatric: Her speech is normal and behavior is normal. Cognition and memory are normal. She exhibits a depressed mood. Vitals reviewed. ASSESSMENT AND PLAN      1. Malignant neoplasm of middle lobe of right lung [facility])  Done with radiation therapy, currently on chemotherapy, notes reviewed. Patient denied any need at this time  - Tdap (age 6y and older) IM ([address])    2.  Essential hypertension  Controlled continue same Future Appointments   Date Time Provider Sakshi Kaur   9/17/2019  1:30 PM Prema Sanchez MD [id number]     This note was completed by using the assistance of a speech-recognition program. However, inadvertent computerized transcription errors may be present. Althoughevery effort was made to ensure accuracy, no guarantees can be provided that every mistake has been identified and corrected by editing.   Electronically signed by Prema Sanchez MD on 6/17/2019  1:30 PM

## 2019-06-17 NOTE — PROGRESS NOTES
Visit Information    Have you changed or started any medications since your last visit including any over-the-counter medicines, vitamins, or herbal medicines? yes - chemotherapy   Are you having any side effects from any of your medications? -  no  Have you stopped taking any of your medications? Is so, why? -  no    Have you seen any other physician or provider since your last visit? Yes - Records Obtained  Have you had any other diagnostic tests since your last visit? No  Have you been seen in the emergency room and/or had an admission to a hospital since we last saw you? No  Have you had your routine dental cleaning in the past 6 months? no    Have you activated your [employer] account? If not, what are your barriers?  Yes     Patient Care Team:  Cory Felix MD as PCP - General (Family Medicine)  Cory Felix MD as PCP - [facility]  Martha Black RN as   Ranjan Moon MD as Consulting Physician (Pulmonology)  Valeri March MD as Consulting Physician (Hematology and Oncology)    Medical History Review  Past Medical, Family, and Social History reviewed and does contribute to the patient presenting condition    Health Maintenance   Topic Date Due    DTaP/Tdap/Td vaccine (1 - Tdap) 04/26/1980    Shingles Vaccine (2 of 2) 05/07/2017    Flu vaccine (Season Ended) 09/01/2019    Breast cancer screen  11/01/2019    Potassium monitoring  05/14/2020    Creatinine monitoring  05/14/2020    Lipid screen  03/06/2022    Cervical cancer screen  11/13/2022    Colon cancer screen colonoscopy  04/24/2023    Pneumococcal 0-64 years Vaccine  Completed    Hepatitis C screen  Completed    HIV screen  Completed

## 2019-08-05 RX ORDER — CALCIUM CARBONATE/VITAMIN D3 600 MG-10
TABLET ORAL
Qty: 90 TABLET | Refills: 3 | Status: SHIPPED | OUTPATIENT
Start: 2019-08-05

## 2019-09-17 ENCOUNTER — HOSPITAL ENCOUNTER (OUTPATIENT)
Age: 59
Discharge: HOME | End: 2019-09-19
Payer: MEDICARE

## 2019-09-17 ENCOUNTER — OFFICE VISIT (OUTPATIENT)
Dept: FAMILY MEDICINE CLINIC | Age: 59
End: 2019-09-17
Payer: MEDICARE

## 2019-09-17 ENCOUNTER — HOSPITAL ENCOUNTER (OUTPATIENT)
Dept: GENERAL RADIOLOGY | Age: 59
Discharge: HOME | End: 2019-09-19
Payer: MEDICARE

## 2019-09-17 VITALS
OXYGEN SATURATION: 100 % | BODY MASS INDEX: 19.35 KG/M2 | SYSTOLIC BLOOD PRESSURE: 121 MMHG | WEIGHT: 120.4 LBS | DIASTOLIC BLOOD PRESSURE: 76 MMHG | HEIGHT: 66 IN | HEART RATE: 90 BPM

## 2019-09-17 DIAGNOSIS — R11.2 INTRACTABLE VOMITING WITH NAUSEA, UNSPECIFIED VOMITING TYPE: ICD-10-CM

## 2019-09-17 DIAGNOSIS — Z23 ENCOUNTER FOR IMMUNIZATION: ICD-10-CM

## 2019-09-17 DIAGNOSIS — Z13.31 POSITIVE DEPRESSION SCREENING: ICD-10-CM

## 2019-09-17 DIAGNOSIS — Z00.00 MEDICARE ANNUAL WELLNESS VISIT, INITIAL: Primary | ICD-10-CM

## 2019-09-17 DIAGNOSIS — M51.37 DEGENERATION OF LUMBAR OR LUMBOSACRAL INTERVERTEBRAL DISC: ICD-10-CM

## 2019-09-17 DIAGNOSIS — G89.29 CHRONIC MIDLINE LOW BACK PAIN WITHOUT SCIATICA: ICD-10-CM

## 2019-09-17 DIAGNOSIS — F33.41 RECURRENT MAJOR DEPRESSIVE DISORDER, IN PARTIAL REMISSION (HCC): ICD-10-CM

## 2019-09-17 DIAGNOSIS — M54.50 CHRONIC MIDLINE LOW BACK PAIN WITHOUT SCIATICA: ICD-10-CM

## 2019-09-17 PROCEDURE — G8431 POS CLIN DEPRES SCRN F/U DOC: HCPCS | Performed by: FAMILY MEDICINE

## 2019-09-17 PROCEDURE — 90686 IIV4 VACC NO PRSV 0.5 ML IM: CPT | Performed by: FAMILY MEDICINE

## 2019-09-17 PROCEDURE — G8420 CALC BMI NORM PARAMETERS: HCPCS | Performed by: FAMILY MEDICINE

## 2019-09-17 PROCEDURE — 4004F PT TOBACCO SCREEN RCVD TLK: CPT | Performed by: FAMILY MEDICINE

## 2019-09-17 PROCEDURE — G0008 ADMIN INFLUENZA VIRUS VAC: HCPCS | Performed by: FAMILY MEDICINE

## 2019-09-17 PROCEDURE — G8427 DOCREV CUR MEDS BY ELIG CLIN: HCPCS | Performed by: FAMILY MEDICINE

## 2019-09-17 PROCEDURE — G0438 PPPS, INITIAL VISIT: HCPCS | Performed by: FAMILY MEDICINE

## 2019-09-17 PROCEDURE — 3017F COLORECTAL CA SCREEN DOC REV: CPT | Performed by: FAMILY MEDICINE

## 2019-09-17 PROCEDURE — 99213 OFFICE O/P EST LOW 20 MIN: CPT | Performed by: FAMILY MEDICINE

## 2019-09-17 PROCEDURE — 72100 X-RAY EXAM L-S SPINE 2/3 VWS: CPT

## 2019-09-17 RX ORDER — LIDOCAINE 40 MG/G
CREAM TOPICAL
Qty: 45 G | Refills: 3 | Status: SHIPPED | OUTPATIENT
Start: 2019-09-17

## 2019-09-17 RX ORDER — OXYCODONE HYDROCHLORIDE AND ACETAMINOPHEN 5; 325 MG/1; MG/1
1 TABLET ORAL EVERY 6 HOURS PRN
Qty: 12 TABLET | Refills: 0 | Status: SHIPPED | OUTPATIENT
Start: 2019-09-17 | End: 2019-09-20

## 2019-09-17 RX ORDER — TIZANIDINE 4 MG/1
4 TABLET ORAL 3 TIMES DAILY
Qty: 30 TABLET | Refills: 0 | Status: SHIPPED | OUTPATIENT
Start: 2019-09-17 | End: 2020-01-28

## 2019-09-17 RX ORDER — DICYCLOMINE HYDROCHLORIDE 10 MG/1
10 CAPSULE ORAL EVERY 6 HOURS PRN
Qty: 20 CAPSULE | Refills: 0 | Status: SHIPPED | OUTPATIENT
Start: 2019-09-17

## 2019-09-17 ASSESSMENT — ENCOUNTER SYMPTOMS
WHEEZING: 0
BACK PAIN: 1
COUGH: 0

## 2019-09-17 ASSESSMENT — LIFESTYLE VARIABLES: HOW OFTEN DO YOU HAVE A DRINK CONTAINING ALCOHOL: 0

## 2019-09-17 ASSESSMENT — PATIENT HEALTH QUESTIONNAIRE - PHQ9: SUM OF ALL RESPONSES TO PHQ QUESTIONS 1-9: 10

## 2019-09-17 NOTE — PROGRESS NOTES
strain: Not on file    Food insecurity:     Worry: Not on file     Inability: Not on file    Transportation needs:     Medical: Not on file     Non-medical: Not on file   Tobacco Use    Smoking status: Current Every Day Smoker     Packs/day: 0.50     Years: 38.00     Pack years: 19.00     Types: Cigarettes    Smokeless tobacco: Never Used   Substance and Sexual Activity    Alcohol use:  Yes     Alcohol/week: 0.0 standard drinks     Comment: rare    Drug use: Yes     Types: Marijuana     Comment: marijuana , she states that she stopped cocaine    Sexual activity: Yes     Partners: Male     Birth control/protection: Post-menopausal   Lifestyle    Physical activity:     Days per week: Not on file     Minutes per session: Not on file    Stress: Not on file   Relationships    Social connections:     Talks on phone: Not on file     Gets together: Not on file     Attends Anabaptism service: Not on file     Active member of club or organization: Not on file     Attends meetings of clubs or organizations: Not on file     Relationship status: Not on file    Intimate partner violence:     Fear of current or ex partner: Not on file     Emotionally abused: Not on file     Physically abused: Not on file     Forced sexual activity: Not on file   Other Topics Concern    Not on file   Social History Narrative    Not on file     Ready to quit: Yes  Counseling given: Yes        Family History   Problem Relation Age of Onset    Bipolar Disorder Mother     Heart Disease Mother     Cancer Mother         breast and lung cancer    Diabetes Father     Heart Disease Father         Death due to MI    Cancer Paternal Grandfather         stomach cancer     Heart Disease Paternal Grandfather     Cancer Sister         ovarian cancer     Cancer Maternal Grandmother         female cancer       Quality & Risk Score Accuracy    Visit Dx:  F33.41 - Recurrent major depressive disorder, in partial remission ([facility] [address]  Assessment and nausea, unspecified vomiting type  -  - dicyclomine (BENTYL) 10 MG capsule; Take 1 capsule by mouth every 6 hours as needed (cramps)  Dispense: 20 capsule; Refill: 0    5. Chronic midline low back pain without sciatica  -Acute exacerbation of chronic low back pain, conservative management x-rays. - XR LUMBAR SPINE (2-3 VIEWS); Future  - oxyCODONE-acetaminophen (PERCOCET) 5-325 MG per tablet; Take 1 tablet by mouth every 6 hours as needed for Pain for up to 3 days. Intended supply: 3 days. Take lowest dose possible to manage pain  Dispense: 12 tablet; Refill: 0  - tiZANidine (ZANAFLEX) 4 MG tablet; Take 1 tablet by mouth 3 times daily  Dispense: 30 tablet; Refill: 0  - lidocaine (LMX) 4 % cream; Apply topically every 8 hrs as needed for pain  Dispense: 45 g; Refill: 3    6. Recurrent major depressive disorder, in partial remission ([facility] [address]  Stable on current treatment    7. Encounter for immunization    - INFLUENZA, QUADV, 3 YRS AND OLDER, IM PF, PREFILL SYR OR SDV, 0.5ML (Lynita Majors, PF)      Orders Placed This Encounter   Procedures    XR LUMBAR SPINE (2-3 VIEWS)     Standing Status:   Future     Number of Occurrences:   1     Standing Expiration Date:   9/17/2020     Order Specific Question:   Reason for exam:     Answer:   pain    INFLUENZA, QUADV, 3 YRS AND OLDER, IM PF, PREFILL SYR OR SDV, 0.5ML (AFLURIA QUADV, PF)    Positive Screen for Clinical Depression with a Documented Follow-up Plan          Medications Discontinued During This Encounter   Medication Reason    dicyclomine (BENTYL) 10 MG capsule REORDER       Erin received counseling on the following healthy behaviors: nutrition, exercise, medication adherence and tobacco cessation  Reviewed prior labs and health maintenance. Continue current medications, diet and exercise. Discussed use, benefit, and side effects of prescribed medications. Barriers to medication compliance addressed.    Patient given educational materials - see patient

## 2019-09-17 NOTE — PROGRESS NOTES
prior to needle access Yes Historical Provider, MD   omeprazole (PRILOSEC) 20 MG delayed release capsule take 1 capsule by mouth twice a day Yes Emeli Mott MD   PARoxetine (PAXIL) 20 MG tablet take 1 tablet by mouth every morning Yes Emeli Mott MD   promethazine (PHENERGAN) 25 MG tablet Take 1 tablet by mouth every 6 hours as needed for Nausea Yes Carter Trujillo MD   potassium chloride (KLOR-CON M) 20 MEQ extended release tablet Take 1 tablet by mouth daily Yes EDDA Paniagua CNP   albuterol sulfate HFA (PROVENTIL HFA) 108 (90 Base) MCG/ACT inhaler Inhale 2 puffs into the lungs every 6 hours as needed for Wheezing or Shortness of Breath Yes Juanita Bill MD   cloNIDine (CATAPRES) 0.1 MG tablet take 1 tablet by mouth at bedtime Yes EDDA Paniagua CNP   Multiple Vitamin (MULTIVITAMIN PO) Take  by mouth daily. Yes Historical Provider, MD   aspirin 81 MG EC tablet Take 81 mg by mouth daily.    Yes Historical Provider, MD       Past Medical History:   Diagnosis Date    Arthritis     Bronchitis, chronic (HCC)     Cancer ([facility]     Chronic back pain     Chronic cough     Chronic kidney disease     Cocaine abuse ([facility]     many years ago    COPD (chronic obstructive pulmonary disease) ([facility]     Depression     Diverticulosis     Emphysema     Family history of breast cancer     mom @ 61    Fibromyalgia     GERD (gastroesophageal reflux disease)     GI bleed     colon    H/O tubal ligation     Hemorrhoids, internal     Hyperlipidemia     Hyperplastic colon polyp 11/15/2017    Hypertension     Meningioma (HCC)     Neuropathy     Orbital fracture (HCC)     left side     Osteoarthritis     Osteopenia     Pulmonary nodule     Renal calculi     Renal cyst     STD (sexually transmitted disease)     Tobacco abuse     Uterine prolapse      Past Surgical History:   Procedure Laterality Date    BACK SURGERY  2011    thoracic laminectomy, T12, S1    CARDIAC CATHETERIZATION  10/24/14 provided    General Health:  General  In general, how would you say your health is?: Fair  In the past 7 days, have you experienced any of the following? New or Increased Pain, New or Increased Fatigue, Loneliness, Social Isolation, Stress or Anger?: (!) New or Increased Pain  Do you get the social and emotional support that you need?: Yes  Do you have a Living Will?: (!) No  General Health Risk Interventions:  · Pain issues: pt is suffering from lung cancer and is on chemotherapy   · No Living Will: additional information provided about referal, , patient declined and pt has  , her daughter     Health Habits/Nutrition:  Health Habits/Nutrition  Do you exercise for at least 20 minutes 2-3 times per week?: Yes  Have you lost any weight without trying in the past 3 months?: (!) Yes  Do you eat fewer than 2 meals per day?: (!) Yes  Have you seen a dentist within the past year?: (!) No  Body mass index is 19.43 kg/m². Health Habits/Nutrition Interventions:  · due to lung cancer     Hearing/Vision:  No exam data present  Hearing/Vision  Do you or your family notice any trouble with your hearing?: (!) Yes  Do you have difficulty driving, watching TV, or doing any of your daily activities because of your eyesight?: No  Have you had an eye exam within the past year?: Yes  Hearing/Vision Interventions:  · pt has hearing aids    ADL:  ADLs  In the past 7 days, did you need help from others to perform any of the following everyday activities? Eating, dressing, grooming, bathing, toileting, or walking/balance?: (!) Walking/Balance  In the past 7 days, did you need help from others to take care of any of the following?  Laundry, housekeeping, banking/finances, shopping, telephone use, food preparation, transportation, or taking medications?: None  ADL Interventions:  · pt refuses home health, refuses walker, living currently with friend     Personalized Preventive Plan   Current Health Maintenance Status  Immunization

## 2019-09-17 NOTE — PATIENT INSTRUCTIONS
Personalized Preventive Plan for Diandra Pang - 9/17/2019  Medicare offers a range of preventive health benefits. Some of the tests and screenings are paid in full while other may be subject to a deductible, co-insurance, and/or copay. Some of these benefits include a comprehensive review of your medical history including lifestyle, illnesses that may run in your family, and various assessments and screenings as appropriate. After reviewing your medical record and screening and assessments performed today your provider may have ordered immunizations, labs, imaging, and/or referrals for you. A list of these orders (if applicable) as well as your Preventive Care list are included within your After Visit Summary for your review. Other Preventive Recommendations:    · A preventive eye exam performed by an eye specialist is recommended every 1-2 years to screen for glaucoma; cataracts, macular degeneration, and other eye disorders. · A preventive dental visit is recommended every 6 months. · Try to get at least 150 minutes of exercise per week or 10,000 steps per day on a pedometer . · Order or download the FREE \"Exercise & Physical Activity: Your Everyday Guide\" from [employer]. Call 8-805.857.9607 or search [employer] online. · You need 5486-1882 mg of calcium and 9265-4080 IU of vitamin D per day. It is possible to meet your calcium requirement with diet alone, but a vitamin D supplement is usually necessary to meet this goal.  · When exposed to the sun, use a sunscreen that protects against both UVA and UVB radiation with an SPF of 30 or greater. Reapply every 2 to 3 hours or after sweating, drying off with a towel, or swimming. · Always wear a seat belt when traveling in a car. Always wear a helmet when riding a bicycle or motorcycle.

## 2019-10-29 ENCOUNTER — OFFICE VISIT (OUTPATIENT)
Dept: FAMILY MEDICINE CLINIC | Age: 59
End: 2019-10-29
Payer: MEDICARE

## 2019-10-29 VITALS
HEIGHT: 66 IN | BODY MASS INDEX: 19.06 KG/M2 | WEIGHT: 118.6 LBS | OXYGEN SATURATION: 98 % | SYSTOLIC BLOOD PRESSURE: 126 MMHG | DIASTOLIC BLOOD PRESSURE: 86 MMHG | HEART RATE: 90 BPM

## 2019-10-29 DIAGNOSIS — C34.2 MALIGNANT NEOPLASM OF MIDDLE LOBE OF RIGHT LUNG (HCC): ICD-10-CM

## 2019-10-29 DIAGNOSIS — Z12.31 ENCOUNTER FOR SCREENING MAMMOGRAM FOR MALIGNANT NEOPLASM OF BREAST: ICD-10-CM

## 2019-10-29 DIAGNOSIS — M54.42 CHRONIC MIDLINE LOW BACK PAIN WITH BILATERAL SCIATICA: Primary | ICD-10-CM

## 2019-10-29 DIAGNOSIS — G89.29 CHRONIC MIDLINE LOW BACK PAIN WITH BILATERAL SCIATICA: Primary | ICD-10-CM

## 2019-10-29 DIAGNOSIS — M51.36 LUMBAR DEGENERATIVE DISC DISEASE: ICD-10-CM

## 2019-10-29 DIAGNOSIS — M54.41 CHRONIC MIDLINE LOW BACK PAIN WITH BILATERAL SCIATICA: Primary | ICD-10-CM

## 2019-10-29 DIAGNOSIS — E44.1 MILD PROTEIN-CALORIE MALNUTRITION (HCC): ICD-10-CM

## 2019-10-29 PROBLEM — R10.31 RIGHT LOWER QUADRANT ABDOMINAL PAIN: Status: RESOLVED | Noted: 2018-05-01 | Resolved: 2019-10-29

## 2019-10-29 PROCEDURE — G8427 DOCREV CUR MEDS BY ELIG CLIN: HCPCS | Performed by: FAMILY MEDICINE

## 2019-10-29 PROCEDURE — 99213 OFFICE O/P EST LOW 20 MIN: CPT | Performed by: FAMILY MEDICINE

## 2019-10-29 PROCEDURE — 3017F COLORECTAL CA SCREEN DOC REV: CPT | Performed by: FAMILY MEDICINE

## 2019-10-29 PROCEDURE — G8482 FLU IMMUNIZE ORDER/ADMIN: HCPCS | Performed by: FAMILY MEDICINE

## 2019-10-29 PROCEDURE — 4004F PT TOBACCO SCREEN RCVD TLK: CPT | Performed by: FAMILY MEDICINE

## 2019-10-29 PROCEDURE — G8420 CALC BMI NORM PARAMETERS: HCPCS | Performed by: FAMILY MEDICINE

## 2019-10-29 ASSESSMENT — ENCOUNTER SYMPTOMS
BACK PAIN: 1
SHORTNESS OF BREATH: 0
ABDOMINAL PAIN: 0
BLOOD IN STOOL: 0
WHEEZING: 0
PHOTOPHOBIA: 0
ABDOMINAL DISTENTION: 0
DIARRHEA: 0

## 2019-11-05 ENCOUNTER — OFFICE VISIT (OUTPATIENT)
Dept: FAMILY MEDICINE CLINIC | Age: 59
End: 2019-11-05
Payer: MEDICARE

## 2019-11-05 VITALS
SYSTOLIC BLOOD PRESSURE: 134 MMHG | OXYGEN SATURATION: 99 % | BODY MASS INDEX: 18.96 KG/M2 | HEART RATE: 104 BPM | HEIGHT: 66 IN | DIASTOLIC BLOOD PRESSURE: 96 MMHG | WEIGHT: 118 LBS

## 2019-11-05 DIAGNOSIS — M79.18 MUSCULOSKELETAL PAIN: ICD-10-CM

## 2019-11-05 DIAGNOSIS — M54.2 NECK PAIN: Primary | ICD-10-CM
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
## 2019-11-05 PROCEDURE — G8420 CALC BMI NORM PARAMETERS: HCPCS | Performed by: FAMILY MEDICINE

## 2019-11-05 PROCEDURE — 99213 OFFICE O/P EST LOW 20 MIN: CPT | Performed by: FAMILY MEDICINE

## 2019-11-05 PROCEDURE — 4004F PT TOBACCO SCREEN RCVD TLK: CPT | Performed by: FAMILY MEDICINE

## 2019-11-05 PROCEDURE — G8482 FLU IMMUNIZE ORDER/ADMIN: HCPCS | Performed by: FAMILY MEDICINE

## 2019-11-05 PROCEDURE — 3017F COLORECTAL CA SCREEN DOC REV: CPT | Performed by: FAMILY MEDICINE

## 2019-11-05 PROCEDURE — G8427 DOCREV CUR MEDS BY ELIG CLIN: HCPCS | Performed by: FAMILY MEDICINE

## 2019-11-05 ASSESSMENT — ENCOUNTER SYMPTOMS
SINUS PRESSURE: 0
WHEEZING: 1
PHOTOPHOBIA: 0
RHINORRHEA: 0
SHORTNESS OF BREATH: 1
BACK PAIN: 1
COUGH: 1

## 2019-11-22 ENCOUNTER — HOSPITAL ENCOUNTER (OUTPATIENT)
Dept: WOMENS IMAGING | Age: 59
Discharge: HOME | End: 2019-11-24
Payer: MEDICARE

## 2019-11-22 DIAGNOSIS — Z12.31 ENCOUNTER FOR SCREENING MAMMOGRAM FOR MALIGNANT NEOPLASM OF BREAST: ICD-10-CM

## 2019-11-22 PROCEDURE — 77063 BREAST TOMOSYNTHESIS BI: CPT

## 2020-01-10 ENCOUNTER — HOSPITAL ENCOUNTER (OUTPATIENT)
Age: 60
Setting detail: SPECIMEN
Discharge: HOME | End: 2020-01-10
Payer: MEDICARE

## 2020-01-10 PROCEDURE — 87506 IADNA-DNA/RNA PROBE TQ 6-11: CPT

## 2020-01-10 PROCEDURE — 87493 C DIFF AMPLIFIED PROBE: CPT

## 2020-01-11 LAB
C DIFFICILE TOXINS, PCR: ABNORMAL
CAMPYLOBACTER PCR: NORMAL
E COLI ENTEROTOXIGENIC PCR: NORMAL
PLESIOMONAS SHIGELLOIDES PCR: NORMAL
SALMONELLA PCR: NORMAL
SHIGATOXIN GENE PCR: NORMAL
SHIGELLA SP PCR: NORMAL
SPECIMEN DESCRIPTION: ABNORMAL
SPECIMEN DESCRIPTION: NORMAL
VIBRIO PCR: NORMAL
YERSINIA ENTEROCOLITICA PCR: NORMAL

## 2020-01-13 ENCOUNTER — OFFICE VISIT (OUTPATIENT)
Dept: GASTROENTEROLOGY | Age: 60
End: 2020-01-13
Payer: MEDICARE

## 2020-01-13 VITALS
BODY MASS INDEX: 17.85 KG/M2 | SYSTOLIC BLOOD PRESSURE: 132 MMHG | WEIGHT: 110.6 LBS | HEART RATE: 115 BPM | DIASTOLIC BLOOD PRESSURE: 93 MMHG

## 2020-01-13 PROCEDURE — 99214 OFFICE O/P EST MOD 30 MIN: CPT | Performed by: INTERNAL MEDICINE

## 2020-01-13 PROCEDURE — G8419 CALC BMI OUT NRM PARAM NOF/U: HCPCS | Performed by: INTERNAL MEDICINE

## 2020-01-13 PROCEDURE — 3017F COLORECTAL CA SCREEN DOC REV: CPT | Performed by: INTERNAL MEDICINE

## 2020-01-13 PROCEDURE — 4004F PT TOBACCO SCREEN RCVD TLK: CPT | Performed by: INTERNAL MEDICINE

## 2020-01-13 PROCEDURE — G8482 FLU IMMUNIZE ORDER/ADMIN: HCPCS | Performed by: INTERNAL MEDICINE

## 2020-01-13 PROCEDURE — G8427 DOCREV CUR MEDS BY ELIG CLIN: HCPCS | Performed by: INTERNAL MEDICINE

## 2020-01-13 RX ORDER — ONDANSETRON 4 MG/1
4 TABLET, FILM COATED ORAL EVERY 8 HOURS PRN
COMMUNITY

## 2020-01-13 RX ORDER — METRONIDAZOLE 500 MG/1
500 TABLET ORAL 3 TIMES DAILY
Qty: 40 TABLET | Refills: 0 | Status: SHIPPED | OUTPATIENT
Start: 2020-01-13 | End: 2020-01-25

## 2020-01-13 ASSESSMENT — ENCOUNTER SYMPTOMS
SINUS PRESSURE: 0
CONSTIPATION: 1
ABDOMINAL PAIN: 1
BACK PAIN: 1
VOMITING: 0
SORE THROAT: 0
TROUBLE SWALLOWING: 0
NAUSEA: 1
DIARRHEA: 1
RECTAL PAIN: 1
COUGH: 1
BLOOD IN STOOL: 0
CHOKING: 0
WHEEZING: 1
ABDOMINAL DISTENTION: 1
ANAL BLEEDING: 0

## 2020-01-13 NOTE — PROGRESS NOTES
Constitutional:       Appearance: She is well-developed. Comments: Thinly built patient. HENT:      Head: Normocephalic and atraumatic. Eyes:      General: No scleral icterus. Conjunctiva/sclera: Conjunctivae normal.      Pupils: Pupils are equal, round, and reactive to light. Neck:      Musculoskeletal: Normal range of motion and neck supple. Thyroid: No thyromegaly. Vascular: No hepatojugular reflux or JVD. Trachea: No tracheal deviation. Cardiovascular:      Rate and Rhythm: Normal rate and regular rhythm. Heart sounds: Normal heart sounds. Pulmonary:      Effort: Pulmonary effort is normal. No respiratory distress. Breath sounds: Normal breath sounds. No wheezing or rales. Abdominal:      General: Bowel sounds are normal. There is no distension. Palpations: Abdomen is soft. There is no hepatomegaly or mass. Tenderness: There is no tenderness. There is no rebound. Hernia: No hernia is present. Musculoskeletal:         General: No tenderness. Comments: No joint swelling   Lymphadenopathy:      Cervical: No cervical adenopathy. Skin:     General: Skin is warm. Findings: No bruising, ecchymosis, erythema or rash. Neurological:      Mental Status: She is alert and oriented to person, place, and time. Cranial Nerves: No cranial nerve deficit. Psychiatric:         Thought Content: Thought content normal.         Assessment:       Diagnosis Orders   1. Intractable vomiting with nausea     2. Hyperplastic colonic polyp, unspecified part of colon     3. Diverticulosis     4. Hemorrhoids, internal             Plan:      Patient has only 2-3 liquid stools a day. However stool is positive for C. difficile. Basing on the characteristics of the diarrhea I doubt that she has C. difficile related diarrhea. However, given that she is on chemotherapy I will treat her with Flagyl 500 mg 3 times a day for 10 to 12 days.     Advised to hold off magnesium for a week. Also not to take dicyclomine. Not to take antibiotics. In between if she has any issues to contact me. Otherwise we will see her in the next 3 to 4 weeks.

## 2020-01-28 ENCOUNTER — OFFICE VISIT (OUTPATIENT)
Dept: GASTROENTEROLOGY | Age: 60
End: 2020-01-28
Payer: MEDICARE

## 2020-01-28 ENCOUNTER — OFFICE VISIT (OUTPATIENT)
Dept: FAMILY MEDICINE CLINIC | Age: 60
End: 2020-01-28
Payer: MEDICARE

## 2020-01-28 VITALS
HEART RATE: 92 BPM | BODY MASS INDEX: 17.52 KG/M2 | SYSTOLIC BLOOD PRESSURE: 120 MMHG | HEIGHT: 66 IN | OXYGEN SATURATION: 100 % | DIASTOLIC BLOOD PRESSURE: 82 MMHG | WEIGHT: 109 LBS

## 2020-01-28 VITALS
SYSTOLIC BLOOD PRESSURE: 130 MMHG | HEART RATE: 92 BPM | BODY MASS INDEX: 17.72 KG/M2 | WEIGHT: 109.8 LBS | DIASTOLIC BLOOD PRESSURE: 85 MMHG

## 2020-01-28 PROBLEM — A04.8 CLOSTRIDIAL GASTROENTERITIS: Status: ACTIVE | Noted: 2020-01-28

## 2020-01-28 PROBLEM — Z98.51 HISTORY OF TUBAL LIGATION: Status: RESOLVED | Noted: 2017-10-05 | Resolved: 2020-01-28

## 2020-01-28 PROBLEM — R11.2 INTRACTABLE VOMITING WITH NAUSEA: Status: RESOLVED | Noted: 2019-03-05 | Resolved: 2020-01-28

## 2020-01-28 PROBLEM — M54.2 NECK PAIN: Status: RESOLVED | Noted: 2017-07-13 | Resolved: 2020-01-28

## 2020-01-28 PROBLEM — M79.18 MUSCULOSKELETAL PAIN: Status: RESOLVED | Noted: 2019-11-05 | Resolved: 2020-01-28

## 2020-01-28 PROCEDURE — G8419 CALC BMI OUT NRM PARAM NOF/U: HCPCS | Performed by: NURSE PRACTITIONER

## 2020-01-28 PROCEDURE — G8482 FLU IMMUNIZE ORDER/ADMIN: HCPCS | Performed by: FAMILY MEDICINE

## 2020-01-28 PROCEDURE — 99213 OFFICE O/P EST LOW 20 MIN: CPT | Performed by: NURSE PRACTITIONER

## 2020-01-28 PROCEDURE — G8419 CALC BMI OUT NRM PARAM NOF/U: HCPCS | Performed by: FAMILY MEDICINE

## 2020-01-28 PROCEDURE — 4004F PT TOBACCO SCREEN RCVD TLK: CPT | Performed by: FAMILY MEDICINE

## 2020-01-28 PROCEDURE — 99214 OFFICE O/P EST MOD 30 MIN: CPT | Performed by: FAMILY MEDICINE

## 2020-01-28 PROCEDURE — G8482 FLU IMMUNIZE ORDER/ADMIN: HCPCS | Performed by: NURSE PRACTITIONER

## 2020-01-28 PROCEDURE — G8926 SPIRO NO PERF OR DOC: HCPCS | Performed by: FAMILY MEDICINE

## 2020-01-28 PROCEDURE — 3023F SPIROM DOC REV: CPT | Performed by: FAMILY MEDICINE

## 2020-01-28 PROCEDURE — 3017F COLORECTAL CA SCREEN DOC REV: CPT | Performed by: NURSE PRACTITIONER

## 2020-01-28 PROCEDURE — 3017F COLORECTAL CA SCREEN DOC REV: CPT | Performed by: FAMILY MEDICINE

## 2020-01-28 PROCEDURE — 4004F PT TOBACCO SCREEN RCVD TLK: CPT | Performed by: NURSE PRACTITIONER

## 2020-01-28 PROCEDURE — G8427 DOCREV CUR MEDS BY ELIG CLIN: HCPCS | Performed by: NURSE PRACTITIONER

## 2020-01-28 PROCEDURE — G8427 DOCREV CUR MEDS BY ELIG CLIN: HCPCS | Performed by: FAMILY MEDICINE

## 2020-01-28 RX ORDER — PAROXETINE 30 MG/1
30 TABLET, FILM COATED ORAL EVERY MORNING
Qty: 90 TABLET | Refills: 2 | Status: SHIPPED | OUTPATIENT
Start: 2020-01-28 | End: 2021-01-25

## 2020-01-28 ASSESSMENT — PATIENT HEALTH QUESTIONNAIRE - PHQ9
2. FEELING DOWN, DEPRESSED OR HOPELESS: 0
SUM OF ALL RESPONSES TO PHQ QUESTIONS 1-9: 0
SUM OF ALL RESPONSES TO PHQ QUESTIONS 1-9: 0
1. LITTLE INTEREST OR PLEASURE IN DOING THINGS: 0
SUM OF ALL RESPONSES TO PHQ9 QUESTIONS 1 & 2: 0

## 2020-01-28 ASSESSMENT — ENCOUNTER SYMPTOMS
BLOOD IN STOOL: 0
WHEEZING: 1
SINUS PRESSURE: 0
WHEEZING: 0
ABDOMINAL DISTENTION: 0
BLOOD IN STOOL: 0
RECTAL PAIN: 0
COUGH: 1
NAUSEA: 0
TROUBLE SWALLOWING: 0
SORE THROAT: 0
VOMITING: 0
ABDOMINAL PAIN: 0
RHINORRHEA: 0
CONSTIPATION: 1
COUGH: 1
DIARRHEA: 1
ANAL BLEEDING: 0
VOMITING: 0
BACK PAIN: 1
BACK PAIN: 1
CHOKING: 0
DIARRHEA: 0
ABDOMINAL DISTENTION: 0
NAUSEA: 1
SHORTNESS OF BREATH: 1
PHOTOPHOBIA: 0
ABDOMINAL PAIN: 1
CONSTIPATION: 0

## 2020-01-28 NOTE — PROGRESS NOTES
nucleic acid detected by RT-PCR.* NEGATIVE: C difficile tcdB nucleic acid not detected by RT-P Final    Specimen Description 01/10/2020 . FECES   Final    Campylobacter PCR 01/10/2020 NEGATIVE: No Campylobacter spp. (jejuni or coli) DNA Detected  NEGATIVE: No Campylobacter spp. (jejuni or coli) DNA Detecte Final    Salmonella PCR 01/10/2020 NEGATIVE: No Salmonella spp. DNA Detected  NEGATIVE: No Salmonella spp.  DNA Detected Final    Shigatoxin Gene PCR 01/10/2020 NEGATIVE: No Shiga toxin-producing gene(s) Detected  NEGATIVE: No Shiga toxin-producing gene(s) Detected Final    Shigella Sp PCR 01/10/2020 NEGATIVE: No Shigella spp. / EIEC DNA Detected  NEGATIVE: No Shigella spp. / EIEC DNA Detected Final    Plesiomonas Shigelloides PCR 01/10/2020 NEGATIVE: No Plesionomas shigelloides DNA Detected  NEGATIVE: No Plesionomas shigelloides DNA Detected Final    Vibrio PCR 01/10/2020 NEGATIVE: No Vibrio (V. vulnificus, V, parahaemolyticus and V. cholerae) DNA Detected  NEGATIVE: No Vibrio (V. vulnificus, V, parahaemolyticus and Final    E Coli Enterotoxigenic PCR 01/10/2020 NEGATIVE: No Enterotoxigenic E. coli (ETEC) Heat-labile and heat-stable (LT/ST) DNA Detected  NEGATIVE: No Enterotoxigenic E. coli (ETEC) Heat-labile and Final    Yersinia Enterocolitica PCR 01/10/2020 NEGATIVE: No Yersinia enterocolitica DNA Detected  NEGATIVE: No Yersinia enterocolitica DNA Detected Final         Most recent labs reviewed:     Lab Results   Component Value Date    WBC 5.05 05/14/2019    HGB 13.7 05/14/2019    HCT 42.5 05/14/2019    MCV 97.3 05/14/2019     05/14/2019       @BRIEFLAB(NA,K,CL,CO2,BUN,CREATININE,GLUCOSE,CALCIUM)@     Lab Results   Component Value Date    ALT 16 05/14/2019    AST 16 05/14/2019    ALKPHOS 82 05/14/2019    BILITOT 0.2 (L) 05/14/2019       Lab Results   Component Value Date    TSH 0.93 08/19/2017       Lab Results   Component Value Date    CHOL 213 (H) 03/06/2017    CHOL 189 06/20/2016    CHOL 228 Attends meetings of clubs or organizations: Not on file     Relationship status: Not on file    Intimate partner violence:     Fear of current or ex partner: Not on file     Emotionally abused: Not on file     Physically abused: Not on file     Forced sexual activity: Not on file   Other Topics Concern    Not on file   Social History Narrative    Not on file     Ready to quit: Not Answered  Counseling given: Not Answered        Family History   Problem Relation Age of Onset    Bipolar Disorder Mother     Heart Disease Mother     Cancer Mother         breast and lung cancer    Diabetes Father     Heart Disease Father         Death due to MI    Cancer Paternal Grandfather         stomach cancer     Heart Disease Paternal Grandfather     Cancer Sister         ovarian cancer     Cancer Maternal Grandmother         female cancer             -rest of complaints with corresponding details per ROS    The patient's past medical, surgical, social, and family history as well as her current medications and allergies were reviewed as documented intoday's encounter. Review of Systems   Constitutional: Positive for activity change, fatigue and unexpected weight change. Negative for appetite change. HENT: Negative for congestion, ear discharge, postnasal drip and rhinorrhea. Eyes: Negative for photophobia and visual disturbance. Respiratory: Positive for cough and shortness of breath. Negative for wheezing. Cardiovascular: Negative for chest pain, palpitations and leg swelling. Gastrointestinal: Positive for abdominal pain, diarrhea and nausea. Negative for abdominal distention, blood in stool, constipation and vomiting. Endocrine: Negative for polyphagia and polyuria. Genitourinary: Negative for difficulty urinating, flank pain, frequency, urgency and vaginal pain. Musculoskeletal: Positive for arthralgias, back pain and gait problem. Negative for myalgias, neck pain and neck stiffness. Neurological: Positive for weakness and numbness. Negative for syncope, facial asymmetry, speech difficulty and headaches. Psychiatric/Behavioral: Positive for decreased concentration and dysphoric mood. Negative for agitation, sleep disturbance and suicidal ideas. The patient is nervous/anxious. Physical Exam  Constitutional:       Appearance: She is ill-appearing. HENT:      Head: Normocephalic. Eyes:      General: Lids are normal.   Neck:      Musculoskeletal: Full passive range of motion without pain. No edema. Cardiovascular:      Rate and Rhythm: Normal rate. Heart sounds: Normal heart sounds, S1 normal and S2 normal.   Pulmonary:      Effort: Pulmonary effort is normal.      Breath sounds: Decreased air movement present. Examination of the right-lower field reveals decreased breath sounds. Examination of the left-lower field reveals decreased breath sounds. Decreased breath sounds present. Abdominal:      General: Abdomen is flat. Bowel sounds are normal.      Palpations: Abdomen is soft. Tenderness: There is no abdominal tenderness. Musculoskeletal:      Lumbar back: She exhibits decreased range of motion, tenderness, pain and spasm. Right lower leg: No edema. Left lower leg: No edema. Neurological:      Mental Status: She is oriented to person, place, and time. She is lethargic. Sensory: Sensory deficit present. Motor: Weakness present. No abnormal muscle tone. Gait: Gait abnormal.   Psychiatric:         Attention and Perception: Attention and perception normal.         Mood and Affect: Mood is depressed and elated. Speech: Speech is delayed. Behavior: Behavior is slowed. Behavior is not aggressive. Behavior is cooperative. Thought Content: Thought content normal.         Cognition and Memory: Cognition normal.             ASSESSMENT AND PLAN      1.  Panlobular emphysema (HCC)  -Stable at her baseline on current No treatment change needed. Lab Results   Component Value Date    LDLCHOLESTEROL 121 03/06/2017    (goal LDL reduction with dx if diabetes is 50% LDL reduction)      PHQ Scores 1/28/2020 9/17/2019 11/13/2017   PHQ2 Score 0 4 0   PHQ9 Score 0 10 0     Interpretation of Total Score Depression Severity: 1-4 = Minimal depression, 5-9 = Mild depression, 10-14 = Moderate depression, 15-19 = Moderately severe depression, 20-27 = Severe depression    The patient'spast medical, surgical, social, and family history as well as her   current medications and allergies were reviewed as documented in today's encounter. Medications, labs, diagnostic studies, consultations andfollow-up as documented in this encounter. Return in about 3 months (around 4/28/2020) for lung ca, living will, discuss palliative . Patient wasseen with total face to face time of 25 minutes. More than 50% of this visit was counseling and education. Future Appointments   Date Time Provider Sakshi Kaur   1/28/2020  2:30 PM Korey Faith MD [facility]   4/28/2020  1:30 PM MD torrie Yee     This note was completed by using the assistance of a speech-recognition program. However, inadvertent computerized transcription errors may be present. Althoughevery effort was made to ensure accuracy, no guarantees can be provided that every mistake has been identified and corrected by editing.   Electronically signed by Brenna Capone MD on 1/28/2020  2:10 PM

## 2020-01-29 ENCOUNTER — TELEPHONE (OUTPATIENT)
Dept: SPIRITUAL SERVICES | Age: 60
End: 2020-01-29

## 2020-01-29 NOTE — TELEPHONE ENCOUNTER
Writer spoke to patient and she already has her Medical Power of  in play and is not interested in discussing the Advanced Directives at this time.

## 2020-03-25 PROBLEM — D32.9 MENINGIOMA (HCC): Status: RESOLVED | Noted: 2017-07-13 | Resolved: 2020-03-24

## 2020-04-01 RX ORDER — OMEPRAZOLE 20 MG/1
CAPSULE, DELAYED RELEASE ORAL
Qty: 180 CAPSULE | Refills: 3 | Status: SHIPPED | OUTPATIENT
Start: 2020-04-01 | End: 2021-06-29 | Stop reason: SDUPTHER

## 2020-04-01 NOTE — TELEPHONE ENCOUNTER
Please Approve or Refuse.   Send to Pharmacy per Pt's Request:      Next Visit Date:  4/28/2020   Last Visit Date: 1/28/2020    Hemoglobin A1C (%)   Date Value   10/25/2016 5.5             ( goal A1C is < 7)   BP Readings from Last 3 Encounters:   01/28/20 130/85   01/28/20 120/82   01/13/20 (!) 132/93          (goal 120/80)  BUN   Date Value Ref Range Status   05/14/2019 11 7 - 25 mg/dL Final     CREATININE   Date Value Ref Range Status   05/14/2019 0.79 0.60 - 1.20 mg/dL Final     Potassium   Date Value Ref Range Status   05/14/2019 3.6 3.5 - 5.1 meq/L Final

## 2020-04-24 ENCOUNTER — TELEPHONE (OUTPATIENT)
Dept: GASTROENTEROLOGY | Age: 60
End: 2020-04-24

## 2020-04-24 NOTE — TELEPHONE ENCOUNTER
LVM for Harry Webster advising her that we are going to keep his appt with Sirena Garcia on 4/28/2020 but we are changing it to a VV at this time due to the current pandemic.

## 2020-04-28 ENCOUNTER — TELEPHONE (OUTPATIENT)
Dept: GASTROENTEROLOGY | Age: 60
End: 2020-04-28

## 2020-04-28 ENCOUNTER — VIRTUAL VISIT (OUTPATIENT)
Dept: GASTROENTEROLOGY | Age: 60
End: 2020-04-28
Payer: MEDICARE

## 2020-04-28 PROCEDURE — 3017F COLORECTAL CA SCREEN DOC REV: CPT | Performed by: INTERNAL MEDICINE

## 2020-04-28 PROCEDURE — 99212 OFFICE O/P EST SF 10 MIN: CPT | Performed by: INTERNAL MEDICINE

## 2020-04-28 RX ORDER — NITROFURANTOIN 25; 75 MG/1; MG/1
100 CAPSULE ORAL 2 TIMES DAILY
COMMUNITY
End: 2020-06-08 | Stop reason: ALTCHOICE

## 2020-04-28 NOTE — PROGRESS NOTES
mcg/actuation powder for inhalation   Inhale 1 puff every day by inhalation route. Historical Provider, MD   lidocaine-prilocaine (EMLA) 2.5-2.5 % cream lidocaine-prilocaine 2.5 %-2.5 % topical cream   Apply to port site and cover 30-45 minutes prior to needle access  Historical Provider, MD   potassium chloride (KLOR-CON M) 20 MEQ extended release tablet Take 1 tablet by mouth daily  Patient taking differently: Take 40 mEq by mouth daily Take 2 20 meq daily  EDDA Zhang CNP   albuterol sulfate HFA (PROVENTIL HFA) 108 (90 Base) MCG/ACT inhaler Inhale 2 puffs into the lungs every 6 hours as needed for Wheezing or Shortness of Breath  Donavon Massey MD   cloNIDine (CATAPRES) 0.1 MG tablet take 1 tablet by mouth at bedtime  EDDA Zhang CNP   Multiple Vitamin (MULTIVITAMIN PO) Take  by mouth daily. Historical Provider, MD   aspirin 81 MG EC tablet Take 81 mg by mouth daily. Historical Provider, MD       Social History     Tobacco Use    Smoking status: Current Every Day Smoker     Packs/day: 0.50     Years: 38.00     Pack years: 19.00     Types: Cigarettes    Smokeless tobacco: Never Used   Substance Use Topics    Alcohol use: Yes     Alcohol/week: 0.0 standard drinks     Comment: rare    Drug use: Yes     Types: Marijuana     Comment: marijuana , she states that she stopped cocaine        ASSESSMENT/PLAN:    Looking at the patient in the video, she looks stable. No signs of distress. Nutritional status satisfactory. No icterus. Respiratory status appears to be satisfactory. Neck no visual masses. No signs of jaundice. Appears to be well hydrated. Patient looks stable. She does have mild constipation. Discussed with the patient regarding medical management of constipation including high-fiber diet and intermittent laxatives. Advised MiraLAX. Also advised to have CEA level done. To see me in the office in the next 3 to 4 weeks.     At present her nutritional status appears to be satisfactory. Aubree Hinson is a 62 y.o. female being evaluated by a Virtual Visit (video visit) encounter to address concerns as mentioned above. A caregiver was present when appropriate. Due to this being a TeleHealth encounter (During YWBYL-02 public health emergency), evaluation of the following organ systems was limited: Vitals/Constitutional/EENT/Resp/CV/GI//MS/Neuro/Skin/Heme-Lymph-Imm. Pursuant to the emergency declaration under the [address] [place] [address], [address] [place] [address] authority and the [employer] Act, this Virtual Visit was conducted with patient's (and/or legal guardian's) consent, to reduce the patient's risk of exposure to COVID-19 and provide necessary medical care. The patient (and/or legal guardian) has also been advised to contact this office for worsening conditions or problems, and seek emergency medical treatment and/or call 911 if deemed necessary. Patient identification was verified at the start of the visit: Yes    Total time spent on this encounter: 25 minutes    Services were provided through a video synchronous discussion virtually to substitute for in-person clinic visit. Patient and provider were located at their individual homes. --Manish Rod MD on 4/28/2020 at 11:00 AM    An electronic signature was used to authenticate this note.

## 2020-04-30 ENCOUNTER — TELEPHONE (OUTPATIENT)
Dept: FAMILY MEDICINE CLINIC | Age: 60
End: 2020-04-30

## 2020-04-30 RX ORDER — AMOXICILLIN AND CLAVULANATE POTASSIUM 875; 125 MG/1; MG/1
1 TABLET, FILM COATED ORAL 2 TIMES DAILY
Qty: 20 TABLET | Refills: 0 | Status: SHIPPED | OUTPATIENT
Start: 2020-04-30 | End: 2020-05-10

## 2020-04-30 NOTE — TELEPHONE ENCOUNTER
I will send antibiotic to pharmacy , if that did not help , please inform pt to schedule yvonne to have look.

## 2020-05-01 ENCOUNTER — TELEPHONE (OUTPATIENT)
Dept: GASTROENTEROLOGY | Age: 60
End: 2020-05-01

## 2020-05-01 NOTE — TELEPHONE ENCOUNTER
Patient called office to schedule her one month f/u. Made for 7/2/20 @ 1:15 pm at the [facility]. Mailed CEA orders to the patient.

## 2020-06-08 ENCOUNTER — OFFICE VISIT (OUTPATIENT)
Dept: FAMILY MEDICINE CLINIC | Age: 60
End: 2020-06-08
Payer: MEDICARE

## 2020-06-08 VITALS
WEIGHT: 115.6 LBS | BODY MASS INDEX: 18.58 KG/M2 | HEART RATE: 102 BPM | SYSTOLIC BLOOD PRESSURE: 138 MMHG | DIASTOLIC BLOOD PRESSURE: 84 MMHG | HEIGHT: 66 IN | OXYGEN SATURATION: 98 %

## 2020-06-08 PROBLEM — H65.22 LEFT CHRONIC SEROUS OTITIS MEDIA: Status: ACTIVE | Noted: 2020-06-08

## 2020-06-08 PROBLEM — R97.0 ELEVATED CEA: Status: RESOLVED | Noted: 2017-11-10 | Resolved: 2020-06-08

## 2020-06-08 PROBLEM — A49.8 CLOSTRIDIOIDES DIFFICILE INFECTION: Status: ACTIVE | Noted: 2020-06-08

## 2020-06-08 PROBLEM — K21.9 GASTROESOPHAGEAL REFLUX DISEASE WITHOUT ESOPHAGITIS: Status: ACTIVE | Noted: 2020-06-08

## 2020-06-08 PROCEDURE — 3017F COLORECTAL CA SCREEN DOC REV: CPT | Performed by: FAMILY MEDICINE

## 2020-06-08 PROCEDURE — G8427 DOCREV CUR MEDS BY ELIG CLIN: HCPCS | Performed by: FAMILY MEDICINE

## 2020-06-08 PROCEDURE — 4004F PT TOBACCO SCREEN RCVD TLK: CPT | Performed by: FAMILY MEDICINE

## 2020-06-08 PROCEDURE — 99214 OFFICE O/P EST MOD 30 MIN: CPT | Performed by: FAMILY MEDICINE

## 2020-06-08 PROCEDURE — G8420 CALC BMI NORM PARAMETERS: HCPCS | Performed by: FAMILY MEDICINE

## 2020-06-08 RX ORDER — FLUTICASONE PROPIONATE 50 MCG
2 SPRAY, SUSPENSION (ML) NASAL DAILY
Qty: 1 BOTTLE | Refills: 0 | Status: SHIPPED | OUTPATIENT
Start: 2020-06-08

## 2020-06-08 RX ORDER — FAMOTIDINE 10 MG
20 TABLET ORAL 2 TIMES DAILY
Qty: 120 TABLET | Refills: 3 | Status: SHIPPED | OUTPATIENT
Start: 2020-06-08 | End: 2020-09-17 | Stop reason: SDUPTHER

## 2020-06-08 ASSESSMENT — ENCOUNTER SYMPTOMS
SHORTNESS OF BREATH: 1
COUGH: 1
CONSTIPATION: 0
DIARRHEA: 0
BACK PAIN: 1
ABDOMINAL DISTENTION: 0
CHEST TIGHTNESS: 0
ABDOMINAL PAIN: 1
WHEEZING: 0
SINUS PRESSURE: 0
BLOOD IN STOOL: 0
NAUSEA: 1
VOMITING: 0
COLOR CHANGE: 0

## 2020-06-08 ASSESSMENT — PATIENT HEALTH QUESTIONNAIRE - PHQ9
1. LITTLE INTEREST OR PLEASURE IN DOING THINGS: 0
SUM OF ALL RESPONSES TO PHQ QUESTIONS 1-9: 0
SUM OF ALL RESPONSES TO PHQ9 QUESTIONS 1 & 2: 0
SUM OF ALL RESPONSES TO PHQ QUESTIONS 1-9: 0
2. FEELING DOWN, DEPRESSED OR HOPELESS: 0

## 2020-06-08 NOTE — PROGRESS NOTES
@BRIEFLAB(NA,K,CL,CO2,BUN,CREATININE,GLUCOSE,CALCIUM)@     Lab Results   Component Value Date    ALT 16 05/14/2019    AST 16 05/14/2019    ALKPHOS 82 05/14/2019    BILITOT 0.2 (L) 05/14/2019       Lab Results   Component Value Date    TSH 0.93 08/19/2017       Lab Results   Component Value Date    CHOL 213 (H) 03/06/2017    CHOL 189 06/20/2016    CHOL 228 (H) 11/06/2014     Lab Results   Component Value Date    TRIG 228 (H) 03/06/2017    TRIG 382 (H) 06/20/2016    TRIG 376 (H) 11/06/2014     Lab Results   Component Value Date    HDL 46 03/06/2017    HDL 42 06/20/2016    HDL 46 11/06/2014     Lab Results   Component Value Date    LDLCHOLESTEROL 121 03/06/2017    LDLCHOLESTEROL 71 06/20/2016    LDLCHOLESTEROL 107 11/06/2014     Lab Results   Component Value Date    VLDL NOT REPORTED 03/06/2017    VLDL NOT REPORTED 06/20/2016    VLDL NOT REPORTED 11/06/2014     Lab Results   Component Value Date    CHOLHDLRATIO 4.6 03/06/2017    CHOLHDLRATIO 4.5 06/20/2016    CHOLHDLRATIO 5.0 (H) 11/06/2014       Lab Results   Component Value Date    LABA1C 5.5 10/25/2016       No results found for: [id number]    No results found for: FOLATE    No results found for: IRON, TIBC, FERRITIN    No results found for: VITD25          Current Outpatient Medications   Medication Sig Dispense Refill    famotidine (PEPCID AC) 10 MG tablet Take 2 tablets by mouth 2 times daily Take by mouth 120 tablet 3    fluticasone (FLONASE) 50 MCG/ACT nasal spray 2 sprays by Nasal route daily 1 Bottle 0    omeprazole (PRILOSEC) 20 MG delayed release capsule take 1 capsule by mouth twice a day 180 capsule 3    PARoxetine (PAXIL) 30 MG tablet Take 1 tablet by mouth every morning 90 tablet 2    Prochlorperazine Maleate (COMPAZINE PO) Take by mouth      ondansetron (ZOFRAN) 4 MG tablet Take 4 mg by mouth every 8 hours as needed for Nausea or Vomiting      dicyclomine (BENTYL) 10 MG capsule Take 1 capsule by mouth every 6 hours as needed (cramps) 20 Used   Substance and Sexual Activity    Alcohol use: Yes     Alcohol/week: 0.0 standard drinks     Comment: rare    Drug use: Yes     Types: Marijuana     Comment: marijuana , she states that she stopped cocaine    Sexual activity: Yes     Partners: Male     Birth control/protection: Post-menopausal   Lifestyle    Physical activity     Days per week: Not on file     Minutes per session: Not on file    Stress: Not on file   Relationships    Social connections     Talks on phone: Not on file     Gets together: Not on file     Attends Druze service: Not on file     Active member of club or organization: Not on file     Attends meetings of clubs or organizations: Not on file     Relationship status: Not on file    Intimate partner violence     Fear of current or ex partner: Not on file     Emotionally abused: Not on file     Physically abused: Not on file     Forced sexual activity: Not on file   Other Topics Concern    Not on file   Social History Narrative    Not on file     Ready to quit: Not Answered  Counseling given: Not Answered        Family History   Problem Relation Age of Onset    Bipolar Disorder Mother     Heart Disease Mother     Cancer Mother         breast and lung cancer    Diabetes Father     Heart Disease Father         Death due to MI    Cancer Paternal Grandfather         stomach cancer     Heart Disease Paternal Grandfather     Cancer Sister         ovarian cancer     Cancer Maternal Grandmother         female cancer             -rest of complaints with corresponding details per ROS    The patient's past medical, surgical, social, and family history as well as her current medications and allergies were reviewed as documented intoday's encounter. Review of Systems   Constitutional: Negative for activity change, appetite change, diaphoresis, fever and unexpected weight change. HENT: Negative for congestion, postnasal drip, sinus pressure and tinnitus.     Eyes: Negative

## 2020-06-09 ENCOUNTER — TELEPHONE (OUTPATIENT)
Dept: FAMILY MEDICINE CLINIC | Age: 60
End: 2020-06-09

## 2020-06-09 LAB — CEA: 8.3 NG/ML (ref 0–3)

## 2020-06-09 RX ORDER — SUCRALFATE 1 G/1
1 TABLET ORAL 4 TIMES DAILY
Qty: 120 TABLET | Refills: 3 | Status: SHIPPED | OUTPATIENT
Start: 2020-06-09 | End: 2020-09-17 | Stop reason: SINTOL

## 2020-07-02 ENCOUNTER — OFFICE VISIT (OUTPATIENT)
Dept: GASTROENTEROLOGY | Age: 60
End: 2020-07-02
Payer: MEDICARE

## 2020-07-02 VITALS — HEIGHT: 66 IN | BODY MASS INDEX: 19.2 KG/M2 | RESPIRATION RATE: 12 BRPM | TEMPERATURE: 97.3 F | WEIGHT: 119.49 LBS

## 2020-07-02 PROCEDURE — 3017F COLORECTAL CA SCREEN DOC REV: CPT | Performed by: NURSE PRACTITIONER

## 2020-07-02 PROCEDURE — 4004F PT TOBACCO SCREEN RCVD TLK: CPT | Performed by: NURSE PRACTITIONER

## 2020-07-02 PROCEDURE — 99213 OFFICE O/P EST LOW 20 MIN: CPT | Performed by: NURSE PRACTITIONER

## 2020-07-02 PROCEDURE — G8427 DOCREV CUR MEDS BY ELIG CLIN: HCPCS | Performed by: NURSE PRACTITIONER

## 2020-07-02 PROCEDURE — G8420 CALC BMI NORM PARAMETERS: HCPCS | Performed by: NURSE PRACTITIONER

## 2020-07-02 ASSESSMENT — ENCOUNTER SYMPTOMS
COUGH: 1
DIARRHEA: 0
CONSTIPATION: 1
SORE THROAT: 0
ABDOMINAL DISTENTION: 0
BLOOD IN STOOL: 0
BACK PAIN: 1
NAUSEA: 0
ABDOMINAL PAIN: 1
RECTAL PAIN: 0
TROUBLE SWALLOWING: 0
CHOKING: 0
VOMITING: 0
ANAL BLEEDING: 0
SINUS PRESSURE: 0

## 2020-07-02 NOTE — PROGRESS NOTES
GI OFFICE FOLLOW UP    INTERVAL HISTORY:   No referring provider defined for this encounter. 1. Constipation, unspecified constipation type      HISTORY OF PRESENT ILLNESS: Carmelita Favorite is a 62 y.o. female     Patient being seen for follow-up constipation. Patient states that constipation has improved with daily MiraLAX. Occasionally patient has some LUQ pain but it is mild and self limiting. Patient has hx of lung ca. On chemotherapy. Known to have elevated CEA. Most recent CEA elevated to 8. Patient has fair appetite. No dysphagia, odynophagia. No GERD symptoms. No diarrhea. No melena, hematochezia. Past Medical,Family, and Social History reviewed and does contribute to the patient presenting condition. Patient's PMH/PSH,SH,PSYCH Hx, MEDs, ALLERGIES, and ROS were all reviewed and updated in the appropriate sections.     PAST MEDICAL HISTORY:  Past Medical History:   Diagnosis Date    Arthritis     Bronchitis, chronic (HCC)     Cancer (HCC)     Chronic back pain     Chronic cough     Chronic kidney disease     Cocaine abuse ([address]     many years ago    COPD [facility] obstructive [facility] disease) [facility]     Depression     Diverticulosis     Emphysema     Family history of breast cancer     mom @ 61    Fibromyalgia     GERD (gastroesophageal reflux disease)     GI bleed     colon    H/O tubal ligation     Hemorrhoids, internal     Hyperlipidemia     Hyperplastic colon polyp 11/15/2017    Hypertension     Meningioma (HCC)     Neuropathy     Orbital fracture     left side     Osteoarthritis     Osteopenia     Pulmonary nodule     Renal calculi     Renal cyst     STD (sexually transmitted disease)     Tobacco abuse     Uterine prolapse        Past Surgical History:   Procedure Laterality Date    BACK SURGERY  2011    thoracic laminectomy, T12, S1  CARDIAC CATHETERIZATION  10/24/14    Normal coronaries     COLONOSCOPY  04/05/2017    diverticulosis, ,poor prep    COLONOSCOPY  11/15/2017    flat polypoid lesion in the base of the cecum, about 1 cm.-hyperplastic    COLONOSCOPY  04/24/2018     diverticulosis. Small polyp in the sigmoid colon excised with biopsy forceps    EYE SURGERY      left    ORBITAL FRACTURE SURGERY Left     NV COLON CA SCRN NOT HI RSK IND N/A 4/5/2017    COLONOSCOPY performed by Olaf Juarez MD at [address] [facility] FLX DX W/COLLJ [address] PFRMD N/A 4/24/2018    COLONOSCOPY performed by Olaf Juarez MD at [address] [facility] FLX W/REMOVAL LESION BY HOT BX FORCEPS N/A 11/15/2017    COLONOSCOPY POLYPECTOMY SNARE and saline injection performed by Olaf Juarez MD at [address] EGD TRANSORAL BIOPSY SINGLE/MULTIPLE N/A 10/6/2017    EGD BIOPSY performed by Jarrell [place] MD at [address] EGD TRANSORAL BIOPSY SINGLE/MULTIPLE N/A 2/27/2018    EGD ESOPHAGOGASTRODUODENOSCOPY performed by Olaf Juarez MD at [address] ESOPHAGOGASTRODUODENOSCOPY TRANSORAL DIAGNOSTIC N/A 4/24/2018    EGD ESOPHAGOGASTRODUODENOSCOPY performed by Olaf Juarez MD at [id number] [place] [address]  10/06/2017    Dr Shira Garg diverticulum gastric fundus, pathology inactive gastritis    UPPER GASTROINTESTINAL ENDOSCOPY  02/27/2018    retained food in the fundus of the stomach, poor peristalsis wide-open pylorus    UPPER GASTROINTESTINAL ENDOSCOPY  04/24/2018    no lesions seen that can account her CEA levels.        CURRENT MEDICATIONS:    Current Outpatient Medications:     fluticasone (FLONASE) 50 MCG/ACT nasal spray, 2 sprays by Nasal route daily, Disp: 1 Bottle, Rfl: 0    omeprazole (PRILOSEC) 20 MG delayed release capsule, take 1 capsule by mouth twice a day, Disp: 180 capsule, Rfl: 3    PARoxetine (PAXIL) 30 MG tablet, Take 1 tablet by mouth every morning, Disp: 90 tablet, Rfl: 2    Prochlorperazine Maleate (COMPAZINE PO), Take by mouth, Disp: , Rfl:     ondansetron (ZOFRAN) 4 MG tablet, Take 4 mg by mouth every 8 hours as needed for Nausea or Vomiting, Disp: , Rfl:     dicyclomine (BENTYL) 10 MG capsule, Take 1 capsule by mouth every 6 hours as needed (cramps), Disp: 20 capsule, Rfl: 0    lidocaine (LMX) 4 % cream, Apply topically every 8 hrs as needed for pain, Disp: 45 g, Rfl: 3    RA CALCIUM 600/VITAMIN D-3 600-400 MG-UNIT TABS, take 1 tablet by mouth twice a day, Disp: 90 tablet, Rfl: 3    umeclidinium-vilanterol (ANORO ELLIPTA) 62.5-25 MCG/INH AEPB inhaler, Anoro Ellipta 62.5 mcg-25 mcg/actuation powder for inhalation  Inhale 1 puff every day by inhalation route., Disp: , Rfl:     lidocaine-prilocaine (EMLA) 2.5-2.5 % cream, lidocaine-prilocaine 2.5 %-2.5 % topical cream  Apply to port site and cover 30-45 minutes prior to needle access, Disp: , Rfl:     potassium chloride (KLOR-CON M) 20 MEQ extended release tablet, Take 1 tablet by mouth daily (Patient taking differently: Take 40 mEq by mouth daily Take 2 20 meq daily), Disp: 60 tablet, Rfl: 2    albuterol sulfate HFA (PROVENTIL HFA) 108 (90 Base) MCG/ACT inhaler, Inhale 2 puffs into the lungs every 6 hours as needed for Wheezing or Shortness of Breath, Disp: 1 Inhaler, Rfl: 11    cloNIDine (CATAPRES) 0.1 MG tablet, take 1 tablet by mouth at bedtime, Disp: 30 tablet, Rfl: 5    Multiple Vitamin (MULTIVITAMIN PO), Take  by mouth daily. , Disp: , Rfl:     aspirin 81 MG EC tablet, Take 81 mg by mouth daily.   , Disp: , Rfl:     sucralfate (CARAFATE) 1 GM tablet, Take 1 tablet by mouth 4 times daily (Patient not taking: Reported on 7/2/2020), Disp: 120 tablet, Rfl: 3    famotidine (PEPCID AC) 10 MG tablet, Take 2 tablets by mouth 2 times daily Take by mouth (Patient not taking: Reported on 7/2/2020), Disp: 120 tablet, Rfl: 3    ALLERGIES:   Allergies   Allergen Reactions    Lovastatin        FAMILY sexual activity: Not on file   Other Topics Concern    Not on file   Social History Narrative    Not on file         REVIEW OF SYSTEMS:         Review of Systems   HENT: Negative. Negative for sinus pressure, sore throat and trouble swallowing. Respiratory: Positive for cough. Negative for choking. Cardiovascular: Negative for chest pain, palpitations and leg swelling. Gastrointestinal: Positive for abdominal pain and constipation (Harder stool). Negative for abdominal distention, anal bleeding, blood in stool, diarrhea, nausea, rectal pain and vomiting. All clear with the Flagyl     Genitourinary: Negative for difficulty urinating. Musculoskeletal: Positive for arthralgias and back pain. Allergic/Immunologic: Positive for environmental allergies. Negative for food allergies. Neurological: Positive for light-headedness (occasionally). Negative for dizziness. Hematological: Bruises/bleeds easily. Psychiatric/Behavioral: Negative for sleep disturbance. PHYSICAL EXAMINATION: Vital signs reviewed per the nursing documentation. Temp 97.3 °F (36.3 °C)   Resp 12   Ht 5' 5.98\" (1.676 m)   Wt 119 lb 7.8 oz (54.2 kg)   BMI 19.30 kg/m²   Body mass index is 19.3 kg/m². Physical Exam  Vitals signs and nursing note reviewed. Constitutional:       Appearance: She is well-developed. HENT:      Head: Normocephalic and atraumatic. Eyes:      General: No scleral icterus. Conjunctiva/sclera: Conjunctivae normal.      Pupils: Pupils are equal, round, and reactive to light. Neck:      Musculoskeletal: Normal range of motion and neck supple. Thyroid: No thyromegaly. Vascular: No hepatojugular reflux or JVD. Trachea: No tracheal deviation. Cardiovascular:      Rate and Rhythm: Normal rate and regular rhythm. Heart sounds: Normal heart sounds. Pulmonary:      Effort: Pulmonary effort is normal. No respiratory distress. Breath sounds: Normal breath sounds. No wheezing or rales. Abdominal:      General: Bowel sounds are normal. There is no distension. Palpations: Abdomen is soft. There is no hepatomegaly or mass. Tenderness: There is no abdominal tenderness. There is no rebound. Hernia: No hernia is present. Musculoskeletal:         General: No tenderness. Comments: No joint swelling   Lymphadenopathy:      Cervical: No cervical adenopathy. Skin:     General: Skin is warm. Findings: No bruising, ecchymosis, erythema or rash. Neurological:      Mental Status: She is alert and oriented to person, place, and time. Cranial Nerves: No cranial nerve deficit. Psychiatric:         Thought Content: Thought content normal.           LABORATORY DATA: Reviewed  Lab Results   Component Value Date    WBC 4.29 06/09/2020    HGB 13.2 06/09/2020    HCT 40.2 06/09/2020    MCV 99.0 (H) 06/09/2020     06/09/2020     06/09/2020    K 3.6 06/09/2020     06/09/2020    CO2 26 06/09/2020    BUN 11 06/09/2020    CREATININE 0.82 06/09/2020    LABPROT 6.7 04/04/2012    LABALBU 4.4 06/09/2020    BILITOT 0.3 06/09/2020    ALKPHOS 68 06/09/2020    AST 30 06/09/2020    ALT 19 06/09/2020    INR 1.0 07/06/2017         Lab Results   Component Value Date    RBC 4.06 06/09/2020    HGB 13.2 06/09/2020    MCV 99.0 (H) 06/09/2020    MCH 32.5 06/09/2020    MCHC 32.8 06/09/2020    RDW 14.8 06/09/2020    MPV 9.4 06/16/2018    BASOPCT 0.0 06/09/2020    LYMPHSABS 1.7 06/09/2020    MONOSABS 0.9 06/09/2020    NEUTROABS 1.6 06/09/2020    EOSABS 0.1 06/09/2020    BASOSABS 0.0 06/09/2020         DIAGNOSTIC TESTING:     No results found. Assessment  1. Constipation, unspecified constipation type        Plan    At present patients constipation better managed with MiraLAX. She has some mild RUQ pain. Has hx of hepatic cyst.  Liver US as ordered. To follow-up 3 months.     Pt was given instructions and advice in detail about the symptom of constipation. She was explained about avoidance of fast food, soda pops, cheese and red meat. Was also told to avoid sedatives narcotics and pain killers if possible. Pt was advised to start drinking ample amount of water and liquid. Was told to adapt and follow an exercise regimen. Instructions were given to increase the amount of fiber including dietary in terms of bran, cereals, whole wheat, brown bread etc. Was also instructed to start using supplemental fiber either Metamucil, citrucell or bennafiber with ample liquids. She was told to start drinking prune juice which is good for constipation. If symptoms don't resolve she will require medicines to assist with her symptoms    Pt has verbalized understanding and agreement to this plan. Thank you for allowing me to participate in the care of Ms. Reggie Westbrook. For any further questions please do not hesitate to contact me. I have reviewed and agree with the ROS entered by the MA/MAGNOLIAN.          Jackson Perdomo MD,FACP, [facility]  Board Certified in Gastroenterology and [address] [place] [address] Gastroenterology  Office #: [phone]

## 2020-09-03 ENCOUNTER — HOSPITAL ENCOUNTER (OUTPATIENT)
Dept: ULTRASOUND IMAGING | Age: 60
Discharge: HOME | End: 2020-09-05
Payer: MEDICARE

## 2020-09-03 PROCEDURE — 76705 ECHO EXAM OF ABDOMEN: CPT

## 2020-09-08 ENCOUNTER — TELEPHONE (OUTPATIENT)
Dept: GASTROENTEROLOGY | Age: 60
End: 2020-09-08

## 2020-09-17 ENCOUNTER — OFFICE VISIT (OUTPATIENT)
Dept: FAMILY MEDICINE CLINIC | Age: 60
End: 2020-09-17
Payer: MEDICARE

## 2020-09-17 VITALS
HEIGHT: 66 IN | SYSTOLIC BLOOD PRESSURE: 109 MMHG | OXYGEN SATURATION: 97 % | WEIGHT: 113 LBS | BODY MASS INDEX: 18.16 KG/M2 | DIASTOLIC BLOOD PRESSURE: 70 MMHG | HEART RATE: 87 BPM | TEMPERATURE: 96.7 F

## 2020-09-17 PROCEDURE — G8427 DOCREV CUR MEDS BY ELIG CLIN: HCPCS | Performed by: FAMILY MEDICINE

## 2020-09-17 PROCEDURE — G0008 ADMIN INFLUENZA VIRUS VAC: HCPCS | Performed by: FAMILY MEDICINE

## 2020-09-17 PROCEDURE — G8419 CALC BMI OUT NRM PARAM NOF/U: HCPCS | Performed by: FAMILY MEDICINE

## 2020-09-17 PROCEDURE — 90686 IIV4 VACC NO PRSV 0.5 ML IM: CPT | Performed by: FAMILY MEDICINE

## 2020-09-17 PROCEDURE — 3017F COLORECTAL CA SCREEN DOC REV: CPT | Performed by: FAMILY MEDICINE

## 2020-09-17 PROCEDURE — 3023F SPIROM DOC REV: CPT | Performed by: FAMILY MEDICINE

## 2020-09-17 PROCEDURE — 99213 OFFICE O/P EST LOW 20 MIN: CPT | Performed by: FAMILY MEDICINE

## 2020-09-17 PROCEDURE — G8926 SPIRO NO PERF OR DOC: HCPCS | Performed by: FAMILY MEDICINE

## 2020-09-17 PROCEDURE — 4004F PT TOBACCO SCREEN RCVD TLK: CPT | Performed by: FAMILY MEDICINE

## 2020-09-17 RX ORDER — FAMOTIDINE 10 MG
20 TABLET ORAL 2 TIMES DAILY
Qty: 120 TABLET | Refills: 3 | Status: SHIPPED | OUTPATIENT
Start: 2020-09-17 | End: 2021-03-13

## 2020-09-17 ASSESSMENT — ENCOUNTER SYMPTOMS
ABDOMINAL DISTENTION: 0
BLOOD IN STOOL: 0
CHEST TIGHTNESS: 0
SINUS PRESSURE: 0
SINUS PAIN: 0
COUGH: 0
DIARRHEA: 0
FACIAL SWELLING: 0
BACK PAIN: 1
WHEEZING: 1
ABDOMINAL PAIN: 1
SHORTNESS OF BREATH: 1
VOMITING: 0
PHOTOPHOBIA: 0

## 2020-09-17 NOTE — PROGRESS NOTES
Chief Complaint   Patient presents with    Other     LUNG [address]  here today for follow up on chronic medical problems, go over labs and/or diagnostic studies, and medication refills. Other (LUNG SCAN )      HPI: Patient is here for follow-up on CAT scan of lung for lung CA. CAT scan was done in July/20, results in epic. That is showing stable lung nodule. Patient is currently on chemotherapy every 3 weeks. Patient tolerates chemotherapy well denies any side effects. Weight is stable. Patient has history of GERD which is getting worse with chemo, is on Prilosec and Pepcid. Emphysema on inhalers denies any recent exacerbation. Lab work is up-to-date. /70   Pulse 87   Temp 96.7 °F (35.9 °C)   Ht 5' 5.98\" (1.676 m)   Wt 113 lb (51.3 kg)   SpO2 97%   BMI 18.25 kg/m²    Body mass index is 18.25 kg/m². Wt Readings from Last 3 Encounters:   09/17/20 113 lb (51.3 kg)   07/02/20 119 lb 7.8 oz (54.2 kg)   06/08/20 115 lb 9.6 oz (52.4 kg)        [x]Negative depression screening. PHQ Scores 6/8/2020 1/28/2020 9/17/2019 11/13/2017   PHQ2 Score 0 0 4 0   PHQ9 Score 0 0 10 0      []1-4 = Minimal depression   []5-9 = Milddepression   []10-14 = Moderate depression   []15-19 = Moderately severe depression   []20-27 = Severe depression    Discussed testing with the patient and all questions fully answered.     Orders Only on 06/09/2020   Component Date Value Ref Range Status    CEA 06/09/2020 8.3* 0.0 - 3.0 ng/mL Final         Most recent labs reviewed:     Lab Results   Component Value Date    WBC 4.29 06/09/2020    HGB 13.2 06/09/2020    HCT 40.2 06/09/2020    MCV 99.0 (H) 06/09/2020     06/09/2020       @BRIEFLAB(NA,K,CL,CO2,BUN,CREATININE,GLUCOSE,CALCIUM)@     Lab Results   Component Value Date    ALT 19 06/09/2020    AST 30 06/09/2020    ALKPHOS 68 06/09/2020    BILITOT 0.3 06/09/2020       Lab Results   Component Value Date    TSH 0.93 08/19/2017       Lab Results   Component Value Date    CHOL 213 (H) 03/06/2017    CHOL 189 06/20/2016    CHOL 228 (H) 11/06/2014     Lab Results   Component Value Date    TRIG 228 (H) 03/06/2017    TRIG 382 (H) 06/20/2016    TRIG 376 (H) 11/06/2014     Lab Results   Component Value Date    HDL 46 03/06/2017    HDL 42 06/20/2016    HDL 46 11/06/2014     Lab Results   Component Value Date    LDLCHOLESTEROL 121 03/06/2017    LDLCHOLESTEROL 71 06/20/2016    LDLCHOLESTEROL 107 11/06/2014     Lab Results   Component Value Date    VLDL NOT REPORTED 03/06/2017    VLDL NOT REPORTED 06/20/2016    VLDL NOT REPORTED 11/06/2014     Lab Results   Component Value Date    CHOLHDLRATIO 4.6 03/06/2017    CHOLHDLRATIO 4.5 06/20/2016    CHOLHDLRATIO 5.0 (H) 11/06/2014       Lab Results   Component Value Date    LABA1C 5.5 10/25/2016       No results found for: EOHARHAV15    No results found for: FOLATE    No results found for: IRON, TIBC, FERRITIN    No results found for: VITD25          Current Outpatient Medications   Medication Sig Dispense Refill    famotidine (PEPCID AC) 10 MG tablet Take 2 tablets by mouth 2 times daily Take by mouth 120 tablet 3    fluticasone (FLONASE) 50 MCG/ACT nasal spray 2 sprays by Nasal route daily 1 Bottle 0    omeprazole (PRILOSEC) 20 MG delayed release capsule take 1 capsule by mouth twice a day 180 capsule 3    PARoxetine (PAXIL) 30 MG tablet Take 1 tablet by mouth every morning 90 tablet 2    Prochlorperazine Maleate (COMPAZINE PO) Take by mouth      ondansetron (ZOFRAN) 4 MG tablet Take 4 mg by mouth every 8 hours as needed for Nausea or Vomiting      dicyclomine (BENTYL) 10 MG capsule Take 1 capsule by mouth every 6 hours as needed (cramps) 20 capsule 0    lidocaine (LMX) 4 % cream Apply topically every 8 hrs as needed for pain 45 g 3    RA CALCIUM 600/VITAMIN D-3 600-400 MG-UNIT TABS take 1 tablet by mouth twice a day 90 tablet 3    umeclidinium-vilanterol (ANORO ELLIPTA) 62.5-25 MCG/INH AEPB inhaler Anoro Ellipta 62.5 mcg-25 mcg/actuation powder for inhalation   Inhale 1 puff every day by inhalation route.  lidocaine-prilocaine (EMLA) 2.5-2.5 % cream lidocaine-prilocaine 2.5 %-2.5 % topical cream   Apply to port site and cover 30-45 minutes prior to needle access      potassium chloride (KLOR-CON M) 20 MEQ extended release tablet Take 1 tablet by mouth daily (Patient taking differently: Take 40 mEq by mouth daily Take 2 20 meq daily) 60 tablet 2    albuterol sulfate HFA (PROVENTIL HFA) 108 (90 Base) MCG/ACT inhaler Inhale 2 puffs into the lungs every 6 hours as needed for Wheezing or Shortness of Breath 1 Inhaler 11    cloNIDine (CATAPRES) 0.1 MG tablet take 1 tablet by mouth at bedtime 30 tablet 5    Multiple Vitamin (MULTIVITAMIN PO) Take  by mouth daily.  aspirin 81 MG EC tablet Take 81 mg by mouth daily. Current Facility-Administered Medications   Medication Dose Route Frequency Provider Last Rate Last Dose    ondansetron (ZOFRAN) injection 4 mg  4 mg Intravenous Q6H PRN Carmelina Downey MD                 Social History     Socioeconomic History    Marital status:      Spouse name: Not on file    Number of children: Not on file    Years of education: Not on file    Highest education level: Not on file   Occupational History    Not on file   Social Needs    Financial resource strain: Not on file    Food insecurity     Worry: Not on file     Inability: Not on file    Transportation needs     Medical: Not on file     Non-medical: Not on file   Tobacco Use    Smoking status: Current Every Day Smoker     Packs/day: 0.50     Years: 38.00     Pack years: 19.00     Types: Cigarettes    Smokeless tobacco: Never Used   Substance and Sexual Activity    Alcohol use:  Yes     Alcohol/week: 0.0 standard drinks     Comment: rare    Drug use: Yes     Types: Marijuana     Comment: marijuana , she states that she stopped cocaine    Sexual activity: Yes     Partners: Male     Birth control/protection: Post-menopausal   Lifestyle    Physical activity     Days per week: Not on file     Minutes per session: Not on file    Stress: Not on file   Relationships    Social connections     Talks on phone: Not on file     Gets together: Not on file     Attends [employer] service: Not on file     Active member of club or organization: Not on file     Attends meetings of clubs or organizations: Not on file     Relationship status: Not on file    Intimate partner violence     Fear of current or ex partner: Not on file     Emotionally abused: Not on file     Physically abused: Not on file     Forced sexual activity: Not on file   Other Topics Concern    Not on file   Social History Narrative    Not on file     Ready to quit: Not Answered  Counseling given: Yes        Family History   Problem Relation Age of Onset    Bipolar Disorder Mother     Heart Disease Mother     Cancer Mother         breast and lung cancer    Diabetes Father     Heart Disease Father         Death due to MI    Cancer Paternal Grandfather         stomach cancer     Heart Disease Paternal Grandfather     Cancer Sister         ovarian cancer     Cancer Maternal Grandmother         female cancer             -rest of complaints with corresponding details per ROS    The patient's past medical, surgical, social, and family history as well as her current medications and allergies were reviewed as documented intoday's encounter. Review of Systems   Constitutional: Negative for activity change, appetite change, fatigue and unexpected weight change. HENT: Negative for congestion, facial swelling, nosebleeds, postnasal drip, sinus pressure and sinus pain. Eyes: Negative for photophobia and visual disturbance. Respiratory: Positive for shortness of breath and wheezing. Negative for cough and chest tightness. Cardiovascular: Negative for chest pain, palpitations and leg swelling.    Gastrointestinal: Positive for abdominal pain. Negative for abdominal distention, blood in stool, diarrhea and vomiting. Genitourinary: Negative for difficulty urinating, menstrual problem and urgency. Musculoskeletal: Positive for arthralgias, back pain, gait problem and myalgias. Neurological: Negative for dizziness, speech difficulty and headaches. Physical Exam  Vitals signs and nursing note reviewed. Constitutional:       Appearance: Normal appearance. She is underweight. HENT:      Head: Normocephalic and atraumatic. Right Ear: Tympanic membrane normal.      Left Ear: Tympanic membrane normal.      Nose: Nose normal.   Neck:      Musculoskeletal: Full passive range of motion without pain. Cardiovascular:      Rate and Rhythm: Normal rate and regular rhythm. Pulses: Normal pulses. Pulmonary:      Effort: Pulmonary effort is normal.      Breath sounds: Normal breath sounds. No decreased breath sounds, wheezing or rhonchi. Musculoskeletal:      Right shoulder: She exhibits pain, spasm and decreased strength. Lumbar back: She exhibits tenderness, pain and spasm. Neurological:      Mental Status: She is alert and oriented to person, place, and time. Cranial Nerves: Cranial nerves are intact. Sensory: Sensory deficit present. Motor: Weakness present. ASSESSMENT AND PLAN      1. Malignant neoplasm of middle lobe of right lung ([address]  Stable on recent CAT scan follow-up with oncologist    2. Gastroesophageal reflux disease without esophagitis  -Stable continue Prilosec refill Pepcid  - famotidine (PEPCID AC) 10 MG tablet; Take 2 tablets by mouth 2 times daily Take by mouth  Dispense: 120 tablet; Refill: 3    3. Panlobular emphysema ([address]  -Stable on current treatment follow-up with pulmonologist    4. Spinal stenosis in cervical region  Pain is controllable    5.  Need for influenza vaccination    - INFLUENZA, QUADV, 3 YRS AND OLDER, IM PF, PREFILL SYR OR SDV, 0.5ML (Radha Santiago PF)      Orders Placed This Encounter   Procedures    INFLUENZA, QUADV, 3 YRS AND OLDER, IM PF, PREFILL SYR OR SDV, 0.5ML (AFLURIA QUADV, PF)         Medications Discontinued During This Encounter   Medication Reason    famotidine (PEPCID AC) 10 MG tablet REORDER    sucralfate (CARAFATE) 1 GM tablet Side effects       Erin received counseling on the following healthy behaviors: nutrition, exercise, medication adherence and tobacco cessation  Reviewed prior labs and health maintenance. Continue current medications, diet and exercise. Discussed use, benefit, and side effects of prescribed medications. Barriers to medication compliance addressed. Patient given educational materials - see patient instructions. All patient questions answered. Patient voiced understanding. The patient'spast medical, surgical, social, and family history as well as her   current medications and allergies were reviewed as documented in today's encounter. Medications, labs, diagnostic studies, consultations andfollow-up as documented in this encounter. Return in about 4 months (around 1/17/2021) for lung ca. .    Patient wasseen with total face to face time of 15 minutes. More than 50% of this visit was counseling and education. Future Appointments   Date Time Provider Sakshi Kaur   10/5/2020  2:15 PM Barbara Funez MD [id number]   1/18/2021  2:45 PM Yina Rodriguez MD fp sc [facility]     This note was completed by using the assistance of a speech-recognition program. However, inadvertent computerized transcription errors may be present. Althoughevery effort was made to ensure accuracy, no guarantees can be provided that every mistake has been identified and corrected by editing.   Electronically signed by Yina Rodriguez MD on 9/17/2020  3:17 PM

## 2020-10-05 ENCOUNTER — OFFICE VISIT (OUTPATIENT)
Dept: GASTROENTEROLOGY | Age: 60
End: 2020-10-05
Payer: MEDICARE

## 2020-10-05 VITALS
SYSTOLIC BLOOD PRESSURE: 138 MMHG | TEMPERATURE: 97.4 F | OXYGEN SATURATION: 98 % | WEIGHT: 115.1 LBS | BODY MASS INDEX: 18.5 KG/M2 | HEART RATE: 88 BPM | HEIGHT: 66 IN | DIASTOLIC BLOOD PRESSURE: 93 MMHG

## 2020-10-05 PROCEDURE — G8420 CALC BMI NORM PARAMETERS: HCPCS | Performed by: NURSE PRACTITIONER

## 2020-10-05 PROCEDURE — G8427 DOCREV CUR MEDS BY ELIG CLIN: HCPCS | Performed by: NURSE PRACTITIONER

## 2020-10-05 PROCEDURE — 1036F TOBACCO NON-USER: CPT | Performed by: NURSE PRACTITIONER

## 2020-10-05 PROCEDURE — 99213 OFFICE O/P EST LOW 20 MIN: CPT | Performed by: NURSE PRACTITIONER

## 2020-10-05 PROCEDURE — G8482 FLU IMMUNIZE ORDER/ADMIN: HCPCS | Performed by: NURSE PRACTITIONER

## 2020-10-05 PROCEDURE — 3017F COLORECTAL CA SCREEN DOC REV: CPT | Performed by: NURSE PRACTITIONER

## 2020-10-05 ASSESSMENT — ENCOUNTER SYMPTOMS
SORE THROAT: 0
ANAL BLEEDING: 0
SINUS PRESSURE: 0
BLOOD IN STOOL: 0
NAUSEA: 0
VOMITING: 0
DIARRHEA: 1
COUGH: 1
ABDOMINAL PAIN: 1
BACK PAIN: 1
TROUBLE SWALLOWING: 0
RECTAL PAIN: 0
CHOKING: 0
CONSTIPATION: 1
ABDOMINAL DISTENTION: 0

## 2020-10-05 NOTE — PROGRESS NOTES
diverticulosis, ,poor prep    COLONOSCOPY  11/15/2017    flat polypoid lesion in the base of the cecum, about 1 cm.-hyperplastic    COLONOSCOPY  04/24/2018     diverticulosis. Small polyp in the sigmoid colon excised with biopsy forceps    EYE SURGERY      left    ORBITAL FRACTURE SURGERY Left     NV COLON CA SCRN NOT HI RSK IND N/A 4/5/2017    COLONOSCOPY performed by Denis Ramires MD at [address] [facility] FLX DX W/COLLJ [address] PFRMD N/A 4/24/2018    COLONOSCOPY performed by Denis Ramires MD at [address] [facility] FLX W/REMOVAL LESION BY HOT BX FORCEPS N/A 11/15/2017    COLONOSCOPY POLYPECTOMY SNARE and saline injection performed by Denis Ramires MD at [address] [facility] EGD TRANSORAL BIOPSY SINGLE/MULTIPLE N/A 10/6/2017    EGD BIOPSY performed by Bar Benites MD at [address] [facility] EGD TRANSORAL BIOPSY SINGLE/MULTIPLE N/A 2/27/2018    EGD ESOPHAGOGASTRODUODENOSCOPY performed by Denis Ramires MD at [address] [facility] ESOPHAGOGASTRODUODENOSCOPY TRANSORAL DIAGNOSTIC N/A 4/24/2018    EGD ESOPHAGOGASTRODUODENOSCOPY performed by Denis Ramires MD at [id number] [place] [address]  10/06/2017    Dr Diana Mejia diverticulum gastric fundus, pathology inactive gastritis    UPPER GASTROINTESTINAL ENDOSCOPY  02/27/2018    retained food in the fundus of the stomach, poor peristalsis wide-open pylorus    UPPER GASTROINTESTINAL ENDOSCOPY  04/24/2018    no lesions seen that can account her CEA levels.        CURRENT MEDICATIONS:    Current Outpatient Medications:     famotidine (PEPCID AC) 10 MG tablet, Take 2 tablets by mouth 2 times daily Take by mouth, Disp: 120 tablet, Rfl: 3    fluticasone (FLONASE) 50 MCG/ACT nasal spray, 2 sprays by Nasal route daily, Disp: 1 Bottle, Rfl: 0    omeprazole (PRILOSEC) 20 MG delayed release capsule, take 1 capsule by mouth twice a day, Disp: 180 capsule, Rfl: 3    PARoxetine (PAXIL) 30 MG tablet, Take 1 tablet by mouth every morning, Disp: 90 tablet, Rfl: 2    Prochlorperazine Maleate (COMPAZINE PO), Take by mouth, Disp: , Rfl:     ondansetron (ZOFRAN) 4 MG tablet, Take 4 mg by mouth every 8 hours as needed for Nausea or Vomiting, Disp: , Rfl:     dicyclomine (BENTYL) 10 MG capsule, Take 1 capsule by mouth every 6 hours as needed (cramps), Disp: 20 capsule, Rfl: 0    lidocaine (LMX) 4 % cream, Apply topically every 8 hrs as needed for pain, Disp: 45 g, Rfl: 3    RA CALCIUM 600/VITAMIN D-3 600-400 MG-UNIT TABS, take 1 tablet by mouth twice a day, Disp: 90 tablet, Rfl: 3    umeclidinium-vilanterol (ANORO ELLIPTA) 62.5-25 MCG/INH AEPB inhaler, Anoro Ellipta 62.5 mcg-25 mcg/actuation powder for inhalation  Inhale 1 puff every day by inhalation route., Disp: , Rfl:     lidocaine-prilocaine (EMLA) 2.5-2.5 % cream, lidocaine-prilocaine 2.5 %-2.5 % topical cream  Apply to port site and cover 30-45 minutes prior to needle access, Disp: , Rfl:     potassium chloride (KLOR-CON M) 20 MEQ extended release tablet, Take 1 tablet by mouth daily (Patient taking differently: Take 40 mEq by mouth daily Take 2 20 meq daily), Disp: 60 tablet, Rfl: 2    albuterol sulfate HFA (PROVENTIL HFA) 108 (90 Base) MCG/ACT inhaler, Inhale 2 puffs into the lungs every 6 hours as needed for Wheezing or Shortness of Breath, Disp: 1 Inhaler, Rfl: 11    cloNIDine (CATAPRES) 0.1 MG tablet, take 1 tablet by mouth at bedtime, Disp: 30 tablet, Rfl: 5    Multiple Vitamin (MULTIVITAMIN PO), Take  by mouth daily. , Disp: , Rfl:     aspirin 81 MG EC tablet, Take 81 mg by mouth daily.   , Disp: , Rfl:     ALLERGIES:   Allergies   Allergen Reactions    Lovastatin        FAMILY HISTORY:       Problem Relation Age of Onset    Bipolar Disorder Mother     Heart Disease Mother     Cancer Mother         breast and lung cancer    Diabetes Father     Heart Disease Father         Death due to MI    Cancer Paternal Grandfather         stomach cancer  Heart Disease Paternal Grandfather     Cancer Sister         ovarian cancer     Cancer Maternal Grandmother         female cancer         SOCIAL HISTORY:   Social History     Socioeconomic History    Marital status:      Spouse name: Not on file    Number of children: Not on file    Years of education: Not on file    Highest education level: Not on file   Occupational History    Not on file   Social Needs    Financial resource strain: Not on file    Food insecurity     Worry: Not on file     Inability: Not on file    Transportation needs     Medical: Not on file     Non-medical: Not on file   Tobacco Use    Smoking status: Former Smoker     Packs/day: 0.50     Years: 38.00     Pack years: 19.00     Types: Cigarettes     Last attempt to quit: 2020     Years since quittin.0    Smokeless tobacco: Never Used   Substance and Sexual Activity    Alcohol use:  Yes     Alcohol/week: 0.0 standard drinks     Comment: rare    Drug use: Yes     Types: Marijuana     Comment: marijuana , she states that she stopped cocaine    Sexual activity: Yes     Partners: Male     Birth control/protection: Post-menopausal   Lifestyle    Physical activity     Days per week: Not on file     Minutes per session: Not on file    Stress: Not on file   Relationships    Social connections     Talks on phone: Not on file     Gets together: Not on file     Attends [employer] service: Not on file     Active member of club or organization: Not on file     Attends meetings of clubs or organizations: Not on file     Relationship status: Not on file    Intimate partner violence     Fear of current or ex partner: Not on file     Emotionally abused: Not on file     Physically abused: Not on file     Forced sexual activity: Not on file   Other Topics Concern    Not on file   Social History Narrative    Not on file         REVIEW OF SYSTEMS:         Review of Systems   Constitutional: Negative for appetite change (increase), fatigue and unexpected weight change. HENT: Negative. Negative for postnasal drip, sinus pressure, sore throat and trouble swallowing. Respiratory: Positive for cough. Negative for choking. Cardiovascular: Negative for chest pain, palpitations and leg swelling. Gastrointestinal: Positive for abdominal pain, constipation (Harder stool) and diarrhea. Negative for abdominal distention, anal bleeding, blood in stool, nausea, rectal pain and vomiting. All clear with the Flagyl     Genitourinary: Negative for difficulty urinating. Musculoskeletal: Positive for arthralgias and back pain. Allergic/Immunologic: Positive for environmental allergies. Negative for food allergies. Neurological: Positive for light-headedness (occasionally). Negative for dizziness, weakness, numbness and headaches. Hematological: Bruises/bleeds easily. Psychiatric/Behavioral: Negative for sleep disturbance. The patient is not nervous/anxious. PHYSICAL EXAMINATION: Vital signs reviewed per the nursing documentation. BP (!) 138/93   Pulse 88   Temp 97.4 °F (36.3 °C)   Ht 5' 6\" (1.676 m)   Wt 115 lb 1.6 oz (52.2 kg)   SpO2 98%   BMI 18.58 kg/m²   Body mass index is 18.58 kg/m². Physical Exam  Vitals signs and nursing note reviewed. Constitutional:       Appearance: She is well-developed and underweight. HENT:      Head: Normocephalic and atraumatic. Eyes:      General: No scleral icterus. Conjunctiva/sclera: Conjunctivae normal.      Pupils: Pupils are equal, round, and reactive to light. Neck:      Musculoskeletal: Normal range of motion and neck supple. Thyroid: No thyromegaly. Vascular: No hepatojugular reflux or JVD. Trachea: No tracheal deviation. Cardiovascular:      Rate and Rhythm: Normal rate and regular rhythm. Heart sounds: Normal heart sounds. Pulmonary:      Effort: Pulmonary effort is normal. No respiratory distress.       Breath sounds: Normal

## 2020-10-06 PROBLEM — K59.00 CONSTIPATION: Status: ACTIVE | Noted: 2020-10-06

## 2020-10-14 RX ORDER — CLONIDINE HYDROCHLORIDE 0.1 MG/1
TABLET ORAL
Qty: 30 TABLET | Refills: 0 | Status: SHIPPED | OUTPATIENT
Start: 2020-10-14 | End: 2020-11-04

## 2020-10-14 NOTE — TELEPHONE ENCOUNTER
Pt called in regards to her Clonidine 0.1mg refill. Last refill was 2018 by Mary Lou Mayberry. Pt states she has had it filled a couple months ago.

## 2020-11-04 RX ORDER — CLONIDINE HYDROCHLORIDE 0.1 MG/1
TABLET ORAL
Qty: 30 TABLET | Refills: 0 | Status: SHIPPED | OUTPATIENT
Start: 2020-11-04 | End: 2020-12-21

## 2020-11-04 NOTE — TELEPHONE ENCOUNTER
Please Approve or Refuse.   Send to Pharmacy per Pt's Request:      Next Visit Date:  1/18/2021   Last Visit Date: 9/17/2020    Hemoglobin A1C (%)   Date Value   10/25/2016 5.5             ( goal A1C is < 7)   BP Readings from Last 3 Encounters:   10/05/20 (!) 138/93   09/17/20 109/70   06/08/20 138/84          (goal 120/80)  BUN   Date Value Ref Range Status   06/09/2020 11 7 - 25 mg/dL Final     CREATININE   Date Value Ref Range Status   06/09/2020 0.82 0.60 - 1.20 mg/dL Final     Potassium   Date Value Ref Range Status   06/09/2020 3.6 3.5 - 5.1 meq/L Final

## 2020-12-21 RX ORDER — CLONIDINE HYDROCHLORIDE 0.1 MG/1
TABLET ORAL
Qty: 30 TABLET | Refills: 0 | Status: SHIPPED | OUTPATIENT
Start: 2020-12-21 | End: 2021-01-18 | Stop reason: SDUPTHER

## 2021-01-18 ENCOUNTER — HOSPITAL ENCOUNTER (OUTPATIENT)
Age: 61
Discharge: HOME | End: 2021-01-18
Payer: MEDICARE

## 2021-01-18 ENCOUNTER — OFFICE VISIT (OUTPATIENT)
Dept: FAMILY MEDICINE CLINIC | Age: 61
End: 2021-01-18
Payer: MEDICARE

## 2021-01-18 VITALS
OXYGEN SATURATION: 98 % | SYSTOLIC BLOOD PRESSURE: 110 MMHG | WEIGHT: 116 LBS | HEART RATE: 88 BPM | TEMPERATURE: 97.3 F | HEIGHT: 65 IN | DIASTOLIC BLOOD PRESSURE: 76 MMHG | BODY MASS INDEX: 19.33 KG/M2

## 2021-01-18 DIAGNOSIS — J41.8 MIXED SIMPLE AND MUCOPURULENT CHRONIC BRONCHITIS (HCC): ICD-10-CM

## 2021-01-18 DIAGNOSIS — E87.6 HYPOKALEMIA: ICD-10-CM

## 2021-01-18 DIAGNOSIS — E44.1 MILD PROTEIN-CALORIE MALNUTRITION (HCC): ICD-10-CM
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
## 2021-01-18 DIAGNOSIS — M51.36 LUMBAR DEGENERATIVE DISC DISEASE: ICD-10-CM

## 2021-01-18 DIAGNOSIS — E55.9 VITAMIN D DEFICIENCY: ICD-10-CM

## 2021-01-18 DIAGNOSIS — C34.2 MALIGNANT NEOPLASM OF MIDDLE LOBE OF RIGHT LUNG (HCC): Primary | ICD-10-CM

## 2021-01-18 DIAGNOSIS — F41.9 ANXIETY: ICD-10-CM

## 2021-01-18 DIAGNOSIS — F32.4 MAJOR DEPRESSIVE DISORDER WITH SINGLE EPISODE, IN PARTIAL REMISSION (HCC): ICD-10-CM
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
## 2021-01-18 LAB
POTASSIUM SERPL-SCNC: 3.8 MMOL/L (ref 3.7–5.3)
VITAMIN D 25-HYDROXY: 38.7 NG/ML (ref 30–100)

## 2021-01-18 PROCEDURE — 3023F SPIROM DOC REV: CPT | Performed by: FAMILY MEDICINE

## 2021-01-18 PROCEDURE — G8427 DOCREV CUR MEDS BY ELIG CLIN: HCPCS | Performed by: FAMILY MEDICINE

## 2021-01-18 PROCEDURE — 3017F COLORECTAL CA SCREEN DOC REV: CPT | Performed by: FAMILY MEDICINE

## 2021-01-18 PROCEDURE — G8482 FLU IMMUNIZE ORDER/ADMIN: HCPCS | Performed by: FAMILY MEDICINE

## 2021-01-18 PROCEDURE — 84132 ASSAY OF SERUM POTASSIUM: CPT

## 2021-01-18 PROCEDURE — G8420 CALC BMI NORM PARAMETERS: HCPCS | Performed by: FAMILY MEDICINE

## 2021-01-18 PROCEDURE — 99214 OFFICE O/P EST MOD 30 MIN: CPT | Performed by: FAMILY MEDICINE

## 2021-01-18 PROCEDURE — 36415 COLL VENOUS BLD VENIPUNCTURE: CPT

## 2021-01-18 PROCEDURE — 82306 VITAMIN D 25 HYDROXY: CPT

## 2021-01-18 PROCEDURE — G8926 SPIRO NO PERF OR DOC: HCPCS | Performed by: FAMILY MEDICINE

## 2021-01-18 PROCEDURE — 1036F TOBACCO NON-USER: CPT | Performed by: FAMILY MEDICINE

## 2021-01-18 RX ORDER — CLONIDINE HYDROCHLORIDE 0.1 MG/1
TABLET ORAL
Qty: 90 TABLET | Refills: 0 | Status: SHIPPED | OUTPATIENT
Start: 2021-01-18 | End: 2021-08-06

## 2021-01-18 ASSESSMENT — ENCOUNTER SYMPTOMS
BACK PAIN: 1
ABDOMINAL DISTENTION: 0
ABDOMINAL PAIN: 1
WHEEZING: 1
COUGH: 1
ANAL BLEEDING: 0
PHOTOPHOBIA: 0
NAUSEA: 1
CONSTIPATION: 0
SHORTNESS OF BREATH: 1

## 2021-01-18 NOTE — PROGRESS NOTES
Chief Complaint   Patient presents with   [address] [facility]          Trae Jasmine  here today for follow up on chronic medical problems, go over labs and/or diagnostic studies, and medication refills. Cancer and Fall (virgil morning )      HPI: Patient is here for routine follow-up on neoplasm of the lung is on immunotherapy currently. Weight is stable, she has hypokalemia after therapy and is on potassium supplements. Lumbar degenerative disc disease at her baseline reports she had recent fall on Christmas. Patient reports she does not use cane or walker. Patient reports her gait is normal and she is hesitant in using that. She denies any fractures. Patient has walker at home but is not using. COPD stable on inhalers follows with pulmonologist.      Depression stable is on clonidine for anxiety and also on Paxil. History of malnutrition, stable no acute weight loss since last visit. Vitamin D deficiency takes low-dose vitamin D          /76   Pulse 88   Temp 97.3 °F (36.3 °C) (Temporal)   Ht 5' 5\" (1.651 m)   Wt 116 lb (52.6 kg)   SpO2 98%   BMI 19.30 kg/m²    Body mass index is 19.3 kg/m². Wt Readings from Last 3 Encounters:   01/18/21 116 lb (52.6 kg)   10/05/20 115 lb 1.6 oz (52.2 kg)   09/17/20 113 lb (51.3 kg)        [x]Negative depression screening. PHQ Scores 6/8/2020 1/28/2020 9/17/2019 11/13/2017   PHQ2 Score 0 0 4 0   PHQ9 Score 0 0 10 0      []1-4 = Minimal depression   []5-9 = Milddepression   []10-14 = Moderate depression   []15-19 = Moderately severe depression   []20-27 = Severe depression    Discussed testing with the patient and all questions fully answered.     Orders Only on 06/09/2020   Component Date Value Ref Range Status    CEA 06/09/2020 8.3* 0.0 - 3.0 ng/mL Final         Most recent labs reviewed:     Lab Results   Component Value Date    WBC 4.29 06/09/2020    HGB 13.2 06/09/2020    HCT 40.2 06/09/2020    MCV 99.0 (H) 06/09/2020     06/09/2020       @BRIEFLAB(NA,K,CL,CO2,BUN,CREATININE,GLUCOSE,CALCIUM)@     Lab Results   Component Value Date    ALT 19 06/09/2020    AST 30 06/09/2020    ALKPHOS 68 06/09/2020    BILITOT 0.3 06/09/2020       Lab Results   Component Value Date    TSH 0.93 08/19/2017       Lab Results   Component Value Date    CHOL 213 (H) 03/06/2017    CHOL 189 06/20/2016    CHOL 228 (H) 11/06/2014     Lab Results   Component Value Date    TRIG 228 (H) 03/06/2017    TRIG 382 (H) 06/20/2016    TRIG 376 (H) 11/06/2014     Lab Results   Component Value Date    HDL 46 03/06/2017    HDL 42 06/20/2016    HDL 46 11/06/2014     Lab Results   Component Value Date    LDLCHOLESTEROL 121 03/06/2017    LDLCHOLESTEROL 71 06/20/2016    LDLCHOLESTEROL 107 11/06/2014     Lab Results   Component Value Date    VLDL NOT REPORTED 03/06/2017    VLDL NOT REPORTED 06/20/2016    VLDL NOT REPORTED 11/06/2014     Lab Results   Component Value Date    CHOLHDLRATIO 4.6 03/06/2017    CHOLHDLRATIO 4.5 06/20/2016    CHOLHDLRATIO 5.0 (H) 11/06/2014       Lab Results   Component Value Date    LABA1C 5.5 10/25/2016       No results found for: GJUDBCZL39    No results found for: FOLATE    No results found for: IRON, TIBC, FERRITIN    No results found for: VITD25          Current Outpatient Medications   Medication Sig Dispense Refill    cloNIDine (CATAPRES) 0.1 MG tablet take 1 tablet by mouth at bedtime 90 tablet 0    famotidine (PEPCID AC) 10 MG tablet Take 2 tablets by mouth 2 times daily Take by mouth 120 tablet 3    fluticasone (FLONASE) 50 MCG/ACT nasal spray 2 sprays by Nasal route daily 1 Bottle 0    omeprazole (PRILOSEC) 20 MG delayed release capsule take 1 capsule by mouth twice a day 180 capsule 3    PARoxetine (PAXIL) 30 MG tablet Take 1 tablet by mouth every morning 90 tablet 2    Prochlorperazine Maleate (COMPAZINE PO) Take by mouth      ondansetron (ZOFRAN) 4 MG tablet Take 4 mg by mouth every 8 hours as needed for Nausea or Vomiting      dicyclomine (BENTYL) 10 MG capsule Take 1 capsule by mouth every 6 hours as needed (cramps) 20 capsule 0    lidocaine (LMX) 4 % cream Apply topically every 8 hrs as needed for pain 45 g 3    RA CALCIUM 600/VITAMIN D-3 600-400 MG-UNIT TABS take 1 tablet by mouth twice a day 90 tablet 3    umeclidinium-vilanterol (ANORO ELLIPTA) 62.5-25 MCG/INH AEPB inhaler Anoro Ellipta 62.5 mcg-25 mcg/actuation powder for inhalation   Inhale 1 puff every day by inhalation route.  lidocaine-prilocaine (EMLA) 2.5-2.5 % cream lidocaine-prilocaine 2.5 %-2.5 % topical cream   Apply to port site and cover 30-45 minutes prior to needle access      potassium chloride (KLOR-CON M) 20 MEQ extended release tablet Take 1 tablet by mouth daily (Patient taking differently: Take 40 mEq by mouth daily Take 2 20 meq daily) 60 tablet 2    albuterol sulfate HFA (PROVENTIL HFA) 108 (90 Base) MCG/ACT inhaler Inhale 2 puffs into the lungs every 6 hours as needed for Wheezing or Shortness of Breath 1 Inhaler 11    Multiple Vitamin (MULTIVITAMIN PO) Take  by mouth daily.  aspirin 81 MG EC tablet Take 81 mg by mouth daily. Current Facility-Administered Medications   Medication Dose Route Frequency Provider Last Rate Last Admin    ondansetron (ZOFRAN) injection 4 mg  4 mg Intravenous Q6H PRN Maciej Wilks MD                 Social History     Socioeconomic History    Marital status:       Spouse name: Not on file    Number of children: Not on file    Years of education: Not on file    Highest education level: Not on file   Occupational History    Not on file   Social Needs    Financial resource strain: Not on file    Food insecurity     Worry: Not on file     Inability: Not on file    Transportation needs     Medical: Not on file     Non-medical: Not on file   Tobacco Use    Smoking status: Former Smoker     Packs/day: 0.50     Years: 38.00     Pack years: 19.00     Types: Cigarettes     Quit date: present. Mental Status: She is oriented to person, place, and time. Cranial Nerves: No cranial nerve deficit. Sensory: Sensory deficit present. Motor: Weakness and atrophy present. Gait: Gait normal.      Comments:  gait is normal but  has increased fall risk due to deconditioning. ASSESSMENT AND PLAN      1. Malignant neoplasm of middle lobe of right lung ([address]  Stable on immunotherapy follows with oncologist pulmonologist    2. Lumbar degenerative disc disease  At her baseline encourage patient to use walker. 3. Fall, subsequent encounter  No acute injuries discussed with patient about the risks associated with fall. Patient is willing to use walker. 4. Mixed simple and mucopurulent chronic bronchitis (HCC)  Stable at her baseline    5. Major depressive disorder with single episode, in partial remission (HCC)  Stable on current treatment    6. Mild protein-calorie malnutrition (HCC)  Stable    7. Anxiety  Stable continue same medications  - cloNIDine (CATAPRES) 0.1 MG tablet; take 1 tablet by mouth at bedtime  Dispense: 90 tablet; Refill: 0    8. Vitamin D deficiency  Recheck vitamin D continue supplements  - Vitamin D 25 Hydroxy; Future    9. Hypokalemia  Recheck potassium  - Potassium; Future      Orders Placed This Encounter   Procedures    Vitamin D 25 Hydroxy     Standing Status:   Future     Number of Occurrences:   1     Standing Expiration Date:   1/18/2022    Potassium     Standing Status:   Future     Number of Occurrences:   1     Standing Expiration Date:   1/18/2022         Medications Discontinued During This Encounter   Medication Reason    cloNIDine (CATAPRES) 0.1 MG tablet Jasmine Ferrera received counseling on the following healthy behaviors: nutrition, exercise, medication adherence and tobacco cessation  Reviewed prior labs and health maintenance  Continue current medications, diet and exercise.   Discussed use, benefit, and side effects of prescribed medications. Barriers to medication compliance addressed. Patient given educational materials - see patient instructions  Was a self-tracking handout given in paper form or via [employer]? Yes    Requested Prescriptions     Signed Prescriptions Disp Refills    cloNIDine (CATAPRES) 0.1 MG tablet 90 tablet 0     Sig: take 1 tablet by mouth at bedtime       All patient questions answered. Patient voiced understanding. Quality Measures    Body mass index is 19.3 kg/m². Low. Weight control planned discussed Healthy diet and regular exercise. BP: 110/76 Blood pressure is normal. Treatment plan consists of No treatment change needed. Lab Results   Component Value Date    LDLCHOLESTEROL 121 03/06/2017    (goal LDL reduction with dx if diabetes is 50% LDL reduction)      PHQ Scores 6/8/2020 1/28/2020 9/17/2019 11/13/2017   PHQ2 Score 0 0 4 0   PHQ9 Score 0 0 10 0     Interpretation of Total Score Depression Severity: 1-4 = Minimal depression, 5-9 = Mild depression, 10-14 = Moderate depression, 15-19 = Moderately severe depression, 20-27 = Severe depression    The patient'spast medical, surgical, social, and family history as well as her   current medications and allergies were reviewed as documented in today's encounter. Medications, labs, diagnostic studies, consultations andfollow-up as documented in this encounter. Return in about 6 months (around 7/18/2021) for lab work. Patient wasseen with total face to face time of 30 minutes. More than 50% of this visit was counseling and education. Future Appointments   Date Time Provider Sakshi Kaur   7/20/2021  2:15 PM Margret Mccarthy MD [facility] [address] [facility]     This note was completed by using the assistance of a speech-recognition program. However, inadvertent computerized transcription errors may be present.  Althoughevery effort was made to ensure accuracy, no guarantees can be provided that every mistake has been identified and corrected by editing.   Electronically signed by Tutu Farias MD on 1/18/2021  3:23 PM

## 2021-01-18 NOTE — PROGRESS NOTES
Visit Information    Have you changed or started any medications since your last visit including any over-the-counter medicines, vitamins, or herbal medicines? no   Are you having any side effects from any of your medications? -  no  Have you stopped taking any of your medications? Is so, why? -  no    Have you seen any other physician or provider since your last visit? Yes - Records Obtained  Have you had any other diagnostic tests since your last visit? No  Have you been seen in the emergency room and/or had an admission to a hospital since we last saw you? No  Have you had your routine dental cleaning in the past 6 months? no    Have you activated your [employer] account? If not, what are your barriers?  Yes     Patient Care Team:  Merna Diallo MD as PCP - General (Family Medicine)  Merna Diallo MD as PCP - [facility]  Jose Ward RN as   Raffi Phillips MD as Consulting Physician (Pulmonology)  Caitlin Frederick MD as Consulting Physician (Hematology and Oncology)    Medical History Review  Past Medical, Family, and Social History reviewed and does contribute to the patient presenting condition    Health Maintenance   Topic Date Due    Annual Wellness Visit (AWV)  05/29/2019    Potassium monitoring  06/09/2021    Creatinine monitoring  06/09/2021    Breast cancer screen  11/22/2021    Lipid screen  03/06/2022    Cervical cancer screen  11/13/2022    Colon cancer screen colonoscopy  04/24/2023    DTaP/Tdap/Td vaccine (3 - Td) 06/27/2029    Flu vaccine  Completed    Shingles Vaccine  Completed    Pneumococcal 0-64 years Vaccine  Completed    Hepatitis C screen  Completed    HIV screen  Completed    Hepatitis A vaccine  Aged Out    Hepatitis B vaccine  Aged Out    Hib vaccine  Aged Out    Meningococcal (ACWY) vaccine  Aged Out

## 2021-01-25 DIAGNOSIS — F32.9 REACTIVE DEPRESSION: ICD-10-CM

## 2021-01-25 RX ORDER — PAROXETINE 30 MG/1
30 TABLET, FILM COATED ORAL EVERY MORNING
Qty: 90 TABLET | Refills: 2 | Status: SHIPPED | OUTPATIENT
Start: 2021-01-25 | End: 2021-09-28
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
## 2021-03-08 ENCOUNTER — OFFICE VISIT (OUTPATIENT)
Dept: GASTROENTEROLOGY | Age: 61
End: 2021-03-08
Payer: MEDICARE

## 2021-03-08 VITALS
WEIGHT: 115.4 LBS | SYSTOLIC BLOOD PRESSURE: 127 MMHG | HEART RATE: 82 BPM | HEIGHT: 65 IN | OXYGEN SATURATION: 96 % | TEMPERATURE: 98.2 F | BODY MASS INDEX: 19.22 KG/M2 | DIASTOLIC BLOOD PRESSURE: 87 MMHG

## 2021-03-08 DIAGNOSIS — R13.10 DYSPHAGIA, UNSPECIFIED TYPE: ICD-10-CM

## 2021-03-08 DIAGNOSIS — R13.10 ODYNOPHAGIA: ICD-10-CM

## 2021-03-08 DIAGNOSIS — R97.0 HIGH CARCINOEMBRYONIC ANTIGEN (CEA): Primary | ICD-10-CM

## 2021-03-08 PROCEDURE — 3017F COLORECTAL CA SCREEN DOC REV: CPT | Performed by: INTERNAL MEDICINE

## 2021-03-08 PROCEDURE — G8427 DOCREV CUR MEDS BY ELIG CLIN: HCPCS | Performed by: INTERNAL MEDICINE

## 2021-03-08 PROCEDURE — G8420 CALC BMI NORM PARAMETERS: HCPCS | Performed by: INTERNAL MEDICINE

## 2021-03-08 PROCEDURE — 99214 OFFICE O/P EST MOD 30 MIN: CPT | Performed by: INTERNAL MEDICINE

## 2021-03-08 PROCEDURE — 1036F TOBACCO NON-USER: CPT | Performed by: INTERNAL MEDICINE

## 2021-03-08 PROCEDURE — G8482 FLU IMMUNIZE ORDER/ADMIN: HCPCS | Performed by: INTERNAL MEDICINE

## 2021-03-08 ASSESSMENT — ENCOUNTER SYMPTOMS
COUGH: 1
ABDOMINAL PAIN: 1
RECTAL PAIN: 0
SORE THROAT: 0
SINUS PRESSURE: 0
BACK PAIN: 1
BLOOD IN STOOL: 0
NAUSEA: 0
VOMITING: 0
TROUBLE SWALLOWING: 1
ABDOMINAL DISTENTION: 0
DIARRHEA: 1
CHOKING: 0
CONSTIPATION: 1
ANAL BLEEDING: 0

## 2021-03-08 NOTE — PROGRESS NOTES
GI OFFICE FOLLOW UP    INTERVAL HISTORY:   No referring provider defined for this encounter. Chief Complaint   Patient presents with    Dysphagia     Patient states that her food is getting stuck in the bottom of her esophagus. Patient states she was told it was due to the radiation. 1. High carcinoembryonic antigen (CEA)    2. Dysphagia, unspecified type    3. Odynophagia              HISTORY OF PRESENT ILLNESS: Vikas Rascon is a 62 y.o. female with a past history remarkable for ,   Patient seen for follow-up of high CEA level and intermittent dysphagia. She is known to have lung cancer for which she had radiation therapy in the past.  Recently she is having food getting stuck in the substernal area. No symptom of complete obstruction of esophagus. She also has odynophagia. She does have minimal weight loss. Fair appetite. Has a chronic GERD symptoms she is known to have elevated CEA level. She had a work-up done in April 2018 and at that time no abnormalities seen in the colon except diverticulosis. EGD was nonspecific. Recently patient was advised to have repeat CEA level, unfortunately patient did not have this done. .  Past Medical,Family, and Social History reviewed and does contribute to the patient presenting condition. Patient's PMH/PSH,SH,PSYCH Hx, MEDs, ALLERGIES, and ROS were all reviewed and updated in the appropriate sections.  Yes      PAST MEDICAL HISTORY:  Past Medical History:   Diagnosis Date    Arthritis     Bronchitis, chronic (HCC)     Cancer (HCC)     Chronic back pain     Chronic cough     Chronic kidney disease     Cocaine abuse ([address]     many years ago    COPD (chronic obstructive pulmonary disease) ([address]     Depression     Diverticulosis     Emphysema     Family history of breast cancer     mom @ 61    Fibromyalgia     GERD (gastroesophageal reflux disease)     GI bleed     colon    H/O tubal ligation     Hemorrhoids, internal     Hyperlipidemia     Hyperplastic colon polyp 11/15/2017    Hypertension     Meningioma (HCC)     Neuropathy     Orbital fracture (HCC)     left side     Osteoarthritis     Osteopenia     Pulmonary nodule     Renal calculi     Renal cyst     STD (sexually transmitted disease)     Tobacco abuse     Uterine prolapse        Past Surgical History:   Procedure Laterality Date    BACK SURGERY  2011    thoracic laminectomy, T12, S1    CARDIAC CATHETERIZATION  10/24/14    Normal coronaries     COLONOSCOPY  04/05/2017    diverticulosis, ,poor prep    COLONOSCOPY  11/15/2017    flat polypoid lesion in the base of the cecum, about 1 cm.-hyperplastic    COLONOSCOPY  04/24/2018     diverticulosis.  Small polyp in the sigmoid colon excised with biopsy forceps    EYE SURGERY      left    ORBITAL FRACTURE SURGERY Left     NE COLON CA SCRN NOT HI RSK IND N/A 4/5/2017    COLONOSCOPY performed by Haresh Gomez MD at [address] [facility] DX W/COLLJ [address] PFRMD N/A 4/24/2018    COLONOSCOPY performed by Haresh Gomez MD at [address] [facility] W/REMOVAL LESION BY HOT BX FORCEPS N/A 11/15/2017    COLONOSCOPY POLYPECTOMY SNARE and saline injection performed by Haresh Gomez MD at [address] [facility] EGD TRANSORAL BIOPSY SINGLE/MULTIPLE N/A 10/6/2017    EGD BIOPSY performed by Otilio Henderson MD at [address] [facility] EGD TRANSORAL BIOPSY SINGLE/MULTIPLE N/A 2/27/2018    EGD ESOPHAGOGASTRODUODENOSCOPY performed by Haresh Gomez MD at [address] [facility] ESOPHAGOGASTRODUODENOSCOPY TRANSORAL DIAGNOSTIC N/A 4/24/2018    EGD ESOPHAGOGASTRODUODENOSCOPY performed by Haresh Gomez MD at [address] [facility]  10/06/2017    Dr Lawrence Cabello diverticulum gastric fundus, pathology inactive gastritis    UPPER GASTROINTESTINAL ENDOSCOPY 02/27/2018    retained food in the fundus of the stomach, poor peristalsis wide-open pylorus    UPPER GASTROINTESTINAL ENDOSCOPY  04/24/2018    no lesions seen that can account her CEA levels.        CURRENT MEDICATIONS:    Current Outpatient Medications:     PARoxetine (PAXIL) 30 MG tablet, take 1 tablet by mouth every morning, Disp: 90 tablet, Rfl: 2    cloNIDine (CATAPRES) 0.1 MG tablet, take 1 tablet by mouth at bedtime, Disp: 90 tablet, Rfl: 0    famotidine (PEPCID AC) 10 MG tablet, Take 2 tablets by mouth 2 times daily Take by mouth, Disp: 120 tablet, Rfl: 3    fluticasone (FLONASE) 50 MCG/ACT nasal spray, 2 sprays by Nasal route daily, Disp: 1 Bottle, Rfl: 0    omeprazole (PRILOSEC) 20 MG delayed release capsule, take 1 capsule by mouth twice a day, Disp: 180 capsule, Rfl: 3    Prochlorperazine Maleate (COMPAZINE PO), Take by mouth, Disp: , Rfl:     ondansetron (ZOFRAN) 4 MG tablet, Take 4 mg by mouth every 8 hours as needed for Nausea or Vomiting, Disp: , Rfl:     dicyclomine (BENTYL) 10 MG capsule, Take 1 capsule by mouth every 6 hours as needed (cramps), Disp: 20 capsule, Rfl: 0    lidocaine (LMX) 4 % cream, Apply topically every 8 hrs as needed for pain, Disp: 45 g, Rfl: 3    RA CALCIUM 600/VITAMIN D-3 600-400 MG-UNIT TABS, take 1 tablet by mouth twice a day, Disp: 90 tablet, Rfl: 3    umeclidinium-vilanterol (ANORO ELLIPTA) 62.5-25 MCG/INH AEPB inhaler, Anoro Ellipta 62.5 mcg-25 mcg/actuation powder for inhalation  Inhale 1 puff every day by inhalation route., Disp: , Rfl:     lidocaine-prilocaine (EMLA) 2.5-2.5 % cream, lidocaine-prilocaine 2.5 %-2.5 % topical cream  Apply to port site and cover 30-45 minutes prior to needle access, Disp: , Rfl:     potassium chloride (KLOR-CON M) 20 MEQ extended release tablet, Take 1 tablet by mouth daily (Patient taking differently: Take 40 mEq by mouth daily Take 2 20 meq daily), Disp: 60 tablet, Rfl: 2    albuterol sulfate HFA (PROVENTIL HFA) 108 (90 Base) MCG/ACT inhaler, Inhale 2 puffs into the lungs every 6 hours as needed for Wheezing or Shortness of Breath, Disp: 1 Inhaler, Rfl: 11    Multiple Vitamin (MULTIVITAMIN PO), Take  by mouth daily. , Disp: , Rfl:     aspirin 81 MG EC tablet, Take 81 mg by mouth daily. , Disp: , Rfl:     ALLERGIES:   Allergies   Allergen Reactions    Lovastatin        FAMILY HISTORY:       Problem Relation Age of Onset    Bipolar Disorder Mother     Heart Disease Mother     Cancer Mother         breast and lung cancer    Diabetes Father     Heart Disease Father         Death due to MI    Cancer Paternal Grandfather         stomach cancer     Heart Disease Paternal Grandfather     Cancer Sister         ovarian cancer     Cancer Maternal Grandmother         female cancer         SOCIAL HISTORY:   Social History     Socioeconomic History    Marital status:      Spouse name: Not on file    Number of children: Not on file    Years of education: Not on file    Highest education level: Not on file   Occupational History    Not on file   Social Needs    Financial resource strain: Not on file    Food insecurity     Worry: Not on file     Inability: Not on file    Transportation needs     Medical: Not on file     Non-medical: Not on file   Tobacco Use    Smoking status: Former Smoker     Packs/day: 0.00     Years: 38.00     Pack years: 0.00     Types: Cigarettes    Smokeless tobacco: Never Used    Tobacco comment: smokes 1 or 2 a day   Substance and Sexual Activity    Alcohol use:  Yes     Alcohol/week: 0.0 standard drinks     Comment: rare    Drug use: Yes     Types: Marijuana     Comment: marijuana , she states that she stopped cocaine    Sexual activity: Yes     Partners: Male     Birth control/protection: Post-menopausal   Lifestyle    Physical activity     Days per week: Not on file     Minutes per session: Not on file    Stress: Not on file   Relationships    Social connections     Talks on phone: Not on file     Gets together: Not on file     Attends [employer]ism service: Not on file     Active member of club or organization: Not on file     Attends meetings of clubs or organizations: Not on file     Relationship status: Not on file    Intimate partner violence     Fear of current or ex partner: Not on file     Emotionally abused: Not on file     Physically abused: Not on file     Forced sexual activity: Not on file   Other Topics Concern    Not on file   Social History Narrative    Not on file         REVIEW OF SYSTEMS:         Review of Systems   Constitutional: Positive for fatigue. Negative for appetite change (increase) and unexpected weight change. HENT: Positive for trouble swallowing. Negative for postnasal drip, sinus pressure and sore throat. Respiratory: Positive for cough. Negative for choking. Cardiovascular: Negative for chest pain, palpitations and leg swelling. Gastrointestinal: Positive for abdominal pain, constipation (Harder stool) and diarrhea. Negative for abdominal distention, anal bleeding, blood in stool, nausea, rectal pain and vomiting. All clear with the Flagyl     Genitourinary: Negative for difficulty urinating. Musculoskeletal: Positive for arthralgias and back pain. Allergic/Immunologic: Positive for environmental allergies. Negative for food allergies. Neurological: Positive for light-headedness (occasionally). Negative for dizziness, weakness, numbness and headaches. Hematological: Bruises/bleeds easily. Psychiatric/Behavioral: Negative for sleep disturbance. The patient is not nervous/anxious. PHYSICAL EXAMINATION:     Vital signs reviewed per the nursing documentation. /87   Pulse 82   Temp 98.2 °F (36.8 °C)   Ht 5' 5\" (1.651 m)   Wt 115 lb 6.4 oz (52.3 kg)   SpO2 96%   BMI 19.20 kg/m²   Body mass index is 19.2 kg/m². Physical Exam  Vitals signs and nursing note reviewed.    Constitutional:       Appearance: Normal appearance. She is well-developed. Comments: Patient is thinly built. HENT:      Head: Normocephalic and atraumatic. Eyes:      Extraocular Movements: Extraocular movements intact. Conjunctiva/sclera: Conjunctivae normal.   Neck:      Thyroid: No thyromegaly. Vascular: No hepatojugular reflux or JVD. Trachea: No tracheal deviation. Cardiovascular:      Rate and Rhythm: Normal rate and regular rhythm. Heart sounds: Normal heart sounds. Pulmonary:      Effort: Pulmonary effort is normal. No respiratory distress. Breath sounds: Normal breath sounds. No wheezing or rales. Abdominal:      General: Bowel sounds are normal. There is no distension. Palpations: Abdomen is soft. There is no hepatomegaly or mass. Tenderness: There is no abdominal tenderness. There is no rebound. Hernia: No hernia is present. Musculoskeletal:         General: No tenderness. Lymphadenopathy:      Cervical: No cervical adenopathy. Skin:     General: Skin is warm and dry. Findings: No bruising, ecchymosis, erythema or rash. Neurological:      Mental Status: She is alert and oriented to person, place, and time. Cranial Nerves: No cranial nerve deficit. Psychiatric:         Mood and Affect: Mood normal.         Behavior: Behavior normal.         Thought Content:  Thought content normal.           LABORATORY DATA: Reviewed  Lab Results   Component Value Date    WBC 4.29 06/09/2020    HGB 13.2 06/09/2020    HCT 40.2 06/09/2020    MCV 99.0 (H) 06/09/2020     06/09/2020     06/09/2020    K 3.8 01/18/2021     06/09/2020    CO2 26 06/09/2020    BUN 11 06/09/2020    CREATININE 0.82 06/09/2020    LABPROT 6.7 04/04/2012    LABALBU 4.4 06/09/2020    BILITOT 0.3 06/09/2020    ALKPHOS 68 06/09/2020    AST 30 06/09/2020    ALT 19 06/09/2020    INR 1.0 07/06/2017         Lab Results   Component Value Date    RBC 4.06 06/09/2020    HGB 13.2 06/09/2020    MCV 99.0 (H) 06/09/2020    MCH 32.5 06/09/2020    MCHC 32.8 06/09/2020    RDW 14.8 06/09/2020    MPV 9.4 06/16/2018    BASOPCT 0.0 06/09/2020    LYMPHSABS 1.7 06/09/2020    MONOSABS 0.9 06/09/2020    NEUTROABS 1.6 06/09/2020    EOSABS 0.1 06/09/2020    BASOSABS 0.0 06/09/2020         DIAGNOSTIC TESTING:     No results found. Assessment  1. High carcinoembryonic antigen (CEA)    2. Dysphagia, unspecified type    3. Odynophagia        Plan  Abdominal examination benign. Patient is advised to have repeat CEA level and also CBC and liver battery. She may need EGD to evaluate esophageal pathology especially radiation-induced esophagitis etc.  Also need to evaluate possibility of stricture. After discussion patient understood and requested the investigations. She is aware of EGD procedure risks and benefits and verbalized the consent. Thank you for allowing me to participate in the care of Ms. Juanita Valentine. For any further questions please do not hesitate to contact me. I have reviewed and agree with the ROS entered by the MA/LPN. Note is dictated utilizing voice recognition software. Unfortunately this leads to occasional typographical errors.  Please contact our office if you have any questions        Marcia Schaefer MD,FACP, [facility]  Board Certified in Gastroenterology and [address] [place] [address] Gastroenterology  Office #: [phone]

## 2021-03-09 ENCOUNTER — HOSPITAL ENCOUNTER (OUTPATIENT)
Age: 61
Discharge: HOME | End: 2021-03-09
Payer: MEDICARE

## 2021-03-09 ENCOUNTER — HOSPITAL ENCOUNTER (OUTPATIENT)
Dept: PREADMISSION TESTING | Age: 61
Setting detail: OUTPATIENT SURGERY
Discharge: HOME | End: 2021-03-13
Payer: MEDICARE

## 2021-03-09 ENCOUNTER — ANESTHESIA EVENT (OUTPATIENT)
Dept: ENDOSCOPY | Age: 61
End: 2021-03-09
Payer: MEDICARE

## 2021-03-09 VITALS — BODY MASS INDEX: 18.48 KG/M2 | WEIGHT: 115 LBS | HEIGHT: 66 IN

## 2021-03-09 DIAGNOSIS — R97.0 HIGH CARCINOEMBRYONIC ANTIGEN (CEA): ICD-10-CM

## 2021-03-09 LAB
ABSOLUTE EOS #: 0.3 K/UL (ref 0–0.4)
ABSOLUTE IMMATURE GRANULOCYTE: ABNORMAL K/UL (ref 0–0.3)
ABSOLUTE LYMPH #: 1.6 K/UL (ref 1–4.8)
ABSOLUTE MONO #: 0.4 K/UL (ref 0.1–1.3)
ALBUMIN SERPL-MCNC: 4.1 G/DL (ref 3.5–5.2)
ALBUMIN/GLOBULIN RATIO: ABNORMAL (ref 1–2.5)
ALP BLD-CCNC: 81 U/L (ref 35–104)
ALT SERPL-CCNC: 10 U/L (ref 5–33)
ANION GAP SERPL CALCULATED.3IONS-SCNC: 8 MMOL/L (ref 9–17)
AST SERPL-CCNC: 18 U/L
BASOPHILS # BLD: 1 % (ref 0–2)
BASOPHILS ABSOLUTE: 0 K/UL (ref 0–0.2)
BILIRUB SERPL-MCNC: 0.22 MG/DL (ref 0.3–1.2)
BUN BLDV-MCNC: 13 MG/DL (ref 6–20)
BUN/CREAT BLD: ABNORMAL (ref 9–20)
CALCIUM SERPL-MCNC: 9 MG/DL (ref 8.6–10.4)
CARCINOEMBRYONIC ANTIGEN: 8.1 NG/ML
CHLORIDE BLD-SCNC: 108 MMOL/L (ref 98–107)
CO2: 26 MMOL/L (ref 20–31)
CREAT SERPL-MCNC: 0.85 MG/DL (ref 0.5–0.9)
DIFFERENTIAL TYPE: ABNORMAL
EOSINOPHILS RELATIVE PERCENT: 6 % (ref 0–4)
GFR AFRICAN AMERICAN: >60 ML/MIN
GFR NON-AFRICAN AMERICAN: >60 ML/MIN
GFR SERPL CREATININE-BSD FRML MDRD: ABNORMAL ML/MIN/{1.73_M2}
GFR SERPL CREATININE-BSD FRML MDRD: ABNORMAL ML/MIN/{1.73_M2}
GLUCOSE BLD-MCNC: 112 MG/DL (ref 70–99)
HCT VFR BLD CALC: 42.6 % (ref 36–46)
HEMOGLOBIN: 14 G/DL (ref 12–16)
IMMATURE GRANULOCYTES: ABNORMAL %
LIPASE: 53 U/L (ref 13–60)
LYMPHOCYTES # BLD: 34 % (ref 24–44)
MCH RBC QN AUTO: 31.2 PG (ref 26–34)
MCHC RBC AUTO-ENTMCNC: 32.9 G/DL (ref 31–37)
MCV RBC AUTO: 94.9 FL (ref 80–100)
MONOCYTES # BLD: 8 % (ref 1–7)
NRBC AUTOMATED: ABNORMAL PER 100 WBC
PDW BLD-RTO: 14.9 % (ref 11.5–14.9)
PLATELET # BLD: 293 K/UL (ref 150–450)
PLATELET ESTIMATE: ABNORMAL
PMV BLD AUTO: 7.3 FL (ref 6–12)
POTASSIUM SERPL-SCNC: 3.7 MMOL/L (ref 3.7–5.3)
RBC # BLD: 4.49 M/UL (ref 4–5.2)
RBC # BLD: ABNORMAL 10*6/UL
SEG NEUTROPHILS: 51 % (ref 36–66)
SEGMENTED NEUTROPHILS ABSOLUTE COUNT: 2.5 K/UL (ref 1.3–9.1)
SODIUM BLD-SCNC: 142 MMOL/L (ref 135–144)
TOTAL PROTEIN: 6.7 G/DL (ref 6.4–8.3)
WBC # BLD: 4.8 K/UL (ref 3.5–11)
WBC # BLD: ABNORMAL 10*3/UL

## 2021-03-09 PROCEDURE — 80053 COMPREHEN METABOLIC PANEL: CPT

## 2021-03-09 PROCEDURE — 36415 COLL VENOUS BLD VENIPUNCTURE: CPT

## 2021-03-09 PROCEDURE — 85025 COMPLETE CBC W/AUTO DIFF WBC: CPT

## 2021-03-09 PROCEDURE — 82378 CARCINOEMBRYONIC ANTIGEN: CPT

## 2021-03-09 PROCEDURE — 83690 ASSAY OF LIPASE: CPT

## 2021-03-09 RX ORDER — BENZONATATE 100 MG/1
100 CAPSULE ORAL 3 TIMES DAILY PRN
COMMUNITY

## 2021-03-09 RX ORDER — MULTIVIT WITH MINERALS/LUTEIN
1000 TABLET ORAL DAILY
Status: ON HOLD | COMMUNITY
End: 2021-03-10

## 2021-03-09 NOTE — PROGRESS NOTES
Pre-op Instructions For Out-Patient Surgery relayed to patient during PAT phone call visit    Medication Instructions:  · Please stop herbs and any supplements now (includes vitamins and minerals). · Please contact your surgeon and prescribing physician for pre-op instructions for any blood thinners. Aspirin    · If you have inhalers/aerosol treatments at home, please use them the morning of your surgery and bring the inhalers with you to the hospital.  Albuterol (patient stated she doesn't have this right now), Anoro    · Please take the following medications the morning of your surgery with a sip of water:    none    Surgery Instructions:  1. After midnight before surgery:  Do not eat or drink anything, including water, mints, gum, and hard candy. You may brush your teeth without swallowing. No smoking, chewing tobacco, or street drugs. 2. Please shower or bathe before surgery. 3. Please do not wear any cologne, lotion, powder, deodorant, jewelry, piercings, perfume, makeup, nail polish, hair accessories, or hair spray on the day of surgery. Wear loose comfortable clothing. 4. Leave your valuables at home. Bring a storage case for any glasses/contacts. 5. An adult who is responsible for you MUST drive you home and should be with you for the first 24 hours after surgery. The Day of Surgery:  · Arrive at [address] [place] [address] Surgery Entrance at the time directed by your surgeon and check in at the desk. · If you have a living will or healthcare power of , please bring a copy. · You will be taken to the pre-op holding area where you will be prepared for surgery. A physical assessment will be performed by a nurse practitioner or house officer. Your IV will be started and you will meet your anesthesiologist.    · We are currently limiting visitors to only one designated person in the pre-op holding area.   When you go to surgery, your family will be directed to the surgical waiting room, where the doctor should speak with them after your surgery. · After surgery, you will be taken to the recovery room then when you are awake and stable you will go to the short stay unit for preparation to be discharged. Only your one designated person is allowed to come to short stay for your discharge.

## 2021-03-10 ENCOUNTER — HOSPITAL ENCOUNTER (OUTPATIENT)
Age: 61
Setting detail: OUTPATIENT SURGERY
Discharge: HOME | End: 2021-03-10
Attending: INTERNAL MEDICINE | Admitting: INTERNAL MEDICINE
Payer: MEDICARE

## 2021-03-10 ENCOUNTER — ANESTHESIA (OUTPATIENT)
Dept: ENDOSCOPY | Age: 61
End: 2021-03-10
Payer: MEDICARE

## 2021-03-10 VITALS
DIASTOLIC BLOOD PRESSURE: 78 MMHG | WEIGHT: 115 LBS | SYSTOLIC BLOOD PRESSURE: 119 MMHG | HEART RATE: 66 BPM | RESPIRATION RATE: 25 BRPM | BODY MASS INDEX: 18.48 KG/M2 | TEMPERATURE: 97.7 F | HEIGHT: 66 IN | OXYGEN SATURATION: 97 %

## 2021-03-10 VITALS — OXYGEN SATURATION: 100 % | SYSTOLIC BLOOD PRESSURE: 146 MMHG | DIASTOLIC BLOOD PRESSURE: 76 MMHG

## 2021-03-10 DIAGNOSIS — R13.19 ESOPHAGEAL DYSPHAGIA: Primary | ICD-10-CM

## 2021-03-10 LAB
SARS-COV-2, RAPID: NOT DETECTED
SPECIMEN DESCRIPTION: NORMAL

## 2021-03-10 PROCEDURE — 3609012400 HC EGD TRANSORAL BIOPSY SINGLE/MULTIPLE: Performed by: INTERNAL MEDICINE

## 2021-03-10 PROCEDURE — 3700000001 HC ADD 15 MINUTES (ANESTHESIA): Performed by: INTERNAL MEDICINE

## 2021-03-10 PROCEDURE — 2709999900 HC NON-CHARGEABLE SUPPLY: Performed by: INTERNAL MEDICINE

## 2021-03-10 PROCEDURE — 43239 EGD BIOPSY SINGLE/MULTIPLE: CPT | Performed by: INTERNAL MEDICINE

## 2021-03-10 PROCEDURE — 88305 TISSUE EXAM BY PATHOLOGIST: CPT

## 2021-03-10 PROCEDURE — 7100000000 HC PACU RECOVERY - FIRST 15 MIN: Performed by: INTERNAL MEDICINE

## 2021-03-10 PROCEDURE — U0002 COVID-19 LAB TEST NON-CDC: HCPCS

## 2021-03-10 PROCEDURE — 7100000001 HC PACU RECOVERY - ADDTL 15 MIN: Performed by: INTERNAL MEDICINE

## 2021-03-10 PROCEDURE — 6360000002 HC RX W HCPCS: Performed by: NURSE ANESTHETIST, CERTIFIED REGISTERED

## 2021-03-10 PROCEDURE — 2500000003 HC RX 250 WO HCPCS: Performed by: NURSE ANESTHETIST, CERTIFIED REGISTERED

## 2021-03-10 PROCEDURE — 2580000003 HC RX 258: Performed by: ANESTHESIOLOGY

## 2021-03-10 PROCEDURE — 3700000000 HC ANESTHESIA ATTENDED CARE: Performed by: INTERNAL MEDICINE

## 2021-03-10 RX ORDER — SODIUM CHLORIDE 0.9 % (FLUSH) 0.9 %
10 SYRINGE (ML) INJECTION PRN
Status: DISCONTINUED | OUTPATIENT
Start: 2021-03-10 | End: 2021-03-10 | Stop reason: HOSPADM

## 2021-03-10 RX ORDER — LIDOCAINE HYDROCHLORIDE 10 MG/ML
INJECTION, SOLUTION EPIDURAL; INFILTRATION; INTRACAUDAL; PERINEURAL PRN
Status: DISCONTINUED | OUTPATIENT
Start: 2021-03-10 | End: 2021-03-10 | Stop reason: SDUPTHER

## 2021-03-10 RX ORDER — SODIUM CHLORIDE, SODIUM LACTATE, POTASSIUM CHLORIDE, CALCIUM CHLORIDE 600; 310; 30; 20 MG/100ML; MG/100ML; MG/100ML; MG/100ML
INJECTION, SOLUTION INTRAVENOUS CONTINUOUS
Status: DISCONTINUED | OUTPATIENT
Start: 2021-03-10 | End: 2021-03-10 | Stop reason: HOSPADM

## 2021-03-10 RX ORDER — MULTIVIT WITH MINERALS/LUTEIN
1000 TABLET ORAL DAILY
COMMUNITY

## 2021-03-10 RX ORDER — NICOTINE 21 MG/24HR
1 PATCH, TRANSDERMAL 24 HOURS TRANSDERMAL EVERY 24 HOURS
Status: ON HOLD | COMMUNITY
End: 2022-08-02 | Stop reason: ALTCHOICE

## 2021-03-10 RX ORDER — ONDANSETRON 2 MG/ML
4 INJECTION INTRAMUSCULAR; INTRAVENOUS
Status: DISCONTINUED | OUTPATIENT
Start: 2021-03-10 | End: 2021-03-10 | Stop reason: HOSPADM

## 2021-03-10 RX ORDER — LABETALOL HYDROCHLORIDE 5 MG/ML
5 INJECTION, SOLUTION INTRAVENOUS EVERY 10 MIN PRN
Status: DISCONTINUED | OUTPATIENT
Start: 2021-03-10 | End: 2021-03-10 | Stop reason: HOSPADM

## 2021-03-10 RX ORDER — DIPHENHYDRAMINE HYDROCHLORIDE 50 MG/ML
12.5 INJECTION INTRAMUSCULAR; INTRAVENOUS
Status: DISCONTINUED | OUTPATIENT
Start: 2021-03-10 | End: 2021-03-10 | Stop reason: HOSPADM

## 2021-03-10 RX ORDER — SODIUM CHLORIDE 0.9 % (FLUSH) 0.9 %
10 SYRINGE (ML) INJECTION EVERY 12 HOURS SCHEDULED
Status: DISCONTINUED | OUTPATIENT
Start: 2021-03-10 | End: 2021-03-10 | Stop reason: HOSPADM

## 2021-03-10 RX ORDER — PROPOFOL 10 MG/ML
INJECTION, EMULSION INTRAVENOUS PRN
Status: DISCONTINUED | OUTPATIENT
Start: 2021-03-10 | End: 2021-03-10 | Stop reason: SDUPTHER

## 2021-03-10 RX ORDER — LIDOCAINE HYDROCHLORIDE 10 MG/ML
1 INJECTION, SOLUTION EPIDURAL; INFILTRATION; INTRACAUDAL; PERINEURAL
Status: DISCONTINUED | OUTPATIENT
Start: 2021-03-10 | End: 2021-03-10 | Stop reason: HOSPADM

## 2021-03-10 RX ORDER — MEPERIDINE HYDROCHLORIDE 25 MG/ML
12.5 INJECTION INTRAMUSCULAR; INTRAVENOUS; SUBCUTANEOUS EVERY 5 MIN PRN
Status: DISCONTINUED | OUTPATIENT
Start: 2021-03-10 | End: 2021-03-10 | Stop reason: HOSPADM

## 2021-03-10 RX ORDER — MORPHINE SULFATE 2 MG/ML
2 INJECTION, SOLUTION INTRAMUSCULAR; INTRAVENOUS EVERY 5 MIN PRN
Status: DISCONTINUED | OUTPATIENT
Start: 2021-03-10 | End: 2021-03-10 | Stop reason: HOSPADM

## 2021-03-10 RX ADMIN — PROPOFOL 20 MG: 10 INJECTION, EMULSION INTRAVENOUS at 08:41

## 2021-03-10 RX ADMIN — SODIUM CHLORIDE, POTASSIUM CHLORIDE, SODIUM LACTATE AND CALCIUM CHLORIDE: 600; 310; 30; 20 INJECTION, SOLUTION INTRAVENOUS at 07:38

## 2021-03-10 RX ADMIN — PROPOFOL 100 MG: 10 INJECTION, EMULSION INTRAVENOUS at 08:35

## 2021-03-10 RX ADMIN — PROPOFOL 20 MG: 10 INJECTION, EMULSION INTRAVENOUS at 08:37

## 2021-03-10 RX ADMIN — LIDOCAINE HYDROCHLORIDE 50 MG: 10 INJECTION, SOLUTION EPIDURAL; INFILTRATION; INTRACAUDAL; PERINEURAL at 08:35

## 2021-03-10 RX ADMIN — PROPOFOL 10 MG: 10 INJECTION, EMULSION INTRAVENOUS at 08:36

## 2021-03-10 RX ADMIN — PROPOFOL 10 MG: 10 INJECTION, EMULSION INTRAVENOUS at 08:40

## 2021-03-10 RX ADMIN — PROPOFOL 10 MG: 10 INJECTION, EMULSION INTRAVENOUS at 08:38

## 2021-03-10 RX ADMIN — PROPOFOL 20 MG: 10 INJECTION, EMULSION INTRAVENOUS at 08:39

## 2021-03-10 ASSESSMENT — PAIN SCALES - GENERAL: PAINLEVEL_OUTOF10: 0

## 2021-03-10 ASSESSMENT — ENCOUNTER SYMPTOMS
STRIDOR: 0
SHORTNESS OF BREATH: 0

## 2021-03-10 ASSESSMENT — COPD QUESTIONNAIRES: CAT_SEVERITY: NO INTERVAL CHANGE

## 2021-03-10 ASSESSMENT — PULMONARY FUNCTION TESTS
PIF_VALUE: 1
PIF_VALUE: 0
PIF_VALUE: 1
PIF_VALUE: 1

## 2021-03-10 ASSESSMENT — LIFESTYLE VARIABLES: SMOKING_STATUS: 0

## 2021-03-10 NOTE — ANESTHESIA POSTPROCEDURE EVALUATION
POST- ANESTHESIA EVALUATION       Pt Name: Ag Dobbins  MRN: [id number]  [place]: 1960  Date of evaluation: 3/10/2021  Time:  11:38 AM      /78   Pulse 66   Temp 97.7 °F (36.5 °C) (Infrared)   Resp 25   Ht 5' 5.75\" (1.67 m)   Wt 115 lb (52.2 kg)   SpO2 97%   BMI 18.70 kg/m²      Consciousness Level  Awake  Cardiopulmonary Status  Stable  Pain Adequately Treated YES  Nausea / Vomiting  NO  Adequate Hydration  YES  Anesthesia Related Complications NONE      Electronically signed by Rk Barnett MD on 3/10/2021 at 11:38 AM       Department of Anesthesiology  Postprocedure Note    Patient: Ag Dobbins  MRN: [id number]  [place]: 1960  Date of evaluation: 3/10/2021  Time:  11:38 AM     Procedure Summary     Date: 03/10/21 Room / Location: [place] [address] / [facility]    Anesthesia Start: 0825 Anesthesia Stop: 2786    Procedure: EGD BIOPSY AND PHOTOS (N/A Esophagus) Diagnosis: (DYSPHAGIA  **COVID RAPID**)    Surgeons: Donny Bragg MD Responsible Provider: Rk Barnett MD    Anesthesia Type: MAC, general ASA Status: 3          Anesthesia Type: MAC, general    Nakia Phase I: Nakia Score: 9    Nakia Phase II:      Last vitals: Reviewed and per EMR flowsheets.        Anesthesia Post Evaluation

## 2021-03-10 NOTE — OP NOTE
findings were verified and the scope was removed. The patient has tolerated the procedure without unusual events. Recommendations/Plan:   1. F/U Biopsies  2. F/U In Office as instructed  3. Discussed with the family  Make food into small pieces, chew well and swallow. To have esophagogram as an outpatient.                  Electronically signed by Shira Sharma MD  on 3/10/2021 at 8:47 AM

## 2021-03-10 NOTE — H&P
Lung cancer ([facility] [address]     non-small cell H3A lung cancer right upper lobe    Marijuana use     for pain and appetite use    Meningioma (HCC)     Neuropathy     Orbital fracture (HCC)     left side ,has  pin in side    Osteoarthritis     Osteopenia     Pulmonary nodule     left lung, watching this one    Renal calculi     Renal cyst     STD (sexually transmitted disease)     unsure of type    Uterine prolapse        SURGICAL HISTORY       Past Surgical History:   Procedure Laterality Date    BACK SURGERY  2011    thoracic laminectomy, T12, S1    CARDIAC CATHETERIZATION  10/24/14    Normal coronaries     COLONOSCOPY  04/05/2017    diverticulosis, ,poor prep    COLONOSCOPY  11/15/2017    flat polypoid lesion in the base of the cecum, about 1 cm.-hyperplastic    COLONOSCOPY  04/24/2018     diverticulosis.  Small polyp in the sigmoid colon excised with biopsy forceps    EAR SURGERY Left     cleaned out infection    ORBITAL FRACTURE SURGERY Left     had pin inserted on temple side    NC COLON CA SCRN NOT HI RSK IND N/A 4/5/2017    COLONOSCOPY performed by Malaika Norton MD at [address] [facility] DX W/COLLJ [address] PFRMD N/A 4/24/2018    COLONOSCOPY performed by Malaika Norton MD at [address] [facility] W/REMOVAL LESION BY HOT BX FORCEPS N/A 11/15/2017    COLONOSCOPY POLYPECTOMY SNARE and saline injection performed by Malaika Norton MD at [address] [facility] EGD TRANSORAL BIOPSY SINGLE/MULTIPLE N/A 10/6/2017    EGD BIOPSY performed by Honey Pires MD at [address] [facility] EGD TRANSORAL BIOPSY SINGLE/MULTIPLE N/A 2/27/2018    EGD ESOPHAGOGASTRODUODENOSCOPY performed by Malaika Norton MD at [address] [facility] ESOPHAGOGASTRODUODENOSCOPY TRANSORAL DIAGNOSTIC N/A 4/24/2018    EGD ESOPHAGOGASTRODUODENOSCOPY performed by Malaika Norton MD at [address] [place] [address]     UPPER GASTROINTESTINAL ENDOSCOPY  10/06/2017 Dr East Monday diverticulum gastric fundus, pathology inactive gastritis    UPPER GASTROINTESTINAL ENDOSCOPY  02/27/2018    retained food in the fundus of the stomach, poor peristalsis wide-open pylorus    UPPER GASTROINTESTINAL ENDOSCOPY  04/24/2018    no lesions seen that can account her CEA levels. FAMILY HISTORY       Family History   Problem Relation Age of Onset    Heart Disease Mother     Cancer Mother         breast and lung cancer    Diabetes Father     Heart Disease Father         Death due to MI    Cancer Paternal Grandfather         stomach cancer     Heart Disease Paternal Grandfather     Cancer Sister         ovarian cancer     Cancer Maternal Grandmother         female cancer       SOCIAL HISTORY       Social History     Socioeconomic History    Marital status:       Spouse name: Not on file    Number of children: Not on file    Years of education: Not on file    Highest education level: Not on file   Occupational History    Not on file   Social Needs    Financial resource strain: Not on file    Food insecurity     Worry: Not on file     Inability: Not on file    Transportation needs     Medical: Not on file     Non-medical: Not on file   Tobacco Use    Smoking status: Current Every Day Smoker     Packs/day: 0.10     Years: 38.00     Pack years: 3.80     Types: Cigarettes    Smokeless tobacco: Never Used    Tobacco comment: smokes 1 or 2 a day   Substance and Sexual Activity    Alcohol use: Not Currently     Alcohol/week: 0.0 standard drinks    Drug use: Yes     Types: Marijuana     Comment: 5 joints a day marijuana for pain and appetite, she states that she stopped cocaine 2011    Sexual activity: Not Currently     Partners: Male     Birth control/protection: Post-menopausal   Lifestyle    Physical activity     Days per week: Not on file     Minutes per session: Not on file    Stress: Not on file   Relationships    Social connections     Talks on phone: Not Apply to port site and cover 30-45 minutes prior to needle access      potassium chloride (KLOR-CON M) 20 MEQ extended release tablet Take 1 tablet by mouth daily (Patient taking differently: Take 20 mEq by mouth daily Can take a second one if needed especially before and after treatments) 60 tablet 2    albuterol sulfate HFA (PROVENTIL HFA) 108 (90 Base) MCG/ACT inhaler Inhale 2 puffs into the lungs every 6 hours as needed for Wheezing or Shortness of Breath 1 Inhaler 11    Multiple Vitamin (MULTIVITAMIN PO) Take  by mouth daily.  aspirin 81 MG EC tablet Take 81 mg by mouth daily. Negative except for what is mentioned in the HPI. GENERAL PHYSICAL EXAM     Vitals :   See vital signs in RN flow sheet. GENERAL APPEARANCE:   Sarah Espinosa is 62 y.o.,  female, not obese, nourished, conscious, alert. Does not appear to be distress or pain at this time. SKIN:  Warm, dry, no cyanosis or jaundice. HEAD:  Normocephalic, atraumatic, no swelling or tenderness. EYES:  Pupils equal, reactive to light. EARS:  No discharge, no marked hearing loss. NOSE:  No rhinorrhea, epistaxis or septal deformity. THROAT:  Not congested. No ulceration bleeding or discharge. NECK:  No stiffness, trachea central.  No palpable masses or L.N.                 CHEST:  Symmetrical and equal on expansion. HEART:  RRR S1 > S2. No audible murmurs or gallops. LUNGS:  Equal on expansion, normal breath sounds. No adventitious sounds. ABDOMEN:    Soft on palpation. No dysphagia, No localized tenderness. No guarding or rigidity. No palpable hepatosplenomegaly. LYMPHATICS:  No palpable cervical lymphadenopathy. LOCOMOTOR, BACK AND SPINE:  No tenderness or deformities. EXTREMITIES:  Symmetrical, no pretibial edema.  Edwards sign negative. No discoloration or ulcerations. NEUROLOGIC:  The patient is conscious, alert, oriented,Cranial nerve II-XII intact, taste and smell were not examined. No apparent focal sensory or motor deficits.              PROVISIONAL DIAGNOSES / SURGERY:      EGD ESOPHAGOGASTRODUODENOSCOPY    DYSPHAGIA    Patient Active Problem List    Diagnosis Date Noted    Elevated CEA of 6.6 and OVA-1 of 4.5 10/30/2017     Priority: High    Tetrahydrocannabinol (THC) use disorder, mild, abuse 10/30/2017     Priority: High    Vitamin D deficiency 01/18/2021    Anxiety 01/18/2021    Constipation 10/06/2020    Clostridioides difficile infection 06/08/2020    Gastroesophageal reflux disease without esophagitis 06/08/2020    Left chronic serous otitis media 06/08/2020    Clostridial gastroenteritis 01/28/2020    Mild protein-calorie malnutrition ([address] 10/29/2019    Malignant neoplasm of middle lobe of right lung ([address] 03/05/2019    Abnormal PET scan of colon 04/04/2018    Mixed simple and mucopurulent chronic bronchitis ([address] 03/12/2018    Right ovarian cyst 03/12/2018    Osteopenia of multiple sites 02/01/2018    Meningioma ([address] 11/21/2017    Hyperplastic colon polyp 11/15/2017    Family history of breast cancer     H/O tubal ligation     Chronic midline low back pain with bilateral sciatica 07/13/2017    DDD (degenerative disc disease), cervical 07/13/2017    History of diverticulitis 03/16/2017    Pulmonary nodule 09/22/2016    Chronic obstructive pulmonary disease ([address] 09/22/2016    Smoking 09/22/2016    Fibromyalgia 09/28/2015    Shoulder impingement 02/05/2013    Other affections of shoulder region, not elsewhere classified 07/10/2012    Degeneration of cervical intervertebral disc 07/10/2012    Spinal stenosis in cervical region 07/10/2012    Cervicalgia 07/10/2012    Carpal tunnel syndrome 07/10/2012    Lumbago 04/17/2012    Lumbar degenerative disc disease 04/17/2012    Chronic low back

## 2021-03-10 NOTE — ANESTHESIA PRE PROCEDURE
Department of Anesthesiology  Preprocedure Note       Name:  Molly Diaz   Age:  62 y.o.  :  1960                                          MRN:  [id number]         Date:  3/10/2021      Surgeon: Karen Gotti):  Surinder Reardon MD    Procedure: Procedure(s):  EGD ESOPHAGOGASTRODUODENOSCOPY    Medications prior to admission:   Prior to Admission medications    Medication Sig Start Date End Date Taking? Authorizing Provider   Ascorbic Acid (VITAMIN C) 1000 MG tablet Take 1,000 mg by mouth daily   Yes Historical Provider, MD   nicotine (NICODERM CQ) 21 MG/24HR Place 1 patch onto the skin every 24 hours   Yes Historical Provider, MD   PARoxetine (PAXIL) 30 MG tablet take 1 tablet by mouth every morning 21  Yes Chaz Arroyo MD   cloNIDine (CATAPRES) 0.1 MG tablet take 1 tablet by mouth at bedtime 21  Yes Chaz Arroyo MD   famotidine (PEPCID AC) 10 MG tablet Take 2 tablets by mouth 2 times daily Take by mouth 20  Yes Chaz Arroyo MD   omeprazole (PRILOSEC) 20 MG delayed release capsule take 1 capsule by mouth twice a day 20  Yes Chaz Arroyo MD   ondansetron (ZOFRAN) 4 MG tablet Take 4 mg by mouth every 8 hours as needed for Nausea or Vomiting   Yes Historical Provider, MD   dicyclomine (BENTYL) 10 MG capsule Take 1 capsule by mouth every 6 hours as needed (cramps) 19  Yes Chaz Arroyo MD   RA CALCIUM 600/VITAMIN D-3 600-400 MG-UNIT TABS take 1 tablet by mouth twice a day 19  Yes Chaz Arroyo MD   umeclidinium-vilanterol (ANORO ELLIPTA) 62.5-25 MCG/INH AEPB inhaler Anoro Ellipta 62.5 mcg-25 mcg/actuation powder for inhalation   Inhale 1 puff every day by inhalation route. Yes Historical Provider, MD   Multiple Vitamin (MULTIVITAMIN PO) Take  by mouth daily.      Yes Historical Provider, MD   benzonatate (TESSALON) 100 MG capsule Take 100 mg by mouth 3 times daily as needed for Cough    Historical Provider, MD   Pembrolizumab (KEYTRUDA IV) Infuse intravenously Once every 6 weeks Last dose was 2/25/2021    Historical Provider, MD   fluticasone Thurmont Erichsen) 50 MCG/ACT nasal spray 2 sprays by Nasal route daily 6/8/20   Alirio Corrigan MD   lidocaine (LMX) 4 % cream Apply topically every 8 hrs as needed for pain 9/17/19   Alirio Corrigan MD   lidocaine-prilocaine (EMLA) 2.5-2.5 % cream lidocaine-prilocaine 2.5 %-2.5 % topical cream   Apply to port site and cover 30-45 minutes prior to needle access    Historical Provider, MD   potassium chloride (KLOR-CON M) 20 MEQ extended release tablet Take 1 tablet by mouth daily  Patient taking differently: Take 20 mEq by mouth daily Can take a second one if needed especially before and after treatments 3/16/18   EDDA Noyola - CNP   albuterol sulfate HFA (PROVENTIL HFA) 108 (90 Base) MCG/ACT inhaler Inhale 2 puffs into the lungs every 6 hours as needed for Wheezing or Shortness of Breath 3/15/18   Sander Gallegos MD   aspirin 81 MG EC tablet Take 81 mg by mouth daily. Historical Provider, MD       Current medications:    Current Facility-Administered Medications   Medication Dose Route Frequency Provider Last Rate Last Admin    lactated ringers infusion   Intravenous Continuous Denis Solano  mL/hr at 03/10/21 0738 New Bag at 03/10/21 0738    sodium chloride flush 0.9 % injection 10 mL  10 mL Intravenous 2 times per day Denis Solano MD        sodium chloride flush 0.9 % injection 10 mL  10 mL Intravenous PRN Denis Solano MD        lidocaine PF 1 % injection 1 mL  1 mL Intradermal Once PRN Denis Solano MD           Allergies:     Allergies   Allergen Reactions    Lovastatin     Statins Other (See Comments)     pain       Problem List:    Patient Active Problem List   Diagnosis Code    Renal calculi N20.0    Diverticulosis K57.90    Uterine prolapse N81.4    Hyperlipidemia E78.5    Depression F32.9    Tobacco abuse Z72.0    Hemorrhoids, internal K64.8    Renal cyst N28.1    Centrilobular emphysema ([address] J43.2    Chronic low back pain M54.5, G89.29    Knee pain, right M25.561    Lumbago M54.5    Lumbar degenerative disc disease M51.36    Other affections of shoulder region, not elsewhere classified M25.819    Degeneration of cervical intervertebral disc M50.30    Spinal stenosis in cervical region M48.02    Cervicalgia M54.2    Carpal tunnel syndrome G56.00    Shoulder impingement M75.40    Fibromyalgia M79.7    Pulmonary nodule R91.1    Chronic obstructive pulmonary disease (HCC) J44.9    Smoking F17.200    History of diverticulitis Z87.19    Chronic midline low back pain with bilateral sciatica M54.41, M54.42, G89.29    DDD (degenerative disc disease), cervical M50.30    Family history of breast cancer Z80.3    H/O tubal ligation Z98.51    Elevated CEA of 6.6 and OVA-1 of 4.5 R89.9    Tetrahydrocannabinol (THC) use disorder, mild, abuse F12.10    Meningioma ([facility]) D32.9    Hyperplastic colon polyp K63.5    Osteopenia of multiple sites M85.89    Mixed simple and mucopurulent chronic bronchitis ([facility]) J41.8    Right ovarian cyst N83.201    Abnormal PET scan of colon R94.8    Malignant neoplasm of middle lobe of right lung ([facility]) C34.2    Mild protein-calorie malnutrition ([facility]) E44.1    Clostridial gastroenteritis A04.8    Clostridioides difficile infection A49.8    Gastroesophageal reflux disease without esophagitis K21.9    Left chronic serous otitis media H65.22    Constipation K59.00    Vitamin D deficiency E55.9    Anxiety F41.9       Past Medical History:        Diagnosis Date    Arthritis     Bronchitis, chronic ([facility])     Chronic back pain     Chronic cough     COPD (chronic obstructive pulmonary disease) (HCC)     Depression     Diverticulosis     Emphysema     Fibromyalgia     GERD (gastroesophageal reflux disease)     Hemorrhoid     internal and external, they bleed    History of cocaine abuse ([facility] [address]     none since 2010, used between 2007 and 2009     History of kidney  TUBAL LIGATION      TUNNELED VENOUS PORT PLACEMENT Left     UPPER GASTROINTESTINAL ENDOSCOPY  10/06/2017    Dr Swann Scarce diverticulum gastric fundus, pathology inactive gastritis    UPPER GASTROINTESTINAL ENDOSCOPY  02/27/2018    retained food in the fundus of the stomach, poor peristalsis wide-open pylorus    UPPER GASTROINTESTINAL ENDOSCOPY  04/24/2018    no lesions seen that can account her CEA levels. Social History:    Social History     Tobacco Use    Smoking status: Current Every Day Smoker     Packs/day: 0.10     Years: 38.00     Pack years: 3.80     Types: Cigarettes    Smokeless tobacco: Never Used    Tobacco comment: smokes 1 or 2 a day   Substance Use Topics    Alcohol use: Not Currently     Alcohol/week: 0.0 standard drinks                                Ready to quit: Not Answered  Counseling given: Not Answered  Comment: smokes 1 or 2 a day      Vital Signs (Current):   Vitals:    03/10/21 0717   BP: (!) 160/90   Pulse: 71   Resp: 16   Temp: 97.7 °F (36.5 °C)   TempSrc: Infrared   SpO2: 100%   Weight: 115 lb (52.2 kg)   Height: 5' 5.75\" (1.67 m)                                              BP Readings from Last 3 Encounters:   03/10/21 (!) 160/90   03/08/21 127/87   01/18/21 110/76       NPO Status: Time of last liquid consumption: 2350                        Time of last solid consumption: 2350                        Date of last liquid consumption: 03/09/21                        Date of last solid food consumption: 03/09/21    BMI:   Wt Readings from Last 3 Encounters:   03/10/21 115 lb (52.2 kg)   03/09/21 115 lb (52.2 kg)   03/08/21 115 lb 6.4 oz (52.3 kg)     Body mass index is 18.7 kg/m².     CBC:   Lab Results   Component Value Date    WBC 4.8 03/09/2021    RBC 4.49 03/09/2021    RBC 4.06 06/09/2020    HGB 14.0 03/09/2021    HCT 42.6 03/09/2021    MCV 94.9 03/09/2021    RDW 14.9 03/09/2021     03/09/2021     05/08/2012     LR    CMP:   Lab Results   Component Value Date     03/09/2021    K 3.7 03/09/2021     03/09/2021    CO2 26 03/09/2021    BUN 13 03/09/2021    CREATININE 0.85 03/09/2021    GFRAA >60 03/09/2021    LABGLOM >60 03/09/2021    GLUCOSE 112 03/09/2021    GLUCOSE 111 06/09/2020    PROT 6.7 03/09/2021    PROT 7.1 06/09/2020    CALCIUM 9.0 03/09/2021    BILITOT 0.22 03/09/2021    ALKPHOS 81 03/09/2021    AST 18 03/09/2021    ALT 10 03/09/2021       POC Tests: No results for input(s): POCGLU, POCNA, POCK, POCCL, POCBUN, POCHEMO, POCHCT in the last 72 hours. Coags:   Lab Results   Component Value Date    PROTIME 10.2 07/06/2017    PROTIME 10.6 09/16/2011    INR 1.0 07/06/2017    APTT 27.5 07/06/2017       HCG (If Applicable): No results found for: PREGTESTUR, PREGSERUM, HCG, HCGQUANT     ABGs: No results found for: PHART, PO2ART, JIJ1HXF, CEJ5OGJ, BEART, U8GCFNFO     Type & Screen (If Applicable):  No results found for: LABABO, LABRH    Drug/Infectious Status (If Applicable):  Lab Results   Component Value Date    HEPCAB NONREACTIVE 03/06/2017       COVID-19 Screening (If Applicable):   Lab Results   Component Value Date    COVID19 Not Detected 03/10/2021         Anesthesia Evaluation  Patient summary reviewed and Nursing notes reviewed no history of anesthetic complications:   Airway: Mallampati: II  TM distance: >3 FB   Neck ROM: full  Mouth opening: > = 3 FB Dental:          Pulmonary:normal exam  breath sounds clear to auscultation  (+) COPD: no interval change,      (-) pneumonia, asthma, shortness of breath, recent URI, sleep apnea, rhonchi, wheezes, rales, stridor and not a current smoker          Patient did not smoke on day of surgery.                  Cardiovascular:  Exercise tolerance: good (>4 METS),   (+) hypertension: no interval change,     (-) pacemaker, valvular problems/murmurs, past MI, CAD, CABG/stent, dysrhythmias,  angina,  CHF, orthopnea, PND,  KOROMA, murmur, weak pulses,  friction rub, systolic click, carotid bruit,  JVD and peripheral edema    ECG reviewed  Rhythm: regular  Rate: normal           Beta Blocker:  Not on Beta Blocker         Neuro/Psych:   (+) neuromuscular disease:, psychiatric history: stable with treatmentdepression/anxiety    (-) seizures, TIA, CVA and headaches           GI/Hepatic/Renal:   (+) GERD: no interval change,      (-) hiatal hernia, PUD, hepatitis, liver disease, no renal disease, bowel prep and no morbid obesity       Endo/Other: Negative Endo/Other ROS   (+) no malignancy/cancer. (-) diabetes mellitus, hypothyroidism, hyperthyroidism, blood dyscrasia, arthritis, no electrolyte abnormalities, no malignancy/cancer               Abdominal:           Vascular: negative vascular ROS. - PVD, DVT and PE. Anesthesia Plan      MAC and general     ASA 3       Induction: intravenous. MIPS: Postoperative opioids intended and Prophylactic antiemetics administered. Anesthetic plan and risks discussed with patient. Plan discussed with CRNA. The patient was counseled at length about the risks of aman Covid-19 during their perioperative period and any recovery window from their procedure. The patient was made aware that aman Covid-19  may worsen their prognosis for recovering from their procedure  and lend to a higher morbidity and/or mortality risk. All material risks, benefits, and reasonable alternatives including postponing the procedure were discussed. The patient DOES wish to proceed with the procedure at this time.             Sarika Garcia MD   3/10/2021

## 2021-03-11 LAB — SURGICAL PATHOLOGY REPORT: NORMAL

## 2021-03-12 DIAGNOSIS — K21.9 GASTROESOPHAGEAL REFLUX DISEASE WITHOUT ESOPHAGITIS: ICD-10-CM

## 2021-03-13 RX ORDER — FAMOTIDINE 20 MG/1
TABLET, FILM COATED ORAL
Qty: 60 TABLET | Refills: 1 | Status: SHIPPED | OUTPATIENT
Start: 2021-03-13 | End: 2021-05-17

## 2021-03-20 ENCOUNTER — HOSPITAL ENCOUNTER (OUTPATIENT)
Dept: LAB | Age: 61
Setting detail: SPECIMEN
Discharge: HOME | End: 2021-03-20
Payer: MEDICARE

## 2021-03-20 DIAGNOSIS — Z01.818 PRE-OP TESTING: Primary | ICD-10-CM

## 2021-03-20 PROCEDURE — U0003 INFECTIOUS AGENT DETECTION BY NUCLEIC ACID (DNA OR RNA); SEVERE ACUTE RESPIRATORY SYNDROME CORONAVIRUS 2 (SARS-COV-2) (CORONAVIRUS DISEASE [COVID-19]), AMPLIFIED PROBE TECHNIQUE, MAKING USE OF HIGH THROUGHPUT TECHNOLOGIES AS DESCRIBED BY CMS-2020-01-R: HCPCS

## 2021-03-20 PROCEDURE — U0005 INFEC AGEN DETEC AMPLI PROBE: HCPCS

## 2021-03-22 LAB
SARS-COV-2: NORMAL
SARS-COV-2: NOT DETECTED
SOURCE: NORMAL

## 2021-03-23 ENCOUNTER — TELEPHONE (OUTPATIENT)
Dept: PRIMARY CARE CLINIC | Age: 61
End: 2021-03-23

## 2021-03-24 ENCOUNTER — HOSPITAL ENCOUNTER (OUTPATIENT)
Dept: GENERAL RADIOLOGY | Age: 61
Discharge: HOME | End: 2021-03-26
Payer: MEDICARE

## 2021-03-24 DIAGNOSIS — R13.19 ESOPHAGEAL DYSPHAGIA: ICD-10-CM

## 2021-03-24 PROCEDURE — A4641 RADIOPHARM DX AGENT NOC: HCPCS | Performed by: INTERNAL MEDICINE

## 2021-03-24 PROCEDURE — 74220 X-RAY XM ESOPHAGUS 1CNTRST: CPT

## 2021-03-24 PROCEDURE — 2500000003 HC RX 250 WO HCPCS: Performed by: INTERNAL MEDICINE

## 2021-03-24 PROCEDURE — 6360000004 HC RX CONTRAST MEDICATION: Performed by: INTERNAL MEDICINE

## 2021-03-24 RX ADMIN — BARIUM SULFATE 1 TABLET: 700 TABLET ORAL at 09:42

## 2021-03-24 RX ADMIN — BARIUM SULFATE 140 ML: 980 POWDER, FOR SUSPENSION ORAL at 09:41

## 2021-03-24 RX ADMIN — BARIUM SULFATE 160 ML: 960 POWDER, FOR SUSPENSION ORAL at 09:41

## 2021-03-29 ENCOUNTER — OFFICE VISIT (OUTPATIENT)
Dept: GASTROENTEROLOGY | Age: 61
End: 2021-03-29
Payer: MEDICARE

## 2021-03-29 VITALS
BODY MASS INDEX: 19.06 KG/M2 | OXYGEN SATURATION: 99 % | WEIGHT: 117.2 LBS | DIASTOLIC BLOOD PRESSURE: 91 MMHG | TEMPERATURE: 97.8 F | HEART RATE: 84 BPM | SYSTOLIC BLOOD PRESSURE: 131 MMHG

## 2021-03-29 DIAGNOSIS — R13.10 DYSPHAGIA, UNSPECIFIED TYPE: Primary | ICD-10-CM

## 2021-03-29 PROCEDURE — G8420 CALC BMI NORM PARAMETERS: HCPCS | Performed by: INTERNAL MEDICINE

## 2021-03-29 PROCEDURE — G8427 DOCREV CUR MEDS BY ELIG CLIN: HCPCS | Performed by: INTERNAL MEDICINE

## 2021-03-29 PROCEDURE — 4004F PT TOBACCO SCREEN RCVD TLK: CPT | Performed by: INTERNAL MEDICINE

## 2021-03-29 PROCEDURE — 99213 OFFICE O/P EST LOW 20 MIN: CPT | Performed by: INTERNAL MEDICINE

## 2021-03-29 PROCEDURE — G8482 FLU IMMUNIZE ORDER/ADMIN: HCPCS | Performed by: INTERNAL MEDICINE

## 2021-03-29 PROCEDURE — 3017F COLORECTAL CA SCREEN DOC REV: CPT | Performed by: INTERNAL MEDICINE

## 2021-03-29 ASSESSMENT — ENCOUNTER SYMPTOMS
ANAL BLEEDING: 0
RECTAL PAIN: 0
BLOOD IN STOOL: 0
ABDOMINAL DISTENTION: 0
CHOKING: 0
SORE THROAT: 0
SINUS PRESSURE: 0
COUGH: 1
BACK PAIN: 1
TROUBLE SWALLOWING: 1
CONSTIPATION: 1
DIARRHEA: 1
ABDOMINAL PAIN: 1
VOMITING: 0
NAUSEA: 0

## 2021-03-29 NOTE — PROGRESS NOTES
GI OFFICE FOLLOW UP    INTERVAL HISTORY:   No referring provider defined for this encounter. Chief Complaint   Patient presents with    Follow-up     Patient is here today to f/u on EGD and labs       1. Dysphagia, unspecified type              HISTORY OF PRESENT ILLNESS: Blanca Ramires is a 62 y.o. female with a past history remarkable for ,   Patient seen for follow-up of dysphagia. She has history of lung cancer for which she had radiation therapy. To further evaluate dysphagia recently patient had a EGD done and did not reveal abnormalities in the esophagus that can account for her symptoms. Random biopsies from the esophagus did not reveal pathology. Also had esophagogram done and no stricture identified. Has spontaneous reflux noted. Small diverticulum in the fundus noted as well. During this visit, patient indicated that her swallowing improved. Does not have weight loss. Past Medical,Family, and Social History reviewed and does contribute to the patient presenting condition. Patient's PMH/PSH,SH,PSYCH Hx, MEDs, ALLERGIES, and ROS were all reviewed and updated in the appropriate sections.  Yes      PAST MEDICAL HISTORY:  Past Medical History:   Diagnosis Date    Arthritis     Bronchitis, chronic (HCC)     Chronic back pain     Chronic cough     COPD (chronic obstructive pulmonary disease) (HCC)     Depression     Diverticulosis     Emphysema     Fibromyalgia     GERD (gastroesophageal reflux disease)     Hemorrhoid     internal and external, they bleed    History of cocaine abuse ([address]     none since 2010, used between 2007 and 2009     History of kidney infection     Hyperlipidemia     Hyperplastic colon polyp 11/15/2017    Hypertension     Lung cancer [address] [address]     non-small cell H3A lung cancer right upper lobe    Marijuana use     for pain and appetite GASTROINTESTINAL ENDOSCOPY  02/27/2018    retained food in the fundus of the stomach, poor peristalsis wide-open pylorus    UPPER GASTROINTESTINAL ENDOSCOPY  04/24/2018    no lesions seen that can account her CEA levels.     UPPER GASTROINTESTINAL ENDOSCOPY N/A 3/10/2021    EGD BIOPSY AND PHOTOS performed by Natasha Fine MD at [address]:    Current Outpatient Medications:     famotidine (PEPCID) 20 MG tablet, take 1 tablet by mouth twice a day, Disp: 60 tablet, Rfl: 1    Ascorbic Acid (VITAMIN C) 1000 MG tablet, Take 1,000 mg by mouth daily, Disp: , Rfl:     nicotine (NICODERM CQ) 21 MG/24HR, Place 1 patch onto the skin every 24 hours, Disp: , Rfl:     benzonatate (TESSALON) 100 MG capsule, Take 100 mg by mouth 3 times daily as needed for Cough, Disp: , Rfl:     Pembrolizumab (KEYTRUDA IV), Infuse intravenously Once every 6 weeks Last dose was 2/25/2021, Disp: , Rfl:     PARoxetine (PAXIL) 30 MG tablet, take 1 tablet by mouth every morning, Disp: 90 tablet, Rfl: 2    cloNIDine (CATAPRES) 0.1 MG tablet, take 1 tablet by mouth at bedtime, Disp: 90 tablet, Rfl: 0    fluticasone (FLONASE) 50 MCG/ACT nasal spray, 2 sprays by Nasal route daily, Disp: 1 Bottle, Rfl: 0    omeprazole (PRILOSEC) 20 MG delayed release capsule, take 1 capsule by mouth twice a day, Disp: 180 capsule, Rfl: 3    ondansetron (ZOFRAN) 4 MG tablet, Take 4 mg by mouth every 8 hours as needed for Nausea or Vomiting, Disp: , Rfl:     dicyclomine (BENTYL) 10 MG capsule, Take 1 capsule by mouth every 6 hours as needed (cramps), Disp: 20 capsule, Rfl: 0    lidocaine (LMX) 4 % cream, Apply topically every 8 hrs as needed for pain, Disp: 45 g, Rfl: 3    RA CALCIUM 600/VITAMIN D-3 600-400 MG-UNIT TABS, take 1 tablet by mouth twice a day, Disp: 90 tablet, Rfl: 3    umeclidinium-vilanterol (ANORO ELLIPTA) 62.5-25 MCG/INH AEPB inhaler, Anoro Ellipta 62.5 mcg-25 mcg/actuation powder for inhalation  Inhale 1 puff every day by inhalation route., Disp: , Rfl:     lidocaine-prilocaine (EMLA) 2.5-2.5 % cream, lidocaine-prilocaine 2.5 %-2.5 % topical cream  Apply to port site and cover 30-45 minutes prior to needle access, Disp: , Rfl:     potassium chloride (KLOR-CON M) 20 MEQ extended release tablet, Take 1 tablet by mouth daily (Patient taking differently: Take 20 mEq by mouth daily Can take a second one if needed especially before and after treatments), Disp: 60 tablet, Rfl: 2    albuterol sulfate HFA (PROVENTIL HFA) 108 (90 Base) MCG/ACT inhaler, Inhale 2 puffs into the lungs every 6 hours as needed for Wheezing or Shortness of Breath, Disp: 1 Inhaler, Rfl: 11    Multiple Vitamin (MULTIVITAMIN PO), Take  by mouth daily. , Disp: , Rfl:     aspirin 81 MG EC tablet, Take 81 mg by mouth daily. , Disp: , Rfl:     ALLERGIES:   Allergies   Allergen Reactions    Lovastatin     Statins Other (See Comments)     pain       FAMILY HISTORY:       Problem Relation Age of Onset    Heart Disease Mother     Cancer Mother         breast and lung cancer    Diabetes Father     Heart Disease Father         Death due to MI    Cancer Paternal Grandfather         stomach cancer     Heart Disease Paternal Grandfather     Cancer Sister         ovarian cancer     Cancer Maternal Grandmother         female cancer         SOCIAL HISTORY:   Social History     Socioeconomic History    Marital status:       Spouse name: Not on file    Number of children: Not on file    Years of education: Not on file    Highest education level: Not on file   Occupational History    Not on file   Social Needs    Financial resource strain: Not on file    Food insecurity     Worry: Not on file     Inability: Not on file    Transportation needs     Medical: Not on file     Non-medical: Not on file   Tobacco Use    Smoking status: Current Every Day Smoker     Packs/day: 0.10     Years: 38.00     Pack years: 3.80     Types: Cigarettes    Smokeless tobacco: Never Used    Tobacco comment: smokes 1 or 2 a day   Substance and Sexual Activity    Alcohol use: Not Currently     Alcohol/week: 0.0 standard drinks    Drug use: Yes     Types: Marijuana     Comment: 5 joints a day marijuana for pain and appetite, she states that she stopped cocaine 2011    Sexual activity: Not Currently     Partners: Male     Birth control/protection: Post-menopausal   Lifestyle    Physical activity     Days per week: Not on file     Minutes per session: Not on file    Stress: Not on file   Relationships    Social connections     Talks on phone: Not on file     Gets together: Not on file     Attends Mandaeism service: Not on file     Active member of club or organization: Not on file     Attends meetings of clubs or organizations: Not on file     Relationship status: Not on file    Intimate partner violence     Fear of current or ex partner: Not on file     Emotionally abused: Not on file     Physically abused: Not on file     Forced sexual activity: Not on file   Other Topics Concern    Not on file   Social History Narrative    Not on file         REVIEW OF SYSTEMS:         Review of Systems   Constitutional: Positive for fatigue. Negative for appetite change (increase) and unexpected weight change. HENT: Positive for trouble swallowing. Negative for postnasal drip, sinus pressure and sore throat. Respiratory: Positive for cough. Negative for choking. Cardiovascular: Negative for chest pain, palpitations and leg swelling. Gastrointestinal: Positive for abdominal pain, constipation (Harder stool) and diarrhea. Negative for abdominal distention, anal bleeding, blood in stool, nausea, rectal pain and vomiting. All clear with the Flagyl     Genitourinary: Negative for difficulty urinating. Musculoskeletal: Positive for arthralgias and back pain. Allergic/Immunologic: Positive for environmental allergies. Negative for food allergies.    Neurological: Positive for light-headedness (occasionally). Negative for dizziness, weakness, numbness and headaches. Hematological: Bruises/bleeds easily. Psychiatric/Behavioral: Negative for sleep disturbance. The patient is not nervous/anxious. PHYSICAL EXAMINATION:     Vital signs reviewed per the nursing documentation. BP (!) 131/91   Pulse 84   Temp 97.8 °F (36.6 °C)   Wt 117 lb 3.2 oz (53.2 kg)   SpO2 99%   BMI 19.06 kg/m²   Body mass index is 19.06 kg/m². Physical Exam  Vitals signs and nursing note reviewed. Constitutional:       Appearance: Normal appearance. She is well-developed. HENT:      Head: Normocephalic and atraumatic. Eyes:      Extraocular Movements: Extraocular movements intact. Conjunctiva/sclera: Conjunctivae normal.   Neck:      Thyroid: No thyromegaly. Vascular: No hepatojugular reflux or JVD. Trachea: No tracheal deviation. Cardiovascular:      Rate and Rhythm: Normal rate and regular rhythm. Heart sounds: Normal heart sounds. Pulmonary:      Effort: Pulmonary effort is normal. No respiratory distress. Breath sounds: Normal breath sounds. No wheezing or rales. Abdominal:      General: Bowel sounds are normal. There is no distension. Palpations: Abdomen is soft. There is no hepatomegaly or mass. Tenderness: There is no abdominal tenderness. There is no rebound. Hernia: No hernia is present. Musculoskeletal:         General: No tenderness. Lymphadenopathy:      Cervical: No cervical adenopathy. Skin:     General: Skin is warm and dry. Findings: No bruising, ecchymosis, erythema or rash. Neurological:      Mental Status: She is alert and oriented to person, place, and time. Cranial Nerves: No cranial nerve deficit. Psychiatric:         Mood and Affect: Mood normal.         Behavior: Behavior normal.         Thought Content:  Thought content normal.           LABORATORY DATA: Reviewed  Lab Results   Component Value Date    WBC 4.8 03/09/2021    HGB 14.0 03/09/2021    HCT 42.6 03/09/2021    MCV 94.9 03/09/2021     03/09/2021     03/09/2021    K 3.7 03/09/2021     (H) 03/09/2021    CO2 26 03/09/2021    BUN 13 03/09/2021    CREATININE 0.85 03/09/2021    LABPROT 6.7 04/04/2012    LABALBU 4.1 03/09/2021    BILITOT 0.22 (L) 03/09/2021    ALKPHOS 81 03/09/2021    AST 18 03/09/2021    ALT 10 03/09/2021    INR 1.0 07/06/2017         Lab Results   Component Value Date    RBC 4.49 03/09/2021    HGB 14.0 03/09/2021    MCV 94.9 03/09/2021    MCH 31.2 03/09/2021    MCHC 32.9 03/09/2021    RDW 14.9 03/09/2021    MPV 7.3 03/09/2021    BASOPCT 1 03/09/2021    LYMPHSABS 1.60 03/09/2021    MONOSABS 0.40 03/09/2021    NEUTROABS 2.50 03/09/2021    EOSABS 0.30 03/09/2021    BASOSABS 0.00 03/09/2021         DIAGNOSTIC TESTING:     Fl Esophagram    Result Date: 3/24/2021  EXAMINATION: DOUBLE CONTRAST ESOPHAGRAM 3/24/2021 9:07 am TECHNIQUE: Double contrast esophagram was performed with barium and air contrast. FLUOROSCOPY DOSE AND TYPE OR TIME AND EXPOSURES: 2 minutes 4 seconds 37.4 mGy 145 dGy cm2 COMPARISON: None HISTORY: ORDERING SYSTEM PROVIDED HISTORY: Esophageal dysphagia TECHNOLOGIST PROVIDED HISTORY: As an outpatient. To use barium pills during the procedure Reason for Exam: Pt feels as if food gets stuck in epigastric area. Acuity: Acute Type of Exam: Initial Additional signs and symptoms: Pt feels as if food gets stuck in epigastric area. FINDINGS: The esophagus is of normal caliber and demonstrates normal motility. There are no mucosal abnormalities seen. Small hiatal hernia. Spontaneous gastroesophageal reflux to the proximal esophagus. There is a small diverticulum arising posteriorly from the fundus of the stomach. The patient swallowed a barium tablet which temporarily lodged at the gastroesophageal junction but passed with multiple additional boluses of water.      Spontaneous gastroesophageal reflux to the proximal esophagus. Small hiatal hernia. Small diverticulum arising posteriorly from the gastric fundus. Patient is known to have elevated CEA levels up to 8, stable in the last 3 years. Her last colonoscopy was in April 2018 and at that time no abnormalities noted. Assessment  1. Dysphagia, unspecified type        Plan  Discussed with the patient regarding EGD and also esophagogram results. Patient reassured. Patient is advised to make food into small pieces, chew well and swallow. If she has any further issues to contact me. Otherwise we will see her in the next 1 year. Patient is advised to follow-up with her oncologist regarding abnormal CEA levels. Not clear whether this is secondary to lung cancer/smoking. Patient understood and agreed. Thank you for allowing me to participate in the care of Ms. Betsy Reis. For any further questions please do not hesitate to contact me. I have reviewed and agree with the ROS entered by the MA/LPN. Note is dictated utilizing voice recognition software. Unfortunately this leads to occasional typographical errors.  Please contact our office if you have any questions        Idania Jean Baptiste MD,FACP, [facility]  Board Certified in Gastroenterology and [address] [place] [address] Gastroenterology  Office #: [phone]

## 2021-04-22 ENCOUNTER — HOSPITAL ENCOUNTER (OUTPATIENT)
Dept: WOMENS IMAGING | Age: 61
Discharge: HOME | End: 2021-04-24
Payer: MEDICARE

## 2021-04-22 DIAGNOSIS — Z12.31 VISIT FOR SCREENING MAMMOGRAM: ICD-10-CM

## 2021-04-22 PROCEDURE — 77063 BREAST TOMOSYNTHESIS BI: CPT

## 2021-05-16 DIAGNOSIS — K21.9 GASTROESOPHAGEAL REFLUX DISEASE WITHOUT ESOPHAGITIS: ICD-10-CM

## 2021-05-17 RX ORDER — FAMOTIDINE 20 MG/1
TABLET, FILM COATED ORAL
Qty: 60 TABLET | Refills: 1 | Status: SHIPPED | OUTPATIENT
Start: 2021-05-17 | End: 2021-07-20

## 2021-06-29 DIAGNOSIS — R11.2 INTRACTABLE VOMITING WITH NAUSEA, UNSPECIFIED VOMITING TYPE: ICD-10-CM

## 2021-06-29 RX ORDER — OMEPRAZOLE 20 MG/1
CAPSULE, DELAYED RELEASE ORAL
Qty: 180 CAPSULE | Refills: 3 | Status: SHIPPED | OUTPATIENT
Start: 2021-06-29 | End: 2022-05-31

## 2021-06-29 NOTE — TELEPHONE ENCOUNTER
Please Approve or Refuse.   Send to Pharmacy per Pt's Request:      Next Visit Date:  8/16/2021   Last Visit Date: 1/18/2021    Hemoglobin A1C (%)   Date Value   10/25/2016 5.5             ( goal A1C is < 7)   BP Readings from Last 3 Encounters:   03/29/21 (!) 131/91   03/10/21 (!) 146/76   03/10/21 119/78          (goal 120/80)  BUN   Date Value Ref Range Status   05/18/2021 13 7 - 25 mg/dL Final     CREATININE   Date Value Ref Range Status   05/18/2021 0.88 0.60 - 1.20 mg/dL Final     Potassium   Date Value Ref Range Status   05/18/2021 3.5 3.5 - 5.1 meq/L Final

## 2021-08-05 DIAGNOSIS — F41.9 ANXIETY: ICD-10-CM

## 2021-08-05 NOTE — TELEPHONE ENCOUNTER
Please Approve or Refuse.   Send to Pharmacy per Pt's Request:      Next Visit Date:  8/16/2021   Last Visit Date: 1/18/2021    Hemoglobin A1C (%)   Date Value   10/25/2016 5.5             ( goal A1C is < 7)   BP Readings from Last 3 Encounters:   03/29/21 (!) 131/91   03/10/21 (!) 146/76   03/10/21 119/78          (goal 120/80)  BUN   Date Value Ref Range Status   06/29/2021 12 7 - 25 mg/dL Final     CREATININE   Date Value Ref Range Status   06/29/2021 1.00 0.60 - 1.20 mg/dL Final     Potassium   Date Value Ref Range Status   06/29/2021 3.5 3.5 - 5.1 meq/L Final

## 2021-08-06 RX ORDER — CLONIDINE HYDROCHLORIDE 0.1 MG/1
TABLET ORAL
Qty: 90 TABLET | Refills: 0 | Status: SHIPPED | OUTPATIENT
Start: 2021-08-06 | End: 2021-11-23 | Stop reason: SDUPTHER

## 2021-08-16 ENCOUNTER — OFFICE VISIT (OUTPATIENT)
Dept: FAMILY MEDICINE CLINIC | Age: 61
End: 2021-08-16
Payer: MEDICARE

## 2021-08-16 VITALS
WEIGHT: 109 LBS | HEIGHT: 66 IN | OXYGEN SATURATION: 97 % | TEMPERATURE: 98 F | DIASTOLIC BLOOD PRESSURE: 70 MMHG | BODY MASS INDEX: 17.52 KG/M2 | SYSTOLIC BLOOD PRESSURE: 110 MMHG | HEART RATE: 93 BPM

## 2021-08-16 DIAGNOSIS — Z00.00 MEDICARE ANNUAL WELLNESS VISIT, SUBSEQUENT: Primary | ICD-10-CM

## 2021-08-16 DIAGNOSIS — Z00.00 ROUTINE GENERAL MEDICAL EXAMINATION AT A HEALTH CARE FACILITY: ICD-10-CM

## 2021-08-16 DIAGNOSIS — H91.93 HEARING PROBLEM OF BOTH EARS: ICD-10-CM

## 2021-08-16 PROCEDURE — G0439 PPPS, SUBSEQ VISIT: HCPCS | Performed by: FAMILY MEDICINE

## 2021-08-16 PROCEDURE — 3017F COLORECTAL CA SCREEN DOC REV: CPT | Performed by: FAMILY MEDICINE

## 2021-08-16 SDOH — ECONOMIC STABILITY: FOOD INSECURITY: WITHIN THE PAST 12 MONTHS, YOU WORRIED THAT YOUR FOOD WOULD RUN OUT BEFORE YOU GOT MONEY TO BUY MORE.: NEVER TRUE

## 2021-08-16 SDOH — ECONOMIC STABILITY: FOOD INSECURITY: WITHIN THE PAST 12 MONTHS, THE FOOD YOU BOUGHT JUST DIDN'T LAST AND YOU DIDN'T HAVE MONEY TO GET MORE.: NEVER TRUE

## 2021-08-16 SDOH — ECONOMIC STABILITY: TRANSPORTATION INSECURITY
IN THE PAST 12 MONTHS, HAS LACK OF TRANSPORTATION KEPT YOU FROM MEETINGS, WORK, OR FROM GETTING THINGS NEEDED FOR DAILY LIVING?: NO

## 2021-08-16 SDOH — ECONOMIC STABILITY: TRANSPORTATION INSECURITY
IN THE PAST 12 MONTHS, HAS THE LACK OF TRANSPORTATION KEPT YOU FROM MEDICAL APPOINTMENTS OR FROM GETTING MEDICATIONS?: NO

## 2021-08-16 SDOH — ECONOMIC STABILITY: HOUSING INSECURITY
IN THE LAST 12 MONTHS, WAS THERE A TIME WHEN YOU DID NOT HAVE A STEADY PLACE TO SLEEP OR SLEPT IN A SHELTER (INCLUDING NOW)?: NO

## 2021-08-16 SDOH — ECONOMIC STABILITY: INCOME INSECURITY: IN THE LAST 12 MONTHS, WAS THERE A TIME WHEN YOU WERE NOT ABLE TO PAY THE MORTGAGE OR RENT ON TIME?: NO

## 2021-08-16 ASSESSMENT — PATIENT HEALTH QUESTIONNAIRE - PHQ9
1. LITTLE INTEREST OR PLEASURE IN DOING THINGS: 1
SUM OF ALL RESPONSES TO PHQ QUESTIONS 1-9: 6
2. FEELING DOWN, DEPRESSED OR HOPELESS: 2
10. IF YOU CHECKED OFF ANY PROBLEMS, HOW DIFFICULT HAVE THESE PROBLEMS MADE IT FOR YOU TO DO YOUR WORK, TAKE CARE OF THINGS AT HOME, OR GET ALONG WITH OTHER PEOPLE: 1
SUM OF ALL RESPONSES TO PHQ QUESTIONS 1-9: 6
9. THOUGHTS THAT YOU WOULD BE BETTER OFF DEAD, OR OF HURTING YOURSELF: 0
7. TROUBLE CONCENTRATING ON THINGS, SUCH AS READING THE NEWSPAPER OR WATCHING TELEVISION: 0
6. FEELING BAD ABOUT YOURSELF - OR THAT YOU ARE A FAILURE OR HAVE LET YOURSELF OR YOUR FAMILY DOWN: 0
3. TROUBLE FALLING OR STAYING ASLEEP: 0
SUM OF ALL RESPONSES TO PHQ QUESTIONS 1-9: 6
5. POOR APPETITE OR OVEREATING: 0
8. MOVING OR SPEAKING SO SLOWLY THAT OTHER PEOPLE COULD HAVE NOTICED. OR THE OPPOSITE, BEING SO FIGETY OR RESTLESS THAT YOU HAVE BEEN MOVING AROUND A LOT MORE THAN USUAL: 0
SUM OF ALL RESPONSES TO PHQ9 QUESTIONS 1 & 2: 3
4. FEELING TIRED OR HAVING LITTLE ENERGY: 3

## 2021-08-16 ASSESSMENT — SOCIAL DETERMINANTS OF HEALTH (SDOH): HOW HARD IS IT FOR YOU TO PAY FOR THE VERY BASICS LIKE FOOD, HOUSING, MEDICAL CARE, AND HEATING?: NOT HARD AT ALL

## 2021-08-16 ASSESSMENT — LIFESTYLE VARIABLES: HOW OFTEN DO YOU HAVE A DRINK CONTAINING ALCOHOL: 0

## 2021-08-16 NOTE — PATIENT INSTRUCTIONS
Personalized Preventive Plan for Nuno Sweeney - 8/16/2021  Medicare offers a range of preventive health benefits. Some of the tests and screenings are paid in full while other may be subject to a deductible, co-insurance, and/or copay. Some of these benefits include a comprehensive review of your medical history including lifestyle, illnesses that may run in your family, and various assessments and screenings as appropriate. After reviewing your medical record and screening and assessments performed today your provider may have ordered immunizations, labs, imaging, and/or referrals for you. A list of these orders (if applicable) as well as your Preventive Care list are included within your After Visit Summary for your review. Other Preventive Recommendations:    · A preventive eye exam performed by an eye specialist is recommended every 1-2 years to screen for glaucoma; cataracts, macular degeneration, and other eye disorders. · A preventive dental visit is recommended every 6 months. · Try to get at least 150 minutes of exercise per week or 10,000 steps per day on a pedometer . · Order or download the FREE \"Exercise & Physical Activity: Your Everyday Guide\" from [employer]. Call 6-260.471.2802 or search [employer] online. · You need 5098-6580 mg of calcium and 3354-1935 IU of vitamin D per day. It is possible to meet your calcium requirement with diet alone, but a vitamin D supplement is usually necessary to meet this goal.  · When exposed to the sun, use a sunscreen that protects against both UVA and UVB radiation with an SPF of 30 or greater. Reapply every 2 to 3 hours or after sweating, drying off with a towel, or swimming. · Always wear a seat belt when traveling in a car. Always wear a helmet when riding a bicycle or motorcycle.

## 2021-08-16 NOTE — PROGRESS NOTES
Visit Information    Have you changed or started any medications since your last visit including any over-the-counter medicines, vitamins, or herbal medicines? no   Are you having any side effects from any of your medications? -  no  Have you stopped taking any of your medications? Is so, why? -  no    Have you seen any other physician or provider since your last visit? No  Have you had any other diagnostic tests since your last visit? No  Have you been seen in the emergency room and/or had an admission to a hospital since we last saw you? No  Have you had your routine dental cleaning in the past 6 months? no    Have you activated your [employer] account? If not, what are your barriers?  Yes     Patient Care Team:  Gabe Boxer, MD as PCP - General (Family Medicine)  Gabe Boxer, MD as PCP - [facility] Provider  Elisha Bergman RN as   Erling Heimlich, MD as Consulting Physician (Pulmonology)  Denisse Quiroga MD as Consulting Physician (Hematology and Oncology)  June Valle MD as Consulting Physician (Gastroenterology)    Medical History Review  Past Medical, Family, and Social History reviewed and does contribute to the patient presenting condition    Health Maintenance   Topic Date Due    Annual Wellness Visit (AWV)  Never done    Flu vaccine (1) 09/01/2021    Lipid screen  03/06/2022    Cervical cancer screen  11/13/2022    Breast cancer screen  04/22/2023    Colon cancer screen colonoscopy  04/24/2023    Pneumococcal 0-64 years Vaccine (2 of 2 - PPSV23) 04/26/2026    DTaP/Tdap/Td vaccine (2 - Td or Tdap) 06/17/2029    Shingles Vaccine  Completed    COVID-19 Vaccine  Completed    Hepatitis C screen  Completed    HIV screen  Completed    Hepatitis A vaccine  Aged Out    Hepatitis B vaccine  Aged Out    Hib vaccine  Aged Out    Meningococcal (ACWY) vaccine  Aged Out

## 2021-08-16 NOTE — PROGRESS NOTES
Medicare Annual Wellness Visit  Name: Marie Mathew Date: 2021   MRN: [id number] Sex: Female   Age: 62 y.o. Ethnicity: Non- / Non    : 1960 Race: White (non-)      Sammy Heller is here for Medicare AWV    Screenings for behavioral, psychosocial and functional/safety risks, and cognitive dysfunction are all negative except as indicated below. These results, as well as other patient data from the [address] form, are documented in Flowsheets linked to this Encounter. Allergies   Allergen Reactions    Lovastatin     Statins Other (See Comments)     pain         Prior to Visit Medications    Medication Sig Taking?  Authorizing Provider   cloNIDine (CATAPRES) 0.1 MG tablet take 1 tablet by mouth at bedtime Yes Cristobal Durán MD   famotidine (PEPCID) 20 MG tablet take 1 tablet by mouth twice a day Yes EDDA Dinh CNP   omeprazole (PRILOSEC) 20 MG delayed release capsule Take 1 capsule by mouth twice daily Yes Cristobal Durán MD   Ascorbic Acid (VITAMIN C) 1000 MG tablet Take 1,000 mg by mouth daily Yes Historical Provider, MD   nicotine (NICODERM CQ) 21 MG/24HR Place 1 patch onto the skin every 24 hours Yes Historical Provider, MD   benzonatate (TESSALON) 100 MG capsule Take 100 mg by mouth 3 times daily as needed for Cough Yes Historical Provider, MD   Pembrolizumab (Darrold Choi IV) Infuse intravenously Once every 6 weeks Last dose was 2021 Yes Historical Provider, MD   PARoxetine (PAXIL) 30 MG tablet take 1 tablet by mouth every morning Yes Cristobal Durán MD   fluticasone (FLONASE) 50 MCG/ACT nasal spray 2 sprays by Nasal route daily Yes Cristobal Durán MD   ondansetron (ZOFRAN) 4 MG tablet Take 4 mg by mouth every 8 hours as needed for Nausea or Vomiting Yes Historical Provider, MD   dicyclomine (BENTYL) 10 MG capsule Take 1 capsule by mouth every 6 hours as needed (cramps) Yes Cristobal Durán MD   lidocaine (LMX) 4 % cream Apply topically every 8 hrs as needed for pain Yes Tomas Rasheed MD   RA CALCIUM 600/VITAMIN D-3 600-400 MG-UNIT TABS take 1 tablet by mouth twice a day Yes Tomas Rasheed MD   umeclidinium-vilanterol (ANORO ELLIPTA) 62.5-25 MCG/INH AEPB inhaler Anoro Ellipta 62.5 mcg-25 mcg/actuation powder for inhalation   Inhale 1 puff every day by inhalation route. Yes Historical Provider, MD   lidocaine-prilocaine (EMLA) 2.5-2.5 % cream lidocaine-prilocaine 2.5 %-2.5 % topical cream   Apply to port site and cover 30-45 minutes prior to needle access Yes Historical Provider, MD   potassium chloride (KLOR-CON M) 20 MEQ extended release tablet Take 1 tablet by mouth daily  Patient taking differently: Take 20 mEq by mouth daily Can take a second one if needed especially before and after treatments Yes Guerda Rodríguez APRN - CNP   albuterol sulfate HFA (PROVENTIL HFA) 108 (90 Base) MCG/ACT inhaler Inhale 2 puffs into the lungs every 6 hours as needed for Wheezing or Shortness of Breath Yes Kirsten He MD   Multiple Vitamin (MULTIVITAMIN PO) Take  by mouth daily. Yes Historical Provider, MD   aspirin 81 MG EC tablet Take 81 mg by mouth daily.    Yes Historical Provider, MD         Past Medical History:   Diagnosis Date    Arthritis     Bronchitis, chronic (HCC)     Chronic back pain     Chronic cough     [facility]) [facility]     Depression     Diverticulosis     Emphysema     Fibromyalgia     GERD (gastroesophageal reflux disease)     Hemorrhoid     internal and external, they bleed    History of cocaine abuse ([address]     none since 2010, used between 2007 and 2009     History of kidney infection     Hyperlipidemia     Hyperplastic colon polyp 11/15/2017    Hypertension     Lung cancer ([facility]     non-small cell H3A lung cancer right upper lobe    Marijuana use     for pain and appetite use    Meningioma (HCC)     Neuropathy     Orbital fracture (HCC)     left side ,has  pin in side    Osteoarthritis     Osteopenia     Pulmonary nodule     left lung, watching this one    Renal calculi     Renal cyst     STD (sexually transmitted disease)     unsure of type    Uterine prolapse        Past Surgical History:   Procedure Laterality Date    BACK SURGERY  2011    thoracic laminectomy, T12, S1    CARDIAC CATHETERIZATION  10/24/14    Normal coronaries     COLONOSCOPY  04/05/2017    diverticulosis, ,poor prep    COLONOSCOPY  11/15/2017    flat polypoid lesion in the base of the cecum, about 1 cm.-hyperplastic    COLONOSCOPY  04/24/2018     diverticulosis.  Small polyp in the sigmoid colon excised with biopsy forceps    EAR SURGERY Left     cleaned out infection    ENDOSCOPY, COLON, DIAGNOSTIC      ORBITAL FRACTURE SURGERY Left     had pin inserted on temple side    SD COLON CA SCRN NOT HI RSK IND N/A 4/5/2017    COLONOSCOPY performed by Rogelio Salvador MD at [address] [facility] FLX DX W/COLLJ [address] PFRMD N/A 4/24/2018    COLONOSCOPY performed by Rogelio Salvador MD at [address] [facility] FLX W/REMOVAL LESION BY HOT BX FORCEPS N/A 11/15/2017    COLONOSCOPY POLYPECTOMY SNARE and saline injection performed by Rogelio Salvador MD at [address] [facility] EGD TRANSORAL BIOPSY SINGLE/MULTIPLE N/A 10/6/2017    EGD BIOPSY performed by Laquita Ball MD at [address] [facility] EGD TRANSORAL BIOPSY SINGLE/MULTIPLE N/A 2/27/2018    EGD ESOPHAGOGASTRODUODENOSCOPY performed by Rogelio Salvador MD at [address] [facility] ESOPHAGOGASTRODUODENOSCOPY TRANSORAL DIAGNOSTIC N/A 4/24/2018    EGD ESOPHAGOGASTRODUODENOSCOPY performed by Rogelio Salvador MD at [address] TUNNELED VENOUS PORT PLACEMENT Left     UPPER GASTROINTESTINAL ENDOSCOPY  10/06/2017    Dr Malia Corea diverticulum gastric fundus, pathology inactive gastritis    UPPER GASTROINTESTINAL ENDOSCOPY  02/27/2018    retained food in the fundus of the stomach, poor peristalsis wide-open pylorus    UPPER GASTROINTESTINAL ENDOSCOPY  04/24/2018    no lesions seen that can account her CEA levels.  UPPER GASTROINTESTINAL ENDOSCOPY N/A 3/10/2021    EGD BIOPSY AND PHOTOS performed by Guillermo Burdick MD at [facility]         Family History   Problem Relation Age of Onset    Heart Disease Mother     Cancer Mother         breast and lung cancer    Diabetes Father     Heart Disease Father         Death due to MI    Cancer Paternal Grandfather         stomach cancer     Heart Disease Paternal Grandfather     Cancer Sister         ovarian cancer     Cancer Maternal Grandmother         female cancer       CareTeam (Including outside providers/suppliers regularly involved in providing care):   Patient Care Team:  Shelva Cabot, MD as PCP - General (Family Medicine)  Shelva Cabot, MD as PCP - [facility]  Michael Loja RN as   Ayla Burrows MD as Consulting Physician (Pulmonology)  Dillon Singh MD as Consulting Physician (Hematology and Oncology)  Guillermo Burdick MD as Consulting Physician (Gastroenterology)    Wt Readings from Last 3 Encounters:   08/16/21 109 lb (49.4 kg)   03/29/21 117 lb 3.2 oz (53.2 kg)   03/09/21 115 lb (52.2 kg)     Vitals:    08/16/21 1526   BP: 110/70   Pulse: 93   Temp: 98 °F (36.7 °C)   SpO2: 97%   Weight: 109 lb (49.4 kg)   Height: 5' 5.75\" (1.67 m)     Body mass index is 17.73 kg/m². Based upon direct observation of the patient, evaluation of cognition reveals recent and remote memory intact. Patient's complete Health Risk Assessment and screening values have been reviewed and are found in Flowsheets. The following problems were reviewed today and where indicated follow up appointments were made and/or referrals ordered.     Positive Risk Factor Screenings with Interventions:       Depression:  PHQ-2 Score: 3  PHQ-9 Total Score: 6    Severity:1-4 = minimal depression, 5-9 = mild depression, 10-14 = moderate depression, 15-19 = moderately severe depression, 20-27 = severe depression  Depression Interventions:  · pt refused counseling , has family support and is on paxil  · Relaxation techniques discussed     Substance History:  Social History     Tobacco History     Smoking Status  Current Every Day Smoker Smoking Frequency  0.1 packs/day for 38 years (3.8 pk yrs) Smoking Tobacco Type  Cigarettes    Smokeless Tobacco Use  Never Used    Tobacco Comment  smokes 1 or 2 a day          Alcohol History     Alcohol Use Status  Not Currently Drinks/Week  0 Standard drinks or equivalent per week Amount  0.0 standard drinks of alcohol/wk          Drug Use     Drug Use Status  Yes Types  Marijuana Comment  5 joints a day marijuana for pain and appetite, she states that she stopped cocaine 2011          Sexual Activity     Sexually Active  Not Currently Partners  Male Birth Control/Protection  Post-menopausal               Alcohol Screening:       A score of 8 or more is associated with harmful or hazardous drinking. A score of 13 or more in women, and 15 or more in men, is likely to indicate alcohol dependence. Substance Abuse Interventions:  Tobacco abuse:  patient is not ready to quit      General Health and ACP:  General  In general, how would you say your health is?: Fair  In the past 7 days, have you experienced any of the following? New or Increased Pain, New or Increased Fatigue, Loneliness, Social Isolation, Stress or Anger?: (!) Social Isolation  Do you get the social and emotional support that you need?: Yes  Do you have a Living Will?: (!) No  Advance Directives     Power of [address] [place] [address] Will ACP-Advance Directive ACP-Power of     Not on File Not on File Not on File Filed      General Health Risk Interventions:  · Social isolation: pt lives with boy friend and has brother who helps.  pt refused home health    Health Habits/Nutrition:  Health Habits/Nutrition  Do you exercise for at least 20 minutes 2-3 times per week?: Yes  Have you lost any weight without trying in the past 3 months?: (!) Yes  Do you eat only one meal per day?: No  Have you seen the dentist within the past year?: (!) No  Body mass index: (!) 17.73  Health Habits/Nutrition Interventions:  · Dental exam overdue:  patient encouraged to make appointment with his/her dentist  · pt has living sandra, refused to made one     Hearing/Vision:  No exam data present  Hearing/Vision  Do you or your family notice any trouble with your hearing that hasn't been managed with hearing aids?: (!) Yes  Do you have difficulty driving, watching TV, or doing any of your daily activities because of your eyesight?: No  Have you had an eye exam within the past year?: Yes  Hearing/Vision Interventions:  · Hearing concerns:  pt has hearing aids , refferal placed      Personalized Preventive Plan   Current Health Maintenance Status  Immunization History   Administered Date(s) Administered    COVID-19, J&J, PF, 0.5 mL 04/09/2021    Influenza 10/10/2013    Influenza Virus Vaccine 11/06/2014, 09/28/2015, 10/17/2018, 10/29/2019    Influenza, High Dose (Fluzone 65 yrs and older) 09/14/2017    Influenza, [place], IM, (6 mo and older Fluzone, Flulaval, Fluarix and 3 yrs and older Afluria) 09/14/2017    Influenza, Quadv, IM, PF (6 mo and older Fluzone, Flulaval, Fluarix, and 3 yrs and older Afluria) 10/25/2016, 09/17/2019, 09/17/2020    Pneumococcal Polysaccharide ([id number]) 03/06/2017    Tdap (Boostrix, Adacel) 06/17/2019    Zoster Live (Zostavax) 05/03/2017    Zoster Recombinant (Shingrix) 03/12/2017, 07/18/2018, 05/01/2020        Health Maintenance   Topic Date Due    Annual Wellness Visit (AWV)  Never done    Flu vaccine (1) 09/01/2021    Lipid screen  03/06/2022    Cervical cancer screen  11/13/2022    Breast cancer screen  04/22/2023    Colon cancer screen colonoscopy  04/24/2023    Pneumococcal 0-64 years Vaccine (2 of 2 - PPSV23) 04/26/2026    DTaP/Tdap/Td vaccine (2 - Td or Tdap) 06/17/2029    Shingles Vaccine  Completed    COVID-19 Vaccine  Completed    Hepatitis C screen  Completed    HIV screen  Completed    Hepatitis A vaccine  Aged Out    Hepatitis B vaccine  Aged Out    Hib vaccine  Aged Out    Meningococcal (ACWY) vaccine  Aged Out     Recommendations for [place] Due: see orders and patient instructions/AVS.  . Recommended screening schedule for the next 5-10 years is provided to the patient in written form: see Patient Kera Garcia was seen today for medicare awv.     Diagnoses and all orders for this visit:    Medicare annual wellness visit, subsequent    Hearing problem of both ears  -     MARIVEL De Paz, JOSE DAVID, Audiology, [place]    Routine general medical examination at a health care facility

## 2021-09-28 DIAGNOSIS — F32.9 REACTIVE DEPRESSION: ICD-10-CM

## 2021-09-28 RX ORDER — PAROXETINE 30 MG/1
30 TABLET, FILM COATED ORAL EVERY MORNING
Qty: 90 TABLET | Refills: 2 | Status: SHIPPED | OUTPATIENT
Start: 2021-09-28 | End: 2022-05-31

## 2021-11-23 DIAGNOSIS — F41.9 ANXIETY: ICD-10-CM

## 2021-11-23 RX ORDER — CLONIDINE HYDROCHLORIDE 0.1 MG/1
TABLET ORAL
Qty: 90 TABLET | Refills: 0 | Status: SHIPPED | OUTPATIENT
Start: 2021-11-23 | End: 2022-02-24

## 2021-11-23 NOTE — TELEPHONE ENCOUNTER
Please Approve or Refuse.   Send to Pharmacy per Pt's Request:     Next Visit Date:  12/16/2021   Last Visit Date: 8/16/2021    Hemoglobin A1C (%)   Date Value   10/25/2016 5.5             ( goal A1C is < 7)   BP Readings from Last 3 Encounters:   08/16/21 110/70   03/29/21 (!) 131/91   03/10/21 (!) 146/76          (goal 120/80)  BUN   Date Value Ref Range Status   11/09/2021 11 7 - 25 mg/dL Final     CREATININE   Date Value Ref Range Status   11/09/2021 0.83 0.60 - 1.20 mg/dL Final     Potassium   Date Value Ref Range Status   11/09/2021 3.4 (L) 3.5 - 5.1 meq/L Final

## 2021-12-16 ENCOUNTER — OFFICE VISIT (OUTPATIENT)
Dept: FAMILY MEDICINE CLINIC | Age: 61
End: 2021-12-16
Payer: MEDICARE

## 2021-12-16 VITALS
OXYGEN SATURATION: 97 % | SYSTOLIC BLOOD PRESSURE: 130 MMHG | DIASTOLIC BLOOD PRESSURE: 80 MMHG | BODY MASS INDEX: 17.19 KG/M2 | HEART RATE: 69 BPM | TEMPERATURE: 98 F | HEIGHT: 66 IN | WEIGHT: 107 LBS

## 2021-12-16 DIAGNOSIS — E44.1 MILD PROTEIN-CALORIE MALNUTRITION (HCC): ICD-10-CM

## 2021-12-16 DIAGNOSIS — C34.2 MALIGNANT NEOPLASM OF MIDDLE LOBE OF RIGHT LUNG (HCC): ICD-10-CM

## 2021-12-16 DIAGNOSIS — Z23 ENCOUNTER FOR IMMUNIZATION: ICD-10-CM

## 2021-12-16 DIAGNOSIS — M50.30 DEGENERATION OF CERVICAL INTERVERTEBRAL DISC: ICD-10-CM

## 2021-12-16 DIAGNOSIS — M50.30 DDD (DEGENERATIVE DISC DISEASE), CERVICAL: ICD-10-CM

## 2021-12-16 DIAGNOSIS — J43.1 PANLOBULAR EMPHYSEMA (HCC): Primary | ICD-10-CM

## 2021-12-16 DIAGNOSIS — K63.5 HYPERPLASTIC COLONIC POLYP, UNSPECIFIED PART OF COLON: ICD-10-CM

## 2021-12-16 DIAGNOSIS — F32.4 MAJOR DEPRESSIVE DISORDER WITH SINGLE EPISODE, IN PARTIAL REMISSION (HCC): ICD-10-CM

## 2021-12-16 DIAGNOSIS — M79.7 FIBROMYALGIA: ICD-10-CM

## 2021-12-16 PROCEDURE — 99214 OFFICE O/P EST MOD 30 MIN: CPT | Performed by: FAMILY MEDICINE

## 2021-12-16 PROCEDURE — G8419 CALC BMI OUT NRM PARAM NOF/U: HCPCS | Performed by: FAMILY MEDICINE

## 2021-12-16 PROCEDURE — 4004F PT TOBACCO SCREEN RCVD TLK: CPT | Performed by: FAMILY MEDICINE

## 2021-12-16 PROCEDURE — G8427 DOCREV CUR MEDS BY ELIG CLIN: HCPCS | Performed by: FAMILY MEDICINE

## 2021-12-16 PROCEDURE — 3023F SPIROM DOC REV: CPT | Performed by: FAMILY MEDICINE

## 2021-12-16 PROCEDURE — 90674 CCIIV4 VAC NO PRSV 0.5 ML IM: CPT | Performed by: FAMILY MEDICINE

## 2021-12-16 PROCEDURE — G8482 FLU IMMUNIZE ORDER/ADMIN: HCPCS | Performed by: FAMILY MEDICINE

## 2021-12-16 PROCEDURE — 3017F COLORECTAL CA SCREEN DOC REV: CPT | Performed by: FAMILY MEDICINE

## 2021-12-16 PROCEDURE — G0008 ADMIN INFLUENZA VIRUS VAC: HCPCS | Performed by: FAMILY MEDICINE

## 2021-12-16 NOTE — PROGRESS NOTES
Vaccine Information Sheet, \"Influenza - Inactivated\"  given to Nicky Hirsch, or parent/legal guardian of  Nicky Hirsch and verbalized understanding. Patient responses:    Have you ever had a reaction to a flu vaccine? No  Do you have any current illness? No  Have you ever had Guillian Kamuela Syndrome? No  Do you have a serious allergy to any of the following: Neomycin, Polymyxin, Thimerosal, eggs or egg products? No    Flu vaccine given per order. Please see immunization tab. Risks and benefits explained. Current VIS given. Visit Information    Have you changed or started any medications since your last visit including any over-the-counter medicines, vitamins, or herbal medicines? no   Are you having any side effects from any of your medications? -  no  Have you stopped taking any of your medications? Is so, why? -  no    Have you seen any other physician or provider since your last visit? No  Have you had any other diagnostic tests since your last visit? Yes - Records Obtained  Have you been seen in the emergency room and/or had an admission to a hospital since we last saw you? No  Have you had your routine dental cleaning in the past 6 months? no    Have you activated your [employer] account? If not, what are your barriers?  Yes     Patient Care Team:  Edgardo Reed MD as PCP - General (Family Medicine)  Edgardo Reed MD as PCP - [facility]  Kev Vidales RN as   Mcihael Chandler MD as Consulting Physician (Pulmonology)  Desirae Arora MD as Consulting Physician (Hematology and Oncology)  Debby George MD as Consulting Physician (Gastroenterology)    Medical History Review  Past Medical, Family, and Social History reviewed and does contribute to the patient presenting condition    Health Maintenance   Topic Date Due    COVID-19 Vaccine (2 - Booster for CounterTack series) 06/04/2021    Flu vaccine (1) 09/01/2021    Lipid screen  03/06/2022    Annual Wellness Visit (AWV)  08/17/2022    Cervical cancer screen  11/13/2022    Breast cancer screen  04/22/2023    Colon cancer screen colonoscopy  04/24/2023    Pneumococcal 0-64 years Vaccine (2 of 2 - PPSV23) 04/26/2026    DTaP/Tdap/Td vaccine (2 - Td or Tdap) 06/17/2029    Shingles Vaccine  Completed    Hepatitis C screen  Completed    HIV screen  Completed    Hepatitis A vaccine  Aged Out    Hepatitis B vaccine  Aged Out    Hib vaccine  Aged Out    Meningococcal (ACWY) vaccine  Aged Out

## 2021-12-16 NOTE — PROGRESS NOTES
Chief Complaint   Patient presents with    Hypertension         Sowmya Parish  here today for follow up on chronic medical problems, go over labs and/or diagnostic studies, and medication refills. Hypertension      HPI: Patient is here for follow-up on COPD lung malignancy. COPD at her baseline is on inhalers  . Patient still smokes not ready to quit. Lung malignancy stable had recent CT chest and liver CT abdomen done. She is off of therapy currently. Patient completed immunotherapy. Both CTs showing stable findings. Degenerative lumbar disease stable on current treatment. Depression stable denies any needs. Patient is compliant with medications reports that is helping. Weight loss, patient has lost couple of pounds. But patient reports she feels fine. She usually takes liquid diet. CT lung      FINDINGS: The thyroid and the thoracic inlet appear unremarkable. The central airways are patent. The thoracic aorta is without aneurysm or dissection, scattered    atherosclerotic calcification. No pulmonary emboli. No mediastinal adenopathy identified. No pneumothorax or pleural effusion identified. Changes from severe centrilobular edema are unchanged. Focus of scarring in the right upper lobe centered on image 100    previously has a similar appearance when centered on image 112 today. Left-sided PICC line with its tip in SVC. The study viewed at bone window shows no acute or aggressive lesions       CT abdomen      FINDINGS: Small cysts again noted in segment 7 of liver. The spleen, adrenals, pancreas and gallbladder are unremarkable. The kidneys concentrate contrast satisfactorily. Multiple bilateral    cysts are present, the largest in the left upper pole. The GI tract appears unremarkable. The bladder, uterus and adnexa are unremarkable. No free air or free fluid.    The aorta is diffusely calcified but nonaneurysmal. No    retroperitoneal adenopathy or hematoma. The study viewed at bone window shows a compression fracture through    the superior endplate of R55, unchanged from the prior study. No    acute or aggressive lesions appreciated       IMPRESSION:         Small cysts in liver unchanged. No evidence for metastatic disease. /80   Pulse 69   Temp 98 °F (36.7 °C)   Ht 5' 5.75\" (1.67 m)   Wt 107 lb (48.5 kg)   SpO2 97%   BMI 17.40 kg/m²    Body mass index is 17.4 kg/m². Wt Readings from Last 3 Encounters:   12/16/21 107 lb (48.5 kg)   08/16/21 109 lb (49.4 kg)   03/29/21 117 lb 3.2 oz (53.2 kg)        []Negative depression screening. PHQ Scores 8/16/2021 6/8/2020 1/28/2020 9/17/2019 11/13/2017   PHQ2 Score 3 0 0 4 0   PHQ9 Score 6 0 0 10 0      []1-4 = Minimal depression   []5-9 = Milddepression   []10-14 = Moderate depression   []15-19 = Moderately severe depression   []20-27 = Severe depression    Discussed testing with the patient and all questions fully answered.     Orders Only on 11/09/2021   Component Date Value Ref Range Status    WBC 11/09/2021 6.87  4.00 - 10.60 10*3/uL Final    RBC 11/09/2021 4.68  3.80 - 5.00 10*6/uL Final    Hemoglobin 11/09/2021 14.5  12.0 - 15.0 g/dL Final    Hematocrit 11/09/2021 44.7  36.0 - 45.0 % Final    MCV 11/09/2021 95.5  82.0 - 98.0 fL Final    MCH 11/09/2021 31.0  27.0 - 33.0 pg Final    MCHC 11/09/2021 32.4  32.0 - 35.0 g/dL Final    RDW 11/09/2021 13.9  11.5 - 15.0 % Final    Platelets 21/10/2402 297  150 - 400 10*3/uL Final    Neutrophils % 11/09/2021 70.3  40.0 - 72.0 % Final    Immature Granulocytes 11/09/2021 0.4  0.0 - 1.0 % Final    Lymphocytes % 11/09/2021 17.6* 20.0 - 45.0 % Final    Monocytes % 11/09/2021 7.9  5.0 - 12.0 % Final    Eosinophils % 11/09/2021 3.1  0.0 - 6.0 % Final    Basophils % 11/09/2021 0.7  0.0 - 1.0 % Final    Neutrophils Absolute 11/09/2021 4.8  1.6 - 7.6 10*3/uL Final    Absolute Immature Granulocyte 11/09/2021 0.0  0.0 - 0.2 10*3/uL Final    Lymphocytes Absolute 11/09/2021 1.2  1.2 - 4.0 10*3/uL Final    Monocytes Absolute 11/09/2021 0.5  0.1 - 1.0 10*3/uL Final    Eosinophils Absolute 11/09/2021 0.2  0.0 - 0.5 10*3/uL Final    Basophils Absolute 11/09/2021 0.1  0.0 - 0.2 10*3/uL Final    nRBC 11/09/2021 0  0 - 0 % Final    Glucose 11/09/2021 126* 70 - 100 mg/dL Final    BUN 11/09/2021 11  7 - 25 mg/dL Final    CREATININE 11/09/2021 0.83  0.60 - 1.20 mg/dL Final    Sodium 11/09/2021 141  136 - 145 meq/L Final    Potassium 11/09/2021 3.4* 3.5 - 5.1 meq/L Final    Chloride 11/09/2021 106  98 - 107 meq/L Final    CO2 11/09/2021 27  21 - 31 meq/L Final    Calcium 11/09/2021 9.0  8.6 - 10.3 mg/dL Final    Total Bilirubin 11/09/2021 0.4  0.3 - 1.0 mg/dL Final    Alkaline Phosphatase 11/09/2021 69  34 - 104 IU/L Final    AST 11/09/2021 13  13 - 39 IU/L Final    ALT 11/09/2021 7  7 - 52 IU/L Final    Total Protein 11/09/2021 5.9* 6.0 - 8.3 g/dL Final    Albumin 11/09/2021 3.8  3.5 - 5.7 g/dL Final    EGFR IF NonAfrican American 11/09/2021 >60  >60 ml/min/1.73sq m Final    eGFR  11/09/2021 >60  >60 ml/min/1.73sq m Final    T4 Free 11/09/2021 1.00  0.71 - 1.85 ng/dL Final    T3, Free 11/09/2021 3.7  2.5 - 3.9 pg/mL Final    TSH, 3RD GENERATION 11/09/2021 2.95  0.34 - 5.60 uIU/mL Final    WBC 12/16/2021 6.06  4.00 - 10.60 10*3/uL Final    RBC 12/16/2021 4.56  3.80 - 5.00 10*6/uL Final    Hemoglobin 12/16/2021 14.3  12.0 - 15.0 g/dL Final    Hematocrit 12/16/2021 42.5  36.0 - 45.0 % Final    MCV 12/16/2021 93.2  82.0 - 98.0 fL Final    MCH 12/16/2021 31.4  27.0 - 33.0 pg Final    MCHC 12/16/2021 33.6  32.0 - 35.0 g/dL Final    RDW 12/16/2021 14.5  11.5 - 15.0 % Final    Platelets 12/57/5820 284  150 - 400 10*3/uL Final    Neutrophils % 12/16/2021 47.7  40.0 - 72.0 % Final    Immature Granulocytes 12/16/2021 0.2  0.0 - 1.0 % Final    Lymphocytes % 12/16/2021 37.0  20.0 - 45.0 % Final    Monocytes % 12/16/2021 8.7 5.0 - 12.0 % Final    Eosinophils % 12/16/2021 5.4  0.0 - 6.0 % Final    Basophils % 12/16/2021 1.0  0.0 - 1.0 % Final    Neutrophils Absolute 12/16/2021 2.9  1.6 - 7.6 10*3/uL Final    Absolute Immature Granulocyte 12/16/2021 0.0  0.0 - 0.2 10*3/uL Final    Lymphocytes Absolute 12/16/2021 2.2  1.2 - 4.0 10*3/uL Final    Monocytes Absolute 12/16/2021 0.5  0.1 - 1.0 10*3/uL Final    Eosinophils Absolute 12/16/2021 0.3  0.0 - 0.5 10*3/uL Final    Basophils Absolute 12/16/2021 0.1  0.0 - 0.2 10*3/uL Final    nRBC 12/16/2021 0  0 - 0 % Final    Glucose 12/16/2021 107* 70 - 100 mg/dL Final    BUN 12/16/2021 12  7 - 25 mg/dL Final    CREATININE 12/16/2021 0.86  0.60 - 1.20 mg/dL Final    Sodium 12/16/2021 140  136 - 145 meq/L Final    Potassium 12/16/2021 3.5  3.5 - 5.1 meq/L Final    Chloride 12/16/2021 106  98 - 107 meq/L Final    CO2 12/16/2021 26  21 - 31 meq/L Final    Calcium 12/16/2021 9.2  8.6 - 10.3 mg/dL Final    Total Bilirubin 12/16/2021 0.5  0.3 - 1.0 mg/dL Final    Alkaline Phosphatase 12/16/2021 71  34 - 104 IU/L Final    AST 12/16/2021 18  13 - 39 IU/L Final    ALT 12/16/2021 10  7 - 52 IU/L Final    Total Protein 12/16/2021 6.1  6.0 - 8.3 g/dL Final    Albumin 12/16/2021 4.0  3.5 - 5.7 g/dL Final    EGFR IF NonAfrican American 12/16/2021 >60  >60 ml/min/1.73sq m Final    eGFR  12/16/2021 >60  >60 ml/min/1.73sq m Final    TSH, 3RD GENERATION 12/16/2021 4.75  0.34 - 5.60 uIU/mL Final    T4 Free 12/16/2021 1.06  0.71 - 1.85 ng/dL Final    T3, Free 12/16/2021 4.2* 2.5 - 3.9 pg/mL Final         Most recent labs reviewed:     Lab Results   Component Value Date    WBC 6.06 12/16/2021    HGB 14.3 12/16/2021    HCT 42.5 12/16/2021    MCV 93.2 12/16/2021     12/16/2021       @BRIEFLAB(NA,K,CL,CO2,BUN,CREATININE,GLUCOSE,CALCIUM)@     Lab Results   Component Value Date    ALT 10 12/16/2021    AST 18 12/16/2021    ALKPHOS 71 12/16/2021    BILITOT 0.5 12/16/2021 Lab Results   Component Value Date    TSH 0.93 08/19/2017       Lab Results   Component Value Date    CHOL 213 (H) 03/06/2017    CHOL 189 06/20/2016    CHOL 228 (H) 11/06/2014     Lab Results   Component Value Date    TRIG 228 (H) 03/06/2017    TRIG 382 (H) 06/20/2016    TRIG 376 (H) 11/06/2014     Lab Results   Component Value Date    HDL 46 03/06/2017    HDL 42 06/20/2016    HDL 46 11/06/2014     Lab Results   Component Value Date    LDLCHOLESTEROL 121 03/06/2017    LDLCHOLESTEROL 71 06/20/2016    LDLCHOLESTEROL 107 11/06/2014     Lab Results   Component Value Date    VLDL NOT REPORTED 03/06/2017    VLDL NOT REPORTED 06/20/2016    VLDL NOT REPORTED 11/06/2014     Lab Results   Component Value Date    CHOLHDLRATIO 4.6 03/06/2017    CHOLHDLRATIO 4.5 06/20/2016    CHOLHDLRATIO 5.0 (H) 11/06/2014       Lab Results   Component Value Date    LABA1C 5.5 10/25/2016       No results found for: HBOERXPD33    No results found for: FOLATE    No results found for: IRON, TIBC, FERRITIN    Lab Results   Component Value Date    VITD25 38.7 01/18/2021             Current Outpatient Medications   Medication Sig Dispense Refill    cloNIDine (CATAPRES) 0.1 MG tablet take 1 tablet by mouth at bedtime 90 tablet 0    PARoxetine (PAXIL) 30 MG tablet take 1 tablet by mouth every morning 90 tablet 2    famotidine (PEPCID) 20 MG tablet take 1 tablet by mouth twice a day 60 tablet 5    omeprazole (PRILOSEC) 20 MG delayed release capsule Take 1 capsule by mouth twice daily 180 capsule 3    Ascorbic Acid (VITAMIN C) 1000 MG tablet Take 1,000 mg by mouth daily      nicotine (NICODERM CQ) 21 MG/24HR Place 1 patch onto the skin every 24 hours      benzonatate (TESSALON) 100 MG capsule Take 100 mg by mouth 3 times daily as needed for Cough      Pembrolizumab (KEYTRUDA IV) Infuse intravenously Once every 6 weeks Last dose was 2/25/2021      fluticasone (FLONASE) 50 MCG/ACT nasal spray 2 sprays by Nasal route daily 1 Bottle 0    ondansetron (ZOFRAN) 4 MG tablet Take 4 mg by mouth every 8 hours as needed for Nausea or Vomiting      dicyclomine (BENTYL) 10 MG capsule Take 1 capsule by mouth every 6 hours as needed (cramps) 20 capsule 0    lidocaine (LMX) 4 % cream Apply topically every 8 hrs as needed for pain 45 g 3    RA CALCIUM 600/VITAMIN D-3 600-400 MG-UNIT TABS take 1 tablet by mouth twice a day 90 tablet 3    umeclidinium-vilanterol (ANORO ELLIPTA) 62.5-25 MCG/INH AEPB inhaler Anoro Ellipta 62.5 mcg-25 mcg/actuation powder for inhalation   Inhale 1 puff every day by inhalation route.  lidocaine-prilocaine (EMLA) 2.5-2.5 % cream lidocaine-prilocaine 2.5 %-2.5 % topical cream   Apply to port site and cover 30-45 minutes prior to needle access      potassium chloride (KLOR-CON M) 20 MEQ extended release tablet Take 1 tablet by mouth daily (Patient taking differently: Take 20 mEq by mouth daily Can take a second one if needed especially before and after treatments) 60 tablet 2    albuterol sulfate HFA (PROVENTIL HFA) 108 (90 Base) MCG/ACT inhaler Inhale 2 puffs into the lungs every 6 hours as needed for Wheezing or Shortness of Breath 1 Inhaler 11    Multiple Vitamin (MULTIVITAMIN PO) Take  by mouth daily.  aspirin 81 MG EC tablet Take 81 mg by mouth daily. No current facility-administered medications for this visit. Social History     Socioeconomic History    Marital status:       Spouse name: Not on file    Number of children: Not on file    Years of education: Not on file    Highest education level: Not on file   Occupational History    Not on file   Tobacco Use    Smoking status: Current Every Day Smoker     Packs/day: 0.10     Years: 38.00     Pack years: 3.80     Types: Cigarettes    Smokeless tobacco: Never Used    Tobacco comment: smokes 1 or 2 a day   Vaping Use    Vaping Use: Former    Substances: Nicotine, Flavoring   Substance and Sexual Activity    Alcohol use: Not Currently     Alcohol/week: 0.0 standard drinks    Drug use: Yes     Types: Marijuana Kennedy Adames     Comment: 5 joints a day marijuana for pain and appetite, she states that she stopped cocaine 2011    Sexual activity: Not Currently     Partners: Male     Birth control/protection: Post-menopausal   Other Topics Concern    Not on file   Social History Narrative    Not on file     Social Determinants of Health     Financial Resource Strain: Low Risk     Difficulty of Paying Living Expenses: Not hard at all   Food Insecurity: No Food Insecurity    Worried About [facility] in the Last Year: Never true    [facility] in the Last Year: Never true   Transportation Needs: No Transportation Needs    Lack of Transportation (Medical): No    Lack of Transportation (Non-Medical):  No   Physical Activity:     Days of Exercise per Week: Not on file    Minutes of Exercise per Session: Not on file   Stress:     Feeling of Stress : Not on file   Social Connections:     Frequency of Communication with Friends and Family: Not on file    Frequency of Social Gatherings with Friends and Family: Not on file    Attends Baptism Services: Not on file    Active Member of [address] [place] [address] or Organizations: Not on file    Attends Club or Organization Meetings: Not on file    Marital Status: Not on file   Intimate Partner Violence:     Fear of Current or Ex-Partner: Not on file    Emotionally Abused: Not on file    Physically Abused: Not on file    Sexually Abused: Not on file   Housing Stability: Unknown    Unable to Pay for Housing in the Last Year: No    Number of [place] in the Last Year: Not on file    Unstable Housing in the Last Year: No     Ready to quit: Not Answered  Counseling given: Yes  Comment: smokes 1 or 2 a day        Family History   Problem Relation Age of Onset    Heart Disease Mother     Cancer Mother         breast and lung cancer    Diabetes Father     Heart Disease Father         Death due to MI    Cancer Paternal Grandfather         stomach cancer     Heart Disease Paternal Grandfather     Cancer Sister         ovarian cancer     Cancer Maternal Grandmother         female cancer             -rest of complaints with corresponding details per ROS    The patient's past medical, surgical, social, and family history as well as her current medications and allergies were reviewed as documented intoday's encounter. Review of Systems   Constitutional: Positive for activity change, appetite change, fatigue and unexpected weight change. Negative for diaphoresis. HENT: Negative for congestion, hearing loss and nosebleeds. Eyes: Negative for photophobia and visual disturbance. Respiratory: Negative for cough, chest tightness, shortness of breath and wheezing. Cardiovascular: Negative for chest pain, palpitations and leg swelling. Gastrointestinal: Positive for nausea. Negative for abdominal distention, abdominal pain, blood in stool, constipation, diarrhea and vomiting. Endocrine: Negative for polyphagia and polyuria. Genitourinary: Negative for difficulty urinating and flank pain. Musculoskeletal: Positive for arthralgias, back pain, myalgias and neck pain. Negative for gait problem, joint swelling and neck stiffness. Skin: Negative for color change, rash and wound. Allergic/Immunologic: Negative for immunocompromised state. Neurological: Positive for weakness and numbness. Negative for dizziness, syncope, speech difficulty and headaches. Psychiatric/Behavioral: Positive for decreased concentration. Negative for agitation, dysphoric mood and sleep disturbance. The patient is nervous/anxious. The patient is not hyperactive. Physical Exam  Vitals and nursing note reviewed. Constitutional:       Appearance: She is cachectic. HENT:      Head: Normocephalic.       Nose: Nose normal.   Eyes:      General: Lids are normal.   Cardiovascular:      Rate and Rhythm: Normal rate and regular rhythm. Pulmonary:      Effort: Pulmonary effort is normal. No tachypnea. Breath sounds: Decreased air movement present. Decreased breath sounds present. No wheezing, rhonchi or rales. Abdominal:      General: Abdomen is flat. Palpations: Abdomen is soft. Musculoskeletal:      Thoracic back: Deformity and spasms present. Decreased range of motion. Lumbar back: Spasms and tenderness present. No bony tenderness. Decreased range of motion. Lymphadenopathy:      Cervical: No cervical adenopathy. Neurological:      Mental Status: She is alert and oriented to person, place, and time. Cranial Nerves: Cranial nerves are intact. Motor: Weakness present. Gait: Gait normal.   Psychiatric:         Attention and Perception: Attention and perception normal.         Mood and Affect: Mood is anxious. Mood is not depressed. Speech: She is communicative. Behavior: Behavior is slowed. Behavior is not agitated. Thought Content: Thought content normal.             ASSESSMENT AND PLAN      1. Panlobular emphysema (HCC)  Stable continue same inhalers. Continue to cut back on smoking    2. Degeneration of cervical intervertebral disc  Stable continue NSAIDs as needed    3. DDD (degenerative disc disease), cervical  Continue NSAIDs as needed    4. Hyperplastic colonic polyp, unspecified part of colon  Stable up-to-date on colonoscopy    5. Fibromyalgia  Pain is under control    6. Major depressive disorder with single episode, in partial remission ([address]  Continue Zoloft. Continue relaxation techniques    7. Mild protein-calorie malnutrition ([address]  Continue to monitor    8. Malignant neoplasm of middle lobe of right lung [facility])  Referral placed follow-up with pulmonologist, recent CTs are stable  - [facility] Referral to [address] [place] [address] Specialist    9.  Encounter for immunization    - INFLUENZA, MDCK QUADV, 2 YRS AND OLDER, IM, PF, PREFILL SYR OR SDV, 0.5ML (FLUCELVAX QUADV, PF)      Orders Placed This Encounter   Procedures    INFLUENZA, MDCK QUADV, 2 YRS AND OLDER, IM, PF, PREFILL SYR OR SDV, 0.5ML ([address], PF)    [facility]erral to [facility] Specialist     Referral Priority:   Routine     Referral Type: Other     Referral Reason:   Specialty Services Required     Number of Visits Requested:   1         There are no discontinued medications. Ammy Singleton received counseling on the following healthy behaviors: nutrition, exercise, medication adherence and tobacco cessation  Reviewed prior labs and health maintenance  Continue current medications, diet and exercise. Discussed use, benefit, and side effects of prescribed medications. Barriers to medication compliance addressed. Patient given educational materials - see patient instructions  Was a self-tracking handout given in paper form or via [employer]? Yes    Requested Prescriptions      No prescriptions requested or ordered in this encounter       All patient questions answered. Patient voiced understanding. Quality Measures    Body mass index is 17.4 kg/m². Low. Weight control planned discussed Healthy diet and regular exercise. BP: 130/80 Blood pressure is normal. Treatment plan consists of No treatment change needed. Lab Results   Component Value Date    LDLCHOLESTEROL 121 03/06/2017    (goal LDL reduction with dx if diabetes is 50% LDL reduction)      PHQ Scores 8/16/2021 6/8/2020 1/28/2020 9/17/2019 11/13/2017   PHQ2 Score 3 0 0 4 0   PHQ9 Score 6 0 0 10 0     Interpretation of Total Score Depression Severity: 1-4 = Minimal depression, 5-9 = Mild depression, 10-14 = Moderate depression, 15-19 = Moderately severe depression, 20-27 = Severe depression    The patient'spast medical, surgical, social, and family history as well as her   current medications and allergies were reviewed as documented in today's encounter.       Medications, labs, diagnostic studies, consultations andfollow-up as documented in this encounter. Return in about 4 months (around 4/16/2022). Patient wasseen with total face to face time of 30 minutes. More than 50% of this visit was counseling and education. Future Appointments   Date Time Provider Sakshi Kaur   4/18/2022  3:00 PM [facility]   8/16/2022  3:15 PM Macy Salvador MD [facility]     This note was completed by using the assistance of a speech-recognition program. However, inadvertent computerized transcription errors may be present. Althoughevery effort was made to ensure accuracy, no guarantees can be provided that every mistake has been identified and corrected by editing.   Electronically signed by Macy Salvador MD on 12/17/2021  1:26 PM

## 2021-12-17 ASSESSMENT — ENCOUNTER SYMPTOMS
VOMITING: 0
ABDOMINAL DISTENTION: 0
SHORTNESS OF BREATH: 0
CHEST TIGHTNESS: 0
WHEEZING: 0
BLOOD IN STOOL: 0
NAUSEA: 1
COLOR CHANGE: 0
PHOTOPHOBIA: 0
CONSTIPATION: 0
DIARRHEA: 0
COUGH: 0
ABDOMINAL PAIN: 0
BACK PAIN: 1

## 2021-12-28 ENCOUNTER — CLINICAL DOCUMENTATION (OUTPATIENT)
Dept: SPIRITUAL SERVICES | Age: 61
End: 2021-12-28

## 2021-12-28 NOTE — PROGRESS NOTES
Advance Care Planning   Ambulatory ACP Specialist Patient Outreach    Date:  12/28/2021  ACP Specialist:  Nancy Cuellar    Outreach call to patient in follow-up to ACP Specialist referral from: Ernesto Davidson MD    [x] PCP  [] Provider   [] Ambulatory Care Management [] Other for Reason:    [x] Advance Directive Assistance  [] Code Status Discussion  [] Complete Portable DNR Order  [] Discuss Goals of Care  [] Complete POST/MOST  [] Early ACP Decision-Making  [] Other    Date Referral Received:12/16/21    Today's Outreach:  [x] First   [] Second  [] Third                               Third outreach made by [x]  phone  [x] email [x]   [employer]     Intervention:  [] Spoke with Patient  [x] Left VM requesting return call      Outcome:Left detailed VM for patient to call back, Sent Email with ACP Packet attached, [email or web address]. Will reach out again in two weeks. Patient returned call same day. Scheduled 1/14 between 1pm and 4pm. ACP Packet to be mailed. Next Step:   [x] ACP scheduled conversation  [] Outreach again in two week               [x] Email / Mail ACP Info Sheets  [x] Email / Mail Advance Directive            [] Close Referral. Routing closure to referring provider/staff and to ACP Specialist .      Thank you for this referral.

## 2022-01-12 DIAGNOSIS — K21.9 GASTROESOPHAGEAL REFLUX DISEASE WITHOUT ESOPHAGITIS: ICD-10-CM

## 2022-01-12 NOTE — TELEPHONE ENCOUNTER
Please Approve or Refuse.   Send to Pharmacy per Pt's Request:      Next Visit Date:  4/18/2022   Last Visit Date: 12/16/2021    Hemoglobin A1C (%)   Date Value   10/25/2016 5.5             ( goal A1C is < 7)   BP Readings from Last 3 Encounters:   12/16/21 130/80   08/16/21 110/70   03/29/21 (!) 131/91          (goal 120/80)  BUN   Date Value Ref Range Status   12/16/2021 12 7 - 25 mg/dL Final     CREATININE   Date Value Ref Range Status   12/16/2021 0.86 0.60 - 1.20 mg/dL Final     Potassium   Date Value Ref Range Status   12/16/2021 3.5 3.5 - 5.1 meq/L Final

## 2022-01-13 ENCOUNTER — CLINICAL DOCUMENTATION (OUTPATIENT)
Dept: SPIRITUAL SERVICES | Age: 62
End: 2022-01-13

## 2022-01-13 RX ORDER — FAMOTIDINE 20 MG/1
TABLET, FILM COATED ORAL
Qty: 60 TABLET | Refills: 5 | Status: SHIPPED | OUTPATIENT
Start: 2022-01-13 | End: 2022-07-14

## 2022-01-14 ENCOUNTER — CLINICAL DOCUMENTATION (OUTPATIENT)
Dept: SPIRITUAL SERVICES | Age: 62
End: 2022-01-14

## 2022-01-14 NOTE — ACP (ADVANCE CARE PLANNING)
Advance Care Planning   Ambulatory ACP Specialist Patient Outreach    Date:  1/14/2022  ACP Specialist:  Vera Dc    Outreach call to patient in follow-up to ACP Specialist referral from: Chapincito Byrd MD    [x] PCP  [] Provider   [] Ambulatory Care Management [] Other for Reason:    [x] Advance Directive Assistance  [] Code Status Discussion  [] Complete Portable DNR Order  [] Discuss Goals of Care  [] Complete POST/MOST  [] Early ACP Decision-Making  [] Other    Date Referral Received:12/16/21    Today's Outreach:  [] First   [] Second  [] Third                               Third outreach made by [x]  phone  [] email []   [employer]     Intervention:  [x] Spoke with Patient  [] Left VM requesting return call      Outcome: Patient called to reschedule ACP Conversation appointment. Rescheduled to January 28th between 1pm and 4pm. Will contact Specialist with new Scheduled time. Next Step:   [x] ACP scheduled conversation  [] Outreach again in one week               [] Email / Mail ACP Info Sheets  [] Email / Mail Advance Directive            [] Close Referral. Routing closure to referring provider/staff and to ACP Specialist .      Thank you for this referral.

## 2022-01-14 NOTE — ACP (ADVANCE CARE PLANNING)
Advance Care Planning   Ambulatory ACP Specialist Patient Outreach    Date:  1/14/2022  ACP Specialist:  Sakshi Singh. Nachtrab    Outreach call to patient in follow-up to ACP Specialist referral from: Chula Morataya MD    [] PCP  [] Provider   [] Ambulatory Care Management [] Other for Reason:    [x] Advance Directive Assistance  [] Code Status Discussion  [] Complete Portable DNR Order  [] Discuss Goals of Care  [] Complete POST/MOST  [] Early ACP Decision-Making  [] Other    Date Referral Received: 12/16/21    Today's Outreach:  [] First   [] Second  [x] Third                               Third outreach made by [x]  phone  [] email []   MyChart     Intervention:  [x] Spoke with Patient  [] Left VM requesting return call      Outcome:  Writer spoke with patient on the telephone; patient stated that she called \"another person earlier today to explain she had not received the ACP documents in the mail\"; patient told writer that [employer] documents are going to be sent priority bekah\", and she should receive them by tomorrow; patient did not know about any follow up calls to be made; writer scheduled a return call to patient on Friday,1/21/22 to follow up in this regard; Next Step:   [] ACP scheduled conversation  [] Outreach again in 1/21/22. [] Email / Mail ACP Info Sheets  [] Email / Mail Advance Directive            [] Close Referral. Routing closure to referring provider/staff and to ACP Specialist .      Thank you for this referral.

## 2022-01-14 NOTE — PROGRESS NOTES
Advance Care Planning   Ambulatory ACP Specialist Patient Outreach    Date:  1/14/2022  ACP Specialist:  Jelani Rojas    Outreach call to patient in follow-up to ACP Specialist referral from: Celestino Merrill MD    [x] PCP  [] Provider   [] Ambulatory Care Management [] Other for Reason:    [x] Advance Directive Assistance  [] Code Status Discussion  [] Complete Portable DNR Order  [] Discuss Goals of Care  [] Complete POST/MOST  [] Early ACP Decision-Making  [] Other    Date Referral Received:12/16/21    Today's Outreach:  [] First   [] Second  [] Third                               Third outreach made by [x]  phone  [] email []   [employer]     Intervention:  [x] Spoke with Patient  [] Left VM requesting return call      Outcome: Patient called to reschedule ACP Conversation appointment. Rescheduled to January 28th between 1pm and 4pm. Will contact Specialist with new Scheduled time. Next Step:   [x] ACP scheduled conversation  [] Outreach again in one week               [] Email / Mail ACP Info Sheets  [] Email / Mail Advance Directive            [] Close Referral. Routing closure to referring provider/staff and to ACP Specialist .      Thank you for this referral.

## 2022-01-19 ENCOUNTER — CLINICAL DOCUMENTATION (OUTPATIENT)
Dept: SPIRITUAL SERVICES | Age: 62
End: 2022-01-19

## 2022-02-10 ENCOUNTER — CLINICAL DOCUMENTATION (OUTPATIENT)
Dept: SPIRITUAL SERVICES | Age: 62
End: 2022-02-10

## 2022-02-10 NOTE — ACP (ADVANCE CARE PLANNING)
Advance Care Planning   Ambulatory ACP Specialist Patient Outreach    Date:  2/10/2022  ACP Specialist:  [id number] Rodríguez call to patient in follow-up to [id number] Specialist referral from: Eevlyn Chau MD    [x] PCP  [] Provider   [] Ambulatory Care Management [] Other for Reason:    [x] Advance Directive Assistance  [] Code Status Discussion  [] Complete Portable DNR Order  [] Discuss Goals of Care  [] Complete POST/MOST  [] Early ACP Decision-Making  [] Other    Date Referral Received:1/4/22    Today's Outreach:  [] First   [] Second  [x] Third                               Third outreach made by [x]  phone  [] email []   [facility]     Intervention:  [x] Spoke with Patient  [] Left VM requesting return call      Outcome: Writer spoke to patient and she was sleeping and wanted to call back in regards to the ACP documents. If patient does not call back in a week, we will close the referral.       Next Step:   [] ACP scheduled conversation  [x] Outreach again in one week               [] Email / Mail [address] [place]  [] Email / Mail Advance Directive            [] Close Referral. Routing closure to referring provider/staff and to ACP Specialist .      Thank you for this referral.

## 2022-02-24 DIAGNOSIS — F41.9 ANXIETY: ICD-10-CM

## 2022-02-24 RX ORDER — CLONIDINE HYDROCHLORIDE 0.1 MG/1
TABLET ORAL
Qty: 90 TABLET | Refills: 0 | Status: SHIPPED | OUTPATIENT
Start: 2022-02-24 | End: 2022-05-17

## 2022-02-25 ENCOUNTER — CLINICAL DOCUMENTATION (OUTPATIENT)
Dept: SPIRITUAL SERVICES | Age: 62
End: 2022-02-25

## 2022-02-25 NOTE — ACP (ADVANCE CARE PLANNING)
Advance Care Planning   Ambulatory ACP Specialist Patient Outreach    Date:  2/25/2022  ACP Specialist:  Stormy Padilla. [place]    Outreach call to patient in follow-up to ACP Specialist referral from: Sugey Taylor MD    [x] PCP  [] Provider   [] Ambulatory Care Management [] Other for Reason:    [x] Advance Directive Assistance  [] Code Status Discussion  [] Complete Portable DNR Order  [] Discuss Goals of Care  [] Complete POST/MOST  [] Early ACP Decision-Making  [] Other    Date Referral Received: 12/16/21    Today's Outreach:  [] First   [] Second  [] [place] outreach made by [x]  phone  [] email []   [employer]     Intervention:  [x] Spoke with Patient  [] Left VM requesting return call      Outcome:    Writer spoke with patient on the telephone; patient stated she still has not reviewed the ACP documents. Patient stated she will call the [facility] office once she has reviewed these documents if she needs assistance completing them. Patient did not want any further follow up calls and asked that this referral be closed. PCP notified. Next Step:   [] ACP scheduled conversation  [] Outreach again in one week               [] Email / Mail ACP Info Sheets  [] Email / Mail Advance Directive            [x] Close Referral. Routing closure to referring provider/staff and to ACP Specialist .      Thank you for this referral.

## 2022-04-05 ENCOUNTER — TELEPHONE (OUTPATIENT)
Dept: FAMILY MEDICINE CLINIC | Age: 62
End: 2022-04-05

## 2022-04-05 NOTE — TELEPHONE ENCOUNTER
[facility]) ED Follow up Call    Reason for ED visit:  Alexander Marrufo , this is Neris Bermudez from Dr. Kristen Zamora office, just calling to see how you are doing after your recent ED visit. Did you receive discharge instructions? Yes  Do you understand the discharge instructions? Yes  Did the ED give you any new prescriptions? Yes  Were you able to fill your prescriptions? Yes      Do you have one of our red, yellow and green  Zone sheets that help you to determine when you should go to the ED? Not Applicable      Do you need or want to make a follow up appt with your PCP? No    Do you have any further needs in the home i.e. Equipment?   Not Applicable        FU appts/Provider:    Future Appointments   Date Time Provider Sakshi Kaur   4/18/2022  3:00 PM MD torrie Granados sc [id number]   8/16/2022  3:15 PM MD torrie Granados sc [id number] [facility]

## 2022-04-18 ENCOUNTER — OFFICE VISIT (OUTPATIENT)
Dept: FAMILY MEDICINE CLINIC | Age: 62
End: 2022-04-18
Payer: COMMERCIAL

## 2022-04-18 VITALS
BODY MASS INDEX: 16.52 KG/M2 | OXYGEN SATURATION: 98 % | SYSTOLIC BLOOD PRESSURE: 130 MMHG | HEART RATE: 88 BPM | WEIGHT: 102.8 LBS | DIASTOLIC BLOOD PRESSURE: 72 MMHG | HEIGHT: 66 IN | TEMPERATURE: 96.7 F

## 2022-04-18 DIAGNOSIS — C34.2 MALIGNANT NEOPLASM OF MIDDLE LOBE OF RIGHT LUNG (HCC): ICD-10-CM

## 2022-04-18 DIAGNOSIS — K55.1 SUPERIOR MESENTERIC ARTERY STENOSIS (HCC): ICD-10-CM

## 2022-04-18 DIAGNOSIS — N95.0 POST-MENOPAUSAL BLEEDING: Primary | ICD-10-CM
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
## 2022-04-18 PROCEDURE — G8419 CALC BMI OUT NRM PARAM NOF/U: HCPCS | Performed by: FAMILY MEDICINE

## 2022-04-18 PROCEDURE — 3017F COLORECTAL CA SCREEN DOC REV: CPT | Performed by: FAMILY MEDICINE

## 2022-04-18 PROCEDURE — G8427 DOCREV CUR MEDS BY ELIG CLIN: HCPCS | Performed by: FAMILY MEDICINE

## 2022-04-18 PROCEDURE — 99214 OFFICE O/P EST MOD 30 MIN: CPT | Performed by: FAMILY MEDICINE

## 2022-04-18 PROCEDURE — 4004F PT TOBACCO SCREEN RCVD TLK: CPT | Performed by: FAMILY MEDICINE

## 2022-04-18 RX ORDER — WALKER
EACH MISCELLANEOUS
Qty: 1 EACH | Refills: 0 | Status: SHIPPED | OUTPATIENT
Start: 2022-04-18

## 2022-04-18 ASSESSMENT — ENCOUNTER SYMPTOMS
RECTAL PAIN: 0
CONSTIPATION: 1
RHINORRHEA: 0
COLOR CHANGE: 0
CHEST TIGHTNESS: 0
VOMITING: 0
SINUS PRESSURE: 0
ABDOMINAL DISTENTION: 0
EYE REDNESS: 0
WHEEZING: 0
BACK PAIN: 1
SORE THROAT: 0
COUGH: 0
DIARRHEA: 0
NAUSEA: 1
STRIDOR: 0
BLOOD IN STOOL: 0
SHORTNESS OF BREATH: 0
ABDOMINAL PAIN: 1

## 2022-04-18 NOTE — PROGRESS NOTES
Chief Complaint   Patient presents with    Hypertension     4 MONTH FOLLOW UP    Fall     [address] 2 WEEKENDS AGO    Menstrual Problem     NOTICED SPOTTING ON FRIDAY         Quoc Marie  here today for follow up on chronic medical problems, go over labs and/or diagnostic studies, and medication refills. Hypertension (4 MONTH FOLLOW UP), Fall ([address] 2 WEEKENDS AGO), and Menstrual Problem (NOTICED SPOTTING ON FRIDAY)      HPI: Patient is scheduled for ER follow-up was in the ER on 30th March with symptoms of abdominal pain and cramping. Patient had blood work done which was normal including lipase amylase. Patient had CT abdomen done that shows. Mesenteric artery stenosis, results are below. Patient reports she has made appointment with cardiologist also needs referral to see vascular surgeon. Patient reports she gets abdominal cramping especially during bowel movement. She denies any blood with stools. Patient also had a recent fall, has underlying lung malignancy is on immunotherapy. Patient reports she is off of therapy currently, has lost a lot of weight has multiple bone pains including lumbar degenerative disc disease. Patient needs walker for balance problems reports has done physical therapy multiple times. Patient has also lost a lot of weight. Patient denies any lightheadedness is up-to-date on blood work. Quoc Marie was evaluated today and a DME order was entered for a wheeled walker with seat because she requires this to successfully complete daily living tasks of eating, bathing, toileting, personal cares and ambulating. A wheeled walker with seat is necessary due to the patient's unsteady gait, upper body weakness, inability to  and ambulation device, ambulating only short distances by pushing a walker, and the need to sit for a short time before resuming ambulation.   These tasks cannot be completed with a lesser ambulation device such as a cane, crutch, or standard walker. The need for this equipment was discussed with the patient and she understands and is in agreement. Patient also complaining of menstrual spotting, reports she had history of prolapsed uterus and was planning to have surgery but due to diagnosis of lung malignancy she could not continue. She reports spotting about 2 weeks ago. She is postmenopausal.  Patient is not up-to-date on Pap. IMPRESSION:     *  Noncalcified atheromatous plaque causing moderate stenosis of the proximal SMA. Distal SMA is patent. *  Enlarged aortic caval lymph node measuring up to 1.1 cm. *  Colonic diverticulosis without CT evidence of diverticulitis. *  Diffuse hepatic steatosis. *  Bibasal emphysema.          Severe emphysema with scarring present in the apex of the right upper    lobe. Two new opacities along the lateral aspect of the right upper lobe    measuring a maximum of 11 mm and the craniocaudal plane. Consider    repeat CT or PET/CT fusion study within 3 months.       All CT scans at this facility use dose modulation, iterative    reconstruction, and/or weight based dosing when appropriate to reduce    radiation dose to as low as reasonably achievable       /72   Pulse 88   Temp 96.7 °F (35.9 °C)   Ht 5' 5.75\" (1.67 m)   Wt 102 lb 12.8 oz (46.6 kg)   SpO2 98%   BMI 16.72 kg/m²    Body mass index is 16.72 kg/m². Wt Readings from Last 3 Encounters:   04/18/22 102 lb 12.8 oz (46.6 kg)   12/16/21 107 lb (48.5 kg)   08/16/21 109 lb (49.4 kg)        []Negative depression screening. PHQ Scores 8/16/2021 6/8/2020 1/28/2020 9/17/2019 11/13/2017   PHQ2 Score 3 0 0 4 0   PHQ9 Score 6 0 0 10 0      []1-4 = Minimal depression   [x]5-9 = Milddepression   []10-14 = Moderate depression   []15-19 = Moderately severe depression   []20-27 = Severe depression    Discussed testing with the patient and all questions fully answered.     Orders Only on 04/12/2022   Component Date Value Ref Range Status    WBC 04/12/2022 6.52  4.00 - 10.60 10*3/uL Final    RBC 04/12/2022 5.04* 3.80 - 5.00 10*6/uL Final    Hemoglobin 04/12/2022 15.7* 12.0 - 15.0 g/dL Final    Hematocrit 04/12/2022 46.5* 36.0 - 45.0 % Final    MCV 04/12/2022 92.3  82.0 - 98.0 fL Final    MCH 04/12/2022 31.2  27.0 - 33.0 pg Final    MCHC 04/12/2022 33.8  32.0 - 35.0 g/dL Final    RDW 04/12/2022 14.9  11.5 - 15.0 % Final    Platelets 88/32/7288 366  150 - 400 10*3/uL Final    Neutrophils % 04/12/2022 73.8* 40.0 - 72.0 % Final    Immature Granulocytes 04/12/2022 0.5  0.0 - 1.0 % Final    Lymphocytes % 04/12/2022 16.7* 20.0 - 45.0 % Final    Monocytes % 04/12/2022 7.2  5.0 - 12.0 % Final    Eosinophils % 04/12/2022 1.5  0.0 - 6.0 % Final    Basophils % 04/12/2022 0.3  0.0 - 1.0 % Final    Neutrophils Absolute 04/12/2022 4.8  1.6 - 7.6 10*3/uL Final    Absolute Immature Granulocyte 04/12/2022 0.0  0.0 - 0.2 10*3/uL Final    Lymphocytes Absolute 04/12/2022 1.1* 1.2 - 4.0 10*3/uL Final    Monocytes Absolute 04/12/2022 0.5  0.1 - 1.0 10*3/uL Final    Eosinophils Absolute 04/12/2022 0.1  0.0 - 0.5 10*3/uL Final    Basophils Absolute 04/12/2022 0.0  0.0 - 0.2 10*3/uL Final    nRBC 04/12/2022 0  0 - 0 % Final    Glucose 04/12/2022 121* 70 - 100 mg/dL Final    BUN 04/12/2022 18  7 - 25 mg/dL Final    CREATININE 04/12/2022 0.86  0.60 - 1.20 mg/dL Final    Sodium 04/12/2022 138  136 - 145 meq/L Final    Potassium 04/12/2022 4.0  3.5 - 5.1 meq/L Final    Chloride 04/12/2022 103  98 - 107 meq/L Final    CO2 04/12/2022 24  21 - 31 meq/L Final    Calcium 04/12/2022 9.8  8.6 - 10.3 mg/dL Final    Total Bilirubin 04/12/2022 0.6  0.3 - 1.0 mg/dL Final    Alkaline Phosphatase 04/12/2022 81  34 - 104 IU/L Final    AST 04/12/2022 16  13 - 39 IU/L Final    ALT 04/12/2022 9  7 - 52 IU/L Final    Total Protein 04/12/2022 6.8  6.0 - 8.3 g/dL Final    Albumin 04/12/2022 4.2  3.5 - 5.7 g/dL Final    EGFR IF NonAfrican American 04/12/2022 >60  >60 ml/min/1.73sq m Final    eGFR  04/12/2022 >60  >60 ml/min/1.73sq m Final    TSH, 3RD GENERATION 04/12/2022 4.31  0.34 - 5.60 uIU/mL Final         Most recent labs reviewed:     Lab Results   Component Value Date    WBC 6.52 04/12/2022    HGB 15.7 (H) 04/12/2022    HCT 46.5 (H) 04/12/2022    MCV 92.3 04/12/2022     04/12/2022       @BRIEFLAB(NA,K,CL,CO2,BUN,CREATININE,GLUCOSE,CALCIUM)@     Lab Results   Component Value Date    ALT 9 04/12/2022    AST 16 04/12/2022    ALKPHOS 81 04/12/2022    BILITOT 0.6 04/12/2022       Lab Results   Component Value Date    TSH 0.93 08/19/2017       Lab Results   Component Value Date    CHOL 213 (H) 03/06/2017    CHOL 189 06/20/2016    CHOL 228 (H) 11/06/2014     Lab Results   Component Value Date    TRIG 228 (H) 03/06/2017    TRIG 382 (H) 06/20/2016    TRIG 376 (H) 11/06/2014     Lab Results   Component Value Date    HDL 46 03/06/2017    HDL 42 06/20/2016    HDL 46 11/06/2014     Lab Results   Component Value Date    LDLCHOLESTEROL 121 03/06/2017    LDLCHOLESTEROL 71 06/20/2016    LDLCHOLESTEROL 107 11/06/2014     Lab Results   Component Value Date    VLDL NOT REPORTED 03/06/2017    VLDL NOT REPORTED 06/20/2016    VLDL NOT REPORTED 11/06/2014     Lab Results   Component Value Date    CHOLHDLRATIO 4.6 03/06/2017    CHOLHDLRATIO 4.5 06/20/2016    CHOLHDLRATIO 5.0 (H) 11/06/2014       Lab Results   Component Value Date    LABA1C 5.5 10/25/2016       No results found for: [id number]    No results found for: FOLATE    No results found for: IRON, TIBC, FERRITIN    Lab Results   Component Value Date    VITD25 38.7 01/18/2021             Current Outpatient Medications   Medication Sig Dispense Refill    Misc.  Devices (STEP N REST II WALKER) MISC Use daily for balance problems 1 each 0    cloNIDine (CATAPRES) 0.1 MG tablet take 1 tablet by mouth at bedtime 90 tablet 0    famotidine (PEPCID) 20 MG tablet take 1 tablet by mouth twice a day 60 tablet 5    PARoxetine (PAXIL) 30 MG tablet take 1 tablet by mouth every morning 90 tablet 2    omeprazole (PRILOSEC) 20 MG delayed release capsule Take 1 capsule by mouth twice daily 180 capsule 3    Ascorbic Acid (VITAMIN C) 1000 MG tablet Take 1,000 mg by mouth daily      benzonatate (TESSALON) 100 MG capsule Take 100 mg by mouth 3 times daily as needed for Cough      fluticasone (FLONASE) 50 MCG/ACT nasal spray 2 sprays by Nasal route daily 1 Bottle 0    ondansetron (ZOFRAN) 4 MG tablet Take 4 mg by mouth every 8 hours as needed for Nausea or Vomiting      dicyclomine (BENTYL) 10 MG capsule Take 1 capsule by mouth every 6 hours as needed (cramps) 20 capsule 0    lidocaine (LMX) 4 % cream Apply topically every 8 hrs as needed for pain 45 g 3    RA CALCIUM 600/VITAMIN D-3 600-400 MG-UNIT TABS take 1 tablet by mouth twice a day 90 tablet 3    umeclidinium-vilanterol (ANORO ELLIPTA) 62.5-25 MCG/INH AEPB inhaler Anoro Ellipta 62.5 mcg-25 mcg/actuation powder for inhalation   Inhale 1 puff every day by inhalation route.  lidocaine-prilocaine (EMLA) 2.5-2.5 % cream lidocaine-prilocaine 2.5 %-2.5 % topical cream   Apply to port site and cover 30-45 minutes prior to needle access      albuterol sulfate HFA (PROVENTIL HFA) 108 (90 Base) MCG/ACT inhaler Inhale 2 puffs into the lungs every 6 hours as needed for Wheezing or Shortness of Breath 1 Inhaler 11    Multiple Vitamin (MULTIVITAMIN PO) Take  by mouth daily.  aspirin 81 MG EC tablet Take 81 mg by mouth daily.         nicotine (NICODERM CQ) 21 MG/24HR Place 1 patch onto the skin every 24 hours (Patient not taking: Reported on 4/18/2022)      Pembrolizumab (KEYTRUDA IV) Infuse intravenously Once every 6 weeks Last dose was 2/25/2021 (Patient not taking: Reported on 4/18/2022)      potassium chloride (KLOR-CON M) 20 MEQ extended release tablet Take 1 tablet by mouth daily (Patient not taking: Reported on 4/18/2022) 60 tablet 2     No current facility-administered medications for this visit. Social History     Socioeconomic History    Marital status:      Spouse name: Not on file    Number of children: Not on file    Years of education: Not on file    Highest education level: Not on file   Occupational History    Not on file   Tobacco Use    Smoking status: Current Every Day Smoker     Packs/day: 0.10     Years: 38.00     Pack years: 3.80     Types: Cigarettes    Smokeless tobacco: Never Used    Tobacco comment: smokes 1 or 2 a day   Vaping Use    Vaping Use: Former    Substances: Nicotine, Flavoring   Substance and Sexual Activity    Alcohol use: Not Currently     Alcohol/week: 0.0 standard drinks    Drug use: Yes     Types: Marijuana (Weed)     Comment: 5 joints a day marijuana for pain and appetite, she states that she stopped cocaine 2011    Sexual activity: Not Currently     Partners: Male     Birth control/protection: Post-menopausal   Other Topics Concern    Not on file   Social History Narrative    Not on file     Social Determinants of Health     Financial Resource Strain: Low Risk     Difficulty of Paying Living Expenses: Not hard at all   Food Insecurity: No Food Insecurity    Worried About [address] in the Last Year: Never true    [facility] in the Last Year: Never true   Transportation Needs: No Transportation Needs    Lack of Transportation (Medical): No    Lack of Transportation (Non-Medical):  No   Physical Activity:     Days of Exercise per Week: Not on file    Minutes of Exercise per Session: Not on file   Stress:     Feeling of Stress : Not on file   Social Connections:     Frequency of Communication with Friends and Family: Not on file    Frequency of Social Gatherings with Friends and Family: Not on file    Attends Spiritism Services: Not on file    Active Member of Clubs or Organizations: Not on file    Attends Club or Organization Meetings: Not on file    Marital Status: Not on file Intimate Partner Violence:     Fear of Current or Ex-Partner: Not on file    Emotionally Abused: Not on file    Physically Abused: Not on file    Sexually Abused: Not on file   Housing Stability: Unknown    Unable to Pay for Housing in the Last Year: No    Number of [place] in the Last Year: Not on file    Unstable Housing in the Last Year: No     Ready to quit: Not Answered  Counseling given: Not Answered  Comment: smokes 1 or 2 a day        Family History   Problem Relation Age of Onset    Heart Disease Mother     Cancer Mother         breast and lung cancer    Diabetes Father     Heart Disease Father         Death due to MI    Cancer Paternal Grandfather         stomach cancer     Heart Disease Paternal Grandfather     Cancer Sister         ovarian cancer     Cancer Maternal Grandmother         female cancer             -rest of complaints with corresponding details per ROS    The patient's past medical, surgical, social, and family history as well as her current medications and allergies were reviewed as documented intoday's encounter. Review of Systems   Constitutional: Positive for activity change, fatigue and unexpected weight change. Negative for appetite change and fever. HENT: Negative for congestion, ear pain, postnasal drip, rhinorrhea, sinus pressure and sore throat. Eyes: Negative for redness and visual disturbance. Respiratory: Negative for cough, chest tightness, shortness of breath, wheezing and stridor. Cardiovascular: Negative for chest pain, palpitations and leg swelling. Gastrointestinal: Positive for abdominal pain, constipation and nausea. Negative for abdominal distention, blood in stool, diarrhea, rectal pain and vomiting. Endocrine: Negative for polydipsia, polyphagia and polyuria. Genitourinary: Positive for menstrual problem and vaginal bleeding. Negative for difficulty urinating, flank pain, frequency and urgency.    Musculoskeletal: Positive for arthralgias, back pain, gait problem and myalgias. Negative for neck pain. Skin: Negative for color change, rash and wound. Allergic/Immunologic: Negative for food allergies and immunocompromised state. Neurological: Positive for numbness. Negative for dizziness, speech difficulty, weakness, light-headedness and headaches. Psychiatric/Behavioral: Negative for agitation, behavioral problems, decreased concentration, dysphoric mood, hallucinations, sleep disturbance and suicidal ideas. The patient is nervous/anxious. Physical Exam  Vitals and nursing note reviewed. Constitutional:       General: She is not in acute distress. Appearance: Normal appearance. She is well-developed. She is cachectic. She is not diaphoretic. HENT:      Head: Normocephalic and atraumatic. Nose: Nose normal.   Eyes:      General:         Right eye: No discharge. Left eye: No discharge. Extraocular Movements: Extraocular movements intact. Conjunctiva/sclera: Conjunctivae normal.      Pupils: Pupils are equal, round, and reactive to light. Neck:      Thyroid: No thyromegaly. Cardiovascular:      Rate and Rhythm: Normal rate and regular rhythm. Heart sounds: Normal heart sounds. No murmur heard. Pulmonary:      Effort: Pulmonary effort is normal. No respiratory distress. Breath sounds: Decreased air movement present. Decreased breath sounds present. No wheezing or rhonchi. Abdominal:      General: Bowel sounds are normal. There is no distension. Palpations: Abdomen is soft. There is no mass. Tenderness: There is generalized abdominal tenderness. There is no right CVA tenderness, left CVA tenderness, guarding or rebound. Musculoskeletal:      Cervical back: Normal range of motion and neck supple. Spasms present. No rigidity. Thoracic back: Spasms present. Normal range of motion. Lumbar back: Deformity, spasms and tenderness present.  No bony tenderness. Decreased range of motion. Comments: Bilateral femoral pulses normal   Lymphadenopathy:      Cervical: Cervical adenopathy present. Skin:     Coloration: Skin is not jaundiced or pale. Findings: No bruising, erythema or rash. Neurological:      General: No focal deficit present. Mental Status: She is oriented to person, place, and time. She is lethargic. Cranial Nerves: No cranial nerve deficit. Sensory: Sensory deficit present. Motor: Weakness and atrophy present. No tremor. Coordination: Coordination normal.      Gait: Gait abnormal. Tandem walk normal.      Deep Tendon Reflexes: Reflexes are normal and symmetric. Psychiatric:         Attention and Perception: Attention and perception normal. She is attentive. Mood and Affect: Mood is anxious and depressed. Affect is not tearful. Speech: She is communicative. Speech is not rapid and pressured, delayed or slurred. Behavior: Behavior normal. Behavior is not agitated or slowed. Thought Content: Thought content normal.         Judgment: Judgment normal.             ASSESSMENT AND PLAN      1. Post-menopausal bleeding  Ultrasound pelvis and patient schedule appointment with gynecologist  - US PELVIS COMPLETE; Future    2. Superior mesenteric artery stenosis [facility])  Patient is on aspirin referral placed to vascular surgeon  - Ambulatory referral to Vascular Surgery  - Lipid Panel; Future    3. Fall, subsequent encounter  Patient will benefit from walker discussed about home safety  - Misc. Devices (STEP N REST II WALKER) MISC; Use daily for balance problems  Dispense: 1 each; Refill: 0    4. Malignant neoplasm of middle lobe of right lung ([address]  On immunotherapy recent CT reviewed  - Misc. Devices (STEP N REST II WALKER) MISC; Use daily for balance problems  Dispense: 1 each;  Refill: 0      Orders Placed This Encounter   Procedures    US PELVIS COMPLETE     This procedure can be scheduled via [employer]. Access your [facility] account by visiting [email or web address]. Standing Status:   Future     Standing Expiration Date:   4/18/2023     Order Specific Question:   Reason for exam:     Answer:   prolaped uterus    Lipid Panel     Standing Status:   Future     Standing Expiration Date:   4/18/2023     Order Specific Question:   Is Patient Fasting?/# of Hours     Answer:   yes, 8-10 hours    Ambulatory referral to Vascular Surgery     Referral Priority:   Routine     Referral Type:   Eval and Treat     Referral Reason:   Specialty Services Required     Referred to Provider:   Denise Corley MD     Requested Specialty:   Vascular Surgery     Number of Visits Requested:   1         There are no discontinued medications. Diana Rondon received counseling on the following healthy behaviors: nutrition, exercise, medication adherence and tobacco cessation  Reviewed prior labs and health maintenance  Continue current medications, diet and exercise. Discussed use, benefit, and side effects of prescribed medications. Barriers to medication compliance addressed. Patient given educational materials - see patient instructions  Was a self-tracking handout given in paper form or via [employer]? Yes    Requested Prescriptions     Signed Prescriptions Disp Refills    Misc. Devices (STEP N REST II WALKER) MISC 1 each 0     Sig: Use daily for balance problems       All patient questions answered. Patient voiced understanding. Quality Measures    Body mass index is 16.72 kg/m². Low. Weight control planned discussed Healthy diet and regular exercise. BP: 130/72 Blood pressure is normal. Treatment plan consists of No treatment change needed.     Lab Results   Component Value Date    LDLCHOLESTEROL 121 03/06/2017    (goal LDL reduction with dx if diabetes is 50% LDL reduction)      PHQ Scores 8/16/2021 6/8/2020 1/28/2020 9/17/2019 11/13/2017   PHQ2 Score 3 0 0 4 0   PHQ9 Score 6 0 0 10 0     Interpretation of Total Score Depression Severity: 1-4 = Minimal depression, 5-9 = Mild depression, 10-14 = Moderate depression, 15-19 = Moderately severe depression, 20-27 = Severe depression    The patient'spast medical, surgical, social, and family history as well as her   current medications and allergies were reviewed as documented in today's encounter. Medications, labs, diagnostic studies, consultations andfollow-up as documented in this encounter. Return in about 2 months (around 6/18/2022). Patient wasseen with total face to face time of 30 minutes. More than 50% of this visit was counseling and education. Future Appointments   Date Time Provider Sakshi Kaur   8/16/2022  3:15 PM MD torrie Jack     This note was completed by using the assistance of a speech-recognition program. However, inadvertent computerized transcription errors may be present. Althoughevery effort was made to ensure accuracy, no guarantees can be provided that every mistake has been identified and corrected by editing.   Electronically signed by Jos Miller MD on 4/18/2022  3:09 PM

## 2022-04-18 NOTE — PROGRESS NOTES
Visit Information    Have you changed or started any medications since your last visit including any over-the-counter medicines, vitamins, or herbal medicines? no   Have you stopped taking any of your medications? Is so, why? -  no  Are you having any side effects from any of your medications? - no    Have you seen any other physician or provider since your last visit?  no   Have you had any other diagnostic tests since your last visit? yes -    Have you been seen in the emergency room and/or had an admission in a hospital since we last saw you?  yes -    Have you had your routine dental cleaning in the past 6 months?  no     Do you have an active MyChart account? If no, what is the barrier?   Yes    Patient Care Team:  Trav Severino MD as PCP - General (Family Medicine)  Trav Severino MD as PCP - [facility] Empaneled Provider  Julian Woo RN as   Carole Ruiz MD as Consulting Physician (Pulmonology)  Cris Huerta MD as Consulting Physician (Hematology and Oncology)  Virginia Valle MD as Consulting Physician (Gastroenterology)    Medical History Review  Past Medical, Family, and Social History reviewed and does contribute to the patient presenting condition    Health Maintenance   Topic Date Due    Pneumococcal 0-64 years Vaccine (2 - PCV) 03/06/2018    COVID-19 Vaccine (2 - Booster for Marshal series) 06/04/2021    Lipid screen  03/06/2022    Depression Monitoring  08/16/2022    Annual Wellness Visit (AWV)  08/17/2022    Cervical cancer screen  11/13/2022    Breast cancer screen  04/22/2023    Colorectal Cancer Screen  04/24/2023    DTaP/Tdap/Td vaccine (2 - Td or Tdap) 06/17/2029    Flu vaccine  Completed    Shingles Vaccine  Completed    Hepatitis C screen  Completed    HIV screen  Completed    Hepatitis A vaccine  Aged Out    Hepatitis B vaccine  Aged Out    Hib vaccine  Aged Out    Meningococcal (ACWY) vaccine  Aged Out

## 2022-04-22 ENCOUNTER — HOSPITAL ENCOUNTER (OUTPATIENT)
Dept: ULTRASOUND IMAGING | Age: 62
Discharge: HOME | End: 2022-04-24
Payer: COMMERCIAL

## 2022-04-22 DIAGNOSIS — N95.0 POST-MENOPAUSAL BLEEDING: ICD-10-CM

## 2022-04-22 PROCEDURE — 76856 US EXAM PELVIC COMPLETE: CPT

## 2022-05-04 DIAGNOSIS — M51.36 LUMBAR DEGENERATIVE DISC DISEASE: ICD-10-CM

## 2022-05-04 DIAGNOSIS — M50.30 DEGENERATION OF CERVICAL INTERVERTEBRAL DISC: Primary | ICD-10-CM

## 2022-05-04 RX ORDER — IBUPROFEN 600 MG/1
600 TABLET ORAL 4 TIMES DAILY PRN
Qty: 60 TABLET | Refills: 0 | Status: SHIPPED | OUTPATIENT
Start: 2022-05-04

## 2022-05-06 ENCOUNTER — HOSPITAL ENCOUNTER (OUTPATIENT)
Age: 62
Discharge: HOME | End: 2022-05-06
Payer: MEDICARE

## 2022-05-06 ENCOUNTER — INITIAL CONSULT (OUTPATIENT)
Dept: VASCULAR SURGERY | Age: 62
End: 2022-05-06
Payer: MEDICARE

## 2022-05-06 VITALS
HEIGHT: 65 IN | OXYGEN SATURATION: 98 % | DIASTOLIC BLOOD PRESSURE: 86 MMHG | HEART RATE: 83 BPM | BODY MASS INDEX: 17.33 KG/M2 | TEMPERATURE: 98.6 F | RESPIRATION RATE: 18 BRPM | WEIGHT: 104 LBS | SYSTOLIC BLOOD PRESSURE: 131 MMHG

## 2022-05-06 DIAGNOSIS — K55.1 CHRONIC VASCULAR INSUFFICIENCY OF INTESTINE (HCC): Primary | ICD-10-CM

## 2022-05-06 DIAGNOSIS — K55.1 SUPERIOR MESENTERIC ARTERY STENOSIS (HCC): ICD-10-CM

## 2022-05-06 LAB
CHOLESTEROL/HDL RATIO: 3.2
CHOLESTEROL: 193 MG/DL
HDLC SERPL-MCNC: 60 MG/DL
LDL CHOLESTEROL: 117 MG/DL (ref 0–130)
TRIGL SERPL-MCNC: 81 MG/DL

## 2022-05-06 PROCEDURE — 99204 OFFICE O/P NEW MOD 45 MIN: CPT | Performed by: SURGERY

## 2022-05-06 PROCEDURE — 80061 LIPID PANEL: CPT

## 2022-05-06 PROCEDURE — 36415 COLL VENOUS BLD VENIPUNCTURE: CPT

## 2022-05-06 ASSESSMENT — ENCOUNTER SYMPTOMS
CHEST TIGHTNESS: 0
EYE PAIN: 0
COLOR CHANGE: 0
VOICE CHANGE: 0
SHORTNESS OF BREATH: 1
ABDOMINAL DISTENTION: 0
ABDOMINAL PAIN: 1
TROUBLE SWALLOWING: 0
COUGH: 1
VOMITING: 0

## 2022-05-06 NOTE — PROGRESS NOTES
[facility]  [address]  [address] [place] [address]  Dept: [phone]     Patient: Leopold Jump  : 1960  MRN: [id number]  DOS: 2022    Referring provider:  Sam Navarro MD         HPI:  Leopold Jump is a 65 y.o. female who comes to the office for the first time regarding SMA stenosis. She does not have postprandial pain. She does not have significant amount of weight loss. She lost approximately 4 pounds since January of this year. She has an SMA stenosis which was not quantified on CT examination in March. This was an outside CT and therefore I cannot view the images. She has not had mesenteric duplex. She does have non-small cell lung cancer for approximately 3 years which is in remission for 1 year. Her weight loss from 140 pounds prior to cancer started at that time but has plateaued over the last year. Most of her abdominal pain of which she complains is in the lower portion of her abdominal compartment almost next to the inguinal ligament. She does not have epigastric pain. She does not have mid abdominal pain. She eats normal-sized meals for her. She has not had any significant increase or decrease in her pain with meals. She does smoke cigarettes. She does have COPD and a chronic cough. She has high blood pressure and high cholesterol. She has never had coronary artery disease and has not had problems with ambulation. She denies claudication rest pain or ulceration.   Past Medical History:   Diagnosis Date    Arthritis     Bronchitis, chronic (HCC)     Chronic back pain     Chronic cough     COPD (chronic obstructive pulmonary disease) (HCC)     Depression     Diverticulosis     Emphysema     Fibromyalgia     GERD (gastroesophageal reflux disease)     Hemorrhoid     internal and external, they bleed    History of cocaine abuse ([facility] [address]     none since 2010, used between 2007 and 2009     History of kidney infection     Hyperlipidemia     Hyperplastic colon polyp 11/15/2017    Hypertension     Lung cancer ([facility] [address]     non-small cell H3A lung cancer right upper lobe    Marijuana use     for pain and appetite use    Meningioma (HCC)     Neuropathy     Orbital fracture (HCC)     left side ,has  pin in side    Osteoarthritis     Osteopenia     Pulmonary nodule     left lung, watching this one    Renal calculi     Renal cyst     STD (sexually transmitted disease)     unsure of type    Uterine prolapse      Family History   Problem Relation Age of Onset    Heart Disease Mother     Cancer Mother         breast and lung cancer    Diabetes Father     Heart Disease Father         Death due to MI    Cancer Paternal Grandfather         stomach cancer     Heart Disease Paternal Grandfather     Cancer Sister         ovarian cancer     Cancer Maternal Grandmother         female cancer      Social History     Socioeconomic History    Marital status:       Spouse name: Not on file    Number of children: Not on file    Years of education: Not on file    Highest education level: Not on file   Occupational History    Not on file   Tobacco Use    Smoking status: Current Every Day Smoker     Packs/day: 0.10     Years: 38.00     Pack years: 3.80     Types: Cigarettes    Smokeless tobacco: Never Used    Tobacco comment: smokes 1 or 2 a day   Vaping Use    Vaping Use: Former    Substances: Nicotine, Flavoring   Substance and Sexual Activity    Alcohol use: Not Currently     Alcohol/week: 0.0 standard drinks    Drug use: Yes     Types: Marijuana (Weed)     Comment: 5 joints a day marijuana for pain and appetite, she states that she stopped cocaine 2011    Sexual activity: Not Currently     Partners: Male     Birth control/protection: Post-menopausal   Other Topics Concern    Not on file   Social History Narrative    Not on file     Social Determinants of Health     Financial Resource Strain: Low Risk     Difficulty of Paying Living Expenses: Not hard at all   Food Insecurity: No Food Insecurity    Worried About Running Out of Food in the Last Year: Never true    Abdiel of Food in the Last Year: Never true   Transportation Needs: No Transportation Needs    Lack of Transportation (Medical): No    Lack of Transportation (Non-Medical): No   Physical Activity:     Days of Exercise per Week: Not on file    Minutes of Exercise per Session: Not on file   Stress:     Feeling of Stress : Not on file   Social Connections:     Frequency of Communication with Friends and Family: Not on file    Frequency of Social Gatherings with Friends and Family: Not on file    Attends Caodaism Services: Not on file    Active Member of Clubs or Organizations: Not on file    Attends Club or Organization Meetings: Not on file    Marital Status: Not on file   Intimate Partner Violence:     Fear of Current or Ex-Partner: Not on file    Emotionally Abused: Not on file    Physically Abused: Not on file    Sexually Abused: Not on file   Housing Stability: Unknown    Unable to Pay for Housing in the Last Year: No    Number of [place] in the Last Year: Not on file    Unstable Housing in the Last Year: No      Past Surgical History:   Procedure Laterality Date    BACK SURGERY  2011    thoracic laminectomy, T12, S1    CARDIAC CATHETERIZATION  10/24/14    Normal coronaries     COLONOSCOPY  04/05/2017    diverticulosis, ,poor prep    COLONOSCOPY  11/15/2017    flat polypoid lesion in the base of the cecum, about 1 cm.-hyperplastic    COLONOSCOPY  04/24/2018     diverticulosis.  Small polyp in the sigmoid colon excised with biopsy forceps    EAR SURGERY Left     cleaned out infection    ENDOSCOPY, COLON, DIAGNOSTIC      ORBITAL FRACTURE SURGERY Left     had pin inserted on temple side    SC COLON CA SCRN NOT HI RSK IND N/A 4/5/2017    COLONOSCOPY performed by [address], MD at [address] [facility] FLX DX W/COLLJ [place] 1978 PFRMD N/A 4/24/2018    COLONOSCOPY performed by [address], MD at [address] [facility] FLX W/REMOVAL LESION BY HOT BX FORCEPS N/A 11/15/2017    COLONOSCOPY POLYPECTOMY SNARE and saline injection performed by [address], MD at [address] EGD TRANSORAL BIOPSY SINGLE/MULTIPLE N/A 10/6/2017    EGD BIOPSY performed by Priya Perdomo MD at [address] EGD TRANSORAL BIOPSY SINGLE/MULTIPLE N/A 2/27/2018    EGD ESOPHAGOGASTRODUODENOSCOPY performed by [address], MD at [address] ESOPHAGOGASTRODUODENOSCOPY TRANSORAL DIAGNOSTIC N/A 4/24/2018    EGD ESOPHAGOGASTRODUODENOSCOPY performed by [address], MD at [address] TUNNELED VENOUS PORT PLACEMENT Left     UPPER GASTROINTESTINAL ENDOSCOPY  10/06/2017    Dr Shoshana Coulter diverticulum gastric fundus, pathology inactive gastritis    UPPER GASTROINTESTINAL ENDOSCOPY  02/27/2018    retained food in the fundus of the stomach, poor peristalsis wide-open pylorus    UPPER GASTROINTESTINAL ENDOSCOPY  04/24/2018    no lesions seen that can account her CEA levels.  UPPER GASTROINTESTINAL ENDOSCOPY N/A 3/10/2021    EGD BIOPSY AND PHOTOS performed by [address], MD at [facility] ENDO      Review of Systems   Constitutional: Negative for activity change, fever and unexpected weight change. HENT: Negative for trouble swallowing and voice change. Eyes: Negative for pain and visual disturbance. Respiratory: Positive for cough and shortness of breath. Negative for chest tightness. Cardiovascular: Negative for chest pain and palpitations. Gastrointestinal: Positive for abdominal pain. Negative for abdominal distention and vomiting. Endocrine: Negative for cold intolerance and heat intolerance. Genitourinary: Negative for dysuria, flank pain and hematuria.    Musculoskeletal: Negative for joint swelling and neck pain. Skin: Negative for color change and rash. Allergic/Immunologic: Negative for immunocompromised state. Neurological: Negative for syncope, speech difficulty, weakness, numbness and headaches. Hematological: Negative for adenopathy. Psychiatric/Behavioral: Negative for behavioral problems and suicidal ideas. Vitals:    05/06/22 1523   BP: 131/86   Site: Right Upper Arm   Position: Sitting   Cuff Size: Large Adult   Pulse: 83   Resp: 18   Temp: 98.6 °F (37 °C)   TempSrc: Temporal   SpO2: 98%   Weight: 104 lb (47.2 kg)   Height: 5' 5\" (1.651 m)          Physical Exam  Constitutional:       General: She is not in acute distress. HENT:      Mouth/Throat:      Mouth: Mucous membranes are moist.      Pharynx: Oropharynx is clear. Eyes:      General: No scleral icterus. Extraocular Movements: Extraocular movements intact. Conjunctiva/sclera: Conjunctivae normal.   Neck:      Thyroid: No thyroid mass or thyromegaly. Cardiovascular:      Rate and Rhythm: Normal rate and regular rhythm. Heart sounds: No murmur heard. Comments: She has palpable femoral, popliteal, dorsalis pedis and posterior tibial pulses bilaterally which are strong and equal.  She has no significant edema. She has no varicose veins. She has no dependent rubor. There is no ulceration or gangrene. She has no abdominal bruit. Her aorta seems to be of normal caliber. She has no significant abdominal tenderness. Pulmonary:      Effort: No respiratory distress. Breath sounds: No rales. Abdominal:      General: There is no distension. Palpations: There is no mass. Tenderness: There is no abdominal tenderness. There is no guarding. Musculoskeletal:      Cervical back: No rigidity or tenderness. Lymphadenopathy:      Cervical: No cervical adenopathy. Skin:     Coloration: Skin is not jaundiced. Findings: No rash. Neurological:      General: No focal deficit present. Mental Status: She is alert and oriented to person, place, and time. Cranial Nerves: No cranial nerve deficit. Psychiatric:         Mood and Affect: Mood normal.         Assessment:  1. Chronic vascular insufficiency of intestine (HCC)          Plan: At this point I doubt that she has mesenteric ischemia. She may have an SMA stenosis but I do not think it is producing symptoms. I would like to evaluate and quantify this with duplex examination and we will do so in the near future and have her back after it has been completed.     Electronically signed by:  Wilbur Walker MD

## 2022-05-16 DIAGNOSIS — F41.9 ANXIETY: ICD-10-CM

## 2022-05-17 RX ORDER — CLONIDINE HYDROCHLORIDE 0.1 MG/1
TABLET ORAL
Qty: 90 TABLET | Refills: 3 | Status: SHIPPED | OUTPATIENT
Start: 2022-05-17 | End: 2022-06-23 | Stop reason: ALTCHOICE

## 2022-05-23 ENCOUNTER — HOSPITAL ENCOUNTER (OUTPATIENT)
Dept: VASCULAR LAB | Age: 62
Discharge: HOME | End: 2022-05-23
Payer: MEDICARE

## 2022-05-23 DIAGNOSIS — K55.1 CHRONIC VASCULAR INSUFFICIENCY OF INTESTINE (HCC): ICD-10-CM

## 2022-05-23 PROCEDURE — 93975 VASCULAR STUDY: CPT

## 2022-05-31 DIAGNOSIS — F32.9 REACTIVE DEPRESSION: ICD-10-CM

## 2022-05-31 DIAGNOSIS — R11.2 INTRACTABLE VOMITING WITH NAUSEA, UNSPECIFIED VOMITING TYPE: ICD-10-CM

## 2022-05-31 RX ORDER — PAROXETINE 30 MG/1
TABLET, FILM COATED ORAL
Qty: 90 TABLET | Refills: 2 | Status: SHIPPED | OUTPATIENT
Start: 2022-05-31 | End: 2022-07-20 | Stop reason: ALTCHOICE

## 2022-05-31 RX ORDER — OMEPRAZOLE 20 MG/1
CAPSULE, DELAYED RELEASE ORAL
Qty: 180 CAPSULE | Refills: 3 | Status: SHIPPED | OUTPATIENT
Start: 2022-05-31

## 2022-06-23 ENCOUNTER — OFFICE VISIT (OUTPATIENT)
Dept: GASTROENTEROLOGY | Age: 62
End: 2022-06-23
Payer: MEDICARE

## 2022-06-23 VITALS
HEART RATE: 77 BPM | SYSTOLIC BLOOD PRESSURE: 142 MMHG | WEIGHT: 107 LBS | DIASTOLIC BLOOD PRESSURE: 90 MMHG | BODY MASS INDEX: 17.81 KG/M2

## 2022-06-23 DIAGNOSIS — K62.5 RECTAL BLEEDING: ICD-10-CM

## 2022-06-23 DIAGNOSIS — R97.0 ELEVATED CEA: Primary | ICD-10-CM

## 2022-06-23 DIAGNOSIS — K59.00 CONSTIPATION, UNSPECIFIED CONSTIPATION TYPE: ICD-10-CM

## 2022-06-23 DIAGNOSIS — K64.9 HEMORRHOIDS, UNSPECIFIED HEMORRHOID TYPE: ICD-10-CM

## 2022-06-23 PROCEDURE — 3017F COLORECTAL CA SCREEN DOC REV: CPT | Performed by: INTERNAL MEDICINE

## 2022-06-23 PROCEDURE — G8419 CALC BMI OUT NRM PARAM NOF/U: HCPCS | Performed by: INTERNAL MEDICINE

## 2022-06-23 PROCEDURE — 99214 OFFICE O/P EST MOD 30 MIN: CPT | Performed by: INTERNAL MEDICINE

## 2022-06-23 PROCEDURE — 4004F PT TOBACCO SCREEN RCVD TLK: CPT | Performed by: INTERNAL MEDICINE

## 2022-06-23 PROCEDURE — APPSS45 APP SPLIT SHARED TIME 31-45 MINUTES: Performed by: NURSE PRACTITIONER

## 2022-06-23 PROCEDURE — G8427 DOCREV CUR MEDS BY ELIG CLIN: HCPCS | Performed by: INTERNAL MEDICINE

## 2022-06-23 RX ORDER — AMLODIPINE BESYLATE 2.5 MG/1
2.5 TABLET ORAL DAILY
COMMUNITY
End: 2022-07-20 | Stop reason: SDUPTHER

## 2022-06-23 RX ORDER — BISACODYL 5 MG
TABLET, DELAYED RELEASE (ENTERIC COATED) ORAL
Qty: 4 TABLET | Refills: 0 | Status: SHIPPED | OUTPATIENT
Start: 2022-06-23 | End: 2022-07-20 | Stop reason: ALTCHOICE

## 2022-06-23 RX ORDER — POLYETHYLENE GLYCOL 3350 17 G/17G
POWDER, FOR SOLUTION ORAL
Qty: 238 G | Refills: 0 | Status: SHIPPED | OUTPATIENT
Start: 2022-06-23 | End: 2022-07-20 | Stop reason: ALTCHOICE

## 2022-06-23 ASSESSMENT — ENCOUNTER SYMPTOMS
ALLERGIC/IMMUNOLOGIC NEGATIVE: 1
CONSTIPATION: 1
RECTAL PAIN: 1
RESPIRATORY NEGATIVE: 1
ANAL BLEEDING: 1
EYES NEGATIVE: 1
ABDOMINAL PAIN: 1
ABDOMINAL DISTENTION: 1

## 2022-06-23 NOTE — PROGRESS NOTES
GI OFFICE FOLLOW UP    INTERVAL HISTORY:   No referring provider defined for this encounter. HISTORY OF PRESENT ILLNESS:     Patient being seen with c/o rectal pain, rectal bleeding, constipation. Patient has been having tenesmus, constipation x several months. Over last 3 months she reports rectal bleeding, rectal pain, hemorrhoids. She has been taking sitz baths daily. Has been taking Miralax as needed for constipation. Denies any abdominal pain, cramping, bloating. No dysphagia, dyne aphasia, dyspeptic symptoms. Tolerating diet well. Weight is stable. History of lung CA, in remission    Past Medical,Family, and Social History reviewed and does contribute to the patient presenting condition. Patient's PMH/PSH,SH,PSYCH Hx, MEDs, ALLERGIES, and ROS were all reviewed and updated in the appropriate sections.  Yes      PAST MEDICAL HISTORY:  Past Medical History:   Diagnosis Date    Arthritis     Bronchitis, chronic (HCC)     Chronic back pain     Chronic cough     COPD (chronic obstructive pulmonary disease) (HCC)     Depression     Diverticulosis     Emphysema     Fibromyalgia     GERD (gastroesophageal reflux disease)     Hemorrhoid     internal and external, they bleed    History of cocaine abuse ([address]     none since 2010, used between 2007 and 2009     History of kidney infection     Hyperlipidemia     Hyperplastic colon polyp 11/15/2017    Hypertension     Lung cancer ([address]     non-small cell H3A lung cancer right upper lobe    Marijuana use     for pain and appetite use    Meningioma (HCC)     Neuropathy     Orbital fracture (HCC)     left side ,has  pin in side    Osteoarthritis     Osteopenia     Pulmonary nodule     left lung, watching this one    Renal calculi     Renal cyst     STD (sexually transmitted disease)     unsure of type    Uterine prolapse        Past Surgical History:   Procedure Laterality Date    BACK SURGERY  2011    thoracic laminectomy, T12, S1    CARDIAC CATHETERIZATION  10/24/14    Normal coronaries     COLONOSCOPY  04/05/2017    diverticulosis, ,poor prep    COLONOSCOPY  11/15/2017    flat polypoid lesion in the base of the cecum, about 1 cm.-hyperplastic    COLONOSCOPY  04/24/2018     diverticulosis. Small polyp in the sigmoid colon excised with biopsy forceps    EAR SURGERY Left     cleaned out infection    ENDOSCOPY, COLON, DIAGNOSTIC      ORBITAL FRACTURE SURGERY Left     had pin inserted on temple side    CA COLON CA SCRN NOT HI RSK IND N/A 4/5/2017    COLONOSCOPY performed by Martha Mo MD at [facility] FLX DX W/COLLJ [address] PFRMD N/A 4/24/2018    [facility]OV[facility] LESION BY HOT BX FORCEPS N/A 11/15/2017    COLONOSCOPY POLYPECTOMY SNARE and saline injection performed by Martha Mo MD at [address] [place] [address] EGD TRANSORAL BIOPSY SINGLE/MULTIPLE N/A 10/6/2017    EGD BIOPSY performed by Zion Monteiro MD at [address] [place] [address] EGD TRANSORAL BIOPSY SINGLE/MULTIPLE N/A 2/27/2018    EGD ESOPHAGOGASTRODUODENOSCOPY performed by Martha Mo MD at [address] [place] [address] ESOPHAGOGASTRODUODENOSCOPY TRANSORAL DIAGNOSTIC N/A 4/24/2018    EGD ESOPHAGOGASTRODUODENOSCOPY performed by Martha Mo MD at [address] TUNNELED VENOUS PORT PLACEMENT Left     UPPER GASTROINTESTINAL ENDOSCOPY  10/06/2017    Dr Nikki Cuadra diverticulum gastric fundus, pathology inactive gastritis    UPPER GASTROINTESTINAL ENDOSCOPY  02/27/2018    retained food in the fundus of the stomach, poor peristalsis wide-open pylorus    UPPER GASTROINTESTINAL ENDOSCOPY  04/24/2018    no lesions seen that can account her CEA levels.     UPPER GASTROINTESTINAL ENDOSCOPY N/A 3/10/2021    EGD BIOPSY AND PHOTOS performed by Guillermo Burdick MD at [address] [place] [address]:    Current Outpatient Medications:     amLODIPine (NORVASC) 2.5 MG tablet, Take 2.5 mg by mouth daily, Disp: , Rfl:     PARoxetine (PAXIL) 30 MG tablet, take 1 tablet by mouth once daily IN THE MORNING, Disp: 90 tablet, Rfl: 2    omeprazole (PRILOSEC) 20 MG delayed release capsule, take 1 capsule by mouth twice a day, Disp: 180 capsule, Rfl: 3    ibuprofen (ADVIL;MOTRIN) 600 MG tablet, Take 1 tablet by mouth 4 times daily as needed for Pain, Disp: 60 tablet, Rfl: 0    Misc.  Devices (STEP N REST II WALKER) MISC, Use daily for balance problems, Disp: 1 each, Rfl: 0    famotidine (PEPCID) 20 MG tablet, take 1 tablet by mouth twice a day, Disp: 60 tablet, Rfl: 5    Ascorbic Acid (VITAMIN C) 1000 MG tablet, Take 1,000 mg by mouth daily, Disp: , Rfl:     benzonatate (TESSALON) 100 MG capsule, Take 100 mg by mouth 3 times daily as needed for Cough, Disp: , Rfl:     fluticasone (FLONASE) 50 MCG/ACT nasal spray, 2 sprays by Nasal route daily, Disp: 1 Bottle, Rfl: 0    ondansetron (ZOFRAN) 4 MG tablet, Take 4 mg by mouth every 8 hours as needed for Nausea or Vomiting, Disp: , Rfl:     dicyclomine (BENTYL) 10 MG capsule, Take 1 capsule by mouth every 6 hours as needed (cramps), Disp: 20 capsule, Rfl: 0    RA CALCIUM 600/VITAMIN D-3 600-400 MG-UNIT TABS, take 1 tablet by mouth twice a day, Disp: 90 tablet, Rfl: 3    umeclidinium-vilanterol (ANORO ELLIPTA) 62.5-25 MCG/INH AEPB inhaler, Anoro Ellipta 62.5 mcg-25 mcg/actuation powder for inhalation  Inhale 1 puff every day by inhalation route., Disp: , Rfl:     lidocaine-prilocaine (EMLA) 2.5-2.5 % cream, lidocaine-prilocaine 2.5 %-2.5 % topical cream  Apply to port site and cover 30-45 minutes prior to needle access, Disp: , Rfl:     albuterol sulfate HFA (PROVENTIL HFA) 108 (90 Base) MCG/ACT inhaler, Inhale 2 puffs into the lungs every 6 hours as needed for Wheezing or Shortness of Breath, Disp: 1 Inhaler, Rfl: 11    Multiple Vitamin (MULTIVITAMIN PO), Take  by mouth daily. , Disp: , Rfl:     aspirin 81 MG EC tablet, Take 81 mg by mouth daily. , Disp: , Rfl:     polyethylene glycol (GLYCOLAX) 17 GM/SCOOP powder, Follow Instructions provided by physician's office, Disp: 238 g, Rfl: 0    bisacodyl (BISACODYL) 5 MG EC tablet, Take 4 tablets in the morning, Disp: 4 tablet, Rfl: 0    nicotine (NICODERM CQ) 21 MG/24HR, Place 1 patch onto the skin every 24 hours  (Patient not taking: Reported on 6/23/2022), Disp: , Rfl:     Pembrolizumab (Zepeda  IV), Infuse intravenously Once every 6 weeks Last dose was 2/25/2021  (Patient not taking: Reported on 6/23/2022), Disp: , Rfl:     lidocaine (LMX) 4 % cream, Apply topically every 8 hrs as needed for pain (Patient not taking: Reported on 6/23/2022), Disp: 45 g, Rfl: 3    potassium chloride (KLOR-CON M) 20 MEQ extended release tablet, Take 1 tablet by mouth daily (Patient not taking: Reported on 6/23/2022), Disp: 60 tablet, Rfl: 2    ALLERGIES:   Allergies   Allergen Reactions    Lovastatin     Statins Other (See Comments)     pain    Sulfa Antibiotics        FAMILY HISTORY:       Problem Relation Age of Onset    Heart Disease Mother     Cancer Mother         breast and lung cancer    Diabetes Father     Heart Disease Father         Death due to MI    Cancer Paternal Grandfather         stomach cancer     Heart Disease Paternal Grandfather     Cancer Sister         ovarian cancer     Cancer Maternal Grandmother         female cancer         SOCIAL HISTORY:   Social History     Socioeconomic History    Marital status:       Spouse name: Not on file    Number of children: Not on file    Years of education: Not on file    Highest education level: Not on file   Occupational History    Not on file   Tobacco Use    Smoking status: Current Every Day Smoker     Packs/day: 0.10     Years: 38.00     Pack years: 3.80     Types: Cigarettes    Smokeless tobacco: Never Used    Tobacco comment: smokes 1 or 2 a day   Vaping Use    Vaping Use: Former    Substances: Nicotine, Flavoring   Substance and Sexual Activity    Alcohol use: Not Currently     Alcohol/week: 0.0 standard drinks    Drug use: Yes     Types: Marijuana (Weed)     Comment: 5 joints a day marijuana for pain and appetite, she states that she stopped cocaine 2011    Sexual activity: Not Currently     Partners: Male     Birth control/protection: Post-menopausal   Other Topics Concern    Not on file   Social History Narrative    Not on file     Social Determinants of Health     Financial Resource Strain: Low Risk     Difficulty of Paying Living Expenses: Not hard at all   Food Insecurity: No Food Insecurity    Worried About [address] in the Last Year: Never true    [address]" in the Last Year: Never true   Transportation Needs: No Transportation Needs    Lack of Transportation (Medical): No    Lack of Transportation (Non-Medical):  No   Physical Activity:     Days of Exercise per Week: Not on file    Minutes of Exercise per Session: Not on file   Stress:     Feeling of Stress : Not on file   Social Connections:     Frequency of Communication with Friends and Family: Not on file    Frequency of Social Gatherings with Friends and Family: Not on file    Attends Yazidi Services: Not on file    Active Member of Clubs or Organizations: Not on file    Attends Club or Organization Meetings: Not on file    Marital Status: Not on file   Intimate Partner Violence:     Fear of Current or Ex-Partner: Not on file    Emotionally Abused: Not on file    Physically Abused: Not on file    Sexually Abused: Not on file   Housing Stability: Unknown    Unable to Pay for Housing in the Last Year: No    Number of [place] in the Last Year: Not on file    Unstable Housing in the Last Year: No         REVIEW OF SYSTEMS:         Review of Systems   Constitutional: Positive for appetite change and unexpected weight change. HENT: Negative. Eyes: Negative. Respiratory: Negative. Cardiovascular: Negative. Gastrointestinal: Positive for abdominal distention, abdominal pain, anal bleeding, constipation and rectal pain. Endocrine: Negative. Genitourinary: Negative. Musculoskeletal: Negative. Skin: Negative. Allergic/Immunologic: Negative. Neurological: Negative. Hematological: Negative. Psychiatric/Behavioral: Negative. PHYSICAL EXAMINATION:     Vital signs reviewed per the nursing documentation. BP (!) 142/90   Pulse 77   Wt 107 lb (48.5 kg)   BMI 17.81 kg/m²   Body mass index is 17.81 kg/m². Physical Exam  Constitutional:       Appearance: Normal appearance. Eyes:      General: No scleral icterus. Pupils: Pupils are equal, round, and reactive to light. Cardiovascular:      Rate and Rhythm: Normal rate and regular rhythm. Heart sounds: Normal heart sounds. Pulmonary:      Effort: Pulmonary effort is normal.      Breath sounds: Normal breath sounds. Abdominal:      General: Bowel sounds are normal. There is no distension. Palpations: Abdomen is soft. There is no mass. Tenderness: There is no abdominal tenderness. There is no guarding. Genitourinary:     Rectum: Guaiac result negative. External hemorrhoid present. Skin:     General: Skin is warm and dry. Coloration: Skin is not jaundiced. Neurological:      Mental Status: She is alert and oriented to person, place, and time. Mental status is at baseline.            LABORATORY DATA: Reviewed  Lab Results   Component Value Date    WBC 7.1 06/15/2022    WBC 7.14 06/15/2022    HGB 15.0 06/15/2022    HGB 14.8 06/15/2022    HCT 44.4 06/15/2022    HCT 44.1 06/15/2022    MCV 93.2 06/15/2022     06/15/2022     06/15/2022     06/15/2022    K 3.6 06/15/2022     06/15/2022    CO2 25 06/15/2022    BUN 16 06/15/2022    CREATININE 0.86 06/15/2022    LABPROT 6.7 2012    LABALBU 4.1 06/15/2022    BILITOT 0.5 06/15/2022    ALKPHOS 72 06/15/2022    AST 17 06/15/2022    ALT 10 06/15/2022    INR 1.0 2017         Lab Results   Component Value Date    RBC 4.73 06/15/2022    HGB 15.0 06/15/2022    HGB 14.8 06/15/2022    MCV 93.2 06/15/2022    MCH 31.3 06/15/2022    MCHC 33.6 06/15/2022    RDW 14.4 06/15/2022    MPV 7.3 2021    BASOPCT 0.4 06/15/2022    LYMPHSABS 1.6 06/15/2022    MONOSABS 0.5 06/15/2022    NEUTROABS 4.8 06/15/2022    NEUTROABS 4.8 06/15/2022    EOSABS 0.2 06/15/2022    BASOSABS 0.0 06/15/2022         DIAGNOSTIC TESTING:     CT CHEST W CONTRAST    Result Date: 2022  CT chest from 2022, with finding of 2 new lung nodules in [address] [facility] Department of Radiology [place] [facility] [phone]    ========================================================================== Patient Name: Skye Doyle : 1960 Sex: F Age: RaceDeolivier Gist MRN: [id number] Pt. Location: 29 Patient Status: D Visit #: [id number] Ordered Date: 2022 3:05:00 PM Completed Date: 2022 01:24 PM Requesting Provider: Addie Travis Attending Provider: Addie Travis Report Copy To: Deja Molina Signs ^ Symptoms: C34.11 Malignant neoplasm of upper lobe, right bronchus or lung I10 History: Ariadna Harris [phone] DM? on metformin? kidney dis?  NONE PER PT SLS Comments: CT chest from 2022, with finding of 2 new lung nodules in [facility]UL Exam: CT CHEST  W CONTRAST Accession #: [id number] ========================================================================== CT CHEST  W CONTRAST  2022 1:24 PM  CLINICAL INDICATIONS: C34.11 Malignant neoplasm of upper lobe, right bronchus or lung I10 follow-up abnormal right upper lobe densities noted on prior exam  TECHNOLOGIST COMMENTS: Malignant neoplasm of right upper lobe  QUESTIONS PER RADIOLOGIST: CT chest from 2022, with finding of 2 new lung nodules in RUL  PROTOCOL: Axial CT images of the chest were obtained with IV contrast.  CONTRAST: Contrast: OMNIPAQUE 350  (LOCM), 100 milliliter, Intravenous  TECHNIQUE: Multidetector CT axial slices of the chest were obtained with IV contrast. Multiplanar reformats were performed and viewed on a separate workstation and reviewed to further define anatomy and possible pathology. All CT scans at this facility use dose modulation, iterative reconstruction, and/or weight based dosing when appropriate to reduce radiation dose to as low as reasonably achievable. COMPARISON: 4/13/2022. FINDINGS: No definite mediastinal or hilar lymphadenopathy identified. Thoracic aorta shows normal caliber. A pleural or pericardial effusion is not seen. Lung windows show marked centrilobular emphysematous change. Previously noted spiculated right upper lobe density with some appearance. Cavitation is grossly unchanged measuring approximately 14.8 mm x 13.8 mm. Previously noted density on lateral within right upper lobe has resolved. No new abnormal nodular densities. Bone windows show osteopenia. A lower thoracic compression fracture deformity is unchanged. IMPRESSION: 1. Interval resolution of previously noted about lateral right upper lobe density since prior exam. 2. Spiculated right upper lobe density and presumed site of original mass appears unchanged. Residual neoplasm is not excluded. Electronically signed: Najma No. Transcribed by: Leann.Fleischer, User Resident: Electronically Signed by: Polo Conklin @ 06/14/2022 08:15 PM         Assessment  1. Elevated CEA    2. Rectal bleeding    3. Hemorrhoids, unspecified hemorrhoid type    4. Constipation, unspecified constipation type        Plan    Patient has significant constipation, tenesmus. Also has intermittent hematochezia. Patient feels that she has recurrent hemorrhoids. During this visit found to have probable anal skin tags and hemorrhoids.   No thrombosis of the hemorrhoids. Stool is tested negative for blood. May need sigmoidoscopy/colonoscopy to evaluate tenesmus. Advised medical management of constipation and brochures were given. Also advised intermittent sitz bath's. Thank you for allowing me to participate in the care of Ms. Sophie Woo. For any further questions please do not hesitate to contact me. I have reviewed and agree with the ROS entered by the MA/LPN. Note is dictated utilizing voice recognition software. Unfortunately this leads to occasional typographical errors.  Please contact our office if you have any questions        Anmol Garcia MD,FACP, [facility]  Board Certified in Gastroenterology and [address] [place] [address] Gastroenterology  Office #: [phone]

## 2022-07-14 DIAGNOSIS — K21.9 GASTROESOPHAGEAL REFLUX DISEASE WITHOUT ESOPHAGITIS: ICD-10-CM

## 2022-07-14 RX ORDER — FAMOTIDINE 20 MG/1
TABLET, FILM COATED ORAL
Qty: 60 TABLET | Refills: 5 | Status: SHIPPED | OUTPATIENT
Start: 2022-07-14 | End: 2022-07-20 | Stop reason: ALTCHOICE

## 2022-07-19 ENCOUNTER — HOSPITAL ENCOUNTER (OUTPATIENT)
Dept: PREADMISSION TESTING | Age: 62
Discharge: HOME | End: 2022-07-23

## 2022-07-19 VITALS — HEIGHT: 65 IN | WEIGHT: 103 LBS | BODY MASS INDEX: 17.16 KG/M2

## 2022-07-19 NOTE — PROGRESS NOTES
Pre-op Instructions For Out-Patient Endoscopy Surgery    Medication Instructions:  Please stop herbs and any supplements now (includes vitamins and minerals). Please contact your surgeon and prescribing physician for pre-op instructions for any blood thinners. ASPIRIN, IBUPROFEN    If you have inhalers/aerosol treatments at home, please use them the morning of your surgery and bring the inhalers with you to the hospital.    Please take the following medications the morning of your surgery with a sip of water:    INHALER, NORVASC    Surgery Instructions:  After midnight before surgery:  Do not eat or drink anything, including water, mints, gum, and hard candy. You may brush your teeth without swallowing. No smoking, chewing tobacco, or street drugs. Please shower or bathe before surgery. Please do not wear any cologne, lotion, powder, jewelry, piercings, perfume, makeup, nail polish, hair accessories, or hair spray on the day of surgery. Wear loose comfortable clothing. Leave your valuables at home. Bring a storage case for any glasses/contacts. An adult who is responsible for you MUST drive you home and should be with you for the first 24 hours after surgery. The Day of Surgery:  Arrive at [address] [place] [address] Surgery Entrance at the time directed by your surgeon and check in at the desk. If you have a living will or healthcare power of , please bring a copy. You will be taken to the pre-op holding area where you will be prepared for surgery. A physical assessment will be performed by a nurse practitioner or house officer. Your IV will be started and you will meet your anesthesiologist.    When you go to surgery, your family will be directed to the surgical waiting room, where the doctor should speak with them after your surgery.     After surgery, you will be taken to the recovery room then when you are awake and stable you will go to the short stay unit for

## 2022-07-20 ENCOUNTER — OFFICE VISIT (OUTPATIENT)
Dept: FAMILY MEDICINE CLINIC | Age: 62
End: 2022-07-20
Payer: MEDICARE

## 2022-07-20 VITALS
OXYGEN SATURATION: 98 % | TEMPERATURE: 97.6 F | BODY MASS INDEX: 17.43 KG/M2 | WEIGHT: 104.6 LBS | SYSTOLIC BLOOD PRESSURE: 120 MMHG | DIASTOLIC BLOOD PRESSURE: 82 MMHG | HEIGHT: 65 IN | HEART RATE: 83 BPM

## 2022-07-20 DIAGNOSIS — C34.2 MALIGNANT NEOPLASM OF MIDDLE LOBE OF RIGHT LUNG (HCC): Primary | ICD-10-CM

## 2022-07-20 DIAGNOSIS — K63.5 HYPERPLASTIC COLONIC POLYP, UNSPECIFIED PART OF COLON: ICD-10-CM

## 2022-07-20 DIAGNOSIS — I38 VALVULAR HEART DISEASE: ICD-10-CM

## 2022-07-20 DIAGNOSIS — F51.04 PSYCHOPHYSIOLOGICAL INSOMNIA: ICD-10-CM

## 2022-07-20 DIAGNOSIS — K55.1 SUPERIOR MESENTERIC ARTERY STENOSIS (HCC): ICD-10-CM

## 2022-07-20 DIAGNOSIS — N81.4 PROLAPSED UTERUS: ICD-10-CM

## 2022-07-20 DIAGNOSIS — E44.1 MILD PROTEIN-CALORIE MALNUTRITION (HCC): ICD-10-CM

## 2022-07-20 DIAGNOSIS — F32.1 CURRENT MODERATE EPISODE OF MAJOR DEPRESSIVE DISORDER WITHOUT PRIOR EPISODE (HCC): ICD-10-CM

## 2022-07-20 DIAGNOSIS — R42 LIGHT HEADEDNESS: ICD-10-CM

## 2022-07-20 PROCEDURE — 3017F COLORECTAL CA SCREEN DOC REV: CPT | Performed by: FAMILY MEDICINE

## 2022-07-20 PROCEDURE — 4004F PT TOBACCO SCREEN RCVD TLK: CPT | Performed by: FAMILY MEDICINE

## 2022-07-20 PROCEDURE — 99214 OFFICE O/P EST MOD 30 MIN: CPT | Performed by: FAMILY MEDICINE

## 2022-07-20 PROCEDURE — G8419 CALC BMI OUT NRM PARAM NOF/U: HCPCS | Performed by: FAMILY MEDICINE

## 2022-07-20 PROCEDURE — G8427 DOCREV CUR MEDS BY ELIG CLIN: HCPCS | Performed by: FAMILY MEDICINE

## 2022-07-20 RX ORDER — CITALOPRAM 20 MG/1
20 TABLET ORAL DAILY
Qty: 30 TABLET | Refills: 1 | Status: SHIPPED | OUTPATIENT
Start: 2022-07-20 | End: 2022-10-13

## 2022-07-20 RX ORDER — AMLODIPINE BESYLATE 2.5 MG/1
TABLET ORAL
COMMUNITY

## 2022-07-20 RX ORDER — TRAZODONE HYDROCHLORIDE 50 MG/1
50 TABLET ORAL NIGHTLY
Qty: 90 TABLET | Refills: 1 | Status: SHIPPED | OUTPATIENT
Start: 2022-07-20

## 2022-07-20 ASSESSMENT — ENCOUNTER SYMPTOMS
TROUBLE SWALLOWING: 0
SINUS PRESSURE: 0
SORE THROAT: 0
COUGH: 1
SHORTNESS OF BREATH: 1
CONSTIPATION: 0
ABDOMINAL PAIN: 0
VOMITING: 0
STRIDOR: 0
DIARRHEA: 0
RECTAL PAIN: 0
NAUSEA: 0
BACK PAIN: 1
ABDOMINAL DISTENTION: 0
COLOR CHANGE: 0
RHINORRHEA: 0
EYE REDNESS: 0
WHEEZING: 0
CHEST TIGHTNESS: 0
BLOOD IN STOOL: 0

## 2022-07-20 ASSESSMENT — PATIENT HEALTH QUESTIONNAIRE - PHQ9
5. POOR APPETITE OR OVEREATING: 2
8. MOVING OR SPEAKING SO SLOWLY THAT OTHER PEOPLE COULD HAVE NOTICED. OR THE OPPOSITE, BEING SO FIGETY OR RESTLESS THAT YOU HAVE BEEN MOVING AROUND A LOT MORE THAN USUAL: 2
3. TROUBLE FALLING OR STAYING ASLEEP: 2
SUM OF ALL RESPONSES TO PHQ QUESTIONS 1-9: 12
SUM OF ALL RESPONSES TO PHQ QUESTIONS 1-9: 12
6. FEELING BAD ABOUT YOURSELF - OR THAT YOU ARE A FAILURE OR HAVE LET YOURSELF OR YOUR FAMILY DOWN: 0
SUM OF ALL RESPONSES TO PHQ QUESTIONS 1-9: 12
7. TROUBLE CONCENTRATING ON THINGS, SUCH AS READING THE NEWSPAPER OR WATCHING TELEVISION: 2
4. FEELING TIRED OR HAVING LITTLE ENERGY: 2
SUM OF ALL RESPONSES TO PHQ QUESTIONS 1-9: 12
SUM OF ALL RESPONSES TO PHQ9 QUESTIONS 1 & 2: 2
9. THOUGHTS THAT YOU WOULD BE BETTER OFF DEAD, OR OF HURTING YOURSELF: 0
10. IF YOU CHECKED OFF ANY PROBLEMS, HOW DIFFICULT HAVE THESE PROBLEMS MADE IT FOR YOU TO DO YOUR WORK, TAKE CARE OF THINGS AT HOME, OR GET ALONG WITH OTHER PEOPLE: 1
2. FEELING DOWN, DEPRESSED OR HOPELESS: 1
1. LITTLE INTEREST OR PLEASURE IN DOING THINGS: 1

## 2022-07-20 ASSESSMENT — COLUMBIA-SUICIDE SEVERITY RATING SCALE - C-SSRS
2. HAVE YOU ACTUALLY HAD ANY THOUGHTS OF KILLING YOURSELF?: NO
1. WITHIN THE PAST MONTH, HAVE YOU WISHED YOU WERE DEAD OR WISHED YOU COULD GO TO SLEEP AND NOT WAKE UP?: NO
6. HAVE YOU EVER DONE ANYTHING, STARTED TO DO ANYTHING, OR PREPARED TO DO ANYTHING TO END YOUR LIFE?: NO

## 2022-07-20 NOTE — PROGRESS NOTES
Chief Complaint   Patient presents with    Hypertension    Results         Mica Khan  here today for follow up on chronic medical problems, go over labs and/or diagnostic studies, and medication refills. Hypertension and Results      HPI: Patient is scheduled for follow-up on chronic medical problems. Patient has history of lung malignancy which is in remission. Patient has stopped treatment recent CT showing stable findings. CT chest CT chest done in June 2022. Interval resolution of previously noted about lateral right upper    lobe density since prior exam.   2. Spiculated right upper lobe density and presumed site of original    mass appears unchanged. Residual neoplasm is not excluded. Patient reports she was feeling dizzy and lightheaded, was seen by cardiologist and has echocardiogram done that showed valvular disease which was mild, patient was on clonidine which was stopped. Patient reports lightheadedness has improved. Depression and anxiety is on Paxil, patient reports that started working she wants to change her medications. Patient reports she is not able to sleep that makes her depression anxiety more worse. She tried melatonin over-the-counter that is not helping. Patient also complains of prolapsed uterus which is chronic problem reports she was planning to have surgery but due to diagnosis of lung malignancy she cannot continue. She wants to reschedule appointment reports that is causing her problems. History of colonic polyp is scheduled for colonoscopy. Malnutrition not able to gain weight tried supplements also that is not helping. Patient still smokes and is trying to quit. Patient's weight is fairly stable. /82   Pulse 83   Temp 97.6 °F (36.4 °C)   Ht 5' 5\" (1.651 m)   Wt 104 lb 9.6 oz (47.4 kg)   SpO2 98%   BMI 17.41 kg/m²    Body mass index is 17.41 kg/m².   Wt Readings from Last 3 Encounters:   07/20/22 104 lb 9.6 oz (47.4 kg) 07/19/22 103 lb (46.7 kg)   06/23/22 107 lb (48.5 kg)        []Negative depression screening. PHQ Scores 7/20/2022 8/16/2021 6/8/2020 1/28/2020 9/17/2019 11/13/2017   PHQ2 Score 2 3 0 0 4 0   PHQ9 Score 12 6 0 0 10 0      []1-4 = Minimal depression   []5-9 = Milddepression   []10-14 = Moderate depression   []15-19 = Moderately severe depression   []20-27 = Severe depression    Discussed testing with the patient and all questions fully answered.     Orders Only on 06/13/2022   Component Date Value Ref Range Status    WBC 06/15/2022 7.1  4.0 - 10.6 10*3/uL Final    Hemoglobin 06/15/2022 15.0  12.0 - 15.0 g/dL Final    Hematocrit 06/15/2022 44.4  36.0 - 45.0 % Final    Platelets 85/76/0565 307  150 - 400 10*3/uL Final    Neutrophils Absolute 06/15/2022 4.8  1.6 - 7.6 10*3/uL Final    WBC 06/15/2022 7.14  4.00 - 10.60 10*3/uL Final    RBC 06/15/2022 4.73  3.80 - 5.00 10*6/uL Final    Hemoglobin 06/15/2022 14.8  12.0 - 15.0 g/dL Final    Hematocrit 06/15/2022 44.1  36.0 - 45.0 % Final    MCV 06/15/2022 93.2  82.0 - 98.0 fL Final    MCH 06/15/2022 31.3  27.0 - 33.0 pg Final    MCHC 06/15/2022 33.6  32.0 - 35.0 g/dL Final    RDW 06/15/2022 14.4  11.5 - 15.0 % Final    Platelets 78/01/0352 310  150 - 400 10*3/uL Final    Neutrophils % 06/15/2022 66.7  40.0 - 72.0 % Final    Immature Granulocytes 06/15/2022 0.4  0.0 - 1.0 % Final    Lymphocytes % 06/15/2022 23.0  20.0 - 45.0 % Final    Monocytes % 06/15/2022 7.0  5.0 - 12.0 % Final    Eosinophils % 06/15/2022 2.5  0.0 - 6.0 % Final    Basophils % 06/15/2022 0.4  0.0 - 1.0 % Final    Neutrophils Absolute 06/15/2022 4.8  1.6 - 7.6 10*3/uL Final    Absolute Immature Granulocyte 06/15/2022 0.0  0.0 - 0.2 10*3/uL Final    Lymphocytes Absolute 06/15/2022 1.6  1.2 - 4.0 10*3/uL Final    Monocytes Absolute 06/15/2022 0.5  0.1 - 1.0 10*3/uL Final    Eosinophils Absolute 06/15/2022 0.2  0.0 - 0.5 10*3/uL Final    Basophils Absolute 06/15/2022 0.0  0.0 - 0.2 10*3/uL Final    nRBC 06/15/2022 0  0 - 0 % Final    TSH, 3RD GENERATION 06/15/2022 1.57  0.34 - 5.60 uIU/mL Final    T3, Free 06/15/2022 3.7  2.5 - 3.9 pg/mL Final    T4 Free 06/15/2022 0.87  0.71 - 1.85 ng/dL Final    Glucose 06/15/2022 86  70 - 100 mg/dL Final    BUN 06/15/2022 16  7 - 25 mg/dL Final    CREATININE 06/15/2022 0.86  0.60 - 1.20 mg/dL Final    Sodium 06/15/2022 138  136 - 145 meq/L Final    Potassium 06/15/2022 3.6  3.5 - 5.1 meq/L Final    Chloride 06/15/2022 103  98 - 107 meq/L Final    CO2 06/15/2022 25  21 - 31 meq/L Final    Calcium 06/15/2022 9.1  8.6 - 10.3 mg/dL Final    Total Bilirubin 06/15/2022 0.5  0.3 - 1.0 mg/dL Final    Alkaline Phosphatase 06/15/2022 72  34 - 104 IU/L Final    AST 06/15/2022 17  13 - 39 IU/L Final    ALT 06/15/2022 10  7 - 52 IU/L Final    Total Protein 06/15/2022 6.6  6.0 - 8.3 g/dL Final    Albumin 06/15/2022 4.1  3.5 - 5.7 g/dL Final    EGFR IF NonAfrican American 06/15/2022 >60  >60 ml/min/1.73sq m Final    eGFR  06/15/2022 >60  >60 ml/min/1.73sq m Final         Most recent labs reviewed:     Lab Results   Component Value Date    WBC 7.1 06/15/2022    WBC 7.14 06/15/2022    HGB 15.0 06/15/2022    HGB 14.8 06/15/2022    HCT 44.4 06/15/2022    HCT 44.1 06/15/2022    MCV 93.2 06/15/2022     06/15/2022     06/15/2022       @BRIEFLAB(NA,K,CL,CO2,BUN,CREATININE,GLUCOSE,CALCIUM)@     Lab Results   Component Value Date    ALT 10 06/15/2022    AST 17 06/15/2022    ALKPHOS 72 06/15/2022    BILITOT 0.5 06/15/2022       Lab Results   Component Value Date    TSH 0.93 08/19/2017       Lab Results   Component Value Date    CHOL 193 05/06/2022    CHOL 213 (H) 03/06/2017    CHOL 189 06/20/2016     Lab Results   Component Value Date    TRIG 81 05/06/2022    TRIG 228 (H) 03/06/2017    TRIG 382 (H) 06/20/2016     Lab Results   Component Value Date    HDL 60 05/06/2022    HDL 46 03/06/2017    HDL 42 06/20/2016     Lab Results   Component Value Date    LDLCHOLESTEROL 117 05/06/2022    LDLCHOLESTEROL 121 03/06/2017    LDLCHOLESTEROL 71 06/20/2016     Lab Results   Component Value Date    VLDL NOT REPORTED 03/06/2017    VLDL NOT REPORTED 06/20/2016    VLDL NOT REPORTED 11/06/2014     Lab Results   Component Value Date    CHOLHDLRATIO 3.2 05/06/2022    CHOLHDLRATIO 4.6 03/06/2017    CHOLHDLRATIO 4.5 06/20/2016       Lab Results   Component Value Date    LABA1C 5.5 10/25/2016       No results found for: CZCKIYED80    No results found for: FOLATE    No results found for: IRON, TIBC, FERRITIN    Lab Results   Component Value Date    VITD25 38.7 01/18/2021             Current Outpatient Medications   Medication Sig Dispense Refill    citalopram (CELEXA) 20 MG tablet Take 1 tablet by mouth in the morning. 30 tablet 1    traZODone (DESYREL) 50 MG tablet Take 1 tablet by mouth nightly 90 tablet 1    omeprazole (PRILOSEC) 20 MG delayed release capsule take 1 capsule by mouth twice a day 180 capsule 3    ibuprofen (ADVIL;MOTRIN) 600 MG tablet Take 1 tablet by mouth 4 times daily as needed for Pain 60 tablet 0    Misc.  Devices (STEP N REST II WALKER) MISC Use daily for balance problems 1 each 0    Ascorbic Acid (VITAMIN C) 1000 MG tablet Take 1,000 mg by mouth daily      benzonatate (TESSALON) 100 MG capsule Take 100 mg by mouth 3 times daily as needed for Cough      fluticasone (FLONASE) 50 MCG/ACT nasal spray 2 sprays by Nasal route daily 1 Bottle 0    ondansetron (ZOFRAN) 4 MG tablet Take 4 mg by mouth every 8 hours as needed for Nausea or Vomiting      dicyclomine (BENTYL) 10 MG capsule Take 1 capsule by mouth every 6 hours as needed (cramps) 20 capsule 0    lidocaine (LMX) 4 % cream Apply topically every 8 hrs as needed for pain 45 g 3    RA CALCIUM 600/VITAMIN D-3 600-400 MG-UNIT TABS take 1 tablet by mouth twice a day 90 tablet 3    umeclidinium-vilanterol (ANORO ELLIPTA) 62.5-25 MCG/INH AEPB inhaler Anoro Ellipta 62.5 mcg-25 mcg/actuation powder for inhalation   Inhale 1 puff every day by inhalation route.      lidocaine-prilocaine (EMLA) 2.5-2.5 % cream lidocaine-prilocaine 2.5 %-2.5 % topical cream   Apply to port site and cover 30-45 minutes prior to needle access      albuterol sulfate HFA (PROVENTIL HFA) 108 (90 Base) MCG/ACT inhaler Inhale 2 puffs into the lungs every 6 hours as needed for Wheezing or Shortness of Breath 1 Inhaler 11    Multiple Vitamin (MULTIVITAMIN PO) Take  by mouth daily. aspirin 81 MG EC tablet Take 81 mg by mouth daily. amLODIPine (NORVASC) 2.5 MG tablet amlodipine 2.5 mg tablet   take 1 tablet by mouth once daily  ( STOP CLONIDINE )      nicotine (NICODERM CQ) 21 MG/24HR Place 1 patch onto the skin every 24 hours  (Patient not taking: No sig reported)      Pembrolizumab (KEYTRUDA IV) Infuse intravenously Once every 6 weeks Last dose was 2/25/2021  (Patient not taking: No sig reported)       No current facility-administered medications for this visit. Social History     Socioeconomic History    Marital status:       Spouse name: Not on file    Number of children: Not on file    Years of education: Not on file    Highest education level: Not on file   Occupational History    Not on file   Tobacco Use    Smoking status: Every Day     Packs/day: 0.10     Years: 38.00     Pack years: 3.80     Types: Cigarettes    Smokeless tobacco: Never    Tobacco comments:     smokes 1 or 2 a day   Vaping Use    Vaping Use: Former    Substances: Nicotine, Flavoring   Substance and Sexual Activity    Alcohol use: Not Currently     Alcohol/week: 0.0 standard drinks    Drug use: Yes     Types: Marijuana (Weed)     Comment: 5 joints a day marijuana for pain and appetite, she states that she stopped cocaine 2011    Sexual activity: Not Currently     Partners: Male     Birth control/protection: Post-menopausal   Other Topics Concern    Not on file   Social History Narrative    Not on file     Social Determinants of Health     Financial Resource Strain: Low Risk Difficulty of Paying Living Expenses: Not hard at all   Food Insecurity: No Food Insecurity    Worried About [address] in the Last Year: Never true    Ran Out of Food in the Last Year: Never true   Transportation Needs: No Transportation Needs    Lack of Transportation (Medical): No    Lack of Transportation (Non-Medical): No   Physical Activity: Not on file   Stress: Not on file   Social Connections: Not on file   Intimate Partner Violence: Not on file   Housing Stability: Unknown    Unable to Pay for Housing in the Last Year: No    Number of Places Lived in the Last Year: Not on file    Unstable Housing in the Last Year: No     Ready to quit: Not Answered  Counseling given: Not Answered  Tobacco comments: smokes 1 or 2 a day        Family History   Problem Relation Age of Onset    Heart Disease Mother     Cancer Mother         breast and lung cancer    Diabetes Father     Heart Disease Father         Death due to MI    Cancer Paternal Grandfather         stomach cancer     Heart Disease Paternal Grandfather     Cancer Sister         ovarian cancer     Cancer Maternal Grandmother         female cancer             -rest of complaints with corresponding details per ROS    The patient's past medical, surgical, social, and family history as well as her current medications and allergies were reviewed as documented intoday's encounter. Review of Systems   Constitutional:  Positive for activity change and unexpected weight change. Negative for appetite change, fatigue and fever. HENT:  Negative for congestion, ear pain, postnasal drip, rhinorrhea, sinus pressure, sore throat and trouble swallowing. Eyes:  Negative for redness and visual disturbance. Respiratory:  Positive for cough and shortness of breath. Negative for chest tightness, wheezing and stridor. Cardiovascular:  Negative for chest pain, palpitations and leg swelling.    Gastrointestinal:  Negative for abdominal distention, abdominal pain, blood in stool, constipation, diarrhea, nausea, rectal pain and vomiting. Endocrine: Negative for polydipsia, polyphagia and polyuria. Genitourinary:  Negative for difficulty urinating, flank pain, frequency and urgency. Musculoskeletal:  Positive for arthralgias, back pain and gait problem. Negative for myalgias and neck pain. Skin:  Negative for color change, rash and wound. Allergic/Immunologic: Negative for food allergies and immunocompromised state. Neurological:  Positive for weakness and numbness. Negative for dizziness, speech difficulty, light-headedness and headaches. Psychiatric/Behavioral:  Positive for decreased concentration, dysphoric mood and sleep disturbance. Negative for agitation, behavioral problems, hallucinations and suicidal ideas. The patient is nervous/anxious. Physical Exam  Vitals and nursing note reviewed. Constitutional:       General: She is not in acute distress. Appearance: Normal appearance. She is well-developed. She is not diaphoretic. HENT:      Head: Normocephalic and atraumatic. Nose: Nose normal.   Eyes:      General:         Right eye: No discharge. Left eye: No discharge. Extraocular Movements: Extraocular movements intact. Conjunctiva/sclera: Conjunctivae normal.      Pupils: Pupils are equal, round, and reactive to light. Neck:      Thyroid: No thyromegaly. Cardiovascular:      Rate and Rhythm: Normal rate and regular rhythm. Heart sounds: Normal heart sounds. No murmur heard. Pulmonary:      Effort: Pulmonary effort is normal. No respiratory distress. Breath sounds: Decreased air movement present. Examination of the right-middle field reveals decreased breath sounds. Examination of the right-lower field reveals decreased breath sounds. Examination of the left-lower field reveals decreased breath sounds. Decreased breath sounds present. No wheezing, rhonchi or rales.    Abdominal: General: Bowel sounds are normal. There is no distension. Palpations: Abdomen is soft. There is no mass. Tenderness: There is no abdominal tenderness. There is no guarding or rebound. Musculoskeletal:      Cervical back: Normal range of motion and neck supple. Spasms present. No rigidity. Thoracic back: Spasms present. Normal range of motion. Lumbar back: Deformity, spasms and tenderness present. Decreased range of motion. Lymphadenopathy:      Cervical: No cervical adenopathy. Skin:     Coloration: Skin is not jaundiced or pale. Findings: No bruising, erythema or rash. Neurological:      General: No focal deficit present. Mental Status: She is oriented to person, place, and time. She is lethargic. Cranial Nerves: No cranial nerve deficit. Sensory: Sensory deficit present. Motor: No weakness or tremor. Coordination: Coordination normal.      Gait: Gait abnormal. Tandem walk normal.      Deep Tendon Reflexes: Reflexes are normal and symmetric. Psychiatric:         Attention and Perception: Attention and perception normal. She is attentive. Mood and Affect: Mood is anxious and depressed. Affect is not tearful. Speech: She is communicative. Speech is slurred. Speech is not rapid and pressured or delayed. Behavior: Behavior is slowed. Behavior is not agitated. Thought Content: Thought content normal.         Cognition and Memory: Cognition is not impaired. Judgment: Judgment normal.           ASSESSMENT AND PLAN      1. Malignant neoplasm of middle lobe of right lung (HCC)  Stable in remission. Recent CT reviewed   Continue to work on smoking  2. Current moderate episode of major depressive disorder without prior episode ([facility] [address]  Drawl disc continue Prozac start on Celexa and trazodone. - citalopram (CELEXA) 20 MG tablet; Take 1 tablet by mouth in the morning. Dispense: 30 tablet;  Refill: 1  - traZODone (DESYREL) 50 MG tablet; Take 1 tablet by mouth nightly  Dispense: 90 tablet; Refill: 1    3. Psychophysiological insomnia  Uncontrolled start on trazodone continue melatonin  - traZODone (DESYREL) 50 MG tablet; Take 1 tablet by mouth nightly  Dispense: 90 tablet; Refill: 1    4. Valvular heart disease  Newly diagnosed we will get records from cardiologist in    5. Light headedness  Rule off of clonidine    6. Superior mesenteric artery stenosis [facility])  Patient seen by vascular surgeon continue to monitor    7. Prolapsed uterus  Referral placed gynecologist  - Gary Jean, [address] [place] [address]    8. Hyperplastic colonic polyp, unspecified part of colon  Schedule for colonoscopy    9. Mild protein-calorie malnutrition ([address]  Fairly stable continue nutrition supplements. Orders Placed This Encounter   Procedures    Venkat Parkinson CNP, OB/GYN, [place]     Referral Priority:   Routine     Referral Type:   Eval and Treat     Referral Reason:   Specialty Services Required     Referred to Provider:   EDDA Ramos CNP     Requested Specialty:   Certified Nurse Practitioner     Number of Visits Requested:   1         Medications Discontinued During This Encounter   Medication Reason    amLODIPine (NORVASC) 2.5 MG tablet DUPLICATE    bisacodyl (BISACODYL) 5 MG EC tablet Therapy completed    famotidine (PEPCID) 20 MG tablet Therapy completed    polyethylene glycol (GLYCOLAX) 17 GM/SCOOP powder Therapy completed    potassium chloride (KLOR-CON M) 20 MEQ extended release tablet Therapy completed    PARoxetine (PAXIL) 30 MG tablet Alternate therapy       Vikram Jaimes received counseling on the following healthy behaviors: nutrition, medication adherence, and tobacco cessation  Reviewed prior labs and health maintenance  Continue current medications, diet and exercise. Discussed use, benefit, and side effects of prescribed medications. Barriers to medication compliance addressed.    Patient given educational materials - see every mistake has been identified and corrected by editing.   Electronically signed by [facility] MD RAMIRO on 7/20/2022  12:43 PM

## 2022-07-20 NOTE — PATIENT INSTRUCTIONS
New Updates for [facility]/ [employer] ([facility]) YVONNE    Thank you for choosing US to give you the best care! [employer] ([facility]) is always trying to think of new ways to help their patients. We are asking all patients to try out the new digital registration that is now available through your [facility] account or the new YVONNE, [employer] ([facility]). Via the yvonne you're now able to update your personal and registration information prior to your upcoming appointment. This will save you time once you arrive at the office to check-in, not to mention your information remains safe!! Many other perks come from signing up for an account, such as:  Requesting refills  Scheduling an appointment  Completing an E-Visit  Sending a message to the office/provider  Having access to your medication list  Paying your bill/copay prior to your appointment  Scheduling your yearly mammogram  Review your test results    If you are not familiar with [facility] or the [employer] ([facility]) YVONNE, please ask one of us and we will be happy to answer any questions or help you set-up your account.       Your [facility] office,  [place]

## 2022-07-20 NOTE — PROGRESS NOTES
Visit Information    Have you changed or started any medications since your last visit including any over-the-counter medicines, vitamins, or herbal medicines? yes -    Have you stopped taking any of your medications? Is so, why? -  yes -   Are you having any side effects from any of your medications? - no    Have you seen any other physician or provider since your last visit? yes -    Have you had any other diagnostic tests since your last visit? yes -    Have you been seen in the emergency room and/or had an admission in a hospital since we last saw you?  no   Have you had your routine dental cleaning in the past 6 months?  no     Do you have an active MyChart account? If no, what is the barrier?   Yes    Patient Care Team:  Nella Coffman MD as PCP - General (Family Medicine)  Nella Coffman MD as PCP - [facility] [place], MARCELINO as   Angele Rinne, MD as Consulting Physician (Pulmonology)  Susanna Chavez MD as Consulting Physician (Hematology and Oncology)  Camryn Trimble MD as Consulting Physician (Gastroenterology)    Medical History Review  Past Medical, Family, and Social History reviewed and does contribute to the patient presenting condition    Health Maintenance   Topic Date Due    Pneumococcal 0-64 years Vaccine (2 - PCV) 03/06/2018    Depression Monitoring  08/16/2022    Annual Wellness Visit (AWV)  08/17/2022    Flu vaccine (1) 09/01/2022    COVID-19 Vaccine (3 - Booster for Marshal series) 09/23/2022    Cervical cancer screen  11/13/2022    Breast cancer screen  04/22/2023    Colorectal Cancer Screen  04/24/2023    Lipids  05/06/2027    DTaP/Tdap/Td vaccine (2 - Td or Tdap) 06/17/2029    Shingles vaccine  Completed    Hepatitis C screen  Completed    HIV screen  Completed    Hepatitis A vaccine  Aged Out    Hepatitis B vaccine  Aged Out    Hib vaccine  Aged Out    Meningococcal (ACWY) vaccine  Aged Out

## 2022-08-01 ENCOUNTER — TELEPHONE (OUTPATIENT)
Dept: GASTROENTEROLOGY | Age: 62
End: 2022-08-01

## 2022-08-01 ENCOUNTER — TELEPHONE (OUTPATIENT)
Dept: FAMILY MEDICINE CLINIC | Age: 62
End: 2022-08-01

## 2022-08-01 ENCOUNTER — ANESTHESIA EVENT (OUTPATIENT)
Dept: ENDOSCOPY | Age: 62
End: 2022-08-01
Payer: MEDICARE

## 2022-08-01 NOTE — TELEPHONE ENCOUNTER
Shabana Singh from [facility] pre-cert called ([phone]) and LVM stating pt's procedure on 8/2/22 is pending auth; some issue with insurance stating her plan is a 1982 [facility] plan. Called pt and was given group number: [id number] and ID number [id number] for [address] [place] [address]. Registration has been updated.

## 2022-08-01 NOTE — TELEPHONE ENCOUNTER
Received VM from [place] with [facility] out patient surgery stating that the patient's insurance was still pending. Did we still want to keep the patient on. Spoke to Sarah Beth Hunt and we will not be cancelling. LVM for [place] that the registration has been updated and insurance info in the system. We received from the patient earlier. Any questions to call the office.     [place] [phone]

## 2022-08-02 ENCOUNTER — HOSPITAL ENCOUNTER (OUTPATIENT)
Age: 62
Setting detail: OUTPATIENT SURGERY
Discharge: HOME | End: 2022-08-02
Attending: INTERNAL MEDICINE | Admitting: INTERNAL MEDICINE
Payer: MEDICARE

## 2022-08-02 ENCOUNTER — ANESTHESIA (OUTPATIENT)
Dept: ENDOSCOPY | Age: 62
End: 2022-08-02
Payer: MEDICARE

## 2022-08-02 VITALS
OXYGEN SATURATION: 100 % | HEART RATE: 66 BPM | TEMPERATURE: 97 F | WEIGHT: 102 LBS | BODY MASS INDEX: 17 KG/M2 | SYSTOLIC BLOOD PRESSURE: 135 MMHG | RESPIRATION RATE: 22 BRPM | HEIGHT: 65 IN | DIASTOLIC BLOOD PRESSURE: 97 MMHG

## 2022-08-02 DIAGNOSIS — K62.5 HEMORRHAGE OF ANUS AND RECTUM: ICD-10-CM

## 2022-08-02 PROCEDURE — 2709999900 HC NON-CHARGEABLE SUPPLY: Performed by: INTERNAL MEDICINE

## 2022-08-02 PROCEDURE — 7100000011 HC PHASE II RECOVERY - ADDTL 15 MIN: Performed by: INTERNAL MEDICINE

## 2022-08-02 PROCEDURE — 6360000002 HC RX W HCPCS: Performed by: NURSE ANESTHETIST, CERTIFIED REGISTERED

## 2022-08-02 PROCEDURE — 2580000003 HC RX 258: Performed by: ANESTHESIOLOGY

## 2022-08-02 PROCEDURE — 3700000001 HC ADD 15 MINUTES (ANESTHESIA): Performed by: INTERNAL MEDICINE

## 2022-08-02 PROCEDURE — 2500000003 HC RX 250 WO HCPCS: Performed by: NURSE ANESTHETIST, CERTIFIED REGISTERED

## 2022-08-02 PROCEDURE — 3609010300 HC COLONOSCOPY W/BIOPSY SINGLE/MULTIPLE: Performed by: INTERNAL MEDICINE

## 2022-08-02 PROCEDURE — 45380 COLONOSCOPY AND BIOPSY: CPT | Performed by: INTERNAL MEDICINE

## 2022-08-02 PROCEDURE — 7100000010 HC PHASE II RECOVERY - FIRST 15 MIN: Performed by: INTERNAL MEDICINE

## 2022-08-02 PROCEDURE — 3700000000 HC ANESTHESIA ATTENDED CARE: Performed by: INTERNAL MEDICINE

## 2022-08-02 PROCEDURE — 88305 TISSUE EXAM BY PATHOLOGIST: CPT

## 2022-08-02 RX ORDER — LIDOCAINE HYDROCHLORIDE 20 MG/ML
INJECTION, SOLUTION INFILTRATION; PERINEURAL PRN
Status: DISCONTINUED | OUTPATIENT
Start: 2022-08-02 | End: 2022-08-02 | Stop reason: SDUPTHER

## 2022-08-02 RX ORDER — SODIUM CHLORIDE 0.9 % (FLUSH) 0.9 %
5-40 SYRINGE (ML) INJECTION PRN
Status: DISCONTINUED | OUTPATIENT
Start: 2022-08-02 | End: 2022-08-02 | Stop reason: HOSPADM

## 2022-08-02 RX ORDER — PROPOFOL 10 MG/ML
INJECTION, EMULSION INTRAVENOUS PRN
Status: DISCONTINUED | OUTPATIENT
Start: 2022-08-02 | End: 2022-08-02 | Stop reason: SDUPTHER

## 2022-08-02 RX ORDER — DIPHENHYDRAMINE HYDROCHLORIDE 50 MG/ML
INJECTION INTRAMUSCULAR; INTRAVENOUS PRN
Status: DISCONTINUED | OUTPATIENT
Start: 2022-08-02 | End: 2022-08-02 | Stop reason: SDUPTHER

## 2022-08-02 RX ORDER — SODIUM CHLORIDE, SODIUM LACTATE, POTASSIUM CHLORIDE, CALCIUM CHLORIDE 600; 310; 30; 20 MG/100ML; MG/100ML; MG/100ML; MG/100ML
INJECTION, SOLUTION INTRAVENOUS CONTINUOUS
Status: DISCONTINUED | OUTPATIENT
Start: 2022-08-02 | End: 2022-08-02 | Stop reason: HOSPADM

## 2022-08-02 RX ORDER — FOLIC ACID 1 MG/1
1 TABLET ORAL DAILY
COMMUNITY

## 2022-08-02 RX ORDER — SODIUM CHLORIDE 9 MG/ML
INJECTION, SOLUTION INTRAVENOUS PRN
Status: DISCONTINUED | OUTPATIENT
Start: 2022-08-02 | End: 2022-08-02 | Stop reason: HOSPADM

## 2022-08-02 RX ORDER — SODIUM CHLORIDE 0.9 % (FLUSH) 0.9 %
5-40 SYRINGE (ML) INJECTION EVERY 12 HOURS SCHEDULED
Status: DISCONTINUED | OUTPATIENT
Start: 2022-08-02 | End: 2022-08-02 | Stop reason: HOSPADM

## 2022-08-02 RX ORDER — LIDOCAINE HYDROCHLORIDE 10 MG/ML
1 INJECTION, SOLUTION EPIDURAL; INFILTRATION; INTRACAUDAL; PERINEURAL
Status: DISCONTINUED | OUTPATIENT
Start: 2022-08-02 | End: 2022-08-02 | Stop reason: HOSPADM

## 2022-08-02 RX ADMIN — PROPOFOL 50 MG: 10 INJECTION, EMULSION INTRAVENOUS at 10:53

## 2022-08-02 RX ADMIN — LIDOCAINE HYDROCHLORIDE 50 MG: 20 INJECTION, SOLUTION INFILTRATION; PERINEURAL at 10:33

## 2022-08-02 RX ADMIN — SODIUM CHLORIDE, POTASSIUM CHLORIDE, SODIUM LACTATE AND CALCIUM CHLORIDE: 600; 310; 30; 20 INJECTION, SOLUTION INTRAVENOUS at 09:23

## 2022-08-02 RX ADMIN — PROPOFOL 50 MG: 10 INJECTION, EMULSION INTRAVENOUS at 10:38

## 2022-08-02 RX ADMIN — PROPOFOL 50 MG: 10 INJECTION, EMULSION INTRAVENOUS at 10:48

## 2022-08-02 RX ADMIN — DIPHENHYDRAMINE HYDROCHLORIDE 25 MG: 50 INJECTION INTRAMUSCULAR; INTRAVENOUS at 10:41

## 2022-08-02 RX ADMIN — PROPOFOL 50 MG: 10 INJECTION, EMULSION INTRAVENOUS at 10:35

## 2022-08-02 RX ADMIN — PROPOFOL 50 MG: 10 INJECTION, EMULSION INTRAVENOUS at 10:33

## 2022-08-02 RX ADMIN — GLUCAGON HYDROCHLORIDE 0.5 MG: KIT at 10:42

## 2022-08-02 RX ADMIN — PROPOFOL 50 MG: 10 INJECTION, EMULSION INTRAVENOUS at 10:43

## 2022-08-02 ASSESSMENT — LIFESTYLE VARIABLES: SMOKING_STATUS: 1

## 2022-08-02 ASSESSMENT — PAIN SCALES - GENERAL: PAINLEVEL_OUTOF10: 0

## 2022-08-02 ASSESSMENT — ENCOUNTER SYMPTOMS
RHINORRHEA: 1
TROUBLE SWALLOWING: 0
SHORTNESS OF BREATH: 1
COUGH: 0
WHEEZING: 0
SORE THROAT: 0

## 2022-08-02 ASSESSMENT — PAIN - FUNCTIONAL ASSESSMENT: PAIN_FUNCTIONAL_ASSESSMENT: 0-10

## 2022-08-02 NOTE — H&P
HISTORY and [id number]       NAME:  Nicky Hirsch  MRN: [id number]   YOB: 1960   Date: 8/2/2022   Age: 65 y.o. Gender: female       COMPLAINT AND PRESENT HISTORY:   Nicky Hirsch  is a 65 y.o. female presenting today for COLONOSCOPY DIAGNOSTIC as r/t RECTAL BLEEDING    COLONOSCOPY QUESTIONNAIRE  LAST COLONOSCOPY: 2018  HISTORY OF COLON POLYPS:Yes  S/S START DATE: hemorrhoid bleeding started 2019  ABD PAIN: Denies  CONSTIPATION: Yes  DIARRHEA (ASIDE FROM COLONOSCOPY PREP): Denies  BLOOD IN STOOL/BLACK, TARRY STOOLS: Yes- bright red blood from Hemorids   NAUSEA/VOMITING/HEMATEMESIS: Denies  FEVER/CHILLS: Denies  APPETITE CHANGE: Decreased appetite  RECENT UNINTENDED WEIGHT LOSS: Denies  DIFFICULTY SWALLOWING/PHARYNGITIS/VOICE CHANGE: States some difficulty swallowing due to narrowing of esophagus      Pt reports completing bowel prep without complications, last BM reported this morning. She states last BM was clear with no stool particles. Pt has a PMHX significant for COPD, HLD, HTN, Lung CA    Patient states they have been NPO since before midnight. No medications taken with a sip of water:  Pt does not wear dentures. Pt denies any hx of MRSA infection  Pt not currently taking any blood thinners or anticoagulants  Pt denies any personal or FHx of complications with anesthesia. Pt denies any acute symptoms of illness at this time including no SOB, CP, fever, URI or UTI symptoms. RECENT IMAGING    No results found.      PAST MEDICAL HISTORY     Past Medical History:   Diagnosis Date    Arthritis     Bronchitis, chronic (HCC)     Chronic back pain     Chronic cough     COPD (chronic obstructive pulmonary disease) ([facility]     Depression     Diverticulosis     Emphysema     Fibromyalgia     GERD (gastroesophageal reflux disease)     Hemorrhoid     internal and external, they bleed    History of cocaine abuse ([facility]     none since 2010, used between 2007 and 2009     History of kidney infection     Hyperlipidemia     Hyperplastic colon polyp 11/15/2017    Hypertension     Lung cancer ([address]     non-small cell H3A lung cancer right upper lobe    Marijuana use     for pain and appetite use    Meningioma (HCC)     Neuropathy     Orbital fracture (HCC)     left side ,has  pin in side    Osteoarthritis     Osteopenia     Pulmonary nodule     left lung, watching this one    Renal calculi     Renal cyst     STD (sexually transmitted disease)     unsure of type    Uterine prolapse        SURGICAL HISTORY       Past Surgical History:   Procedure Laterality Date    BACK SURGERY  2011    thoracic laminectomy, T12, S1    CARDIAC CATHETERIZATION  10/24/14    Normal coronaries     COLONOSCOPY  04/05/2017    diverticulosis, ,poor prep    COLONOSCOPY  11/15/2017    flat polypoid lesion in the base of the cecum, about 1 cm.-hyperplastic    COLONOSCOPY  04/24/2018     diverticulosis.  Small polyp in the sigmoid colon excised with biopsy forceps    EAR SURGERY Left     cleaned out infection    ENDOSCOPY, COLON, DIAGNOSTIC      ORBITAL FRACTURE SURGERY Left     had pin inserted on temple side    CO COLON CA SCRN NOT HI RSK IND N/A 4/5/2017    COLONOSCOPY performed by Kellie Myers MD at [facility] FLX DX W/JUICE Sims 1977 PFRMD N/A 4/24/2018    COLONOSCOPY performed by Kellie Myers MD at [facility] N/A 11/15/2017    COLONOSCOPY POLYPECTOMY SNARE and saline injection performed by Kellie Myers MD at [facility] EGD TRANSORAL BIOPSY SINGLE/MULTIPLE N/A 10/6/2017    EGD BIOPSY performed by Wilhemina Olszewski, MD at [facility] EGD TRANSORAL BIOPSY SINGLE/MULTIPLE N/A 2/27/2018    EGD ESOPHAGOGASTRODUODENOSCOPY performed by Kellie Myers MD at [facility] ESOPHAGOGASTRODUODENOSCOPY TRANSORAL DIAGNOSTIC N/A 4/24/2018    EGD ESOPHAGOGASTRODUODENOSCOPY performed by Kellie Myers MD at [address] [place] [address]  TONSILLECTOMY      TUBAL LIGATION      TUNNELED VENOUS PORT PLACEMENT Left     UPPER GASTROINTESTINAL ENDOSCOPY  10/06/2017    Dr Livan Ferguson diverticulum gastric fundus, pathology inactive gastritis    UPPER GASTROINTESTINAL ENDOSCOPY  02/27/2018    retained food in the fundus of the stomach, poor peristalsis wide-open pylorus    UPPER GASTROINTESTINAL ENDOSCOPY  04/24/2018    no lesions seen that can account her CEA levels. UPPER GASTROINTESTINAL ENDOSCOPY N/A 3/10/2021    EGD BIOPSY AND PHOTOS performed by Ora Dakin, MD at [address] [place] [address]       Family History   Problem Relation Age of Onset    Heart Disease Mother     Cancer Mother         breast and lung cancer    Diabetes Father     Heart Disease Father         Death due to MI    Cancer Paternal Grandfather         stomach cancer     Heart Disease Paternal Grandfather     Cancer Sister         ovarian cancer     Cancer Maternal Grandmother         female cancer       SOCIAL HISTORY       Social History     Socioeconomic History    Marital status:     Tobacco Use    Smoking status: Every Day     Packs/day: 0.10     Years: 38.00     Pack years: 3.80     Types: Cigarettes    Smokeless tobacco: Never    Tobacco comments:     smokes 1 or 2 a day   Vaping Use    Vaping Use: Former    Substances: Nicotine, Flavoring   Substance and Sexual Activity    Alcohol use: Not Currently     Alcohol/week: 0.0 standard drinks    Drug use: Yes     Types: Marijuana (Weed)     Comment: 5 joints a day marijuana for pain and appetite, she states that she stopped cocaine 2011    Sexual activity: Not Currently     Partners: Male     Birth control/protection: Post-menopausal     Social Determinants of Health     Financial Resource Strain: Low Risk     Difficulty of Paying Living Expenses: Not hard at all   Food Insecurity: No Food Insecurity    Worried About [employer] [employer] in the Last Year: Never true    [facility] [address] in the Last Year: Never true   Transportation Needs: No Transportation Needs    Lack of Transportation (Medical): No    Lack of Transportation (Non-Medical): No   Housing Stability: Unknown    Unable to Pay for Housing in the Last Year: No    Unstable Housing in the Last Year: No           REVIEW OF SYSTEMS      Allergies   Allergen Reactions    Lovastatin     Statins Other (See Comments)     pain    Sulfa Antibiotics        No current facility-administered medications on file prior to encounter. Current Outpatient Medications on File Prior to Encounter   Medication Sig Dispense Refill    omeprazole (PRILOSEC) 20 MG delayed release capsule take 1 capsule by mouth twice a day 180 capsule 3    ibuprofen (ADVIL;MOTRIN) 600 MG tablet Take 1 tablet by mouth 4 times daily as needed for Pain 60 tablet 0    Misc.  Devices (STEP N REST II WALKER) MISC Use daily for balance problems 1 each 0    Ascorbic Acid (VITAMIN C) 1000 MG tablet Take 1,000 mg by mouth daily      nicotine (NICODERM CQ) 21 MG/24HR Place 1 patch onto the skin every 24 hours  (Patient not taking: No sig reported)      benzonatate (TESSALON) 100 MG capsule Take 100 mg by mouth 3 times daily as needed for Cough      Pembrolizumab (KEYTRUDA IV) Infuse intravenously Once every 6 weeks Last dose was 2/25/2021  (Patient not taking: No sig reported)      fluticasone (FLONASE) 50 MCG/ACT nasal spray 2 sprays by Nasal route daily 1 Bottle 0    ondansetron (ZOFRAN) 4 MG tablet Take 4 mg by mouth every 8 hours as needed for Nausea or Vomiting      dicyclomine (BENTYL) 10 MG capsule Take 1 capsule by mouth every 6 hours as needed (cramps) 20 capsule 0    lidocaine (LMX) 4 % cream Apply topically every 8 hrs as needed for pain 45 g 3    RA CALCIUM 600/VITAMIN D-3 600-400 MG-UNIT TABS take 1 tablet by mouth twice a day 90 tablet 3    umeclidinium-vilanterol (ANORO ELLIPTA) 62.5-25 MCG/INH AEPB inhaler Anoro Ellipta 62.5 mcg-25 mcg/actuation powder for inhalation   Inhale 1 puff every day by inhalation route.      lidocaine-prilocaine (EMLA) 2.5-2.5 % cream lidocaine-prilocaine 2.5 %-2.5 % topical cream   Apply to port site and cover 30-45 minutes prior to needle access      albuterol sulfate HFA (PROVENTIL HFA) 108 (90 Base) MCG/ACT inhaler Inhale 2 puffs into the lungs every 6 hours as needed for Wheezing or Shortness of Breath 1 Inhaler 11    Multiple Vitamin (MULTIVITAMIN PO) Take  by mouth daily. aspirin 81 MG EC tablet Take 81 mg by mouth daily. Review of Systems   Constitutional:  Negative for chills and fever. HENT:  Positive for rhinorrhea. Negative for congestion, ear pain, postnasal drip, sore throat and trouble swallowing. Respiratory:  Positive for shortness of breath (chronic due to lung cancer). Negative for cough and wheezing. Cardiovascular:  Positive for palpitations (Chronic \"fluttering heart\"). Negative for chest pain and leg swelling. Gastrointestinal:         See HPI. Genitourinary:  Negative for dysuria and frequency. Musculoskeletal:  Positive for myalgias (left calf muscle tightness). Neurological:  Negative for dizziness and headaches. Hematological:  Bruises/bleeds easily. See HPI    GENERAL PHYSICAL EXAM:     Vitals: See nurse flowsheet    Physical Exam  Constitutional:       General: She is not in acute distress. Appearance: Normal appearance. She is normal weight. She is not ill-appearing, toxic-appearing or diaphoretic. HENT:      Head: Normocephalic. Mouth/Throat:      Mouth: Mucous membranes are moist.      Pharynx: Oropharynx is clear. Eyes:      Extraocular Movements: Extraocular movements intact. Conjunctiva/sclera: Conjunctivae normal.      Pupils: Pupils are equal, round, and reactive to light. Cardiovascular:      Rate and Rhythm: Normal rate and regular rhythm. Pulses: Normal pulses. Heart sounds: Normal heart sounds.    Pulmonary:      Effort: Pulmonary effort is normal. No respiratory distress. Breath sounds: Normal breath sounds. No wheezing. Abdominal:      General: Abdomen is flat. Bowel sounds are normal. There is no distension. Palpations: Abdomen is soft. Tenderness: There is no abdominal tenderness. Comments: Hyperactive bowel sounds   Musculoskeletal:         General: No swelling, tenderness or deformity. Normal range of motion. Cervical back: Normal range of motion. Right lower leg: No edema. Left lower leg: No edema. Skin:     General: Skin is warm and dry. Findings: No bruising, lesion or rash. Neurological:      General: No focal deficit present. Mental Status: She is alert and oriented to person, place, and time. Mental status is at baseline. Psychiatric:         Mood and Affect: Mood normal.         Behavior: Behavior normal.         Thought Content:  Thought content normal.         Judgment: Judgment normal.                                                                                       PROVISIONAL DIAGNOSES / SURGERY:      COLONOSCOPY DIAGNOSTIC     RECTAL BLEEDING    Patient Active Problem List    Diagnosis Date Noted    Elevated CEA of 6.6 and OVA-1 of 4.5 10/30/2017    Tetrahydrocannabinol (THC) use disorder, mild, abuse 10/30/2017    Psychophysiological insomnia 07/20/2022    Valvular heart disease 07/20/2022    Superior mesenteric artery stenosis ([address] 04/18/2022    Vitamin D deficiency 01/18/2021    Anxiety 01/18/2021    Constipation 10/06/2020    Clostridioides difficile infection 06/08/2020    Gastroesophageal reflux disease without esophagitis 06/08/2020    Left chronic serous otitis media 06/08/2020    Clostridial gastroenteritis 01/28/2020    Mild protein-calorie malnutrition ([address] 10/29/2019    Malignant neoplasm of middle lobe of right lung ([address] 03/05/2019    Abnormal PET scan of colon 04/04/2018    Mixed simple and mucopurulent chronic bronchitis ([address] 03/12/2018    Right ovarian cyst 03/12/2018    Osteopenia of multiple sites 02/01/2018    Meningioma ([facility] 11/21/2017    Hyperplastic colon polyp 11/15/2017    Family history of breast cancer     H/O tubal ligation     Chronic midline low back pain with bilateral sciatica 07/13/2017    DDD (degenerative disc disease), cervical 07/13/2017    History of diverticulitis 03/16/2017    Pulmonary nodule 09/22/2016    Chronic obstructive pulmonary disease ([facility] 09/22/2016    Smoking 09/22/2016    Fibromyalgia 09/28/2015    Shoulder impingement 02/05/2013    Other affections of shoulder region, not elsewhere classified 07/10/2012    Degeneration of cervical intervertebral disc 07/10/2012    Spinal stenosis in cervical region 07/10/2012    Cervicalgia 07/10/2012    Carpal tunnel syndrome 07/10/2012    Lumbago 04/17/2012    Lumbar degenerative disc disease 04/17/2012    Chronic low back pain 01/05/2012    Knee pain, right 01/05/2012    Renal calculi     Diverticulosis     Prolapsed uterus     Hyperlipidemia     Depression     Tobacco abuse     Hemorrhoids, internal     Renal cyst     Centrilobular emphysema ([facility]                Loli Polanco, APRN - CNP on 8/2/2022 at 7:46 AM

## 2022-08-02 NOTE — ANESTHESIA POSTPROCEDURE EVALUATION
Department of Anesthesiology  Postprocedure Note    Patient: Kirill Ramirez  MRN: [id number]  [place]: 1960  Date of evaluation: 8/2/2022      Procedure Summary     Date: 08/02/22 Room / Location: [address] [place] [address] ENDO 01 / [facility] ENDO    Anesthesia Start: 1032 Anesthesia Stop: 1103    Procedure: COLONOSCOPY WITH BIOPSY Diagnosis:       Hemorrhage of anus and rectum      (RECTAL BLEEDING)    Surgeons: Marcial Schuster MD Responsible Provider: Tigist Garrido MD    Anesthesia Type: General ASA Status: 3          Anesthesia Type: General    Nakia Phase I:      Nakia Phase II: Nakia Score: 10      Anesthesia Post Evaluation    Comments: POST- ANESTHESIA EVALUATION       Pt Name: Kirill Ramirez  MRN: [id number]  [place]: 1960  Date of evaluation: 8/2/2022  Time:  12:37 PM      BP (!) 135/97   Pulse 66   Temp 97 °F (36.1 °C)   Resp 22   Ht 5' 5\" (1.651 m)   Wt 102 lb (46.3 kg)   SpO2 100%   BMI 16.97 kg/m²      Consciousness Level  Awake  Cardiopulmonary Status  Stable  Pain Adequately Treated YES  Nausea / Vomiting  NO  Adequate Hydration  YES  Anesthesia Related Complications NONE      Electronically signed by Tigist Garrido MD on 8/2/2022 at 12:37 PM

## 2022-08-02 NOTE — OP NOTE
COLONOSCOPY    DATE OF PROCEDURE: 8/2/2022    SURGEON: Camelia Jauregui MD    ASSISTANT: None    PREOPERATIVE DIAGNOSIS: History of rectal bleeding, tenesmus. Past history of colon polyps. Procedure performed probably only colonic lesions. Also patient is known to have high CEA levels in the past.    POSTOPERATIVE DIAGNOSIS: Significant diverticulosis in the sigmoid colon. Questionable polyp in between the diverticuli which biopsies taken. No signs of acute diverticulitis. Has a hemorrhoids. OPERATION: Total colonoscopy biopsy. ANESTHESIA: MAC    ESTIMATED BLOOD LOSS: None    COMPLICATIONS: None     SPECIMENS:  Was Obtained: Biopsy from the sigmoid colon- questionable polyp/prominent fold in between the diverticuli. HISTORY: The patient is a 65y.o. year old female with history of above preop diagnosis. I recommended colonoscopy with possible biopsy or polypectomy and I explained the risk, benefits, expected outcome, and alternatives to the procedure. Risks included but are not limited to bleeding, infection, respiratory distress, hypotension, and perforation of the colon and possibility of missing a lesion. The patient understands and is in agreement. PROCEDURE:  The patient's SPO2 remained above 90% throughout the procedure. Digital rectal exam was normal.  The colonoscope was inserted through the anus into the rectum and advanced under direct vision to the cecum with difficulty. Terminal ileum was examined for approximately 2 inches. The prep was fair. Findings:  Terminal ileum: normal    Cecum/Ascending colon: normal    Transverse colon: normal    Descending/Sigmoid colon: abnormal: Has a significant diverticulosis in the sigmoid colon. No stricture encountered. In between the diverticuli there is erythema, edema of a fold seen.   Biopsies taken from this area to rule out dysplasia, adenoma etc.    Rectum/Anus: examined in normal and retroflexed positions and was abnormal: Has a hemorrhoids. No signs of bleeding. Withdrawal Time was (minutes): 12          The colon was decompressed and the scope was removed. The patient tolerated the procedure without unusual events. During the procedure, the patient's blood pressure, pulse and oxygen saturation remained stable and documented. No unusual events occurred during the procedure. Patient was transferred to recovery room and will be discharged when criteria is met. Recommendations/Plan:   F/U Biopsies  F/U In Office as instructed  Discussed with the family  High fiber diet   Precautions to avoid constipation   Intermittent sitz bath's. To avoid straining and bowel movements.     Electronically signed by Martina Souza MD  on 8/2/2022 at 11:02 AM

## 2022-08-02 NOTE — DISCHARGE INSTRUCTIONS
Precautions provided constipation    High-fiber diet    Sitz bath's twice a day for 5 days and intermittently following. To call me if she has any further significant bleeding etc.    Colonoscopy: What to Expect at [address] [place] [address]  After you have a colonoscopy, you will stay at the clinic for 1 to 2 hours until the medicines wear off. Then you can go home, but you will need to arrange for a ride. Your doctor will tell you when you can eat and do your other usual activities. Your doctor will talk to you about when you will need your next colonoscopy. The results of your test and your risk for colorectal cancer will help your doctor decide how often you need to be checked. After the test, you may be bloated or have gas pains. You may need to pass gas. If a biopsy was done or a polyp was removed, you may have streaks of blood in your stool (feces) for a few days. This care sheet gives you a general idea about how long it will take for you to recover. But each person recovers at a different pace. Follow the steps below to get better as quickly as possible. How can you care for yourself at home? Activity  Rest as much as you need to after you go home. You should be able to go back to your usual activities the day after the test.  Diet  Follow your doctors directions for eating. Drink plenty of fluids (unless your doctor has told you not to) to replace the fluids that were lost during the colon prep. Do not drink alcohol. Medicines  If polyps were removed or a biopsy was done during the test, your doctor may tell you not to take aspirin or other anti-inflammatory medicines, such as ibuprofen (Advil, Motrin) and naproxen (Aleve), for a few days. Other instructions  For your safety, you should not drive or operate machinery until the medicine effects are gone and you can think clearly.  Your doctor may tell you not to drive or operate machinery until the day after your test.  Do not sign legal documents or make major decisions until the medicine effects are gone and you can think clearly. The anesthesia medicine can make it hard for you to fully understand what you are agreeing to. Follow-up care is a key part of your treatment and safety. Be sure to make and go to all appointments, and call your doctor if you are having problems. It's also a good idea to know your test results and keep a list of the medicines you take. Call your Doctor if you have any of the following:             Passing blood rectally or vomiting blood (it may be red or black). Persistent nausea or vomiting. Severe abdominal or chest pain, not relieved by passing gas. Fever of 100 or more, chills or excessive sweating. Redness or swelling at the IV site. If you experience shortness of breath or severe chest pain, call 911. Where can you learn more? Go to https://[employer] To Get Approved For.INSOMENIA. org and sign in to your [employer] account. Enter E264 in the Kaiima box to learn more about Colonoscopy: What to Expect at Home.     If you do not have an account, please click on the Sign Up Now link. © 3880-6414 Healthwise, Incorporated. Care instructions adapted under license by [facility]. This care instruction is for use with your licensed healthcare professional. If you have questions about a medical condition or this instruction, always ask your healthcare professional. [address] any warranty or liability for your use of this information. Content Version: 0.2.714948; Last Revised: February 20, 2013   PATIENT INSTRUCTIONS  DIVERTICULOSIS    FOLLOW-UP:  Please make an appointment with your physician as directed. Call your physician immediately if you have any fevers greater than 102.5,  increasing abdominal pain, GI bleeding (from the colon or rectum),or nausea/vomiting.       CAUSE:  Some people may have congenital diverticulosis, but most people supplement as noted above twice daily. If your symptoms don't improve with fiber and dietary changes alone your physician may also recommend psyllium or methylcellulose as well. If your physician has placed you on an antibiotic it is critical that you take the full course of these, even if your symptoms have improved, and that you not miss any doses. QUESTIONS:  Please feel free to call your physician or the hospital  if you have any questions, and they will be glad to assist you. If you have further questions it may also be helpful to meet with a dietitician. High-Fiber Diet     What Is Fiber? Dietary fiber is a form of carbohydrate found in plants that cannot be digested by humans. All plants contain fiber, including fruits, vegetables, grains, and legumes. Fiber is often classified into two categories: soluble and insoluble. Soluble fiber draws water into the bowel and can help slow digestion. Examples of foods that are high in soluble fiber include oatmeal, oat bran, barley, legumes (eg, beans and peas), apples, and strawberries. Insoluble fiber speeds digestion and can add bulk to the stool. Examples of foods that are high in insoluble fiber include whole-wheat products, wheat bran, cauliflower, green beans, and potatoes. Why Follow a High-Fiber Diet? A high-fiber diet is often recommended to prevent and treat constipation , hemorrhoids , diverticulitis , and irritable bowel syndrome . Eating a high-fiber diet can also help improve your cholesterol levels, lower your risk of coronary heart disease , reduce your risk of type 2 diabetes , and lower your weight. For people with type 1 or 2 diabetes, a high-fiber diet can also help stabilize blood sugar levels. How Much Fiber Should I Eat? A high-fiber diet should contain  20-35 grams  of fiber a day. This is actually the amount recommended for the general adult population; however, most Americans eat only 15 grams of fiber per day. Digestion of Fiber   Eating a higher fiber diet than usual can take some getting used to by your body's digestive system. To avoid the side effects of sudden increases in dietary fiber (eg, gas, cramping, bloating, and diarrhea), increase fiber gradually and be sure to drink plenty of fluids every day. Tips for Increasing Fiber Intake   Whenever possible, choose whole grains over refined grains (eg, brown rice instead of white rice, whole-wheat bread instead of white bread). Include a variety of grains in your diet, such as wheat, rye, barley, oats, quinoa, and bulgur. Eat more vegetarian-based meals. Here are some ideas: black bean burgers, eggplant lasagna, and veggie tofu stir-andrea. Choose high-fiber snacks, such as fruits, popcorn, whole-grain crackers, and nuts. Make whole-grain cereal or whole-grain toast part of your daily breakfast regime. When eating out, whether ordering a sandwich or dinner, ask for extra vegetables. When baking, replace part of the white flour with whole-wheat flour. Whole-wheat flour is particularly easy to incorporate into a recipe. High-Fiber Diet Eating Guide   Food Category   Foods Recommended   Notes   Grains   Whole-grain breads, muffins, bagels, or laura bread Rye bread Whole-wheat crackers or crisp breads Whole-grain or bran cereals Oatmeal, oat bran, or grits Wheat germ Whole-wheat pasta and brown rice   Read the ingredients list on food labels. Look for products that list \"whole\" as the first ingredient (eg, whole-wheat, whole oats). Choose cereals with at least 2 grams of fiber per serving.    Vegetables   All vegetables, especially asparagus, bean sprouts, broccoli, [place] sprouts, cabbage, carrots, cauliflower, celery, corn, greens, green beans, green pepper, onions, peas, potatoes (with skin), snow peas, spinach, squash, sweet potatoes, tomatoes, zucchini   For maximum fiber intake, eat the peels of fruits and vegetablesjust be sure to wash them well first.   Fruits   All fruits, especially apples, berries, grapefruits, mangoes, nectarines, oranges, peaches, pears, dried fruits (figs, dates, prunes, raisins)   Choose raw fruits and vegetables over juice, cooked, or cannedraw fruit has more fiber. Dried fruit is also a good source of fiber. Milk   With the exception of yogurt containing inulin (a type of fiber), dairy foods provide little fiber. Add more fiber by topping your yogurt or cottage cheese with fresh fruit, whole grain or bran cereals, nuts, or seeds. Meats and Beans   All beans and peas, especially Garbanzo beans, kidney beans, lentils, lima beans, split peas, and tsai beans All nuts and seeds, especially almonds, peanuts, [place] nuts, cashews, peanut butter, walnuts, sesame and sunflower seeds All meat, poultry, fish, and eggs   Increase fiber in meat dishes by adding tsai beans, kidney beans, black-eyed peas, bran, or oatmeal. If you are following a low-fat diet, use nuts and seeds only in moderation. Fats and Oils   All in moderation   Fats and oils do not provide fiber   Snacks, Sweets, and Condiments   Fruit Nuts Popcorn, whole-wheat pretzels, or trail mix made with dried fruits, nuts, and seeds Cakes, breads, and cookies made with oatmeal or whole-wheat flour   Most snack foods do not provide much fiber. Choose snacks with at least 2 grams of fiber per serving. Last Reviewed: March 2011 Camelia Aguero MS, MPH, RD   Updated: 3/29/2011  Colon Polypectomy   (Colon Polyp Removal)       Definition   A colon polypectomy is the removal of polyps from the inside lining of the colon (large intestine). A polyp is a mass of tissue. Some types of polyps have the potential to develop into cancer. Most polyps can be removed during a colonoscopy or sigmoidoscopy . A Colon Polyp        2011 [address] [place] [address].   Reasons for Procedure   The purpose of the surgery is to remove a polyp. It is done for cancer prevention.    In rare cases, larger polyps can cause troublesome symptoms, such as rectal bleeding, abdominal pain, and bowel irregularities. A polyp removal will relieve these symptoms. Possible Complications   Complications are rare, but no procedure is completely free of risk. If you are planning to have a polypectomy, your doctor will review a list of possible complications, which may include:   Damage to the colon wall   Bleeding   Infection   Adverse reaction to the sedative   Factors that may increase the risk of complications include:   Type, size, and location of the polyp   Patient factors, such as blood-clotting disorders, substance abuse, or other diseases (eg, obesity , diabetes )   What to Expect   Prior to Procedure    Your doctor will likely do the following:   Physical exam and health history   Review of medicines   Test your stool for hidden blood (called \"occult blood\")   X-rays an exam that uses small amounts of radiation to make a picture of the inside of the body   Barium enema x-ray exam that uses contrast to help better see the colon   Diagnostic colonoscopy or sigmoidoscopyexamination of the inside of the intestine with an endoscope   Your colon must be completely cleaned before the procedure. Any stool left in the intestine will block the view. This preparation may start several days before the procedure. Follow your doctor's instructions, which may include any of the following cleansing methods:   Enemas fluid introduced into the rectum to stimulate a bowel movement   Laxativesmedicines that cause you to have soft bowel movements   A clear-liquid diet   Oral cathartic medicinesa large container of fluid to drink, which stimulates a bowel movement   Leading up to your procedure:   Talk to your doctor about your medicines.  You may be asked to stop taking some medicines up to one week before the procedure, like:   Anti-inflammatory drugs (eg, aspirin)   Blood thinners, like clopidogrel (Plavix) or warfarin require you to be alert or coordinated. Do not:  Drive. Swim. Ride a bicycle. Operate heavy machinery. Mary [place]. Use power tools. Climb ladders. Work at [employer]. Take a bath. Do not drink alcohol. Do not make any important decisions or sign legal documents. Stay with an adult. The first meal following your procedure should be light and small. Avoid solid foods if you feel sick to your stomach (nauseous) or if you throw up (vomit). Drink enough fluids to keep your urine clear or pale yellow. Only take your usual medicines or new medicines if your caregiver approves them. Only take over-the-counter or prescription medicines for pain, discomfort, or fever as directed by your caregiver. Keep all follow-up appointments as directed by your caregiver. SEEK IMMEDIATE MEDICAL CARE IF:   You are not feeling normal or behaving normally after 24 hours. You have persistent nausea and vomiting. You are unable to drink fluids or eat food. You have difficulty urinating. You have difficulty breathing or speaking. You have blue or gray skin. There is difficulty waking or you cannot be woken up. You have heavy bleeding, redness, or a lot of swelling where the sedative or anesthesia entered your skin (intravenous site). You have a rash. MAKE SURE YOU:  Understand these instructions. Will watch your condition. Will get help right away if you are not doing well or get worse. Document Released: 12/18/2006 Document Revised: 06/18/2013 Document Reviewed: 04/17/2013  [facility]. [facility] Patient Information ©2013 [place].

## 2022-08-02 NOTE — ANESTHESIA PRE PROCEDURE
Department of Anesthesiology  Preprocedure Note       Name:  Melissa Stokes   Age:  65 y.o.  :  1960                                          MRN:  [id number]         Date:  2022      Surgeon: Neha Luque):  Africa Valdivia MD    Procedure: Procedure(s):  COLONOSCOPY DIAGNOSTIC    Medications prior to admission:   Prior to Admission medications    Medication Sig Start Date End Date Taking? Authorizing Provider   amLODIPine (NORVASC) 2.5 MG tablet amlodipine 2.5 mg tablet   take 1 tablet by mouth once daily  ( STOP CLONIDINE )    Historical Provider, MD   citalopram (CELEXA) 20 MG tablet Take 1 tablet by mouth in the morning. 22   Nichole Donahue MD   traZODone (DESYREL) 50 MG tablet Take 1 tablet by mouth nightly 22   Nichole Donahue MD   omeprazole (PRILOSEC) 20 MG delayed release capsule take 1 capsule by mouth twice a day 22   Nichole Donahue MD   ibuprofen (ADVIL;MOTRIN) 600 MG tablet Take 1 tablet by mouth 4 times daily as needed for Pain 22   Nichole Donahue MD   Misc.  Devices (STEP N REST II WALKER) MISC Use daily for balance problems 22   Nichole Donahue MD   Ascorbic Acid (VITAMIN C) 1000 MG tablet Take 1,000 mg by mouth daily    Historical Provider, MD   nicotine (NICODERM CQ) 21 MG/24HR Place 1 patch onto the skin every 24 hours   Patient not taking: No sig reported    Historical Provider, MD   benzonatate (TESSALON) 100 MG capsule Take 100 mg by mouth 3 times daily as needed for Cough    Historical Provider, MD   Pembrolizumab (Rekha Monday IV) Infuse intravenously Once every 6 weeks Last dose was 2021   Patient not taking: No sig reported    Historical Provider, MD   fluticasone (FLONASE) 50 MCG/ACT nasal spray 2 sprays by Nasal route daily 20   Nichole Donahue MD   ondansetron (ZOFRAN) 4 MG tablet Take 4 mg by mouth every 8 hours as needed for Nausea or Vomiting    Historical Provider, MD   dicyclomine (BENTYL) 10 MG capsule Take 1 capsule by mouth every 6 hours as needed (cramps) 9/17/19   Opal Beard MD   lidocaine (LMX) 4 % cream Apply topically every 8 hrs as needed for pain 9/17/19   Opal Beard MD   RA CALCIUM 600/VITAMIN D-3 600-400 MG-UNIT TABS take 1 tablet by mouth twice a day 8/5/19   Opal Beard MD   umeclidinium-vilanterol (ANORO ELLIPTA) 62.5-25 MCG/INH AEPB inhaler Anoro Ellipta 62.5 mcg-25 mcg/actuation powder for inhalation   Inhale 1 puff every day by inhalation route. Historical Provider, MD   lidocaine-prilocaine (EMLA) 2.5-2.5 % cream lidocaine-prilocaine 2.5 %-2.5 % topical cream   Apply to port site and cover 30-45 minutes prior to needle access    Historical Provider, MD   albuterol sulfate HFA (PROVENTIL HFA) 108 (90 Base) MCG/ACT inhaler Inhale 2 puffs into the lungs every 6 hours as needed for Wheezing or Shortness of Breath 3/15/18   Sonia Ayala MD   Multiple Vitamin (MULTIVITAMIN PO) Take  by mouth daily. Historical Provider, MD   aspirin 81 MG EC tablet Take 81 mg by mouth daily. Historical Provider, MD       Current medications:    Current Facility-Administered Medications   Medication Dose Route Frequency Provider Last Rate Last Admin    lidocaine PF 1 % injection 1 mL  1 mL IntraDERmal Once PRN Nettie Jolly MD        lactated ringers infusion   IntraVENous Continuous Nettie Jolly MD        sodium chloride flush 0.9 % injection 5-40 mL  5-40 mL IntraVENous 2 times per day Nettie Jolly MD        sodium chloride flush 0.9 % injection 5-40 mL  5-40 mL IntraVENous PRN Nettie Jolly MD        0.9 % sodium chloride infusion   IntraVENous PRN Nettie Jolly MD           Allergies: Allergies   Allergen Reactions    Lovastatin     Statins Other (See Comments)     pain       Problem List:    Patient Active Problem List   Diagnosis Code    Renal calculi N20.0    Diverticulosis K57.90    Prolapsed uterus N81.4    Hyperlipidemia E78.5    Depression F32. A    Tobacco abuse Z72.0    Hemorrhoids, internal K64.8  Renal cyst N28.1    Centrilobular emphysema (HCC) J43.2    Chronic low back pain M54.50, G89.29    Knee pain, right M25.561    Lumbago M54.50    Lumbar degenerative disc disease M51.36    Other affections of shoulder region, not elsewhere classified M25.819    Degeneration of cervical intervertebral disc M50.30    Spinal stenosis in cervical region M48.02    Cervicalgia M54.2    Carpal tunnel syndrome G56.00    Shoulder impingement M75.40    Fibromyalgia M79.7    Pulmonary nodule R91.1    Chronic obstructive pulmonary disease (HCC) J44.9    Smoking F17.200    History of diverticulitis Z87.19    Chronic midline low back pain with bilateral sciatica M54.41, M54.42, G89.29    DDD (degenerative disc disease), cervical M50.30    Family history of breast cancer Z80.3    H/O tubal ligation Z98.51    Elevated CEA of 6.6 and OVA-1 of 4.5 R89.9    Tetrahydrocannabinol (THC) use disorder, mild, abuse F12.10    Meningioma ([facility]) D32.9    Hyperplastic colon polyp K63.5    Osteopenia of multiple sites M85.89    Mixed simple and mucopurulent chronic bronchitis ([facility]) J41.8    Right ovarian cyst N83.201    Abnormal PET scan of colon R94.8    Malignant neoplasm of middle lobe of right lung ([facility]) C34.2    Mild protein-calorie malnutrition (HCC) E44.1    Clostridial gastroenteritis A04.8    Clostridioides difficile infection A49.8    Gastroesophageal reflux disease without esophagitis K21.9    Left chronic serous otitis media H65.22    Constipation K59.00    Vitamin D deficiency E55.9    Anxiety F41.9    Superior mesenteric artery stenosis (HCC) K55.1    Psychophysiological insomnia F51.04    Valvular heart disease I38       Past Medical History:        Diagnosis Date    Arthritis     Bronchitis, chronic (HCC)     Chronic back pain     Chronic cough     COPD (chronic obstructive pulmonary disease) ([facility])     Depression     Diverticulosis     Emphysema     Fibromyalgia     GERD (gastroesophageal reflux disease)     Hemorrhoid     internal and external, they bleed    History of cocaine abuse ([facility] [address]     none since 2010, used between 2007 and 2009     History of kidney infection     Hyperlipidemia     Hyperplastic colon polyp 11/15/2017    Hypertension     Lung cancer ([facility] [address]     non-small cell H3A lung cancer right upper lobe    Marijuana use     for pain and appetite use    Meningioma (HCC)     Neuropathy     Orbital fracture (HCC)     left side ,has  pin in side    Osteoarthritis     Osteopenia     Pulmonary nodule     left lung, watching this one    Renal calculi     Renal cyst     STD (sexually transmitted disease)     unsure of type    Uterine prolapse        Past Surgical History:        Procedure Laterality Date    BACK SURGERY  2011    thoracic laminectomy, T12, S1    CARDIAC CATHETERIZATION  10/24/14    Normal coronaries     COLONOSCOPY  04/05/2017    diverticulosis, ,poor prep    COLONOSCOPY  11/15/2017    flat polypoid lesion in the base of the cecum, about 1 cm.-hyperplastic    COLONOSCOPY  04/24/2018     diverticulosis.  Small polyp in the sigmoid colon excised with biopsy forceps    EAR SURGERY Left     cleaned out infection    ENDOSCOPY, COLON, DIAGNOSTIC      ORBITAL FRACTURE SURGERY Left     had pin inserted on temple side    CA COLON CA SCRN NOT HI RSK IND N/A 4/5/2017    COLONOSCOPY performed by Jose Cruz Mancuso MD at [address] [facility] FLX DX W/JUICE Sims 1977 PFRMD N/A 4/24/2018    COLONOSCOPY performed by Jose Cruz Mancuso MD at [address] N/A 11/15/2017    COLONOSCOPY POLYPECTOMY SNARE and saline injection performed by Jose Cruz Mancuso MD at [facility] EGD TRANSORAL BIOPSY SINGLE/MULTIPLE N/A 10/6/2017    EGD BIOPSY performed by Martin Deng MD at [address] [facility] EGD TRANSORAL BIOPSY SINGLE/MULTIPLE N/A 2/27/2018    EGD ESOPHAGOGASTRODUODENOSCOPY performed by Neno Simmons 06/15/2022 02:35 PM    HGB 14.8 06/15/2022 02:35 PM    HCT 44.4 06/15/2022 02:35 PM    HCT 44.1 06/15/2022 02:35 PM    MCV 93.2 06/15/2022 02:35 PM    RDW 14.4 06/15/2022 02:35 PM     06/15/2022 02:35 PM     06/15/2022 02:35 PM     05/08/2012 04:40 PM       CMP:   Lab Results   Component Value Date/Time     06/15/2022 02:35 PM    K 3.6 06/15/2022 02:35 PM     06/15/2022 02:35 PM    CO2 25 06/15/2022 02:35 PM    BUN 16 06/15/2022 02:35 PM    CREATININE 0.86 06/15/2022 02:35 PM    GFRAA >60 03/09/2021 12:14 PM    LABGLOM >60 03/09/2021 12:14 PM    GLUCOSE 86 06/15/2022 02:35 PM    PROT 6.6 06/15/2022 02:35 PM    CALCIUM 9.1 06/15/2022 02:35 PM    BILITOT 0.5 06/15/2022 02:35 PM    ALKPHOS 72 06/15/2022 02:35 PM    AST 17 06/15/2022 02:35 PM    ALT 10 06/15/2022 02:35 PM       POC Tests: No results for input(s): POCGLU, POCNA, POCK, POCCL, POCBUN, POCHEMO, POCHCT in the last 72 hours.     Coags:   Lab Results   Component Value Date/Time    PROTIME 10.2 07/06/2017 11:29 AM    PROTIME 10.6 09/16/2011 03:50 PM    INR 1.0 07/06/2017 11:29 AM    APTT 27.5 07/06/2017 11:29 AM       HCG (If Applicable): No results found for: PREGTESTUR, PREGSERUM, HCG, HCGQUANT     ABGs: No results found for: PHART, PO2ART, AYI4KME, MHB2LAJ, BEART, O9KZQBJN     Type & Screen (If Applicable):  No results found for: LABABO, LABRH    Drug/Infectious Status (If Applicable):  Lab Results   Component Value Date/Time    HEPCAB NONREACTIVE 03/06/2017 12:07 PM       COVID-19 Screening (If Applicable):   Lab Results   Component Value Date/Time    COVID19 Not Detected 03/20/2021 01:51 AM           Anesthesia Evaluation  Patient summary reviewed and Nursing notes reviewed no history of anesthetic complications:   Airway: Mallampati: II  TM distance: >3 FB   Neck ROM: full  Mouth opening: > = 3 FB   Dental: normal exam         Pulmonary:normal exam  breath sounds clear to auscultation  (+) COPD:  current smoker                          ROS comment: non-small cell  lung cancer right upper lobe - in Remission   Cardiovascular:    (+) hypertension:,         Rhythm: regular  Rate: normal                    Neuro/Psych:   (+) neuromuscular disease:, psychiatric history:depression/anxiety              ROS comment: Fibromyalgia  DDD - lumbar and Cervical  Meningioma GI/Hepatic/Renal:   (+) GERD:, renal disease: kidney stones,           Endo/Other:    (+) malignancy/cancer (lung cancer). Abdominal:             Vascular: negative vascular ROS. Other Findings:           Anesthesia Plan      general     ASA 3       Induction: intravenous. MIPS: Prophylactic antiemetics administered. Anesthetic plan and risks discussed with patient. Plan discussed with CRNA.                     Manohar Dc MD   8/2/2022

## 2022-08-03 ENCOUNTER — TELEPHONE (OUTPATIENT)
Dept: GASTROENTEROLOGY | Age: 62
End: 2022-08-03

## 2022-08-04 LAB — SURGICAL PATHOLOGY REPORT: NORMAL

## 2022-08-08 ENCOUNTER — HOSPITAL ENCOUNTER (OUTPATIENT)
Age: 62
Setting detail: SPECIMEN
Discharge: HOME | End: 2022-08-08

## 2022-08-08 ENCOUNTER — OFFICE VISIT (OUTPATIENT)
Dept: OBGYN CLINIC | Age: 62
End: 2022-08-08
Payer: MEDICARE

## 2022-08-08 VITALS
BODY MASS INDEX: 16.55 KG/M2 | DIASTOLIC BLOOD PRESSURE: 70 MMHG | WEIGHT: 103 LBS | HEIGHT: 66 IN | SYSTOLIC BLOOD PRESSURE: 102 MMHG

## 2022-08-08 DIAGNOSIS — Z11.51 SPECIAL SCREENING EXAMINATION FOR HUMAN PAPILLOMAVIRUS (HPV): ICD-10-CM

## 2022-08-08 DIAGNOSIS — R10.2 PELVIC PAIN: ICD-10-CM

## 2022-08-08 DIAGNOSIS — Z01.419 WELL FEMALE EXAM WITH ROUTINE GYNECOLOGICAL EXAM: Primary | ICD-10-CM

## 2022-08-08 DIAGNOSIS — Z12.31 ENCOUNTER FOR SCREENING MAMMOGRAM FOR MALIGNANT NEOPLASM OF BREAST: ICD-10-CM

## 2022-08-08 PROCEDURE — G0101 CA SCREEN;PELVIC/BREAST EXAM: HCPCS | Performed by: NURSE PRACTITIONER

## 2022-08-09 LAB
HPV SAMPLE: NORMAL
HPV, GENOTYPE 16: NOT DETECTED
HPV, GENOTYPE 18: NOT DETECTED
HPV, HIGH RISK OTHER: NOT DETECTED
HPV, INTERPRETATION: NORMAL
SPECIMEN DESCRIPTION: NORMAL

## 2022-08-16 ENCOUNTER — OFFICE VISIT (OUTPATIENT)
Dept: FAMILY MEDICINE CLINIC | Age: 62
End: 2022-08-16
Payer: MEDICARE

## 2022-08-16 VITALS
HEART RATE: 81 BPM | OXYGEN SATURATION: 98 % | DIASTOLIC BLOOD PRESSURE: 78 MMHG | WEIGHT: 102.6 LBS | BODY MASS INDEX: 16.49 KG/M2 | HEIGHT: 66 IN | TEMPERATURE: 98.2 F | SYSTOLIC BLOOD PRESSURE: 122 MMHG

## 2022-08-16 DIAGNOSIS — Z87.891 PERSONAL HISTORY OF TOBACCO USE, PRESENTING HAZARDS TO HEALTH: ICD-10-CM

## 2022-08-16 DIAGNOSIS — Z00.00 MEDICARE ANNUAL WELLNESS VISIT, SUBSEQUENT: Primary | ICD-10-CM

## 2022-08-16 DIAGNOSIS — Z71.89 ACP (ADVANCE CARE PLANNING): ICD-10-CM

## 2022-08-16 PROCEDURE — G0439 PPPS, SUBSEQ VISIT: HCPCS | Performed by: FAMILY MEDICINE

## 2022-08-16 PROCEDURE — 99407 BEHAV CHNG SMOKING > 10 MIN: CPT | Performed by: FAMILY MEDICINE

## 2022-08-16 PROCEDURE — 3017F COLORECTAL CA SCREEN DOC REV: CPT | Performed by: FAMILY MEDICINE

## 2022-08-16 ASSESSMENT — PATIENT HEALTH QUESTIONNAIRE - PHQ9
6. FEELING BAD ABOUT YOURSELF - OR THAT YOU ARE A FAILURE OR HAVE LET YOURSELF OR YOUR FAMILY DOWN: 0
7. TROUBLE CONCENTRATING ON THINGS, SUCH AS READING THE NEWSPAPER OR WATCHING TELEVISION: 0
10. IF YOU CHECKED OFF ANY PROBLEMS, HOW DIFFICULT HAVE THESE PROBLEMS MADE IT FOR YOU TO DO YOUR WORK, TAKE CARE OF THINGS AT HOME, OR GET ALONG WITH OTHER PEOPLE: 0
SUM OF ALL RESPONSES TO PHQ QUESTIONS 1-9: 5
2. FEELING DOWN, DEPRESSED OR HOPELESS: 0
8. MOVING OR SPEAKING SO SLOWLY THAT OTHER PEOPLE COULD HAVE NOTICED. OR THE OPPOSITE, BEING SO FIGETY OR RESTLESS THAT YOU HAVE BEEN MOVING AROUND A LOT MORE THAN USUAL: 0
3. TROUBLE FALLING OR STAYING ASLEEP: 1
SUM OF ALL RESPONSES TO PHQ QUESTIONS 1-9: 5
5. POOR APPETITE OR OVEREATING: 1
9. THOUGHTS THAT YOU WOULD BE BETTER OFF DEAD, OR OF HURTING YOURSELF: 0
1. LITTLE INTEREST OR PLEASURE IN DOING THINGS: 0
SUM OF ALL RESPONSES TO PHQ9 QUESTIONS 1 & 2: 0
4. FEELING TIRED OR HAVING LITTLE ENERGY: 3

## 2022-08-16 ASSESSMENT — VISUAL ACUITY
OD_CC: 20/25
OS_CC: 20/20

## 2022-08-16 ASSESSMENT — LIFESTYLE VARIABLES
HOW OFTEN DO YOU HAVE A DRINK CONTAINING ALCOHOL: NEVER
HOW MANY STANDARD DRINKS CONTAINING ALCOHOL DO YOU HAVE ON A TYPICAL DAY: PATIENT DOES NOT DRINK

## 2022-08-16 NOTE — PROGRESS NOTES
Visit Information    Have you changed or started any medications since your last visit including any over-the-counter medicines, vitamins, or herbal medicines? no   Have you stopped taking any of your medications? Is so, why? -  no  Are you having any side effects from any of your medications? - no    Have you seen any other physician or provider since your last visit? yes -    Have you had any other diagnostic tests since your last visit? yes -    Have you been seen in the emergency room and/or had an admission in a hospital since we last saw you?  no   Have you had your routine dental cleaning in the past 6 months?  no     Do you have an active MyChart account? If no, what is the barrier?   Yes    Patient Care Team:  Anila Matson MD as PCP - General (Family Medicine)  Anila Matson MD as PCP - [facility]  Cheryl James RN as   Leobardo Linares MD as Consulting Physician (Pulmonology)  Suyapa Rolon MD as Consulting Physician (Hematology and Oncology)  Arcadio Herrera MD as Consulting Physician (Gastroenterology)    Medical History Review  Past Medical, Family, and Social History reviewed and does contribute to the patient presenting condition    Health Maintenance   Topic Date Due    Pneumococcal 0-64 years Vaccine (2 - PCV) 03/06/2018    Annual Wellness Visit (AWV)  08/17/2022    Flu vaccine (1) 09/01/2022    COVID-19 Vaccine (3 - Booster for Roman Dolin series) 09/23/2022    Breast cancer screen  04/22/2023    Depression Monitoring  07/20/2023    Lipids  05/06/2027    Colorectal Cancer Screen  08/02/2027    Cervical cancer screen  08/08/2027    DTaP/Tdap/Td vaccine (2 - Td or Tdap) 06/17/2029    Shingles vaccine  Completed    Hepatitis C screen  Completed    HIV screen  Completed    Hepatitis A vaccine  Aged Out    Hepatitis B vaccine  Aged Out    Hib vaccine  Aged Out    Meningococcal (ACWY) vaccine  Aged Out

## 2022-08-16 NOTE — PATIENT INSTRUCTIONS
Personalized Preventive Plan for Melissa Stokes - 8/16/2022  Medicare offers a range of preventive health benefits. Some of the tests and screenings are paid in full while other may be subject to a deductible, co-insurance, and/or copay. Some of these benefits include a comprehensive review of your medical history including lifestyle, illnesses that may run in your family, and various assessments and screenings as appropriate. After reviewing your medical record and screening and assessments performed today your provider may have ordered immunizations, labs, imaging, and/or referrals for you. A list of these orders (if applicable) as well as your Preventive Care list are included within your After Visit Summary for your review. Other Preventive Recommendations:    A preventive eye exam performed by an eye specialist is recommended every 1-2 years to screen for glaucoma; cataracts, macular degeneration, and other eye disorders. A preventive dental visit is recommended every 6 months. Try to get at least 150 minutes of exercise per week or 10,000 steps per day on a pedometer . Order or download the FREE \"Exercise & Physical Activity: Your Everyday Guide\" from [employer]. Call 9-186.188.2968 or search [employer] online. You need 3030-3287 mg of calcium and 3134-2315 IU of vitamin D per day. It is possible to meet your calcium requirement with diet alone, but a vitamin D supplement is usually necessary to meet this goal.  When exposed to the sun, use a sunscreen that protects against both UVA and UVB radiation with an SPF of 30 or greater. Reapply every 2 to 3 hours or after sweating, drying off with a towel, or swimming. Always wear a seat belt when traveling in a car. Always wear a helmet when riding a bicycle or motorcycle.

## 2022-08-17 LAB — CYTOLOGY REPORT: NORMAL

## 2022-08-23 ENCOUNTER — OFFICE VISIT (OUTPATIENT)
Dept: OBGYN CLINIC | Age: 62
End: 2022-08-23
Payer: COMMERCIAL

## 2022-08-23 DIAGNOSIS — R10.2 PELVIC PAIN: ICD-10-CM

## 2022-08-23 PROCEDURE — 76830 TRANSVAGINAL US NON-OB: CPT | Performed by: OBSTETRICS & GYNECOLOGY

## 2022-08-23 PROCEDURE — 76856 US EXAM PELVIC COMPLETE: CPT | Performed by: OBSTETRICS & GYNECOLOGY

## 2022-08-24 ENCOUNTER — TELEPHONE (OUTPATIENT)
Dept: OBGYN CLINIC | Age: 62
End: 2022-08-24

## 2022-08-24 NOTE — TELEPHONE ENCOUNTER
----- Message from EDDA Amos CNP sent at 8/23/2022  5:16 PM EDT -----  Normal pelvic ultrasound  Please let patient know

## 2022-08-24 NOTE — TELEPHONE ENCOUNTER
----- Message from EDDA Berman CNP sent at 8/23/2022  5:16 PM EDT -----  Normal pelvic ultrasound  Please let patient know

## 2022-09-01 ENCOUNTER — OFFICE VISIT (OUTPATIENT)
Dept: GASTROENTEROLOGY | Age: 62
End: 2022-09-01
Payer: COMMERCIAL

## 2022-09-01 VITALS
HEART RATE: 57 BPM | BODY MASS INDEX: 16.79 KG/M2 | WEIGHT: 104 LBS | DIASTOLIC BLOOD PRESSURE: 83 MMHG | SYSTOLIC BLOOD PRESSURE: 148 MMHG

## 2022-09-01 DIAGNOSIS — K64.9 HEMORRHOIDS, UNSPECIFIED HEMORRHOID TYPE: ICD-10-CM

## 2022-09-01 DIAGNOSIS — K59.00 CONSTIPATION, UNSPECIFIED CONSTIPATION TYPE: ICD-10-CM

## 2022-09-01 DIAGNOSIS — K62.5 RECTAL BLEEDING: Primary | ICD-10-CM

## 2022-09-01 DIAGNOSIS — K57.90 DIVERTICULOSIS: ICD-10-CM

## 2022-09-01 PROCEDURE — G8419 CALC BMI OUT NRM PARAM NOF/U: HCPCS | Performed by: NURSE PRACTITIONER

## 2022-09-01 PROCEDURE — 99214 OFFICE O/P EST MOD 30 MIN: CPT | Performed by: NURSE PRACTITIONER

## 2022-09-01 PROCEDURE — 3017F COLORECTAL CA SCREEN DOC REV: CPT | Performed by: NURSE PRACTITIONER

## 2022-09-01 PROCEDURE — 1036F TOBACCO NON-USER: CPT | Performed by: NURSE PRACTITIONER

## 2022-09-01 PROCEDURE — G8427 DOCREV CUR MEDS BY ELIG CLIN: HCPCS | Performed by: NURSE PRACTITIONER

## 2022-09-01 ASSESSMENT — ENCOUNTER SYMPTOMS
ALLERGIC/IMMUNOLOGIC NEGATIVE: 1
EYES NEGATIVE: 1
ANAL BLEEDING: 1
RESPIRATORY NEGATIVE: 1
ABDOMINAL DISTENTION: 1
ABDOMINAL PAIN: 1
CONSTIPATION: 1
RECTAL PAIN: 1

## 2022-09-01 NOTE — PROGRESS NOTES
GI CLINIC FOLLOW UP    INTERVAL HISTORY:   No referring provider defined for this encounter. Chief Complaint   Patient presents with    Other     Pt here for colon f/u        HISTORY OF PRESENT ILLNESS:     Patient being seen for follow-up colonoscopy. Patient had colonoscopy to evaluate rectal bleeding, tenesmus, severe constipation, history of elevated CEA. Colonoscopy prep was fair. Patient has significant diverticulosis in the sigmoid colon. No stricture noted. No inflammation. Between diverticuli there was some mild erythema, edema of the fold. Biopsies from this area revealed benign lymphoid polyp. Has hemorrhoids. The present patient reports constipation improved with daily Metamucil. Reports stools are much softer. Denies any melena. Occasionally has small amount of bright red blood per rectum, typically on tissue. Known hemorrhoids. Has been doing daily sitz bath's. Patient states she is tolerating diet. Has increased fiber intake, increase fluids. Denies any dysphagia, odynophagia, dyspeptic symptoms. Patient remains underweight however her weight has been stable in the last several months      Past Medical,Family, and Social History reviewed and does contribute to the patient presentingcondition. Patient's PMH/PSH,SH,PSYCH Hx, MEDs, ALLERGIES, and ROS were all reviewed and updated in the appropriate sections.     PAST MEDICAL HISTORY:  Past Medical History:   Diagnosis Date    Arthritis     Bronchitis, chronic (HCC)     Chronic back pain     Chronic cough     COPD (chronic obstructive pulmonary disease) (HCC)     Depression     Diverticulosis     Emphysema     Fibromyalgia     GERD (gastroesophageal reflux disease)     Hemorrhoid     internal and external, they bleed    History of cocaine abuse ([facility] [address]     none since 2010, used between 2007 and 2009     History of kidney infection     Hyperlipidemia     Hyperplastic colon polyp 11/15/2017    Hypertension     Lung cancer ([facility] [address]     non-small cell H3A lung cancer right upper lobe    Marijuana use     for pain and appetite use    Meningioma (HCC)     Neuropathy     Orbital fracture (HCC)     left side ,has  pin in side    Osteoarthritis     Osteopenia     Pulmonary nodule     left lung, watching this one    Renal calculi     Renal cyst     STD (sexually transmitted disease)     unsure of type    Uterine prolapse        Past Surgical History:   Procedure Laterality Date    BACK SURGERY      thoracic laminectomy, T12, S1    CARDIAC CATHETERIZATION  10/24/14    Normal coronaries     COLONOSCOPY  2017    diverticulosis, ,poor prep    COLONOSCOPY  11/15/2017    flat polypoid lesion in the base of the cecum, about 1 cm.-hyperplastic    COLONOSCOPY  2018     diverticulosis.  Small polyp in the sigmoid colon excised with biopsy forceps    COLONOSCOPY N/A 2022    COLONOSCOPY WITH BIOPSY performed by Claus Shahid MD at [place] [address] Left     cleaned out infection    ENDOSCOPY, COLON, DIAGNOSTIC      ORBITAL FRACTURE SURGERY Left     had pin inserted on temple side    FL COLON CA SCRN NOT HI RSK IND N/A 2017    COLONOSCOPY performed by Claus Shahid MD at [facility] FLX DX W/COLLJ [address] PFRMD N/A 2018    COLONOSCOPY performed by Claus Shahid MD at [facility] N/A 11/15/2017    COLONOSCOPY POLYPECTOMY SNARE and saline injection performed by Claus Shahid MD at [facility] EGD TRANSORAL BIOPSY SINGLE/MULTIPLE N/A 10/6/2017    EGD BIOPSY performed by Jovany Weiss MD at [facility] EGD TRANSORAL BIOPSY SINGLE/MULTIPLE N/A 2018    EGD ESOPHAGOGASTRODUODENOSCOPY performed by Claus Shahid MD at [facility] ESOPHAGOGASTRODUODENOSCOPY TRANSORAL DIAGNOSTIC N/A 2018    EGD ESOPHAGOGASTRODUODENOSCOPY performed by Claus Shahid MD at [address] [facility] TUNNELED VENOUS PORT PLACEMENT Left     UPPER GASTROINTESTINAL ENDOSCOPY  10/06/2017    Dr Libia Rowan diverticulum gastric fundus, pathology inactive gastritis    UPPER GASTROINTESTINAL ENDOSCOPY  02/27/2018    retained food in the fundus of the stomach, poor peristalsis wide-open pylorus    UPPER GASTROINTESTINAL ENDOSCOPY  04/24/2018    no lesions seen that can account her CEA levels. UPPER GASTROINTESTINAL ENDOSCOPY N/A 3/10/2021    EGD BIOPSY AND PHOTOS performed by Rubia Nassar MD at [address]:    Current Outpatient Medications:     folic acid (FOLVITE) 1 MG tablet, Take 1 mg by mouth in the morning., Disp: , Rfl:     amLODIPine (NORVASC) 2.5 MG tablet, amlodipine 2.5 mg tablet  take 1 tablet by mouth once daily  ( STOP CLONIDINE ), Disp: , Rfl:     citalopram (CELEXA) 20 MG tablet, Take 1 tablet by mouth in the morning., Disp: 30 tablet, Rfl: 1    traZODone (DESYREL) 50 MG tablet, Take 1 tablet by mouth nightly, Disp: 90 tablet, Rfl: 1    omeprazole (PRILOSEC) 20 MG delayed release capsule, take 1 capsule by mouth twice a day, Disp: 180 capsule, Rfl: 3    ibuprofen (ADVIL;MOTRIN) 600 MG tablet, Take 1 tablet by mouth 4 times daily as needed for Pain, Disp: 60 tablet, Rfl: 0    Misc.  Devices (STEP N REST II WALKER) MISC, Use daily for balance problems, Disp: 1 each, Rfl: 0    Ascorbic Acid (VITAMIN C) 1000 MG tablet, Take 1,000 mg by mouth daily, Disp: , Rfl:     benzonatate (TESSALON) 100 MG capsule, Take 100 mg by mouth 3 times daily as needed for Cough, Disp: , Rfl:     fluticasone (FLONASE) 50 MCG/ACT nasal spray, 2 sprays by Nasal route daily, Disp: 1 Bottle, Rfl: 0    ondansetron (ZOFRAN) 4 MG tablet, Take 4 mg by mouth every 8 hours as needed for Nausea or Vomiting, Disp: , Rfl:     dicyclomine (BENTYL) 10 MG capsule, Take 1 capsule by mouth every 6 hours as needed (cramps), Disp: 20 capsule, Rfl: 0    lidocaine (LMX) 4 % cream, Apply topically every 8 hrs as needed for pain, Disp: 45 g, Rfl: 3    RA CALCIUM 600/VITAMIN D-3 600-400 MG-UNIT TABS, take 1 tablet by mouth twice a day, Disp: 90 tablet, Rfl: 3    umeclidinium-vilanterol (ANORO ELLIPTA) 62.5-25 MCG/INH AEPB inhaler, Anoro Ellipta 62.5 mcg-25 mcg/actuation powder for inhalation  Inhale 1 puff every day by inhalation route., Disp: , Rfl:     lidocaine-prilocaine (EMLA) 2.5-2.5 % cream, lidocaine-prilocaine 2.5 %-2.5 % topical cream  Apply to port site and cover 30-45 minutes prior to needle access, Disp: , Rfl:     albuterol sulfate HFA (PROVENTIL HFA) 108 (90 Base) MCG/ACT inhaler, Inhale 2 puffs into the lungs every 6 hours as needed for Wheezing or Shortness of Breath, Disp: 1 Inhaler, Rfl: 11    Multiple Vitamin (MULTIVITAMIN PO), Take  by mouth daily. , Disp: , Rfl:     aspirin 81 MG EC tablet, Take 81 mg by mouth daily. , Disp: , Rfl:     ALLERGIES:   Allergies   Allergen Reactions    Lovastatin     Statins Other (See Comments)     pain       FAMILY HISTORY:       Problem Relation Age of Onset    Heart Disease Mother     Cancer Mother         breast and lung cancer    Diabetes Father     Heart Disease Father         Death due to MI    Cancer Paternal Grandfather         stomach cancer     Heart Disease Paternal Grandfather     Cancer Sister         ovarian cancer     Cancer Maternal Grandmother         female cancer         SOCIAL HISTORY:   Social History     Socioeconomic History    Marital status:       Spouse name: Not on file    Number of children: Not on file    Years of education: Not on file    Highest education level: Not on file   Occupational History    Not on file   Tobacco Use    Smoking status: Former     Packs/day: 0.50     Years: 38.00     Pack years: 19.00     Types: Cigarettes     Quit date: 2022     Years since quittin.0    Smokeless tobacco: Never    Tobacco comments:     smokes 1 or 2 a day   Vaping Use    Vaping Use: Every day    Substances: Nicotine, Flavoring Substance and Sexual Activity    Alcohol use: Not Currently     Alcohol/week: 0.0 standard drinks    Drug use: Yes     Types: Marijuana Neelam Vidal)     Comment: 5 joints a day marijuana for pain and appetite, she states that she stopped cocaine 2011    Sexual activity: Not Currently     Partners: Male     Birth control/protection: Post-menopausal   Other Topics Concern    Not on file   Social History Narrative    Not on file     Social Determinants of Health     Financial Resource Strain: Not on file   Food Insecurity: Not on file   Transportation Needs: Not on file   Physical Activity: Inactive    Days of Exercise per Week: 0 days    Minutes of Exercise per Session: 0 min   Stress: Not on file   Social Connections: Not on file   Intimate Partner Violence: Not on file   Housing Stability: Not on file       REVIEW OF SYSTEMS: A 12-point review of systemswas obtained and pertinent positives and negatives were enumerated above in the history of present illness. All other reviewed systems / symptoms were negative. Review of Systems   Constitutional:  Positive for appetite change and unexpected weight change. HENT: Negative. Eyes: Negative. Respiratory: Negative. Cardiovascular: Negative. Gastrointestinal:  Positive for abdominal distention, abdominal pain, anal bleeding, constipation and rectal pain. Endocrine: Negative. Genitourinary: Negative. Musculoskeletal: Negative. Skin: Negative. Allergic/Immunologic: Negative. Neurological: Negative. Hematological: Negative. Psychiatric/Behavioral: Negative. PHYSICAL EXAMINATION: Vital signs reviewed per the nursing documentation. There were no vitals taken for this visit. There is no height or weight on file to calculate BMI. Physical Exam  Constitutional:       Appearance: Normal appearance. She is underweight. Eyes:      General: No scleral icterus. Pupils: Pupils are equal, round, and reactive to light. Cardiovascular:      Rate and Rhythm: Normal rate and regular rhythm. Heart sounds: Normal heart sounds. Pulmonary:      Effort: Pulmonary effort is normal.      Breath sounds: Normal breath sounds. Abdominal:      General: Bowel sounds are normal. There is no distension. Palpations: Abdomen is soft. There is no mass. Tenderness: There is no abdominal tenderness. There is no guarding. Skin:     General: Skin is warm and dry. Coloration: Skin is not jaundiced. Neurological:      Mental Status: She is alert and oriented to person, place, and time. Mental status is at baseline. LABORATORY DATA: Reviewed  Lab Results   Component Value Date    WBC 7.1 06/15/2022    WBC 7.14 06/15/2022    HGB 15.0 06/15/2022    HGB 14.8 06/15/2022    HCT 44.4 06/15/2022    HCT 44.1 06/15/2022    MCV 93.2 06/15/2022     06/15/2022     06/15/2022     06/15/2022    K 3.6 06/15/2022     06/15/2022    CO2 25 06/15/2022    BUN 16 06/15/2022    CREATININE 0.86 06/15/2022    LABPROT 6.7 04/04/2012    LABALBU 4.1 06/15/2022    BILITOT 0.5 06/15/2022    ALKPHOS 72 06/15/2022    AST 17 06/15/2022    ALT 10 06/15/2022    INR 1.0 07/06/2017         Lab Results   Component Value Date    RBC 4.73 06/15/2022    HGB 15.0 06/15/2022    HGB 14.8 06/15/2022    MCV 93.2 06/15/2022    MCH 31.3 06/15/2022    MCHC 33.6 06/15/2022    RDW 14.4 06/15/2022    MPV 7.3 03/09/2021    BASOPCT 0.4 06/15/2022    LYMPHSABS 1.6 06/15/2022    MONOSABS 0.5 06/15/2022    NEUTROABS 4.8 06/15/2022    NEUTROABS 4.8 06/15/2022    EOSABS 0.2 06/15/2022    BASOSABS 0.0 06/15/2022         DIAGNOSTIC TESTING:     US NON OB TRANSVAGINAL    Result Date: 8/23/2022  GYNECOLOGY ULTRASOUND REPORT [facility]; [address] [phone] [place] [phone] Fax 8/23/2022 MRN: [id number] Contact Serial #: [id number] Shell Orellana YOB: 1960 Age: 65 y.o.  The ultrasound images were reviewed. Please see the attached ultrasound report. Ultrasonographer: Alivia Vázquez [facility] Assessment: Abigail Godfrey is a 65 y.o. female Pelvic pain Specific Ultrasound Imaging Obtained: Transabdominal Approach: Yes Transvaginal Approach: Yes Limitations of Study Encountered requiring Trans Vaginal imaging: Overlying bowel & gas limiting the study: No Poor prep for procedure limiting study: No Elevated BMI limiting study: No Ovaries are NOT seen on Transabdominal imaging, required to better visualize structures, or a retroverted uterus is present. Yes Findings: 1. The Uterus is homogeneous, atrophied and anteverted (4.75 x 3.83 x 1.61 cm) 2. The Endometrial Stripe measurement is 0.22 cm 3. The Left Ovary is without masses or cysts 4. The Right Ovary is without masses or cysts 5. There is not an abnormal amount of cul-de-sac fluid Electronically signed by Laurie Aguillon DO on 8/23/22 at 4:59 PM EDT     1. The Uterus is homogeneous, atrophied and anteverted (4.75 x 3.83 x 1.61 cm) 2. The Endometrial Stripe measurement is 0.22 cm 3. The Left Ovary is without masses or cysts 4. The Right Ovary is without masses or cysts 5. There is not an abnormal amount of cul-de-sac fluid    US PELVIS COMPLETE    Result Date: 8/23/2022  See transvaginal ultrasound report from same day         IMPRESSION: Ms. Nelida Hamm is a 65 y.o. female with    Diagnosis Orders   1. Rectal bleeding        2. Hemorrhoids, unspecified hemorrhoid type        3. Constipation, unspecified constipation type        4. Diverticulosis          Colonoscopy reviewed with patient in detail. Patient has diverticulosis. Advised to follow high fiber diet and to take precautions to avoid constipation and straining. To continue Metamucil daily. MiraLAX as needed  Increase fluids    Has hemorrhoids. Avoid constipation and straining. Continue sitz bath's. Avoid prolonged toileting. Use baby wipes. May use OTC Tucks. Follow-up 6 months, sooner if needed. Thank you for allowing me to participate in the care of Ms. Claudene Nine. For any further questions please do not hesitate to contact me. I have reviewed and agree with the ROS entered by the MA/DERRICK.          SELMA Paula    [facility]  Office #: [phone]

## 2022-09-19 ENCOUNTER — HOSPITAL ENCOUNTER (OUTPATIENT)
Dept: WOMENS IMAGING | Age: 62
Discharge: HOME | End: 2022-09-21
Payer: MEDICARE

## 2022-09-19 DIAGNOSIS — Z12.31 ENCOUNTER FOR SCREENING MAMMOGRAM FOR MALIGNANT NEOPLASM OF BREAST: ICD-10-CM

## 2022-09-19 PROCEDURE — 77063 BREAST TOMOSYNTHESIS BI: CPT

## 2022-10-13 DIAGNOSIS — F32.1 CURRENT MODERATE EPISODE OF MAJOR DEPRESSIVE DISORDER WITHOUT PRIOR EPISODE (HCC): ICD-10-CM

## 2022-10-13 RX ORDER — CITALOPRAM 20 MG/1
TABLET ORAL
Qty: 30 TABLET | Refills: 1 | Status: SHIPPED | OUTPATIENT
Start: 2022-10-13

## 2022-10-17 ENCOUNTER — TELEPHONE (OUTPATIENT)
Dept: FAMILY MEDICINE CLINIC | Age: 62
End: 2022-10-17

## 2022-10-17 DIAGNOSIS — K62.5 RECTAL BLEEDING: Primary | ICD-10-CM

## 2022-10-17 NOTE — TELEPHONE ENCOUNTER
Patient CALLED is complaining of onset symptoms of excessive bleeding out her rectal, stated that it is so much blood , every time she makes a bowel movement. She described the blood as \"period blood\" where she is bleeding through her undergarments. Been ongoing for the past few days. I did Triage phone call and gathered enough information. Due to the serious onset symptoms/conditions. Patient was advised to seek medical attention with nearest Emergency room department. To be seen and evaluated. Also once discharged. Patient was advised to give our office a call back to schedule a follow up appointment with provider. Also to schedule appt with her GI provider. Patient verbalized : No: she will await to be evaluated when the symptoms of her bleeding occurs.  Just an uguay

## 2022-10-18 ENCOUNTER — HOSPITAL ENCOUNTER (OUTPATIENT)
Age: 62
Setting detail: SPECIMEN
Discharge: HOME | End: 2022-10-18
Payer: MEDICARE

## 2022-10-18 ENCOUNTER — TELEPHONE (OUTPATIENT)
Dept: GASTROENTEROLOGY | Age: 62
End: 2022-10-18

## 2022-10-18 DIAGNOSIS — K64.9 HEMORRHOIDS, UNSPECIFIED HEMORRHOID TYPE: Primary | ICD-10-CM

## 2022-10-18 DIAGNOSIS — K62.5 RECTAL BLEEDING: ICD-10-CM

## 2022-10-18 LAB
ABSOLUTE EOS #: 0.2 K/UL (ref 0–0.44)
ABSOLUTE IMMATURE GRANULOCYTE: <0.03 K/UL (ref 0–0.3)
ABSOLUTE LYMPH #: 1.73 K/UL (ref 1.1–3.7)
ABSOLUTE MONO #: 0.45 K/UL (ref 0.1–1.2)
BASOPHILS # BLD: 1 % (ref 0–2)
BASOPHILS ABSOLUTE: 0.04 K/UL (ref 0–0.2)
EOSINOPHILS RELATIVE PERCENT: 4 % (ref 1–4)
HCT VFR BLD CALC: 48.9 % (ref 36.3–47.1)
HEMOGLOBIN: 15.4 G/DL (ref 11.9–15.1)
IMMATURE GRANULOCYTES: 0 %
LYMPHOCYTES # BLD: 30 % (ref 24–43)
MCH RBC QN AUTO: 31 PG (ref 25.2–33.5)
MCHC RBC AUTO-ENTMCNC: 31.5 G/DL (ref 28.4–34.8)
MCV RBC AUTO: 98.6 FL (ref 82.6–102.9)
MONOCYTES # BLD: 8 % (ref 3–12)
NRBC AUTOMATED: 0 PER 100 WBC
PDW BLD-RTO: 13.9 % (ref 11.8–14.4)
PLATELET # BLD: 325 K/UL (ref 138–453)
PMV BLD AUTO: 10.3 FL (ref 8.1–13.5)
RBC # BLD: 4.96 M/UL (ref 3.95–5.11)
SEG NEUTROPHILS: 57 % (ref 36–65)
SEGMENTED NEUTROPHILS ABSOLUTE COUNT: 3.27 K/UL (ref 1.5–8.1)
WBC # BLD: 5.7 K/UL (ref 3.5–11.3)

## 2022-10-18 PROCEDURE — 36415 COLL VENOUS BLD VENIPUNCTURE: CPT

## 2022-10-18 PROCEDURE — 85025 COMPLETE CBC W/AUTO DIFF WBC: CPT

## 2022-10-18 NOTE — TELEPHONE ENCOUNTER
Pt states her hemorrhoids are really bothering her and bleeding everyday. Pt would like to get them removed.

## 2022-10-18 NOTE — TELEPHONE ENCOUNTER
Please notify patient to stop aspirin and ibuprofen. Patient has been seen by OFELIA Quintana , she can call them to schedule appointment. I will order CBC to check her hemoglobin levels.

## 2022-11-16 ENCOUNTER — OFFICE VISIT (OUTPATIENT)
Dept: FAMILY MEDICINE CLINIC | Age: 62
End: 2022-11-16
Payer: MEDICARE

## 2022-11-16 VITALS
WEIGHT: 100.4 LBS | HEIGHT: 66 IN | SYSTOLIC BLOOD PRESSURE: 98 MMHG | TEMPERATURE: 97.3 F | DIASTOLIC BLOOD PRESSURE: 84 MMHG | BODY MASS INDEX: 16.14 KG/M2 | HEART RATE: 77 BPM | OXYGEN SATURATION: 98 %

## 2022-11-16 DIAGNOSIS — C34.91 PRIMARY LUNG ADENOCARCINOMA, RIGHT (HCC): ICD-10-CM

## 2022-11-16 DIAGNOSIS — M48.02 SPINAL STENOSIS IN CERVICAL REGION: ICD-10-CM

## 2022-11-16 DIAGNOSIS — I10 PRIMARY HYPERTENSION: ICD-10-CM

## 2022-11-16 DIAGNOSIS — E55.9 VITAMIN D DEFICIENCY: ICD-10-CM

## 2022-11-16 DIAGNOSIS — M81.0 AGE-RELATED OSTEOPOROSIS WITHOUT CURRENT PATHOLOGICAL FRACTURE: ICD-10-CM

## 2022-11-16 DIAGNOSIS — J43.1 PANLOBULAR EMPHYSEMA (HCC): Primary | ICD-10-CM

## 2022-11-16 DIAGNOSIS — Z23 NEED FOR INFLUENZA VACCINATION: ICD-10-CM

## 2022-11-16 DIAGNOSIS — S22.080A WEDGE COMPRESSION FRACTURE OF T11-T12 VERTEBRA, INITIAL ENCOUNTER FOR CLOSED FRACTURE (HCC): ICD-10-CM

## 2022-11-16 DIAGNOSIS — K64.1 GRADE II HEMORRHOIDS: ICD-10-CM

## 2022-11-16 DIAGNOSIS — F32.4 MAJOR DEPRESSIVE DISORDER WITH SINGLE EPISODE, IN PARTIAL REMISSION (HCC): ICD-10-CM

## 2022-11-16 PROBLEM — J45.909 ASTHMA: Status: ACTIVE | Noted: 2019-02-20

## 2022-11-16 PROBLEM — M48.54XD: Status: ACTIVE | Noted: 2020-07-24

## 2022-11-16 PROBLEM — R30.9 PAINFUL MICTURITION, UNSPECIFIED: Status: ACTIVE | Noted: 2020-04-16

## 2022-11-16 PROBLEM — R97.0 ELEVATED CARCINOEMBRYONIC ANTIGEN (CEA): Status: ACTIVE | Noted: 2021-02-25

## 2022-11-16 PROBLEM — D32.0 BENIGN NEOPLASM OF CEREBRAL MENINGES (HCC): Status: ACTIVE | Noted: 2020-08-28

## 2022-11-16 PROBLEM — R42 DIZZINESS AND GIDDINESS: Status: RESOLVED | Noted: 2021-08-29 | Resolved: 2022-11-16

## 2022-11-16 PROBLEM — L28.2 OTHER PRURIGO: Status: ACTIVE | Noted: 2021-09-27

## 2022-11-16 PROBLEM — K76.89 OTHER SPECIFIED DISEASES OF LIVER: Status: ACTIVE | Noted: 2020-04-29

## 2022-11-16 PROBLEM — N28.9 DISORDER OF KIDNEY AND URETER, UNSPECIFIED: Status: ACTIVE | Noted: 2020-04-29

## 2022-11-16 PROBLEM — E04.1 NONTOXIC SINGLE THYROID NODULE: Status: ACTIVE | Noted: 2020-07-24

## 2022-11-16 PROBLEM — R91.8 OTHER NONSPECIFIC ABNORMAL FINDING OF LUNG FIELD: Status: ACTIVE | Noted: 2020-07-24

## 2022-11-16 PROBLEM — H95.192: Status: ACTIVE | Noted: 2021-10-27

## 2022-11-16 PROBLEM — R42 DIZZINESS AND GIDDINESS: Status: ACTIVE | Noted: 2021-08-29

## 2022-11-16 PROBLEM — I70.0 ATHEROSCLEROSIS OF AORTA (HCC): Status: ACTIVE | Noted: 2021-12-16

## 2022-11-16 PROBLEM — C34.11 MALIGNANT NEOPLASM OF UPPER LOBE OF RIGHT LUNG (HCC): Status: ACTIVE | Noted: 2019-01-29

## 2022-11-16 PROBLEM — A04.72 CLOSTRIDIUM DIFFICILE COLITIS: Status: ACTIVE | Noted: 2020-01-15

## 2022-11-16 PROBLEM — B88.8 INFESTATION BY BED BUG: Status: ACTIVE | Noted: 2021-09-16

## 2022-11-16 PROBLEM — J98.11 ATELECTASIS: Status: ACTIVE | Noted: 2020-07-24

## 2022-11-16 PROBLEM — M26.601 RIGHT TEMPOROMANDIBULAR JOINT DISORDER, UNSPECIFIED: Status: ACTIVE | Noted: 2021-10-27

## 2022-11-16 PROBLEM — J98.11 ATELECTASIS: Status: RESOLVED | Noted: 2020-07-24 | Resolved: 2022-11-16

## 2022-11-16 PROBLEM — F17.210 NICOTINE DEPENDENCE, CIGARETTES, UNCOMPLICATED: Status: ACTIVE | Noted: 2022-01-25

## 2022-11-16 PROCEDURE — 3017F COLORECTAL CA SCREEN DOC REV: CPT | Performed by: FAMILY MEDICINE

## 2022-11-16 PROCEDURE — G8419 CALC BMI OUT NRM PARAM NOF/U: HCPCS | Performed by: FAMILY MEDICINE

## 2022-11-16 PROCEDURE — 3023F SPIROM DOC REV: CPT | Performed by: FAMILY MEDICINE

## 2022-11-16 PROCEDURE — G8427 DOCREV CUR MEDS BY ELIG CLIN: HCPCS | Performed by: FAMILY MEDICINE

## 2022-11-16 PROCEDURE — 90674 CCIIV4 VAC NO PRSV 0.5 ML IM: CPT | Performed by: FAMILY MEDICINE

## 2022-11-16 PROCEDURE — G0008 ADMIN INFLUENZA VIRUS VAC: HCPCS | Performed by: FAMILY MEDICINE

## 2022-11-16 PROCEDURE — 99214 OFFICE O/P EST MOD 30 MIN: CPT | Performed by: FAMILY MEDICINE

## 2022-11-16 PROCEDURE — 3074F SYST BP LT 130 MM HG: CPT | Performed by: FAMILY MEDICINE

## 2022-11-16 PROCEDURE — 1036F TOBACCO NON-USER: CPT | Performed by: FAMILY MEDICINE

## 2022-11-16 PROCEDURE — 3078F DIAST BP <80 MM HG: CPT | Performed by: FAMILY MEDICINE

## 2022-11-16 PROCEDURE — G8482 FLU IMMUNIZE ORDER/ADMIN: HCPCS | Performed by: FAMILY MEDICINE

## 2022-11-16 RX ORDER — DENOSUMAB 60 MG/ML
60 INJECTION SUBCUTANEOUS ONCE
Qty: 1 ML | Refills: 0 | Status: SHIPPED | OUTPATIENT
Start: 2022-11-16 | End: 2022-11-16

## 2022-11-16 SDOH — ECONOMIC STABILITY: FOOD INSECURITY: WITHIN THE PAST 12 MONTHS, YOU WORRIED THAT YOUR FOOD WOULD RUN OUT BEFORE YOU GOT MONEY TO BUY MORE.: NEVER TRUE

## 2022-11-16 SDOH — ECONOMIC STABILITY: FOOD INSECURITY: WITHIN THE PAST 12 MONTHS, THE FOOD YOU BOUGHT JUST DIDN'T LAST AND YOU DIDN'T HAVE MONEY TO GET MORE.: NEVER TRUE

## 2022-11-16 ASSESSMENT — ENCOUNTER SYMPTOMS
ABDOMINAL PAIN: 0
SHORTNESS OF BREATH: 1
EYE REDNESS: 0
ABDOMINAL DISTENTION: 0
RECTAL PAIN: 0
SORE THROAT: 0
DIARRHEA: 0
BLOOD IN STOOL: 0
NAUSEA: 0
TROUBLE SWALLOWING: 0
VOMITING: 0
COUGH: 1
RHINORRHEA: 0
BACK PAIN: 1
WHEEZING: 1
COLOR CHANGE: 0
CHEST TIGHTNESS: 0
STRIDOR: 0
CONSTIPATION: 0
SINUS PRESSURE: 0

## 2022-11-16 ASSESSMENT — SOCIAL DETERMINANTS OF HEALTH (SDOH): HOW HARD IS IT FOR YOU TO PAY FOR THE VERY BASICS LIKE FOOD, HOUSING, MEDICAL CARE, AND HEATING?: NOT HARD AT ALL

## 2022-11-16 NOTE — PATIENT INSTRUCTIONS
New Updates for [facility] MyChart/ [employer] ([facility]) YVONNE    Thank you for choosing US to give you the best care! [employer] ([facility]) is always trying to think of new ways to help their patients. We are asking all patients to try out the new digital registration that is now available through your [facility] account or the new YVONNE, [employer] ([facility]). Via the yvonne you're now able to update your personal and registration information prior to your upcoming appointment. This will save you time once you arrive at the office to check-in, not to mention your information remains safe!! Many other perks come from signing up for an account, such as:  Requesting refills  Scheduling an appointment  Completing an E-Visit  Sending a message to the office/provider  Having access to your medication list  Paying your bill/copay prior to your appointment  Scheduling your yearly mammogram  Review your test results    If you are not familiar with [facility] or the [employer] ([facility]) YVONNE, please ask one of us and we will be happy to answer any questions or help you set-up your account.       Your [facility] office,  [place]

## 2022-11-16 NOTE — PROGRESS NOTES
Chief Complaint   Patient presents with    Hypertension     NO CONCERNS          Ernst Montgomery  here today for follow up on chronic medical problems, go over labs and/or diagnostic studies, and medication refills. Hypertension (NO CONCERNS )      HPI: Patient is scheduled for follow-up on chronic medical conditions. Patient has history of COPD emphysema stable, patient denies any recent flareups is on multiple inhalers follows with pulmonologist.     Patient history of lung malignancy, status post immunotherapy which has been stopped last year. Patient is in remission. Patient was complaining of rectal bleeding, was referred to rectal surgeon had second-degree hemorrhoids. Patient reports she had banding done for 2 hemorrhoids. Patient reports bleeding has stopped completely. Hypertension blood pressure is running normal patient is on smaller dose of amlodipine 2.5 mg. Aspirin was stopped due to bleeding patient has restarted that. Patient has history of compression fractures in T12 treatment, had DEXA scan done in 2018 no results found in epic. Patient has results in MyChart that showed osteoporosis. Patient does not remember taking bisphosphonates. Currently she is on vitamin D therapy and her vitamin D levels are normal.      Lumbar spinal stenosis pain is fairly stable denies any recent flareups. Patient takes ibuprofen only as needed. BP 98/84   Pulse 77   Temp 97.3 °F (36.3 °C)   Ht 5' 5.5\" (1.664 m)   Wt 100 lb 6.4 oz (45.5 kg)   SpO2 98%   BMI 16.45 kg/m²    Body mass index is 16.45 kg/m². Wt Readings from Last 3 Encounters:   11/16/22 100 lb 6.4 oz (45.5 kg)   09/01/22 104 lb (47.2 kg)   08/16/22 102 lb 9.6 oz (46.5 kg)        []Negative depression screening.   PHQ Scores 8/16/2022 7/20/2022 8/16/2021 6/8/2020 1/28/2020 9/17/2019 11/13/2017   PHQ2 Score 0 2 3 0 0 4 0   PHQ9 Score 5 12 6 0 0 10 0      []1-4 = Minimal depression   [x]5-9 = Milddepression   []10-14 = Moderate depression   []15-19 = Moderately severe depression   []20-27 = Severe depression    Discussed testing with the patient and all questions fully answered.     Hospital Outpatient Visit on 10/18/2022   Component Date Value Ref Range Status    WBC 10/18/2022 5.7  3.5 - 11.3 k/uL Final    RBC 10/18/2022 4.96  3.95 - 5.11 m/uL Final    Hemoglobin 10/18/2022 15.4 (A)  11.9 - 15.1 g/dL Final    Hematocrit 10/18/2022 48.9 (A)  36.3 - 47.1 % Final    MCV 10/18/2022 98.6  82.6 - 102.9 fL Final    MCH 10/18/2022 31.0  25.2 - 33.5 pg Final    MCHC 10/18/2022 31.5  28.4 - 34.8 g/dL Final    RDW 10/18/2022 13.9  11.8 - 14.4 % Final    Platelets 84/39/1237 325  138 - 453 k/uL Final    MPV 10/18/2022 10.3  8.1 - 13.5 fL Final    NRBC Automated 10/18/2022 0.0  0.0 per 100 WBC Final    Seg Neutrophils 10/18/2022 57  36 - 65 % Final    Lymphocytes 10/18/2022 30  24 - 43 % Final    Monocytes 10/18/2022 8  3 - 12 % Final    Eosinophils % 10/18/2022 4  1 - 4 % Final    Basophils 10/18/2022 1  0 - 2 % Final    Immature Granulocytes 10/18/2022 0  0 % Final    Segs Absolute 10/18/2022 3.27  1.50 - 8.10 k/uL Final    Absolute Lymph # 10/18/2022 1.73  1.10 - 3.70 k/uL Final    Absolute Mono # 10/18/2022 0.45  0.10 - 1.20 k/uL Final    Absolute Eos # 10/18/2022 0.20  0.00 - 0.44 k/uL Final    Basophils Absolute 10/18/2022 0.04  0.00 - 0.20 k/uL Final    Absolute Immature Granulocyte 10/18/2022 <0.03  0.00 - 0.30 k/uL Final         Most recent labs reviewed:     Lab Results   Component Value Date    WBC 5.7 10/18/2022    HGB 15.4 (H) 10/18/2022    HCT 48.9 (H) 10/18/2022    MCV 98.6 10/18/2022     10/18/2022       @BRIEFLAB(NA,K,CL,CO2,BUN,CREATININE,GLUCOSE,CALCIUM)@     Lab Results   Component Value Date    ALT 10 06/15/2022    AST 17 06/15/2022    ALKPHOS 72 06/15/2022    BILITOT 0.5 06/15/2022       Lab Results   Component Value Date    TSH 0.93 08/19/2017       Lab Results   Component Value Date    CHOL 193 05/06/2022 CHOL 213 (H) 03/06/2017    CHOL 189 06/20/2016     Lab Results   Component Value Date    TRIG 81 05/06/2022    TRIG 228 (H) 03/06/2017    TRIG 382 (H) 06/20/2016     Lab Results   Component Value Date    HDL 60 05/06/2022    HDL 46 03/06/2017    HDL 42 06/20/2016     Lab Results   Component Value Date    LDLCHOLESTEROL 117 05/06/2022    LDLCHOLESTEROL 121 03/06/2017    LDLCHOLESTEROL 71 06/20/2016     Lab Results   Component Value Date    VLDL NOT REPORTED 03/06/2017    VLDL NOT REPORTED 06/20/2016    VLDL NOT REPORTED 11/06/2014     Lab Results   Component Value Date    CHOLHDLRATIO 3.2 05/06/2022    CHOLHDLRATIO 4.6 03/06/2017    CHOLHDLRATIO 4.5 06/20/2016       Lab Results   Component Value Date    LABA1C 5.5 10/25/2016       No results found for: HGOGUTEJ79    No results found for: FOLATE    No results found for: IRON, TIBC, FERRITIN    Lab Results   Component Value Date    VITD25 38.7 01/18/2021             Current Outpatient Medications   Medication Sig Dispense Refill    denosumab (PROLIA) 60 MG/ML SOSY SC injection Inject 1 mL into the skin once for 1 dose 1 mL 0    citalopram (CELEXA) 20 MG tablet take 1 tablet by mouth every morning 30 tablet 1    folic acid (FOLVITE) 1 MG tablet Take 1 mg by mouth in the morning. traZODone (DESYREL) 50 MG tablet Take 1 tablet by mouth nightly 90 tablet 1    omeprazole (PRILOSEC) 20 MG delayed release capsule take 1 capsule by mouth twice a day 180 capsule 3    ibuprofen (ADVIL;MOTRIN) 600 MG tablet Take 1 tablet by mouth 4 times daily as needed for Pain 60 tablet 0    Misc.  Devices (STEP N REST II WALKER) MISC Use daily for balance problems 1 each 0    Ascorbic Acid (VITAMIN C) 1000 MG tablet Take 1,000 mg by mouth daily      fluticasone (FLONASE) 50 MCG/ACT nasal spray 2 sprays by Nasal route daily 1 Bottle 0    ondansetron (ZOFRAN) 4 MG tablet Take 4 mg by mouth every 8 hours as needed for Nausea or Vomiting      dicyclomine (BENTYL) 10 MG capsule Take 1 capsule by mouth every 6 hours as needed (cramps) 20 capsule 0    RA CALCIUM 600/VITAMIN D-3 600-400 MG-UNIT TABS take 1 tablet by mouth twice a day 90 tablet 3    umeclidinium-vilanterol (ANORO ELLIPTA) 62.5-25 MCG/INH AEPB inhaler Anoro Ellipta 62.5 mcg-25 mcg/actuation powder for inhalation   Inhale 1 puff every day by inhalation route.      lidocaine-prilocaine (EMLA) 2.5-2.5 % cream lidocaine-prilocaine 2.5 %-2.5 % topical cream   Apply to port site and cover 30-45 minutes prior to needle access      albuterol sulfate HFA (PROVENTIL HFA) 108 (90 Base) MCG/ACT inhaler Inhale 2 puffs into the lungs every 6 hours as needed for Wheezing or Shortness of Breath 1 Inhaler 11    Multiple Vitamin (MULTIVITAMIN PO) Take  by mouth daily. aspirin 81 MG EC tablet Take 81 mg by mouth daily. lidocaine (LMX) 4 % cream Apply topically every 8 hrs as needed for pain (Patient not taking: Reported on 2022) 45 g 3     No current facility-administered medications for this visit. Social History     Socioeconomic History    Marital status:       Spouse name: Not on file    Number of children: Not on file    Years of education: Not on file    Highest education level: Not on file   Occupational History    Not on file   Tobacco Use    Smoking status: Former     Packs/day: 0.50     Years: 38.00     Pack years: 19.00     Types: Cigarettes     Quit date: 2022     Years since quittin.2    Smokeless tobacco: Never    Tobacco comments:     smokes 1 or 2 a day   Vaping Use    Vaping Use: Every day    Substances: Nicotine, Flavoring   Substance and Sexual Activity    Alcohol use: Not Currently     Alcohol/week: 0.0 standard drinks    Drug use: Yes     Types: Marijuana (Weed)     Comment: 5 joints a day marijuana for pain and appetite, she states that she stopped cocaine 2011    Sexual activity: Not Currently     Partners: Male     Birth control/protection: Post-menopausal   Other Topics Concern    Not on file   Social History Narrative    Not on file     Social Determinants of Health     Financial Resource Strain: Low Risk     Difficulty of Paying Living Expenses: Not hard at all   Food Insecurity: No Food Insecurity    Worried About Running Out of Food in the Last Year: Never true    Ran Out of Food in the Last Year: Never true   Transportation Needs: Not on file   Physical Activity: Inactive    Days of Exercise per Week: 0 days    Minutes of Exercise per Session: 0 min   Stress: Not on file   Social Connections: Not on file   Intimate Partner Violence: Not on file   Housing Stability: Not on file     Counseling given: Not Answered  Tobacco comments: smokes 1 or 2 a day        Family History   Problem Relation Age of Onset    Heart Disease Mother     Cancer Mother         breast and lung cancer    Diabetes Father     Heart Disease Father         Death due to MI    Cancer Paternal Grandfather         stomach cancer     Heart Disease Paternal Grandfather     Cancer Sister         ovarian cancer     Cancer Maternal Grandmother         female cancer             -rest of complaints with corresponding details per ROS    The patient's past medical, surgical, social, and family history as well as her current medications and allergies were reviewed as documented intoday's encounter. Review of Systems   Constitutional:  Negative for activity change, appetite change, fatigue, fever and unexpected weight change. HENT:  Negative for congestion, ear pain, postnasal drip, rhinorrhea, sinus pressure, sore throat and trouble swallowing. Eyes:  Positive for visual disturbance. Negative for redness. Respiratory:  Positive for cough, shortness of breath and wheezing. Negative for chest tightness and stridor. Cardiovascular:  Negative for chest pain, palpitations and leg swelling.    Gastrointestinal:  Negative for abdominal distention, abdominal pain, blood in stool, constipation, diarrhea, nausea, rectal pain and vomiting. Endocrine: Negative for polydipsia, polyphagia and polyuria. Genitourinary:  Negative for difficulty urinating, flank pain, frequency and urgency. Musculoskeletal:  Positive for arthralgias, back pain, gait problem, joint swelling and myalgias. Negative for neck pain. Skin:  Negative for color change, rash and wound. Allergic/Immunologic: Positive for immunocompromised state. Negative for food allergies. Neurological:  Positive for weakness and numbness. Negative for dizziness, speech difficulty, light-headedness and headaches. Psychiatric/Behavioral:  Negative for agitation, behavioral problems, decreased concentration, dysphoric mood, hallucinations, sleep disturbance and suicidal ideas. The patient is nervous/anxious. Physical Exam  Vitals and nursing note reviewed. Constitutional:       General: She is not in acute distress. Appearance: Normal appearance. She is well-developed. She is not diaphoretic. HENT:      Head: Normocephalic and atraumatic. Nose: Nose normal.   Eyes:      General:         Right eye: No discharge. Left eye: No discharge. Extraocular Movements: Extraocular movements intact. Conjunctiva/sclera: Conjunctivae normal.      Pupils: Pupils are equal, round, and reactive to light. Neck:      Thyroid: No thyromegaly. Cardiovascular:      Rate and Rhythm: Normal rate and regular rhythm. Heart sounds: Normal heart sounds. No murmur heard. Pulmonary:      Effort: Pulmonary effort is normal. No respiratory distress. Breath sounds: Normal breath sounds. No wheezing or rhonchi. Abdominal:      General: Bowel sounds are normal. There is no distension. Palpations: Abdomen is soft. There is no mass. Tenderness: There is no abdominal tenderness. There is no right CVA tenderness, left CVA tenderness, guarding or rebound. Musculoskeletal:      Cervical back: Neck supple. Spasms present. No rigidity or tenderness. Decreased range of motion. Thoracic back: Deformity and spasms present. Decreased range of motion. Lumbar back: Deformity, spasms and tenderness present. Decreased range of motion. Positive right straight leg raise test and positive left straight leg raise test.   Lymphadenopathy:      Cervical: No cervical adenopathy. Skin:     Coloration: Skin is not jaundiced or pale. Findings: No bruising, erythema or rash. Neurological:      General: No focal deficit present. Mental Status: She is alert and oriented to person, place, and time. Cranial Nerves: No cranial nerve deficit. Sensory: No sensory deficit. Motor: No weakness or tremor. Coordination: Coordination normal.      Gait: Gait and tandem walk normal.      Deep Tendon Reflexes: Reflexes are normal and symmetric. Psychiatric:         Attention and Perception: Attention and perception normal. She is attentive. Mood and Affect: Mood is anxious and depressed. Affect is tearful. Affect is not angry. Speech: She is communicative. Speech is rapid and pressured. Speech is not delayed or slurred. Behavior: Behavior normal. Behavior is not agitated or slowed. Thought Content: Thought content normal.         Judgment: Judgment normal.           ASSESSMENT AND PLAN      1. Panlobular emphysema ([address]  Stable, continue inhalers continue continue to work on smoking. 2. Grade II hemorrhoids  Status post banding, continue stool softeners and high-fiber diet    3. Primary hypertension  Blood pressure is running, stop amlodipine continue to monitor    4. Wedge compression fracture of T11-T12 vertebra, initial encounter for closed fracture ([address]  Started on bisphosphonates Prolia shots continue vitamin D therapy    5. Primary lung adenocarcinoma, right [facility])  Patient is in remission. To monitor follow-up with oncologist    6. Spinal stenosis in cervical region  Chronic problem pain is fairly stable. Continue NSAIDs as needed    7. Major depressive disorder with single episode, in partial remission (HCC)  Stable continue Celexa . 8. Age-related osteoporosis with current pathological fracture  Started on Prolia, continue vitamin D therapy  - denosumab (PROLIA) 60 MG/ML SOSY SC injection; Inject 1 mL into the skin once for 1 dose  Dispense: 1 mL; Refill: 0    9. Vitamin D deficiency  Continue vitamin D supplements    10. Need for influenza vaccination    - Influenza, FLUCELVAX, (age 10 mo+), IM, Preservative Free, 0.5 mL      Orders Placed This Encounter   Procedures    Influenza, FLUCELVAX, (age 10 mo+), IM, Preservative Free, 0.5 mL         Medications Discontinued During This Encounter   Medication Reason    amLODIPine (NORVASC) 2.5 MG tablet Therapy completed    benzonatate (TESSALON) 100 MG capsule Therapy completed       Lg Sneed received counseling on the following healthy behaviors: nutrition, exercise, medication adherence, and tobacco cessation  Reviewed prior labs and health maintenance  Continue current medications, diet and exercise. Discussed use, benefit, and side effects of prescribed medications. Barriers to medication compliance addressed. Patient given educational materials - see patient instructions  Was a self-tracking handout given in paper form or via [employer]? Yes    Requested Prescriptions     Signed Prescriptions Disp Refills    denosumab (PROLIA) 60 MG/ML SOSY SC injection 1 mL 0     Sig: Inject 1 mL into the skin once for 1 dose       All patient questions answered. Patient voiced understanding. Quality Measures    Body mass index is 16.45 kg/m². Normal. Weight control planned discussed Healthy diet and regular exercise. BP: 98/84 Blood pressure is Normal. Treatment plan consists of No treatment change needed.     Lab Results   Component Value Date    LDLCHOLESTEROL 117 05/06/2022    (goal LDL reduction with dx if diabetes is 50% LDL reduction)      PHQ Scores 8/16/2022 7/20/2022 8/16/2021 6/8/2020 1/28/2020 9/17/2019 11/13/2017   PHQ2 Score 0 2 3 0 0 4 0   PHQ9 Score 5 12 6 0 0 10 0     Interpretation of Total Score Depression Severity: 1-4 = Minimal depression, 5-9 = Mild depression, 10-14 = Moderate depression, 15-19 = Moderately severe depression, 20-27 = Severe depression    The patient'spast medical, surgical, social, and family history as well as her   current medications and allergies were reviewed as documented in today's encounter. Medications, labs, diagnostic studies, consultations andfollow-up as documented in this encounter. Return in about 3 months (around 2/16/2023) for 30min,always chronic conditons. Patient wasseen with total face to face time of 30 minutes. More than 50% of this visit was counseling and education. Future Appointments   Date Time Provider Sakshi Kaur   12/9/2022  2:00 PM Mable White MD [facility]   2/16/2023  3:00 PM Isaiah Westbrook MD [facility]   3/1/2023 11:00 AM EDDA Xiong NP [facility]   8/17/2023  2:00 PM Isaiah Westbrook MD [facility] [address] [facility]     This note was completed by using the assistance of a speech-recognition program. However, inadvertent computerized transcription errors may be present. Althoughevery effort was made to ensure accuracy, no guarantees can be provided that every mistake has been identified and corrected by editing.   Electronically signed by Isaiah Westbrook MD on 11/16/2022  6:07 PM

## 2022-11-18 DIAGNOSIS — M81.0 OSTEOPOROSIS, UNSPECIFIED OSTEOPOROSIS TYPE, UNSPECIFIED PATHOLOGICAL FRACTURE PRESENCE: Primary | ICD-10-CM

## 2022-11-18 RX ORDER — ALBUTEROL SULFATE 90 UG/1
4 AEROSOL, METERED RESPIRATORY (INHALATION) PRN
OUTPATIENT
Start: 2022-11-18

## 2022-11-18 RX ORDER — ACETAMINOPHEN 325 MG/1
650 TABLET ORAL
OUTPATIENT
Start: 2022-11-18

## 2022-11-18 RX ORDER — SODIUM CHLORIDE 9 MG/ML
INJECTION, SOLUTION INTRAVENOUS CONTINUOUS
OUTPATIENT
Start: 2022-11-18

## 2022-11-18 RX ORDER — EPINEPHRINE 1 MG/ML
0.3 INJECTION, SOLUTION, CONCENTRATE INTRAVENOUS PRN
OUTPATIENT
Start: 2022-11-18

## 2022-11-18 RX ORDER — ONDANSETRON 2 MG/ML
8 INJECTION INTRAMUSCULAR; INTRAVENOUS
OUTPATIENT
Start: 2022-11-18

## 2022-11-18 RX ORDER — DIPHENHYDRAMINE HYDROCHLORIDE 50 MG/ML
50 INJECTION INTRAMUSCULAR; INTRAVENOUS
OUTPATIENT
Start: 2022-11-18

## 2022-12-01 ENCOUNTER — HOSPITAL ENCOUNTER (OUTPATIENT)
Dept: INFUSION THERAPY | Age: 62
Setting detail: INFUSION SERIES
Discharge: HOME | End: 2022-12-01
Payer: MEDICARE

## 2022-12-01 VITALS
DIASTOLIC BLOOD PRESSURE: 76 MMHG | RESPIRATION RATE: 17 BRPM | SYSTOLIC BLOOD PRESSURE: 129 MMHG | TEMPERATURE: 98.8 F | HEART RATE: 53 BPM | OXYGEN SATURATION: 98 %

## 2022-12-01 DIAGNOSIS — M81.0 OSTEOPOROSIS, UNSPECIFIED OSTEOPOROSIS TYPE, UNSPECIFIED PATHOLOGICAL FRACTURE PRESENCE: Primary | ICD-10-CM

## 2022-12-01 LAB
CALCIUM SERPL-MCNC: 9.2 MG/DL (ref 8.6–10.4)
CREAT SERPL-MCNC: 0.63 MG/DL (ref 0.5–0.9)
GFR SERPL CREATININE-BSD FRML MDRD: >60 ML/MIN/1.73M2
MAGNESIUM: 1.9 MG/DL (ref 1.6–2.6)
PHOSPHORUS: 3.2 MG/DL (ref 2.6–4.5)

## 2022-12-01 PROCEDURE — 84100 ASSAY OF PHOSPHORUS: CPT

## 2022-12-01 PROCEDURE — 82310 ASSAY OF CALCIUM: CPT

## 2022-12-01 PROCEDURE — 96372 THER/PROPH/DIAG INJ SC/IM: CPT

## 2022-12-01 PROCEDURE — 82565 ASSAY OF CREATININE: CPT

## 2022-12-01 PROCEDURE — 83735 ASSAY OF MAGNESIUM: CPT

## 2022-12-01 PROCEDURE — 6360000002 HC RX W HCPCS: Performed by: FAMILY MEDICINE

## 2022-12-01 PROCEDURE — 36415 COLL VENOUS BLD VENIPUNCTURE: CPT

## 2022-12-01 RX ORDER — ALBUTEROL SULFATE 90 UG/1
4 AEROSOL, METERED RESPIRATORY (INHALATION) PRN
OUTPATIENT
Start: 2022-12-01

## 2022-12-01 RX ORDER — ACETAMINOPHEN 325 MG/1
650 TABLET ORAL
OUTPATIENT
Start: 2022-12-01

## 2022-12-01 RX ORDER — DIPHENHYDRAMINE HYDROCHLORIDE 50 MG/ML
50 INJECTION INTRAMUSCULAR; INTRAVENOUS
OUTPATIENT
Start: 2022-12-01

## 2022-12-01 RX ORDER — SODIUM CHLORIDE 9 MG/ML
INJECTION, SOLUTION INTRAVENOUS CONTINUOUS
OUTPATIENT
Start: 2022-12-01

## 2022-12-01 RX ORDER — EPINEPHRINE 1 MG/ML
0.3 INJECTION, SOLUTION, CONCENTRATE INTRAVENOUS PRN
OUTPATIENT
Start: 2022-12-01

## 2022-12-01 RX ORDER — ONDANSETRON 2 MG/ML
8 INJECTION INTRAMUSCULAR; INTRAVENOUS
OUTPATIENT
Start: 2022-12-01

## 2022-12-01 RX ADMIN — DENOSUMAB 60 MG: 60 INJECTION SUBCUTANEOUS at 14:15

## 2022-12-01 NOTE — PROGRESS NOTES
Pt arrived for Prolia injection. Vitals obtained and labs drawn. Labs within written parameters. Injection given in R arm. Pt tolerated well. No s/s adverse reaction noted. Pt discharged home, ambulatory per self with family.

## 2022-12-07 DIAGNOSIS — F32.1 CURRENT MODERATE EPISODE OF MAJOR DEPRESSIVE DISORDER WITHOUT PRIOR EPISODE (HCC): ICD-10-CM

## 2022-12-07 RX ORDER — CITALOPRAM 20 MG/1
TABLET ORAL
Qty: 30 TABLET | Refills: 1 | Status: SHIPPED | OUTPATIENT
Start: 2022-12-07

## 2022-12-08 ENCOUNTER — HOSPITAL ENCOUNTER (OUTPATIENT)
Dept: VASCULAR LAB | Age: 62
Discharge: HOME | End: 2022-12-08
Payer: MEDICARE

## 2022-12-08 DIAGNOSIS — K55.1 SUPERIOR MESENTERIC ARTERY STENOSIS (HCC): ICD-10-CM

## 2022-12-08 PROCEDURE — 93975 VASCULAR STUDY: CPT

## 2022-12-09 ENCOUNTER — OFFICE VISIT (OUTPATIENT)
Dept: VASCULAR SURGERY | Age: 62
End: 2022-12-09
Payer: MEDICARE

## 2022-12-09 VITALS
BODY MASS INDEX: 17 KG/M2 | SYSTOLIC BLOOD PRESSURE: 112 MMHG | TEMPERATURE: 98.2 F | DIASTOLIC BLOOD PRESSURE: 79 MMHG | HEIGHT: 65 IN | WEIGHT: 102 LBS | RESPIRATION RATE: 16 BRPM | HEART RATE: 71 BPM

## 2022-12-09 DIAGNOSIS — K55.1 CHRONIC VASCULAR INSUFFICIENCY OF INTESTINE (HCC): Primary | ICD-10-CM

## 2022-12-09 PROCEDURE — 3017F COLORECTAL CA SCREEN DOC REV: CPT | Performed by: SURGERY

## 2022-12-09 PROCEDURE — G8482 FLU IMMUNIZE ORDER/ADMIN: HCPCS | Performed by: SURGERY

## 2022-12-09 PROCEDURE — G8419 CALC BMI OUT NRM PARAM NOF/U: HCPCS | Performed by: SURGERY

## 2022-12-09 PROCEDURE — 3078F DIAST BP <80 MM HG: CPT | Performed by: SURGERY

## 2022-12-09 PROCEDURE — 3074F SYST BP LT 130 MM HG: CPT | Performed by: SURGERY

## 2022-12-09 PROCEDURE — G8427 DOCREV CUR MEDS BY ELIG CLIN: HCPCS | Performed by: SURGERY

## 2022-12-09 PROCEDURE — 1036F TOBACCO NON-USER: CPT | Performed by: SURGERY

## 2022-12-09 PROCEDURE — 99213 OFFICE O/P EST LOW 20 MIN: CPT | Performed by: SURGERY

## 2022-12-09 NOTE — PROGRESS NOTES
[facility]  [address] [facility]  [address] [place] [address]  Dept: [phone]     Patient: Carrillo Nelson  : 1960  MRN: [id number]  DOS: 2022            HPI:  Carrillo Nelson is a 65 y.o. female who returns to the office regarding her superior mesenteric artery elevated velocity. Recall that she has SMA stenosis which is not producing postprandial pain, food fear or significant weight loss. He continues to not produce symptoms despite having a peak systolic velocity of approximately 400 cm/s. I investigated the images today of her most recent mesenteric duplex which was done yesterday. I see no significant evidence of very very high-grade stenosis although the SMA I think does have at least a 60 to 70% stenosis. The celiac artery is widely patent. The TOÑO is patent. She has not really changed her weight since the last time she has seen me and she remains at approximately 100 pounds. She has certainly not lost weight. She denies food fear. She denies postprandial pain. Review of Systems    Vitals:    22 1404   BP: 112/79   Site: Left Upper Arm   Position: Sitting   Cuff Size: Small Adult   Pulse: 71   Resp: 16   Temp: 98.2 °F (36.8 °C)   TempSrc: Temporal   Weight: 102 lb (46.3 kg)   Height: 5' 5\" (1.651 m)          Physical Exam  Cardiovascular:      Comments: On examination her abdomen is soft nontender nondistended. I cannot appreciate an epigastric or mid abdominal bruit. She has palpable femoral and popliteal pulses bilaterally. Assessment:  1. Chronic vascular insufficiency of intestine (HCC)          Plan: At this point I would continue to follow her with mesenteric duplex examinations at 6-month intervals.   She understands that if she develops symptoms of mesenteric ischemia which include postprandial pain after every meal, food fear and eventual weight loss that she needs to seek attention sooner rather than later. She is moving [facility] the [facility] [place] and I explained to her that she would either need to go to [place] [facility] or another option would be the vascular group in [place]. She understands and agrees and we can see her in 6 months if she is still in this area.     Electronically signed by:  Jorge Alberto Field MD

## 2023-01-04 ENCOUNTER — TELEPHONE (OUTPATIENT)
Dept: FAMILY MEDICINE CLINIC | Age: 63
End: 2023-01-04

## 2023-01-04 DIAGNOSIS — J43.1 PANLOBULAR EMPHYSEMA (HCC): Primary | ICD-10-CM

## 2023-01-04 RX ORDER — BENZONATATE 100 MG/1
100 CAPSULE ORAL 3 TIMES DAILY PRN
Qty: 60 CAPSULE | Refills: 0 | Status: SHIPPED | OUTPATIENT
Start: 2023-01-04 | End: 2023-01-11

## 2023-01-04 NOTE — TELEPHONE ENCOUNTER
Pt is requesting refill on her tessalon  pearls. Medication is on her med list.      Please Approve or Refuse.   Send to Pharmacy per Pt's Request: [facility]     Next Visit Date:  2/16/2023   Last Visit Date: 11/16/2022    Hemoglobin A1C (%)   Date Value   10/25/2016 5.5             ( goal A1C is < 7)   BP Readings from Last 3 Encounters:   12/09/22 112/79   12/01/22 129/76   11/16/22 98/84          (goal 120/80)  BUN   Date Value Ref Range Status   06/15/2022 16 7 - 25 mg/dL Final     Creatinine   Date Value Ref Range Status   12/01/2022 0.63 0.50 - 0.90 mg/dL Final     Potassium   Date Value Ref Range Status   06/15/2022 3.6 3.5 - 5.1 meq/L Final

## 2023-01-24 NOTE — PROGRESS NOTES
Medicare Annual Wellness Visit    Nicky Hirsch is here for Medicare AWV (NO CONCERNS )    Assessment & Plan   Medicare annual wellness visit, subsequent  ACP (advance care planning)  Personal history of tobacco use, presenting hazards to health  -     [address] >10 MINUTES [[id number]]    Recommendations for Preventive Services Due: see orders and patient instructions/AVS.  Recommended screening schedule for the next 5-10 years is provided to the patient in written form: see Patient Instructions/AVS.     Return in 3 months (on 11/16/2022) for Medicare Annual Wellness Visit in 1 year. Subjective   The following acute and/or chronic problems were also addressed today:    Patient is scheduled for Medicare wellness visit. Depression she was started on Celexa reports that is helping, her mood is better and sleep is improved takes trazodone as needed. Patient is up-to-date on blood work. Lung malignancy stable. Patient's complete Health Risk Assessment and screening values have been reviewed and are found in Flowsheets. The following problems were reviewed today and where indicated follow up appointments were made and/or referrals ordered.     Positive Risk Factor Screenings with Interventions:      Depression:  PHQ-2 Score: 0  PHQ-9 Total Score: 5    Severity:1-4 = minimal depression, 5-9 = mild depression, 10-14 = moderate depression, 15-19 = moderately severe depression, 20-27 = severe depression  Depression Interventions:  Relaxation techniques discussed  --------------------------------------------------------------------------------    Tobacco Use:  Tobacco Use: Medium Risk    Smoking Tobacco Use: Former    Smokeless Tobacco Use: Never     E-cigarette/Vaping       Questions Responses    E-cigarette/Vaping Use Current Every Day User    Start Date     Passive Exposure No    Quit Date     Counseling Given No    Comments           Substance Use - Tobacco Interventions:  tobacco cessation tips and resources provided, currently vaping     Drug Use Screening: DAST-10 Score: 1  DAST-10 Score Interpretation:  1-2: Low level - Monitor, re-assess at a later date; 3-5: Moderate level - Further Investigation; 6-8: Substantial level - Intensive Assessment; 9-10: Severe level - Intensive Assessment  Substance Use - Drug Use Interventions:  educational materials provided       General Health and ACP:  General  In general, how would you say your health is?: (!) Poor  Patient has lung malignancy, which is stable after immunotherapy.   In the past 7 days, have you experienced any of the following: New or Increased Pain, New or Increased Fatigue, Loneliness, Social Isolation, Stress or Anger?: No  Do you get the social and emotional support that you need?: Yes  Do you have a Living Will?: (!) No    Advance Directives       Power of [address] [address] Will ACP-Advance Directive ACP-Power of     Not on File Not on File Not on File Filed        General Health Risk Interventions:  No Living Will: ACP documents already completed- patient asked to provide copy to the office    Health Habits/Nutrition:  Physical Activity: Inactive    Days of Exercise per Week: 0 days    Minutes of Exercise per Session: 0 min     Have you lost any weight without trying in the past 3 months?: No  Body mass index: (!) 16.56  Have you seen the dentist within the past year?: (!) No  Health Habits/Nutrition Interventions:  Dental exam overdue:  patient encouraged to make appointment with his/her dentist    Hearing/Vision:  Do you or your family notice any trouble with your hearing that hasn't been managed with hearing aids?: No  Do you have difficulty driving, watching TV, or doing any of your daily activities because of your eyesight?: No  Have you had an eye exam within the past year?: (!) No  Vision Screening    Right eye Left eye Both eyes   Without correction      With correction 20/25 20/20 20/20     Hearing/Vision Interventions:  Vision concerns:  patient encouraged to make appointment with his/her eye specialist            Objective   Vitals:    08/16/22 1439   BP: 122/78   Pulse: 81   Temp: 98.2 °F (36.8 °C)   TempSrc: Temporal   SpO2: 98%   Weight: 102 lb 9.6 oz (46.5 kg)   Height: 5' 6\" (1.676 m)      Body mass index is 16.56 kg/m². Allergies   Allergen Reactions    Lovastatin     Statins Other (See Comments)     pain     Prior to Visit Medications    Medication Sig Taking? Authorizing Provider   folic acid (FOLVITE) 1 MG tablet Take 1 mg by mouth in the morning. Yes Historical Provider, MD   amLODIPine (NORVASC) 2.5 MG tablet amlodipine 2.5 mg tablet   take 1 tablet by mouth once daily  ( STOP CLONIDINE ) Yes Historical Provider, MD   citalopram (CELEXA) 20 MG tablet Take 1 tablet by mouth in the morning. Yes Gama Fleming MD   traZODone (DESYREL) 50 MG tablet Take 1 tablet by mouth nightly Yes Gama Fleming MD   omeprazole (PRILOSEC) 20 MG delayed release capsule take 1 capsule by mouth twice a day Yes Gama Fleming MD   ibuprofen (ADVIL;MOTRIN) 600 MG tablet Take 1 tablet by mouth 4 times daily as needed for Pain Yes Gama Fleming MD   Misc.  Devices (STEP N REST II WALKER) MISC Use daily for balance problems Yes Gama Fleming MD   Ascorbic Acid (VITAMIN C) 1000 MG tablet Take 1,000 mg by mouth daily Yes Historical Provider, MD   benzonatate (TESSALON) 100 MG capsule Take 100 mg by mouth 3 times daily as needed for Cough Yes Historical Provider, MD   fluticasone (FLONASE) 50 MCG/ACT nasal spray 2 sprays by Nasal route daily Yes Gama Fleming MD   ondansetron (ZOFRAN) 4 MG tablet Take 4 mg by mouth every 8 hours as needed for Nausea or Vomiting Yes Historical Provider, MD   dicyclomine (BENTYL) 10 MG capsule Take 1 capsule by mouth every 6 hours as needed (cramps) Yes Gama Fleming MD   lidocaine (LMX) 4 % cream Apply topically every 8 hrs as needed for pain Yes Gama Fleming MD   RA CALCIUM mmol/l or 190 mg/dl  Maintain LDL cholesterol levels under 3.0 mmol/l or 115 mg/dl   Control blood glucose levels  Consider taking aspirin (75 mg daily), once blood pressure is controlled   Provided a follow up plan.   Time spent (minutes): 10 Patient/surrogate refused vaccine...

## 2023-02-09 ENCOUNTER — TELEPHONE (OUTPATIENT)
Dept: FAMILY MEDICINE CLINIC | Age: 63
End: 2023-02-09

## 2023-02-09 NOTE — TELEPHONE ENCOUNTER
1st attempt - 2/9/2023 - 10:38 am - LVM advising appt needs to be moved due to change in providers scheduled  2nd attempt - Message sent via [address]

## 2023-02-10 DIAGNOSIS — F32.1 CURRENT MODERATE EPISODE OF MAJOR DEPRESSIVE DISORDER WITHOUT PRIOR EPISODE (HCC): ICD-10-CM

## 2023-02-10 RX ORDER — CITALOPRAM 20 MG/1
TABLET ORAL
Qty: 30 TABLET | Refills: 1 | Status: SHIPPED | OUTPATIENT
Start: 2023-02-10

## 2023-03-01 ENCOUNTER — TELEPHONE (OUTPATIENT)
Dept: FAMILY MEDICINE CLINIC | Age: 63
End: 2023-03-01

## 2023-03-01 ENCOUNTER — OFFICE VISIT (OUTPATIENT)
Dept: GASTROENTEROLOGY | Age: 63
End: 2023-03-01
Payer: MEDICARE

## 2023-03-01 VITALS
DIASTOLIC BLOOD PRESSURE: 81 MMHG | SYSTOLIC BLOOD PRESSURE: 116 MMHG | BODY MASS INDEX: 16.66 KG/M2 | WEIGHT: 100 LBS | HEIGHT: 65 IN | OXYGEN SATURATION: 95 % | HEART RATE: 79 BPM

## 2023-03-01 DIAGNOSIS — R63.6 UNDERWEIGHT: ICD-10-CM

## 2023-03-01 DIAGNOSIS — J43.1 PANLOBULAR EMPHYSEMA (HCC): Primary | ICD-10-CM

## 2023-03-01 DIAGNOSIS — K59.00 CONSTIPATION, UNSPECIFIED CONSTIPATION TYPE: Primary | ICD-10-CM

## 2023-03-01 DIAGNOSIS — R10.9 ABDOMINAL CRAMPING: ICD-10-CM

## 2023-03-01 PROCEDURE — 3074F SYST BP LT 130 MM HG: CPT | Performed by: NURSE PRACTITIONER

## 2023-03-01 PROCEDURE — G8482 FLU IMMUNIZE ORDER/ADMIN: HCPCS | Performed by: NURSE PRACTITIONER

## 2023-03-01 PROCEDURE — 99213 OFFICE O/P EST LOW 20 MIN: CPT | Performed by: NURSE PRACTITIONER

## 2023-03-01 PROCEDURE — 3079F DIAST BP 80-89 MM HG: CPT | Performed by: NURSE PRACTITIONER

## 2023-03-01 PROCEDURE — G8419 CALC BMI OUT NRM PARAM NOF/U: HCPCS | Performed by: NURSE PRACTITIONER

## 2023-03-01 PROCEDURE — 3017F COLORECTAL CA SCREEN DOC REV: CPT | Performed by: NURSE PRACTITIONER

## 2023-03-01 PROCEDURE — 1036F TOBACCO NON-USER: CPT | Performed by: NURSE PRACTITIONER

## 2023-03-01 PROCEDURE — G8427 DOCREV CUR MEDS BY ELIG CLIN: HCPCS | Performed by: NURSE PRACTITIONER

## 2023-03-01 RX ORDER — BENZONATATE 100 MG/1
100 CAPSULE ORAL 3 TIMES DAILY PRN
Qty: 21 CAPSULE | Refills: 0 | Status: SHIPPED | OUTPATIENT
Start: 2023-03-01 | End: 2023-03-08

## 2023-03-01 RX ORDER — DICYCLOMINE HYDROCHLORIDE 10 MG/1
10 CAPSULE ORAL EVERY 6 HOURS PRN
Qty: 60 CAPSULE | Refills: 2 | Status: SHIPPED | OUTPATIENT
Start: 2023-03-01

## 2023-03-01 ASSESSMENT — ENCOUNTER SYMPTOMS
RESPIRATORY NEGATIVE: 1
CONSTIPATION: 1
TROUBLE SWALLOWING: 1
ALLERGIC/IMMUNOLOGIC NEGATIVE: 1
ANAL BLEEDING: 1

## 2023-03-01 NOTE — TELEPHONE ENCOUNTER
PATIENT CALLED AND WOULD LIKE REFILL ON HER MEDICATION HAYLEY RENO, WITH REFILLS. MEDICATION IS NOT ON WAIT LIST. PLEASE ADVISE.

## 2023-03-01 NOTE — PROGRESS NOTES
GI CLINIC FOLLOW UP    INTERVAL HISTORY:   No referring provider defined for this encounter. Chief Complaint   Patient presents with    Constipation     Pt states she still has constipation, states she sometimes has bleeding when she has hard stool        HISTORY OF PRESENT ILLNESS:     Patient being seen for follow-up constipation. Patient had recent colonoscopy. Colonoscopy prep was fair. Has significant diverticulosis in the sigmoid colon. No stricture. Has hemorrhoids. Has chronic constipation. Recently states she has been taking Metamucil daily and has noticed improvement of her bowel habits. Reports stools are much softer. Will occasionally have hard bowel movement and straining. Denies any melena. She is tolerating diet well. Has increased her fiber intake and fluids. Denies any dysphagia, odynophagia, dyspeptic symptoms. Her weight is stable    She is started taking protein supplementation    Past Medical,Family, and Social History reviewed and does contribute to the patient presentingcondition. Patient's PMH/PSH,SH,PSYCH Hx, MEDs, ALLERGIES, and ROS were all reviewed and updated in the appropriate sections.     PAST MEDICAL HISTORY:  Past Medical History:   Diagnosis Date    Arthritis     Bronchitis, chronic (HCC)     Chronic back pain     Chronic cough     COPD (chronic obstructive pulmonary disease) (HCC)     Depression     Diverticulosis     Emphysema     Fibromyalgia     GERD (gastroesophageal reflux disease)     Hemorrhoid     internal and external, they bleed    History of cocaine abuse ([address]     none since 2010, used between 2007 and 2009     History of kidney infection     Hyperlipidemia     Hyperplastic colon polyp 11/15/2017    Hypertension     Lung cancer ([address]     non-small cell H3A lung cancer right upper lobe    Marijuana use     for pain and appetite use    Meningioma (HCC)     Neuropathy     Orbital fracture (HCC)     left side ,has  pin in side Osteoarthritis     Osteopenia     Pulmonary nodule     left lung, watching this one    Renal calculi     Renal cyst     STD (sexually transmitted disease)     unsure of type    Uterine prolapse        Past Surgical History:   Procedure Laterality Date    BACK SURGERY  2011    thoracic laminectomy, T12, S1    CARDIAC CATHETERIZATION  10/24/14    Normal coronaries     COLONOSCOPY  04/05/2017    diverticulosis, ,poor prep    COLONOSCOPY  11/15/2017    flat polypoid lesion in the base of the cecum, about 1 cm.-hyperplastic    COLONOSCOPY  04/24/2018     diverticulosis. Small polyp in the sigmoid colon excised with biopsy forceps    COLONOSCOPY N/A 8/2/2022    COLONOSCOPY WITH BIOPSY performed by Kevin Lares MD at [facility] ENDO    EAR SURGERY Left     cleaned out infection    ENDOSCOPY, COLON, DIAGNOSTIC      ORBITAL FRACTURE SURGERY Left     had pin inserted on temple side    NE COLON CA SCRN NOT HI RSK IND N/A 4/5/2017    COLONOSCOPY performed by Kevin Lares MD at [facility] OR    NE COLONOSCOPY FLX DX W/COLLJ SPEC WHEN PFRMD N/A 4/24/2018    COLONOSCOPY performed by Kevin Lares MD at [facility] OR    NE COLSC FLX W/REMOVAL LESION BY HOT BX FORCEPS N/A 11/15/2017    COLONOSCOPY POLYPECTOMY SNARE and saline injection performed by Kevin Lares MD at [facility] OR    NE EGD TRANSORAL BIOPSY SINGLE/MULTIPLE N/A 10/6/2017    EGD BIOPSY performed by Serafin Carter MD at [facility] OR    NE EGD TRANSORAL BIOPSY SINGLE/MULTIPLE N/A 2/27/2018    EGD ESOPHAGOGASTRODUODENOSCOPY performed by Kevin Lares MD at [facility] OR    NE ESOPHAGOGASTRODUODENOSCOPY TRANSORAL DIAGNOSTIC N/A 4/24/2018    EGD ESOPHAGOGASTRODUODENOSCOPY performed by Kevin Lares MD at [facility] OR    TONSILLECTOMY      TUBAL LIGATION      TUNNELED VENOUS PORT PLACEMENT Left     UPPER GASTROINTESTINAL ENDOSCOPY  10/06/2017    Dr Carter--large diverticulum gastric fundus, pathology inactive gastritis    UPPER GASTROINTESTINAL ENDOSCOPY  02/27/2018  retained food in the fundus of the stomach, poor peristalsis wide-open pylorus    UPPER GASTROINTESTINAL ENDOSCOPY  04/24/2018    no lesions seen that can account her CEA levels. UPPER GASTROINTESTINAL ENDOSCOPY N/A 3/10/2021    EGD BIOPSY AND PHOTOS performed by Solomon Pathak MD at [address]:    Current Outpatient Medications:     dicyclomine (BENTYL) 10 MG capsule, Take 1 capsule by mouth every 6 hours as needed (cramps), Disp: 60 capsule, Rfl: 2    citalopram (CELEXA) 20 MG tablet, take 1 tablet by mouth every morning, Disp: 30 tablet, Rfl: 1    folic acid (FOLVITE) 1 MG tablet, Take 1 mg by mouth in the morning., Disp: , Rfl:     traZODone (DESYREL) 50 MG tablet, Take 1 tablet by mouth nightly, Disp: 90 tablet, Rfl: 1    omeprazole (PRILOSEC) 20 MG delayed release capsule, take 1 capsule by mouth twice a day, Disp: 180 capsule, Rfl: 3    ibuprofen (ADVIL;MOTRIN) 600 MG tablet, Take 1 tablet by mouth 4 times daily as needed for Pain, Disp: 60 tablet, Rfl: 0    Misc.  Devices (STEP N REST II WALKER) MISC, Use daily for balance problems, Disp: 1 each, Rfl: 0    Ascorbic Acid (VITAMIN C) 1000 MG tablet, Take 1,000 mg by mouth daily, Disp: , Rfl:     fluticasone (FLONASE) 50 MCG/ACT nasal spray, 2 sprays by Nasal route daily, Disp: 1 Bottle, Rfl: 0    ondansetron (ZOFRAN) 4 MG tablet, Take 4 mg by mouth every 8 hours as needed for Nausea or Vomiting, Disp: , Rfl:     lidocaine (LMX) 4 % cream, Apply topically every 8 hrs as needed for pain, Disp: 45 g, Rfl: 3    RA CALCIUM 600/VITAMIN D-3 600-400 MG-UNIT TABS, take 1 tablet by mouth twice a day, Disp: 90 tablet, Rfl: 3    umeclidinium-vilanterol (ANORO ELLIPTA) 62.5-25 MCG/INH AEPB inhaler, Anoro Ellipta 62.5 mcg-25 mcg/actuation powder for inhalation  Inhale 1 puff every day by inhalation route., Disp: , Rfl:     lidocaine-prilocaine (EMLA) 2.5-2.5 % cream, lidocaine-prilocaine 2.5 %-2.5 % topical cream  Apply to port site and cover 30-45 minutes prior to needle access, Disp: , Rfl:     albuterol sulfate HFA (PROVENTIL HFA) 108 (90 Base) MCG/ACT inhaler, Inhale 2 puffs into the lungs every 6 hours as needed for Wheezing or Shortness of Breath, Disp: 1 Inhaler, Rfl: 11    Multiple Vitamin (MULTIVITAMIN PO), Take  by mouth daily. , Disp: , Rfl:     denosumab (PROLIA) 60 MG/ML SOSY SC injection, Inject 1 mL into the skin once for 1 dose, Disp: 1 mL, Rfl: 0    aspirin 81 MG EC tablet, Take 81 mg by mouth daily. (Patient not taking: No sig reported), Disp: , Rfl:     ALLERGIES:   Allergies   Allergen Reactions    Lovastatin     Statins Other (See Comments)     pain       FAMILY HISTORY:       Problem Relation Age of Onset    Heart Disease Mother     Cancer Mother         breast and lung cancer    Diabetes Father     Heart Disease Father         Death due to MI    Cancer Paternal Grandfather         stomach cancer     Heart Disease Paternal Grandfather     Cancer Sister         ovarian cancer     Cancer Maternal Grandmother         female cancer         SOCIAL HISTORY:   Social History     Socioeconomic History    Marital status:       Spouse name: Not on file    Number of children: Not on file    Years of education: Not on file    Highest education level: Not on file   Occupational History    Not on file   Tobacco Use    Smoking status: Former     Packs/day: 0.50     Years: 38.00     Pack years: 19.00     Types: Cigarettes     Quit date: 2022     Years since quittin.5    Smokeless tobacco: Never    Tobacco comments:     smokes 1 or 2 a day   Vaping Use    Vaping Use: Every day    Substances: Nicotine, Flavoring   Substance and Sexual Activity    Alcohol use: Not Currently     Alcohol/week: 0.0 standard drinks    Drug use: Yes     Types: Marijuana (Weed)     Comment: 5 joints a day marijuana for pain and appetite, she states that she stopped cocaine 2011    Sexual activity: Not Currently     Partners: Male     Birth control/protection: Post-menopausal   Other Topics Concern    Not on file   Social History Narrative    Not on file     Social Determinants of Health     Financial Resource Strain: Low Risk     Difficulty of Paying Living Expenses: Not hard at all   Food Insecurity: No Food Insecurity    Worried About Running Out of Food in the Last Year: Never true    Ran Out of Food in the Last Year: Never true   Transportation Needs: Not on file   Physical Activity: Inactive    Days of Exercise per Week: 0 days    Minutes of Exercise per Session: 0 min   Stress: Not on file   Social Connections: Not on file   Intimate Partner Violence: Not on file   Housing Stability: Not on file       REVIEW OF SYSTEMS: A 12-point review of systemswas obtained and pertinent positives and negatives were enumerated above in the history of present illness. All other reviewed systems / symptoms were negative. Review of Systems   Constitutional: Negative. HENT:  Positive for trouble swallowing (from radiation). Eyes:  Positive for visual disturbance (glasses). Respiratory: Negative. Cardiovascular: Negative. Gastrointestinal:  Positive for anal bleeding (sometimes) and constipation. Endocrine: Negative. Genitourinary: Negative. Musculoskeletal: Negative. Skin: Negative. Allergic/Immunologic: Negative. Neurological: Negative. Hematological: Negative. Psychiatric/Behavioral: Negative. PHYSICAL EXAMINATION: Vital signs reviewed per the nursing documentation. /81   Pulse 79   Ht 5' 5\" (1.651 m)   Wt 100 lb (45.4 kg)   SpO2 95%   BMI 16.64 kg/m²   Body mass index is 16.64 kg/m². Physical Exam  Constitutional:       Appearance: Normal appearance. She is underweight. Eyes:      General: No scleral icterus. Pupils: Pupils are equal, round, and reactive to light. Cardiovascular:      Rate and Rhythm: Normal rate and regular rhythm. Heart sounds: Normal heart sounds.    Pulmonary: Effort: Pulmonary effort is normal.      Breath sounds: Normal breath sounds. Abdominal:      General: Bowel sounds are normal. There is no distension. Palpations: Abdomen is soft. There is no mass. Tenderness: There is no abdominal tenderness. There is no guarding. Skin:     General: Skin is warm and dry. Coloration: Skin is not jaundiced. Neurological:      Mental Status: She is alert and oriented to person, place, and time. Mental status is at baseline. LABORATORY DATA: Reviewed  Lab Results   Component Value Date    WBC 5.7 10/18/2022    HGB 15.4 (H) 10/18/2022    HCT 48.9 (H) 10/18/2022    MCV 98.6 10/18/2022     10/18/2022     06/15/2022    K 3.6 06/15/2022     06/15/2022    CO2 25 06/15/2022    BUN 16 06/15/2022    CREATININE 0.63 12/01/2022    LABPROT 6.7 04/04/2012    LABALBU 4.1 06/15/2022    BILITOT 0.5 06/15/2022    ALKPHOS 72 06/15/2022    AST 17 06/15/2022    ALT 10 06/15/2022    INR 1.0 07/06/2017         Lab Results   Component Value Date    RBC 4.96 10/18/2022    HGB 15.4 (H) 10/18/2022    MCV 98.6 10/18/2022    MCH 31.0 10/18/2022    MCHC 31.5 10/18/2022    RDW 13.9 10/18/2022    MPV 10.3 10/18/2022    BASOPCT 1 10/18/2022    LYMPHSABS 1.73 10/18/2022    MONOSABS 0.45 10/18/2022    NEUTROABS 3.27 10/18/2022    EOSABS 0.20 10/18/2022    BASOSABS 0.04 10/18/2022         DIAGNOSTIC TESTING:     No results found. IMPRESSION: Ms. Neftaly Carrillo is a 65 y.o. female with    Diagnosis Orders   1. Constipation, unspecified constipation type        2. Abdominal cramping  dicyclomine (BENTYL) 10 MG capsule      3. Underweight          At present she is stable. She is advised to continue taking Metamucil daily. MiraLAX as needed. Increase fluids, fiber. She does take Bentyl on rare occasion for abdominal cramping. Advised to continue this as needed. Educated on possible side effects including constipation. Follow-up 6 months weight check.       Thank you for allowing me to participate in the care of Ms. Brandy Lovett. For any further questions please do not hesitate to contact me. I have reviewed and agree with the ROS entered by the MA/DERRICK.          SELMA Haines    [facility]  Office #: [phone]

## 2023-03-11 ENCOUNTER — HOSPITAL ENCOUNTER (INPATIENT)
Age: 63
LOS: 1 days | Discharge: HOME | DRG: 394 | End: 2023-03-13
Attending: EMERGENCY MEDICINE | Admitting: INTERNAL MEDICINE
Payer: MEDICARE

## 2023-03-11 DIAGNOSIS — K62.5 RECTAL BLEEDING: Primary | ICD-10-CM

## 2023-03-11 DIAGNOSIS — K57.90 DIVERTICULOSIS: ICD-10-CM

## 2023-03-11 LAB
ABO/RH: NORMAL
ABSOLUTE EOS #: 0.2 K/UL (ref 0–0.4)
ABSOLUTE LYMPH #: 2.2 K/UL (ref 1–4.8)
ABSOLUTE MONO #: 0.6 K/UL (ref 0.1–1.3)
ALBUMIN SERPL-MCNC: 3.9 G/DL (ref 3.5–5.2)
ALP SERPL-CCNC: 54 U/L (ref 35–104)
ALT SERPL-CCNC: 7 U/L (ref 5–33)
ANION GAP SERPL CALCULATED.3IONS-SCNC: 10 MMOL/L (ref 9–17)
ANTIBODY SCREEN: NEGATIVE
ARM BAND NUMBER: NORMAL
AST SERPL-CCNC: 18 U/L
BASOPHILS # BLD: 1 % (ref 0–2)
BASOPHILS ABSOLUTE: 0 K/UL (ref 0–0.2)
BILIRUB SERPL-MCNC: 0.3 MG/DL (ref 0.3–1.2)
BLOOD BANK COMMENT: NORMAL
BUN SERPL-MCNC: 28 MG/DL (ref 8–23)
CALCIUM SERPL-MCNC: 9.1 MG/DL (ref 8.6–10.4)
CHLORIDE SERPL-SCNC: 101 MMOL/L (ref 98–107)
CO2 SERPL-SCNC: 23 MMOL/L (ref 20–31)
CREAT SERPL-MCNC: 0.68 MG/DL (ref 0.5–0.9)
DATE, STOOL #1: ABNORMAL
EOSINOPHILS RELATIVE PERCENT: 3 % (ref 0–4)
EXPIRATION DATE: NORMAL
GFR SERPL CREATININE-BSD FRML MDRD: >60 ML/MIN/1.73M2
GLUCOSE SERPL-MCNC: 94 MG/DL (ref 70–99)
HCT VFR BLD AUTO: 40.1 % (ref 36–46)
HEMOCCULT SP1 STL QL: POSITIVE
HGB BLD-MCNC: 13.6 G/DL (ref 12–16)
INR PPP: 1.1
LYMPHOCYTES # BLD: 30 % (ref 24–44)
MCH RBC QN AUTO: 31.6 PG (ref 26–34)
MCHC RBC AUTO-ENTMCNC: 34 G/DL (ref 31–37)
MCV RBC AUTO: 93 FL (ref 80–100)
MONOCYTES # BLD: 8 % (ref 1–7)
PDW BLD-RTO: 15.2 % (ref 11.5–14.9)
PLATELET # BLD AUTO: 226 K/UL (ref 150–450)
PMV BLD AUTO: 7.8 FL (ref 6–12)
POTASSIUM SERPL-SCNC: 3.4 MMOL/L (ref 3.7–5.3)
PROT SERPL-MCNC: 6.3 G/DL (ref 6.4–8.3)
PROTHROMBIN TIME: 14.3 SEC (ref 11.8–14.6)
RBC # BLD: 4.31 M/UL (ref 4–5.2)
SEG NEUTROPHILS: 58 % (ref 36–66)
SEGMENTED NEUTROPHILS ABSOLUTE COUNT: 4.4 K/UL (ref 1.3–9.1)
SODIUM SERPL-SCNC: 134 MMOL/L (ref 135–144)
TIME, STOOL #1: ABNORMAL
WBC # BLD AUTO: 7.5 K/UL (ref 3.5–11)

## 2023-03-11 PROCEDURE — 85610 PROTHROMBIN TIME: CPT

## 2023-03-11 PROCEDURE — 82270 OCCULT BLOOD FECES: CPT

## 2023-03-11 PROCEDURE — 85025 COMPLETE CBC W/AUTO DIFF WBC: CPT

## 2023-03-11 PROCEDURE — 80053 COMPREHEN METABOLIC PANEL: CPT

## 2023-03-11 PROCEDURE — 36415 COLL VENOUS BLD VENIPUNCTURE: CPT

## 2023-03-11 PROCEDURE — 99285 EMERGENCY DEPT VISIT HI MDM: CPT

## 2023-03-11 PROCEDURE — 86850 RBC ANTIBODY SCREEN: CPT

## 2023-03-11 PROCEDURE — 96361 HYDRATE IV INFUSION ADD-ON: CPT

## 2023-03-11 PROCEDURE — 86901 BLOOD TYPING SEROLOGIC RH(D): CPT

## 2023-03-11 PROCEDURE — 2580000003 HC RX 258: Performed by: EMERGENCY MEDICINE

## 2023-03-11 PROCEDURE — 86900 BLOOD TYPING SEROLOGIC ABO: CPT

## 2023-03-11 RX ORDER — 0.9 % SODIUM CHLORIDE 0.9 %
1000 INTRAVENOUS SOLUTION INTRAVENOUS ONCE
Status: COMPLETED | OUTPATIENT
Start: 2023-03-11 | End: 2023-03-12

## 2023-03-11 RX ADMIN — SODIUM CHLORIDE 1000 ML: 9 INJECTION, SOLUTION INTRAVENOUS at 22:36

## 2023-03-12 PROBLEM — K62.5 RECTAL BLEEDING: Status: ACTIVE | Noted: 2023-03-12

## 2023-03-12 LAB
ANION GAP SERPL CALCULATED.3IONS-SCNC: 8 MMOL/L (ref 9–17)
BUN SERPL-MCNC: 22 MG/DL (ref 8–23)
CALCIUM SERPL-MCNC: 8.7 MG/DL (ref 8.6–10.4)
CHLORIDE SERPL-SCNC: 106 MMOL/L (ref 98–107)
CO2 SERPL-SCNC: 26 MMOL/L (ref 20–31)
CREAT SERPL-MCNC: 0.65 MG/DL (ref 0.5–0.9)
GFR SERPL CREATININE-BSD FRML MDRD: >60 ML/MIN/1.73M2
GLUCOSE SERPL-MCNC: 89 MG/DL (ref 70–99)
HCT VFR BLD AUTO: 41.4 % (ref 36–46)
HCT VFR BLD AUTO: 42.4 % (ref 36–46)
HCT VFR BLD AUTO: 43.9 % (ref 36–46)
HGB BLD-MCNC: 13.8 G/DL (ref 12–16)
HGB BLD-MCNC: 14.2 G/DL (ref 12–16)
HGB BLD-MCNC: 14.8 G/DL (ref 12–16)
POTASSIUM SERPL-SCNC: 3.6 MMOL/L (ref 3.7–5.3)
SODIUM SERPL-SCNC: 140 MMOL/L (ref 135–144)

## 2023-03-12 PROCEDURE — APPNB30 APP NON BILLABLE TIME 0-30 MINS: Performed by: NURSE PRACTITIONER

## 2023-03-12 PROCEDURE — 96374 THER/PROPH/DIAG INJ IV PUSH: CPT

## 2023-03-12 PROCEDURE — 6360000002 HC RX W HCPCS: Performed by: NURSE PRACTITIONER

## 2023-03-12 PROCEDURE — 96361 HYDRATE IV INFUSION ADD-ON: CPT

## 2023-03-12 PROCEDURE — 2580000003 HC RX 258: Performed by: NURSE PRACTITIONER

## 2023-03-12 PROCEDURE — A4216 STERILE WATER/SALINE, 10 ML: HCPCS | Performed by: NURSE PRACTITIONER

## 2023-03-12 PROCEDURE — 36415 COLL VENOUS BLD VENIPUNCTURE: CPT

## 2023-03-12 PROCEDURE — 85014 HEMATOCRIT: CPT

## 2023-03-12 PROCEDURE — 99223 1ST HOSP IP/OBS HIGH 75: CPT | Performed by: INTERNAL MEDICINE

## 2023-03-12 PROCEDURE — G0378 HOSPITAL OBSERVATION PER HR: HCPCS

## 2023-03-12 PROCEDURE — C9113 INJ PANTOPRAZOLE SODIUM, VIA: HCPCS | Performed by: NURSE PRACTITIONER

## 2023-03-12 PROCEDURE — 94640 AIRWAY INHALATION TREATMENT: CPT

## 2023-03-12 PROCEDURE — 6370000000 HC RX 637 (ALT 250 FOR IP): Performed by: NURSE PRACTITIONER

## 2023-03-12 PROCEDURE — 94761 N-INVAS EAR/PLS OXIMETRY MLT: CPT

## 2023-03-12 PROCEDURE — 85018 HEMOGLOBIN: CPT

## 2023-03-12 PROCEDURE — 2060000000 HC ICU INTERMEDIATE R&B

## 2023-03-12 PROCEDURE — 80048 BASIC METABOLIC PNL TOTAL CA: CPT

## 2023-03-12 RX ORDER — SODIUM CHLORIDE 9 MG/ML
INJECTION, SOLUTION INTRAVENOUS CONTINUOUS
Status: DISCONTINUED | OUTPATIENT
Start: 2023-03-12 | End: 2023-03-12

## 2023-03-12 RX ORDER — ACETAMINOPHEN 325 MG/1
650 TABLET ORAL EVERY 6 HOURS PRN
Status: CANCELLED | OUTPATIENT
Start: 2023-03-12

## 2023-03-12 RX ORDER — DICYCLOMINE HYDROCHLORIDE 10 MG/1
10 CAPSULE ORAL EVERY 6 HOURS PRN
Status: DISCONTINUED | OUTPATIENT
Start: 2023-03-12 | End: 2023-03-13 | Stop reason: HOSPADM

## 2023-03-12 RX ORDER — CITALOPRAM 20 MG/1
20 TABLET ORAL DAILY
Status: DISCONTINUED | OUTPATIENT
Start: 2023-03-12 | End: 2023-03-13 | Stop reason: HOSPADM

## 2023-03-12 RX ORDER — SODIUM CHLORIDE 0.9 % (FLUSH) 0.9 %
5-40 SYRINGE (ML) INJECTION PRN
Status: DISCONTINUED | OUTPATIENT
Start: 2023-03-12 | End: 2023-03-13 | Stop reason: HOSPADM

## 2023-03-12 RX ORDER — SODIUM CHLORIDE 9 MG/ML
INJECTION, SOLUTION INTRAVENOUS PRN
Status: DISCONTINUED | OUTPATIENT
Start: 2023-03-12 | End: 2023-03-13 | Stop reason: HOSPADM

## 2023-03-12 RX ORDER — SODIUM CHLORIDE 0.9 % (FLUSH) 0.9 %
5-40 SYRINGE (ML) INJECTION EVERY 12 HOURS SCHEDULED
Status: CANCELLED | OUTPATIENT
Start: 2023-03-12

## 2023-03-12 RX ORDER — ONDANSETRON 4 MG/1
4 TABLET, ORALLY DISINTEGRATING ORAL EVERY 8 HOURS PRN
Status: DISCONTINUED | OUTPATIENT
Start: 2023-03-12 | End: 2023-03-13 | Stop reason: HOSPADM

## 2023-03-12 RX ORDER — ONDANSETRON 4 MG/1
4 TABLET, ORALLY DISINTEGRATING ORAL EVERY 8 HOURS PRN
Status: CANCELLED | OUTPATIENT
Start: 2023-03-12

## 2023-03-12 RX ORDER — ONDANSETRON 2 MG/ML
4 INJECTION INTRAMUSCULAR; INTRAVENOUS EVERY 6 HOURS PRN
Status: DISCONTINUED | OUTPATIENT
Start: 2023-03-12 | End: 2023-03-13 | Stop reason: HOSPADM

## 2023-03-12 RX ORDER — ACETAMINOPHEN 325 MG/1
650 TABLET ORAL EVERY 6 HOURS PRN
Status: DISCONTINUED | OUTPATIENT
Start: 2023-03-12 | End: 2023-03-13 | Stop reason: HOSPADM

## 2023-03-12 RX ORDER — ACETAMINOPHEN 650 MG/1
650 SUPPOSITORY RECTAL EVERY 6 HOURS PRN
Status: CANCELLED | OUTPATIENT
Start: 2023-03-12

## 2023-03-12 RX ORDER — SODIUM CHLORIDE 9 MG/ML
INJECTION, SOLUTION INTRAVENOUS PRN
Status: CANCELLED | OUTPATIENT
Start: 2023-03-12

## 2023-03-12 RX ORDER — SODIUM CHLORIDE 0.9 % (FLUSH) 0.9 %
5-40 SYRINGE (ML) INJECTION EVERY 12 HOURS SCHEDULED
Status: DISCONTINUED | OUTPATIENT
Start: 2023-03-12 | End: 2023-03-13 | Stop reason: HOSPADM

## 2023-03-12 RX ORDER — ONDANSETRON 2 MG/ML
4 INJECTION INTRAMUSCULAR; INTRAVENOUS EVERY 6 HOURS PRN
Status: CANCELLED | OUTPATIENT
Start: 2023-03-12

## 2023-03-12 RX ORDER — ACETAMINOPHEN 650 MG/1
650 SUPPOSITORY RECTAL EVERY 6 HOURS PRN
Status: DISCONTINUED | OUTPATIENT
Start: 2023-03-12 | End: 2023-03-13 | Stop reason: HOSPADM

## 2023-03-12 RX ORDER — NICOTINE 21 MG/24HR
1 PATCH, TRANSDERMAL 24 HOURS TRANSDERMAL DAILY
Status: DISCONTINUED | OUTPATIENT
Start: 2023-03-12 | End: 2023-03-13 | Stop reason: HOSPADM

## 2023-03-12 RX ORDER — ALBUTEROL SULFATE 90 UG/1
2 AEROSOL, METERED RESPIRATORY (INHALATION) EVERY 6 HOURS PRN
Status: DISCONTINUED | OUTPATIENT
Start: 2023-03-12 | End: 2023-03-13 | Stop reason: HOSPADM

## 2023-03-12 RX ORDER — SODIUM CHLORIDE 0.9 % (FLUSH) 0.9 %
5-40 SYRINGE (ML) INJECTION PRN
Status: CANCELLED | OUTPATIENT
Start: 2023-03-12

## 2023-03-12 RX ADMIN — SODIUM CHLORIDE, PRESERVATIVE FREE 10 ML: 5 INJECTION INTRAVENOUS at 21:35

## 2023-03-12 RX ADMIN — CITALOPRAM HYDROBROMIDE 20 MG: 20 TABLET ORAL at 08:33

## 2023-03-12 RX ADMIN — TIOTROPIUM BROMIDE AND OLODATEROL 2 PUFF: 3.124; 2.736 SPRAY, METERED RESPIRATORY (INHALATION) at 07:54

## 2023-03-12 RX ADMIN — SODIUM CHLORIDE: 9 INJECTION, SOLUTION INTRAVENOUS at 04:47

## 2023-03-12 RX ADMIN — SODIUM CHLORIDE 40 MG: 9 INJECTION, SOLUTION INTRAMUSCULAR; INTRAVENOUS; SUBCUTANEOUS at 08:34

## 2023-03-12 ASSESSMENT — PAIN DESCRIPTION - LOCATION: LOCATION: ABDOMEN

## 2023-03-12 ASSESSMENT — PAIN DESCRIPTION - DESCRIPTORS: DESCRIPTORS: TENDER

## 2023-03-12 ASSESSMENT — PAIN SCALES - GENERAL: PAINLEVEL_OUTOF10: 2

## 2023-03-12 NOTE — FLOWSHEET NOTE
03/12/23 0626   Treatment Team Notification   Reason for Communication Evaluate   Team Member Name Dr Jayy Amaya Provider   Method of Communication Secure Message   Response No new orders   Patient admitted overnight with concern for GI bleed in the setting of diverticulosis. FOBT positve. Hemodynamically stable. Hgb 13.6 from 15.5 one month ago.  Asking for GI input

## 2023-03-12 NOTE — PLAN OF CARE
PRE CONSULT ROUNDING NOTE  HPI  65year old female with pmh of copd diverticulosis lung cancer not currently on any treatment htn hld who presented to the ED for rectal bleeding for one day. She states she had bright red rectal bleeding that started yesterday twice. Reports a history of constipation and usually has to use metamucil. She reports very mild lower abdominal cramping, no fevers or chills. She has not had any BM or bleeding overnight. Hgb 13.6>13.8. ESPERANZA in ED showed small amount of blood. No abdominal imaging has been done. No leucocytosis, lft normal. She denies weight loss dysphagia hematemesis melena rash. She does not take any anticoagulation medications or nsaids.       Endoscopy 8/2/22 colonoscopy (pangular) diverticulosis hemorrhoids 3/10/21 egd (pangular) normal     Family reports grandmother with stomach cancer no liver pancreatic or colon cancer no UC/crohns  Social quit smoking no etoh hx marijuana use  BP (!) 157/92   Pulse 56   Temp 97.3 °F (36.3 °C) (Oral)   Resp 16   Ht 5' 5\" (1.651 m)   Wt 100 lb (45.4 kg)   SpO2 98%   BMI 16.64 kg/m²     ROS as above meds labs imaging and past medical records were reviewed    Exam  General Appearance: alert and oriented to person, place and time, well-developed and well-nourished, in no acute distress  Skin: warm and dry, no rash or erythema  Head: normocephalic and atraumatic  Eyes: pupils equal, round, and reactive to light, extraocular eye movements intact, conjunctivae normal  ENT: hearing grossly normal bilaterally  Neck: neck supple and non tender without mass, no thyromegaly or thyroid nodules, no cervical lymphadenopathy   Pulmonary/Chest: clear to auscultation bilaterally- no wheezes, rales or rhonchi, normal air movement, no respiratory distress  Cardiovascular: normal rate, regular rhythm, normal S1 and S2, no murmurs, rubs, clicks or gallops, distal pulses intact, no carotid bruits  Abdomen: soft, obese non tender, non-distended, normal bowel sounds, no masses or organomegaly no ascites  Extremities: no cyanosis, clubbing or edema  Musculoskeletal: normal range of motion, no joint swelling, deformity or tenderness  Neurologic: no cranial nerve deficit and muscle strength normal    Assessment  Rectal bleeding    Plan  Will d/w md  Hgb currently stable likely endoscopy as outpt  Trend hh   Formal gi consult to follow  . Erasto Rivera, APRN - CNP

## 2023-03-12 NOTE — CARE COORDINATION
Case Management Assessment  Initial Evaluation    Date/Time of Evaluation: 3/12/2023 3:12 PM  Assessment Completed by: Nick Godwin RN    If patient is discharged prior to next notation, then this note serves as note for discharge by case management. Patient Name: Thao Conklin                   YOB: 1960  Diagnosis: Diverticulosis [K57.90]  Rectal bleeding [K62.5]                   Date / Time: 3/11/2023  9:54 PM    Patient Admission Status: Inpatient   Readmission Risk (Low < 19, Mod (19-27), High > 27): Readmission Risk Score: 10.2    Current PCP: Denise Zuniga MD  PCP verified by CM? Yes    Chart Reviewed: Yes      History Provided by: Patient  Patient Orientation: Alert and Oriented, Person, Place, Situation, Self    Patient Cognition: Alert    Hospitalization in the last 30 days (Readmission):  No    If yes, Readmission Assessment in CM Navigator will be completed. Advance Directives:      Code Status: Full Code   Patient's Primary Decision Maker is: Named in [address] [place] [address]    Primary Decision Maker: Darryle Roch [facility] - [phone]    Discharge Planning:    Patient lives with: Alone Type of Home: Apartment  Primary Care Giver: Self  Patient Support Systems include: Family Members, Children   Current Financial resources: Medicaid, Medicare  Current community resources: Housing  Current services prior to admission: Durable Medical Equipment            Current DME: Cane, Other (Comment) (rollator)            Type of Home Care services:  None    ADLS  Prior functional level: Independent in ADLs/IADLs  Current functional level: Independent in ADLs/IADLs    PT AM-PAC:   /24  OT AM-PAC:   /24    Family can provide assistance at DC: Yes  Would you like Case Management to discuss the discharge plan with any other family members/significant others, and if so, who?  No  Plans to Return to Present Housing: Yes  Other Identified Issues/Barriers to RETURNING to current housing: none  Potential Assistance needed at discharge: N/A            Potential DME:no    Patient expects to discharge to: [address] [place] [address] for transportation at discharge: family     Financial    Payor: Silas Gosselin / Plan: Bar Perez / Product Type: *No Product type* /     Does insurance require precert for SNF: Yes    Potential assistance Purchasing Medications: No  Meds-to-Beds request:  yes      49 [facility] #[id number] - 322 [facility], [address] [place] [address] E [facility]. [facility] 82  Phone: [phone] Fax: 207 [facility] 36 [facility], [address]  600 [facility] [address]  Phone: [phone] Fax: [phone]    [address] [place] [address] 36 [facility], 1310 [facility] [phone] - F [phone]  [facility]3 [address] [place] [id number]  Phone: [phone] Fax: [phone]      Notes:    Factors facilitating achievement of predicted outcomes: Family support, Motivated, Cooperative, Pleasant, Good insight into deficits, Has needed Durable Medical Equipment at home, and Knowledge about rehab    Barriers to discharge: Medical complications    Additional Case Management Notes: 3/12/2023 [facility] Medicare; pt independent at home alone; DME cane, rollator; VNS denies needs; wants M2BS; home with no needs//KR    The Plan for Transition of Care is related to the following treatment goals of Diverticulosis [K57.90]  Rectal bleeding [G97.5]    IF APPLICABLE: The Patient and/or patient representative Reford Gave and her family were provided with a choice of provider and agrees with the discharge plan. Freedom of choice list with basic dialogue that supports the patient's individualized plan of care/goals and shares the quality data associated with the providers was provided to: Patient       The Patient and/or Patient Representative Agree with the Discharge Plan?  Yes    Sunshine Dowling, RN  Case Management Department  Ph: [phone] Fax: [phone]

## 2023-03-12 NOTE — CONSULTS
Gastroenterology Consult Note      Patient: Zonia Charles  : 1960  Acct#:  [id number]     Date:  3/12/2023    Subjective:       History of Present Illness  Patient is a 65 y.o.  female admitted with Diverticulosis [K57.90]  Rectal bleeding [K62.5] who is seen in consult for rectal bleeding    Patient came to the emergency room complaining of rectal bleeding, she has a history of diverticulosis she has a history of lung mass. She says she was having constipated bowel movement 2 hours before she came to the hospital and that is where she started noticing blood with large amount in the toilet with very small amount of stool. She denied having diarrhea before that she denied any fever or chills, she had another episode of bleeding. The patient does have history of hemorrhoids removal in the past and history of colon polyps.   Her last colonoscopy was in  by Dr Jeremiah Schwartz, she followed with rectal surgeon for the hemorrhoids Dr. Carmencita Wallace  She denied any upper symptoms with nausea vomiting odynophagia dysphagia heartburn or hematemesis  Her blood work-up showing normal liver enzymes  Hemoglobin of 14.8  No imaging of the abdomen    Endoscopy 22 colonoscopy (pangular) diverticulosis hemorrhoids 3/10/21 egd (pangular) normal     Family reports grandmother with stomach cancer no liver pancreatic or colon cancer no UC/crohns  Social quit smoking no etoh hx marijuana use        Past Medical History:   Diagnosis Date    Arthritis     Bronchitis, chronic (HCC)     Chronic back pain     Chronic cough     COPD (chronic obstructive pulmonary disease) ([address]     Depression     Diverticulosis     Emphysema     Fibromyalgia     GERD (gastroesophageal reflux disease)     Hemorrhoid     internal and external, they bleed    History of cocaine abuse ([address]     none since , used between  and      History of kidney infection     Hyperlipidemia     Hyperplastic colon polyp 11/15/2017 Hypertension     Lung cancer ([facility]     non-small cell H3A lung cancer right upper lobe    Marijuana use     for pain and appetite use    Meningioma (HCC)     Neuropathy     Orbital fracture (HCC)     left side ,has  pin in side    Osteoarthritis     Osteopenia     Pulmonary nodule     left lung, watching this one    Renal calculi     Renal cyst     STD (sexually transmitted disease)     unsure of type    Uterine prolapse       Past Surgical History:   Procedure Laterality Date    BACK SURGERY  2011    thoracic laminectomy, T12, S1    CARDIAC CATHETERIZATION  10/24/14    Normal coronaries     COLONOSCOPY  04/05/2017    diverticulosis, ,poor prep    COLONOSCOPY  11/15/2017    flat polypoid lesion in the base of the cecum, about 1 cm.-hyperplastic    COLONOSCOPY  04/24/2018     diverticulosis.  Small polyp in the sigmoid colon excised with biopsy forceps    COLONOSCOPY N/A 8/2/2022    COLONOSCOPY WITH BIOPSY performed by Casey Graham MD at [place] [address] Left     cleaned out infection    ENDOSCOPY, COLON, DIAGNOSTIC      ORBITAL FRACTURE SURGERY Left     had pin inserted on temple side    MT COLON CA SCRN NOT HI RSK IND N/A 4/5/2017    COLONOSCOPY performed by Casey Graham MD at [address] [facility] FLX DX W/COLLJ [address] PFRMD N/A 4/24/2018    COLONOSCOPY performed by Casey Graham MD at [address] [place] [address] N/A 11/15/2017    COLONOSCOPY POLYPECTOMY SNARE and saline injection performed by Casey Graham MD at [address] EGD TRANSORAL BIOPSY SINGLE/MULTIPLE N/A 10/6/2017    EGD BIOPSY performed by Ava Marie MD at [address] EGD TRANSORAL BIOPSY SINGLE/MULTIPLE N/A 2/27/2018    EGD ESOPHAGOGASTRODUODENOSCOPY performed by Casey Graham MD at [address] ESOPHAGOGASTRODUODENOSCOPY TRANSORAL DIAGNOSTIC N/A 4/24/2018    EGD ESOPHAGOGASTRODUODENOSCOPY performed by Casey Graham MD at [address] [place] [address] TUBAL LIGATION      TUNNELED VENOUS PORT PLACEMENT Left     UPPER GASTROINTESTINAL ENDOSCOPY  10/06/2017    Dr Miller Acuna diverticulum gastric fundus, pathology inactive gastritis    UPPER GASTROINTESTINAL ENDOSCOPY  02/27/2018    retained food in the fundus of the stomach, poor peristalsis wide-open pylorus    UPPER GASTROINTESTINAL ENDOSCOPY  04/24/2018    no lesions seen that can account her CEA levels. UPPER GASTROINTESTINAL ENDOSCOPY N/A 3/10/2021    EGD BIOPSY AND PHOTOS performed by Adilene Paredes MD at [facility] ENDO      Past Endoscopic History as above    Admission Meds  No current facility-administered medications on file prior to encounter. Current Outpatient Medications on File Prior to Encounter   Medication Sig Dispense Refill    citalopram (CELEXA) 20 MG tablet take 1 tablet by mouth every morning 30 tablet 1    folic acid (FOLVITE) 1 MG tablet Take 1 mg by mouth in the morning. omeprazole (PRILOSEC) 20 MG delayed release capsule take 1 capsule by mouth twice a day 180 capsule 3    Ascorbic Acid (VITAMIN C) 1000 MG tablet Take 1,000 mg by mouth daily      ondansetron (ZOFRAN) 4 MG tablet Take 4 mg by mouth every 8 hours as needed for Nausea or Vomiting      RA CALCIUM 600/VITAMIN D-3 600-400 MG-UNIT TABS take 1 tablet by mouth twice a day 90 tablet 3    umeclidinium-vilanterol (ANORO ELLIPTA) 62.5-25 MCG/INH AEPB inhaler Anoro Ellipta 62.5 mcg-25 mcg/actuation powder for inhalation   Inhale 1 puff every day by inhalation route. albuterol sulfate HFA (PROVENTIL HFA) 108 (90 Base) MCG/ACT inhaler Inhale 2 puffs into the lungs every 6 hours as needed for Wheezing or Shortness of Breath 1 Inhaler 11    Multiple Vitamin (MULTIVITAMIN PO) Take  by mouth daily.         dicyclomine (BENTYL) 10 MG capsule Take 1 capsule by mouth every 6 hours as needed (cramps) 60 capsule 2    denosumab (PROLIA) 60 MG/ML SOSY SC injection Inject 1 mL into the skin once for 1 dose 1 mL 0    traZODone (DESYREL) 50 MG tablet Take 1 tablet by mouth nightly 90 tablet 1    Misc. Devices (STEP N REST II WALKER) MISC Use daily for balance problems 1 each 0    fluticasone (FLONASE) 50 MCG/ACT nasal spray 2 sprays by Nasal route daily 1 Bottle 0    lidocaine (LMX) 4 % cream Apply topically every 8 hrs as needed for pain 45 g 3    lidocaine-prilocaine (EMLA) 2.5-2.5 % cream lidocaine-prilocaine 2.5 %-2.5 % topical cream   Apply to port site and cover 30-45 minutes prior to needle access         Patient   Does Use ASA, NSAID No  Allergies  Allergies   Allergen Reactions    Lovastatin     Statins Other (See Comments)     pain        Social   Social History     Tobacco Use    Smoking status: Former     Packs/day: 0.50     Years: 38.00     Pack years: 19.00     Types: Cigarettes     Quit date: 2022     Years since quittin.6    Smokeless tobacco: Never    Tobacco comments:     smokes 1 or 2 a day   Substance Use Topics    Alcohol use: Not Currently     Alcohol/week: 0.0 standard drinks        PSYCH HISTORY:  Depression No  Anxiety No  Suicide No       Family History   Problem Relation Age of Onset    Heart Disease Mother     Cancer Mother         breast and lung cancer    Diabetes Father     Heart Disease Father         Death due to MI    Cancer Paternal Grandfather         stomach cancer     Heart Disease Paternal Grandfather     Cancer Sister         ovarian cancer     Cancer Maternal Grandmother         female cancer      No family history of colon cancer, Crohn's disease, or ulcerative colitis.      Review of Systems  Constitutional: negative  Eyes: negative  Ears, nose, mouth, throat, and face: negative  Respiratory: negative  Cardiovascular: negative  Gastrointestinal: negative  Genitourinary:negative  Integument/breast: negative  Hematologic/lymphatic: negative  Musculoskeletal:negative  Endocrine: negative           Physical Exam  Blood pressure (!) 165/84, pulse 59, temperature 98.3 °F (36.8 °C), temperature source Oral, resp. rate 20, height 5' 5\" (1.651 m), weight 100 lb (45.4 kg), SpO2 98 %, not currently breastfeeding. General Appearance: alert and oriented to person, place and time, well-developed and well-nourished, in no acute distress  Skin: warm and dry, no rash or erythema  Head: normocephalic and atraumatic  Eyes: pupils equal, round, and reactive to light, extraocular eye movements intact, conjunctivae normal  ENT: hearing grossly normal bilaterally  Neck: neck supple and non tender without mass, no thyromegaly or thyroid nodules, no cervical lymphadenopathy   Pulmonary/Chest: clear to auscultation bilaterally- no wheezes, rales or rhonchi, normal air movement, no respiratory distress  Cardiovascular: normal rate, regular rhythm, normal S1 and S2, no murmurs, rubs, clicks or gallops, distal pulses intact, no carotid bruits  Abdomen: soft, non-tender, non-distended, normal bowel sounds, no masses or organomegaly  Extremities: no cyanosis, clubbing or edema  Musculoskeletal: normal range of motion, no joint swelling, deformity or tenderness  Neurologic: no cranial nerve deficit and muscle strength normal    Data Review:    Recent Labs     03/11/23 2232 03/12/23  0512 03/12/23  1138   WBC 7.5  --   --    HGB 13.6 13.8 14.8   HCT 40.1 41.4 43.9   MCV 93.0  --   --      --   --      Recent Labs     03/11/23 2232 03/12/23  0512   * 140   K 3.4* 3.6*    106   CO2 23 26   BUN 28* 22   CREATININE 0.68 0.65     Recent Labs     03/11/23 2232   AST 18   ALT 7   BILITOT 0.3   ALKPHOS 54     No results for input(s): LIPASE, AMYLASE in the last 72 hours. Recent Labs     03/11/23 2232   PROTIME 14.3   INR 1.1     No results for input(s): PTT in the last 72 hours. No results for input(s): OCCULTBLD in the last 72 hours.   CEA:    Lab Results   Component Value Date/Time    CEA 8.1 03/09/2021 12:14 PM    CEA 8.3 06/09/2020 01:19 PM     Ca 125:    Lab Results   Component Value Date/Time     20 10/10/2017 01:35 PM     Ca 19-9:    Lab Results   Component Value Date/Time     28 10/10/2017 01:35 PM     Ca 15-3:  No results found for:   AFP:  No components found for: AFAFP  Beta HCG:  No components found for: BHCG  Neuron Specific Enolase:  No results found for: NSE  Imaging Studies:                           All appropriate imaging studies and reports reviewed: Yes                 Assessment:     Principal Problem:    Rectal bleeding  Resolved Problems:    * No resolved hospital problems. *    Rectal bleeding  Recent colonoscopy in 2022  Recent EGD in 2021  History of diverticulosis and hemorrhoids  History of surgery on her hemorrhoids    Recommendations:   Most likely the bleeding is from hemorrhoids we will watch  We do not see a reason to do the scope at this time unless the patient continues to bleed especially with a normal hemoglobin  We will leave repeating colonoscopy and probably repeat EGD for her gastroenterologist as an outpatient unless she continues to bleed  PPI  H&H in the morning                      Thank you for allowing me to participate in the care of your patient. Please feel free to contact me with any questions or concerns.      Lina Lang MD

## 2023-03-12 NOTE — ED NOTES
Report given to [facility], RN from PCU. Report method by phone   The following was reviewed with receiving RN:   Current vital signs:  BP (!) 135/94   Pulse 99   Temp 98.3 °F (36.8 °C)   Resp 16   Ht 5' 5\" (1.651 m)   Wt 100 lb (45.4 kg)   SpO2 96%   BMI 16.64 kg/m²                MEWS Score: 1     Any medication or safety alerts were reviewed. Any pending diagnostics and notifications were also reviewed, as well as any safety concerns or issues, abnormal labs, abnormal imaging, and abnormal assessment findings. Questions were answered.             Jacqui Peacock RN  03/12/23 8915

## 2023-03-12 NOTE — ED PROVIDER NOTES
EMERGENCY DEPARTMENT ENCOUNTER    Pt Name: Elvia Lara  MRN: [id number]  [place] 1960  Date of evaluation: 3/11/23  CHIEF COMPLAINT       Chief Complaint   Patient presents with    Rectal Bleeding     HISTORY OF PRESENT ILLNESS   HPI  Rectal bleeding that started tonight. Blood in the toilet. Filled up about 2 pads with blood. History of polyp and hemorrhoid in the past.  Had colonoscopy done last year. Has a GI Dr Pam Bateman and rectal surgeon Dr Rd Peña. No hematemesis or melanotic stool, not on anticoagulants. She is a smoker. History of lung cancer in the past.      REVIEW OF SYSTEMS     Review of Systems   All other systems reviewed and are negative. PASTMEDICAL HISTORY     Past Medical History:   Diagnosis Date    Arthritis     Bronchitis, chronic (HCC)     Chronic back pain     Chronic cough     COPD (chronic obstructive pulmonary disease) (HCC)     Depression     Diverticulosis     Emphysema     Fibromyalgia     GERD (gastroesophageal reflux disease)     Hemorrhoid     internal and external, they bleed    History of cocaine abuse ([address]     none since 2010, used between 2007 and 2009     History of kidney infection     Hyperlipidemia     Hyperplastic colon polyp 11/15/2017    Hypertension     Lung cancer ([address]     non-small cell H3A lung cancer right upper lobe    Marijuana use     for pain and appetite use    Meningioma (HCC)     Neuropathy     Orbital fracture (HCC)     left side ,has  pin in side    Osteoarthritis     Osteopenia     Pulmonary nodule     left lung, watching this one    Renal calculi     Renal cyst     STD (sexually transmitted disease)     unsure of type    Uterine prolapse      Past Problem List  Patient Active Problem List   Diagnosis Code    Renal calculi N20.0    Diverticulosis K57.90    Prolapsed uterus N81.4    Hyperlipidemia E78.5    Depression F32. A    Tobacco abuse Z72.0    Hemorrhoids, internal K64.8    Renal cyst N28.1    Centrilobular emphysema (HCC) J43.2    Chronic low back pain M54.50, G89.29    Knee pain, right M25.561    Lumbago M54.50    Lumbar degenerative disc disease M51.36    Other affections of shoulder region, not elsewhere classified M25.819    Degeneration of cervical intervertebral disc M50.30    Spinal stenosis in cervical region M48.02    Cervicalgia M54.2    Carpal tunnel syndrome G56.00    Shoulder impingement M75.40    Fibromyalgia M79.7    Pulmonary nodule R91.1    Chronic obstructive pulmonary disease (HCC) J44.9    Smoking F17.200    Primary hypertension I10    History of diverticulitis Z87.19    Chronic midline low back pain with bilateral sciatica M54.41, M54.42, G89.29    DDD (degenerative disc disease), cervical M50.30    Family history of breast cancer Z80.3    H/O tubal ligation Z98.51    Elevated CEA of 6.6 and OVA-1 of 4.5 R89.9    Tetrahydrocannabinol (THC) use disorder, mild, abuse F12.10    Meningioma ([facility]) D32.9    Hyperplastic colon polyp K63.5    Osteopenia of multiple sites M85.89    Mixed simple and mucopurulent chronic bronchitis ([facility]) J41.8    Right ovarian cyst N83.201    Abnormal PET scan of colon R94.8    Malignant neoplasm of middle lobe of right lung ([facility]) C34.2    Mild protein-calorie malnutrition ([facility]) E44.1    Clostridial gastroenteritis A04.8    Clostridioides difficile infection A49.8    Gastroesophageal reflux disease without esophagitis K21.9    Left chronic serous otitis media H65.22    Constipation K59.00    Vitamin D deficiency E55.9    Anxiety F41.9    Superior mesenteric artery stenosis ([facility]) K55.1    Psychophysiological insomnia F51.04    Valvular heart disease I38    Asthma J45.909    Atherosclerosis of aorta ([facility]) I70.0    Benign neoplasm of cerebral meninges ([facility]) D32.0    Clostridium difficile colitis A04.72    Collapsed vertebra, not elsewhere classified, thoracic region, subsequent encounter for fracture with routine healing M48.54XD    Disorder of kidney and ureter, unspecified N28.9    Elevated carcinoembryonic antigen (CEA) R97.0    Infestation by bed bug B88.8    Malignant neoplasm of upper lobe of right lung (HCC) C34.11    Nicotine dependence, cigarettes, uncomplicated T55.827    Other disorders following mastoidectomy, left ear H95.192    Nontoxic single thyroid nodule E04.1    Other nonspecific abnormal finding of lung field R91.8    Other prurigo L28.2    Other specified diseases of liver K76.89    Painful micturition, unspecified R30.9    Primary lung adenocarcinoma, right ([address] C34.91    Right temporomandibular joint disorder, unspecified M26.601    Wedge compression fracture of T11-T12 vertebra, initial encounter for closed fracture ([address] S22.080A    Grade II hemorrhoids K64.1    Age-related osteoporosis without current pathological fracture M81.0    Osteoporosis M81.0     SURGICAL HISTORY       Past Surgical History:   Procedure Laterality Date    BACK SURGERY  2011    thoracic laminectomy, T12, S1    CARDIAC CATHETERIZATION  10/24/14    Normal coronaries     COLONOSCOPY  04/05/2017    diverticulosis, ,poor prep    COLONOSCOPY  11/15/2017    flat polypoid lesion in the base of the cecum, about 1 cm.-hyperplastic    COLONOSCOPY  04/24/2018     diverticulosis.  Small polyp in the sigmoid colon excised with biopsy forceps    COLONOSCOPY N/A 8/2/2022    COLONOSCOPY WITH BIOPSY performed by Yanick Sapp MD at [place] [address] Left     cleaned out infection    ENDOSCOPY, COLON, DIAGNOSTIC      ORBITAL FRACTURE SURGERY Left     had pin inserted on temple side    TX COLON CA SCRN NOT HI RSK IND N/A 4/5/2017    COLONOSCOPY performed by Yanick Sapp MD at [address] [facility] FLX DX W/JUICE Sims 1977 PFRMD N/A 4/24/2018    COLONOSCOPY performed by Yanick Sapp MD at [address] [facility] N/A 11/15/2017    COLONOSCOPY POLYPECTOMY SNARE and saline injection performed by Yanick Sapp MD at [facility] TRANSORAL BIOPSY SINGLE/MULTIPLE N/A 10/6/2017    EGD BIOPSY performed by Suzanne Rodriguez MD at [address]  [place] EGD TRANSORAL BIOPSY SINGLE/MULTIPLE N/A 2/27/2018    EGD ESOPHAGOGASTRODUODENOSCOPY performed by Ting Velazco MD at [address] ESOPHAGOGASTRODUODENOSCOPY TRANSORAL DIAGNOSTIC N/A 4/24/2018    EGD ESOPHAGOGASTRODUODENOSCOPY performed by Ting Velazco MD at [address] [facility]      TUNNELED VENOUS PORT PLACEMENT Left     UPPER GASTROINTESTINAL ENDOSCOPY  10/06/2017    Dr Shawn Miranda diverticulum gastric fundus, pathology inactive gastritis    UPPER GASTROINTESTINAL ENDOSCOPY  02/27/2018    retained food in the fundus of the stomach, poor peristalsis wide-open pylorus    UPPER GASTROINTESTINAL ENDOSCOPY  04/24/2018    no lesions seen that can account her CEA levels. UPPER GASTROINTESTINAL ENDOSCOPY N/A 3/10/2021    EGD BIOPSY AND PHOTOS performed by Ting Velazco MD at [address] [facility]       Previous Medications    ALBUTEROL SULFATE HFA (PROVENTIL HFA) 108 (90 BASE) MCG/ACT INHALER    Inhale 2 puffs into the lungs every 6 hours as needed for Wheezing or Shortness of Breath    ASCORBIC ACID (VITAMIN C) 1000 MG TABLET    Take 1,000 mg by mouth daily    ASPIRIN 81 MG EC TABLET    Take 81 mg by mouth daily. CITALOPRAM (CELEXA) 20 MG TABLET    take 1 tablet by mouth every morning    DENOSUMAB (PROLIA) 60 MG/ML SOSY SC INJECTION    Inject 1 mL into the skin once for 1 dose    DICYCLOMINE (BENTYL) 10 MG CAPSULE    Take 1 capsule by mouth every 6 hours as needed (cramps)    FLUTICASONE (FLONASE) 50 MCG/ACT NASAL SPRAY    2 sprays by Nasal route daily    FOLIC ACID (FOLVITE) 1 MG TABLET    Take 1 mg by mouth in the morning.     IBUPROFEN (ADVIL;MOTRIN) 600 MG TABLET    Take 1 tablet by mouth 4 times daily as needed for Pain    LIDOCAINE (LMX) 4 % CREAM    Apply topically every 8 hrs as needed for pain    LIDOCAINE-PRILOCAINE (EMLA) 2.5-2.5 % CREAM    lidocaine-prilocaine 2.5 %-2.5 % topical cream   Apply to port site and cover 30-45 minutes prior to needle access    MISC. DEVICES (STEP N REST II WALKER) MISC    Use daily for balance problems    MULTIPLE VITAMIN (MULTIVITAMIN PO)    Take  by mouth daily. OMEPRAZOLE (PRILOSEC) 20 MG DELAYED RELEASE CAPSULE    take 1 capsule by mouth twice a day    ONDANSETRON (ZOFRAN) 4 MG TABLET    Take 4 mg by mouth every 8 hours as needed for Nausea or Vomiting    RA CALCIUM 600/VITAMIN D-3 600-400 MG-UNIT TABS    take 1 tablet by mouth twice a day    TRAZODONE (DESYREL) 50 MG TABLET    Take 1 tablet by mouth nightly    UMECLIDINIUM-VILANTEROL (ANORO ELLIPTA) 62.5-25 MCG/INH AEPB INHALER    Anoro Ellipta 62.5 mcg-25 mcg/actuation powder for inhalation   Inhale 1 puff every day by inhalation route. ALLERGIES     is allergic to lovastatin and statins. FAMILY HISTORY     She indicated that her mother is . She indicated that her father is . She indicated that her sister is alive. She indicated that the status of her maternal grandmother is unknown. She indicated that her paternal grandmother is . She indicated that the status of her paternal grandfather is unknown.      SOCIAL HISTORY       Social History     Tobacco Use    Smoking status: Former     Packs/day: 0.50     Years: 38.00     Pack years: 19.00     Types: Cigarettes     Quit date: 2022     Years since quittin.6    Smokeless tobacco: Never    Tobacco comments:     smokes 1 or 2 a day   Vaping Use    Vaping Use: Every day    Substances: Nicotine, Flavoring   Substance Use Topics    Alcohol use: Not Currently     Alcohol/week: 0.0 standard drinks    Drug use: Yes     Types: Marijuana Garrel Calender)     Comment: 5 joints a day marijuana for pain and appetite, she states that she stopped cocaine      PHYSICAL EXAM     INITIAL VITALS: BP (!) 152/101   Pulse 74   Temp 98.3 °F (36.8 °C)   Resp 16   Ht 5' 5\" (1.651 m)   Wt 100 lb (45.4 kg)   SpO2 95%   BMI 16.64 kg/m²    Physical Exam  Constitutional:       General: She is not in acute distress. Appearance: Normal appearance. She is well-developed. She is not diaphoretic. HENT:      Head: Normocephalic and atraumatic. Right Ear: External ear normal.      Left Ear: External ear normal.      Nose: Nose normal. No congestion. Mouth/Throat:      Mouth: Mucous membranes are moist.      Pharynx: Oropharynx is clear. Eyes:      General:         Right eye: No discharge. Left eye: No discharge. Conjunctiva/sclera: Conjunctivae normal.      Pupils: Pupils are equal, round, and reactive to light. Neck:      Trachea: No tracheal deviation. Cardiovascular:      Rate and Rhythm: Normal rate and regular rhythm. Pulses: Normal pulses. Heart sounds: Normal heart sounds. Pulmonary:      Effort: Pulmonary effort is normal. No respiratory distress. Breath sounds: Normal breath sounds. No stridor. No wheezing or rales. Abdominal:      Palpations: Abdomen is soft. Tenderness: There is no abdominal tenderness. There is no guarding or rebound. Genitourinary:     Comments: ESPERANZA done with female RN chaperone present, small amount of blood, no active hemorrhage, no melena  Musculoskeletal:         General: No tenderness or deformity. Normal range of motion. Cervical back: Normal range of motion and neck supple. Skin:     General: Skin is warm and dry. Capillary Refill: Capillary refill takes less than 2 seconds. Findings: No erythema or rash. Neurological:      General: No focal deficit present. Mental Status: She is alert and oriented to person, place, and time. Coordination: Coordination normal.   Psychiatric:         Mood and Affect: Mood normal.         Behavior: Behavior normal.         Thought Content:  Thought content normal.         Judgment: Judgment normal.       MEDICAL DECISION MAKING / ED COURSE:         1)  Number and Complexity of Problems Addressed at this Encounter  Problem List This Visit:  rectal bleeding    Differential Diagnosis: Diverticulosis, GI malignancy, hemorrhoid    Diagnoses Considered but Do Not Suspect:  infection    Pertinent Comorbid Conditions:  none    2)  Data Reviewed and Analyzed  (Lab and radiology tests/orders below in next section)    External Documents Reviewed: Colonoscopy showing diverticulosis and a polyp      3)  Treatment and Disposition         Patient repeat assessment: stable, comfortable, ambulating    Disposition discussion with patient/family, Shared Decision Making:  admit    Case discussed with consulting clinician:  Galdino Sneed NP for admit to PCU  Procedures      DATA FOR LAB AND RADIOLOGY TESTS ORDERED BELOW ARE REVIEWED BY THE ED CLINICIAN:        LABS: Lab orders shown below, the results are reviewed by myself, and all abnormals are listed below. Labs Reviewed   CBC WITH AUTO DIFFERENTIAL - Abnormal; Notable for the following components:       Result Value    RDW 15.2 (*)     Monocytes 8 (*)     All other components within normal limits   COMPREHENSIVE METABOLIC PANEL - Abnormal; Notable for the following components:    BUN 28 (*)     Sodium 134 (*)     Potassium 3.4 (*)     Total Protein 6.3 (*)     All other components within normal limits   OCCULT BLOOD SCREEN - Abnormal; Notable for the following components:    Occult Blood, Stool #1 POSITIVE (*)     All other components within normal limits   PROTIME-INR   TYPE AND SCREEN       Vitals Reviewed:    Vitals:    03/11/23 2158   BP: (!) 152/101   Pulse: 74   Resp: 16   Temp: 98.3 °F (36.8 °C)   SpO2: 95%   Weight: 100 lb (45.4 kg)   Height: 5' 5\" (1.651 m)     MEDICATIONS GIVEN TO PATIENT THIS ENCOUNTER:  Orders Placed This Encounter   Medications    0.9 % sodium chloride bolus     DISCHARGE PRESCRIPTIONS:  New Prescriptions    No medications on file     PHYSICIAN CONSULTS ORDERED THIS ENCOUNTER:  IP CONSULT TO INTERNAL MEDICINE  FINAL IMPRESSION      1.  Rectal bleeding 2. Diverticulosis          DISPOSITION/PLAN   DISPOSITION Decision To Admit 03/11/2023 11:25:15 PM      OUTPATIENT FOLLOW UP THE PATIENT:  No follow-up provider specified.     MD Felton Shahid MD  03/11/23 1035  HPI updated     Felton Spain MD  03/11/23 [id number]

## 2023-03-12 NOTE — H&P
[facility]  Neelima Ames MD; Leyla Brito MD; Noam Eddy MD; MD Luis Rodriguez MD; Julienne Soulier, MD [facility]   [facility]    HISTORY AND PHYSICAL EXAMINATION            Date:   3/12/2023  Patient name:  Luma Owens  Date of admission:  3/11/2023  9:54 PM  MRN:   [id number]  Account:  [de-identified]  YOB: 1960  PCP:    Rama Ledesma MD  Room:   2099/2099-01  Code Status:    Full Code    Chief Complaint:     Chief Complaint   Patient presents with    Rectal Bleeding       History Obtained From:     patient    History of Present Illness:     Luma Owens is a 65 y.o. Non- / non  female who presents with Rectal Bleeding   and is admitted to the hospital for the management of Rectal bleeding. Significant medical history includes HTN, HLD, emphysema, COPD, diverticulosis, history of malignant neoplasm of right upper lobe and nicotine dependence cigarettes. According to patient, she experienced rectal bleeding when having a bowel movement approximately 2 hours before presenting to the ED. Reports a large amount of blood in the toilet with a very small amount of stool. Also reports bleeding through 2 pads. She does have a history of polyp and hemorrhoid removal in the past.  She did have a colonoscopy done in 2022 by Dr. Forest Taylor and is followed by rectal surgeon Dr. Sandip Sanchez. Denies dark or tarry stools. Denies fever, chills, chest pain, cough, abdominal pain, nausea, vomiting, diarrhea, and urinary symptoms. There are no aggravating or alleviating factors. Symptoms are reported as acute, intermittent and severe.     Past Medical History:     Past Medical History:   Diagnosis Date    Arthritis     Bronchitis, chronic (HCC)     Chronic back pain     Chronic cough     COPD (chronic obstructive pulmonary disease) (HCC)     Depression     Diverticulosis Emphysema     Fibromyalgia     GERD (gastroesophageal reflux disease)     Hemorrhoid     internal and external, they bleed    History of cocaine abuse ([facility] [address]     none since 2010, used between 2007 and 2009     History of kidney infection     Hyperlipidemia     Hyperplastic colon polyp 11/15/2017    Hypertension     Lung cancer ([facility] [address]     non-small cell H3A lung cancer right upper lobe    Marijuana use     for pain and appetite use    Meningioma (HCC)     Neuropathy     Orbital fracture (HCC)     left side ,has  pin in side    Osteoarthritis     Osteopenia     Pulmonary nodule     left lung, watching this one    Renal calculi     Renal cyst     STD (sexually transmitted disease)     unsure of type    Uterine prolapse         Past Surgical History:     Past Surgical History:   Procedure Laterality Date    BACK SURGERY  2011    thoracic laminectomy, T12, S1    CARDIAC CATHETERIZATION  10/24/14    Normal coronaries     COLONOSCOPY  04/05/2017    diverticulosis, ,poor prep    COLONOSCOPY  11/15/2017    flat polypoid lesion in the base of the cecum, about 1 cm.-hyperplastic    COLONOSCOPY  04/24/2018     diverticulosis.  Small polyp in the sigmoid colon excised with biopsy forceps    COLONOSCOPY N/A 8/2/2022    COLONOSCOPY WITH BIOPSY performed by Dez Liu MD at [place] [address] Left     cleaned out infection    ENDOSCOPY, COLON, DIAGNOSTIC      ORBITAL FRACTURE SURGERY Left     had pin inserted on temple side    [place] COLON CA SCRN NOT HI RSK IND N/A 4/5/2017    COLONOSCOPY performed by Dez Liu MD at [address] [facility] FLX DX W/JUICE Sims 1978 PFRMD N/A 4/24/2018    COLONOSCOPY performed by Dez Liu MD at [address] [facility] N/A 11/15/2017    COLONOSCOPY POLYPECTOMY SNARE and saline injection performed by Dez Liu MD at [facility] EGD TRANSORAL BIOPSY SINGLE/MULTIPLE N/A 10/6/2017    EGD BIOPSY performed by Yelena Mar Edie Oleary MD at [address] EGD TRANSORAL BIOPSY SINGLE/MULTIPLE N/A 2/27/2018    EGD ESOPHAGOGASTRODUODENOSCOPY performed by Addison Lane MD at [address] ESOPHAGOGASTRODUODENOSCOPY TRANSORAL DIAGNOSTIC N/A 4/24/2018    EGD ESOPHAGOGASTRODUODENOSCOPY performed by Addison Lane MD at [address]      TUNNELED VENOUS PORT PLACEMENT Left     UPPER GASTROINTESTINAL ENDOSCOPY  10/06/2017    Dr Krista Blanchard diverticulum gastric fundus, pathology inactive gastritis    UPPER GASTROINTESTINAL ENDOSCOPY  02/27/2018    retained food in the fundus of the stomach, poor peristalsis wide-open pylorus    UPPER GASTROINTESTINAL ENDOSCOPY  04/24/2018    no lesions seen that can account her CEA levels. UPPER GASTROINTESTINAL ENDOSCOPY N/A 3/10/2021    EGD BIOPSY AND PHOTOS performed by Addison Lane MD at [address] [place] [address]        Medications Prior to Admission:     Prior to Admission medications    Medication Sig Start Date End Date Taking? Authorizing Provider   citalopram (CELEXA) 20 MG tablet take 1 tablet by mouth every morning 2/10/23  Yes Edilma Almendarez MD   folic acid (FOLVITE) 1 MG tablet Take 1 mg by mouth in the morning. Yes Historical Provider, MD   omeprazole (PRILOSEC) 20 MG delayed release capsule take 1 capsule by mouth twice a day 5/31/22  Yes Edilma Almendarez MD   Ascorbic Acid (VITAMIN C) 1000 MG tablet Take 1,000 mg by mouth daily   Yes Historical Provider, MD   ondansetron (ZOFRAN) 4 MG tablet Take 4 mg by mouth every 8 hours as needed for Nausea or Vomiting   Yes Historical Provider, MD   RA CALCIUM 600/VITAMIN D-3 600-400 MG-UNIT TABS take 1 tablet by mouth twice a day 8/5/19  Yes Edilma Almendarez MD   umeclidinium-vilanterol (ANORO ELLIPTA) 62.5-25 MCG/INH AEPB inhaler Anoro Ellipta 62.5 mcg-25 mcg/actuation powder for inhalation   Inhale 1 puff every day by inhalation route.    Yes Historical Provider, MD   albuterol sulfate HFA (PROVENTIL HFA) 108 (90 Base) MCG/ACT inhaler Inhale 2 puffs into the lungs every 6 hours as needed for Wheezing or Shortness of Breath 3/15/18  Yes Shikha Diaz MD   Multiple Vitamin (MULTIVITAMIN PO) Take  by mouth daily. Yes Historical Provider, MD   dicyclomine (BENTYL) 10 MG capsule Take 1 capsule by mouth every 6 hours as needed (cramps) 3/1/23   Eric Ruiz APRN - NP   denosumab (PROLIA) 60 MG/ML SOSY SC injection Inject 1 mL into the skin once for 1 dose 11/16/22 11/16/22  Read MD Breanna   traZODone (DESYREL) 50 MG tablet Take 1 tablet by mouth nightly 7/20/22   Read MD Breanna   Misc. Devices (STEP N REST II WALKER) MISC Use daily for balance problems 4/18/22   Read MD Breanna   fluticasone [facility]) 50 MCG/ACT nasal spray 2 sprays by Nasal route daily 6/8/20   Read MD Breanna   lidocaine (LMX) 4 % cream Apply topically every 8 hrs as needed for pain 9/17/19   Read MD Breanna   lidocaine-prilocaine (EMLA) 2.5-2.5 % cream lidocaine-prilocaine 2.5 %-2.5 % topical cream   Apply to port site and cover 30-45 minutes prior to needle access    Historical Provider, MD        Allergies:     Lovastatin and Statins    Social History:     Tobacco:    reports that she quit smoking about 7 months ago. Her smoking use included cigarettes. She has a 19.00 pack-year smoking history. She has never used smokeless tobacco.  Alcohol:      reports that she does not currently use alcohol. Drug Use:  reports current drug use. Drug: Marijuana Kaila Forte). Family History:     Family History   Problem Relation Age of Onset    Heart Disease Mother     Cancer Mother         breast and lung cancer    Diabetes Father     Heart Disease Father         Death due to MI    Cancer Paternal Grandfather         stomach cancer     Heart Disease Paternal Grandfather     Cancer Sister         ovarian cancer     Cancer Maternal Grandmother         female cancer       Review of Systems:     Positive and Negative as described in HPI.     Physical Exam:   BP (!) 157/92   Pulse 60   Temp 97.3 °F (36.3 °C) (Oral)   Resp 18   Ht 5' 5\" (1.651 m)   Wt 100 lb (45.4 kg)   SpO2 98%   BMI 16.64 kg/m²   Temp (24hrs), Av.8 °F (36.6 °C), Min:97.3 °F (36.3 °C), Max:98.3 °F (36.8 °C)    No results for input(s): POCGLU in the last 72 hours.   No intake or output data in the 24 hours ending 23 1248    General Appearance: alert, well appearing, and in no acute distress  Mental status: oriented to person, place, and time  Head: normocephalic, atraumatic  Lungs: Bilateral equal air entry, clear to ausculation, no wheezing, rales or rhonchi, normal effort  Cardiovascular: normal rate, regular rhythm, no murmur, gallop, rub  Abdomen: Soft, nontender, nondistended, normal bowel sounds, no hepatomegaly or splenomegaly  Neurologic: There are no new focal motor or sensory deficits, normal muscle tone and bulk, no abnormal sensation, normal speech, cranial nerves II through XII grossly intact  Skin: No gross lesions, rashes, bruising or bleeding on exposed skin area  Extremities: peripheral pulses palpable, no pedal edema or calf pain with palpation      Investigations:      Laboratory Testing:  Recent Results (from the past 24 hour(s))   Occult Blood Screen    Collection Time: 23 10:17 PM   Result Value Ref Range    Occult Blood, Stool #1 POSITIVE (A) NEGATIVE    Date, Stool #1 4,483,187     Time, Stool #1 Unknown    CBC with Auto Differential    Collection Time: 23 10:32 PM   Result Value Ref Range    WBC 7.5 3.5 - 11.0 k/uL    RBC 4.31 4.0 - 5.2 m/uL    Hemoglobin 13.6 12.0 - 16.0 g/dL    Hematocrit 40.1 36 - 46 %    MCV 93.0 80 - 100 fL    MCH 31.6 26 - 34 pg    MCHC 34.0 31 - 37 g/dL    RDW 15.2 (H) 11.5 - 14.9 %    Platelets 987 638 - 595 k/uL    MPV 7.8 6.0 - 12.0 fL    Seg Neutrophils 58 36 - 66 %    Lymphocytes 30 24 - 44 %    Monocytes 8 (H) 1 - 7 %    Eosinophils % 3 0 - 4 %    Basophils 1 0 - 2 %    Segs Absolute 4.40 1.3 - 9.1 k/uL    Absolute Lymph # 2.20 1.0 - 4.8 k/uL    Absolute Mono # 0.60 0.1 - 1.3 k/uL    Absolute Eos # 0.20 0.0 - 0.4 k/uL    Basophils Absolute 0.00 0.0 - 0.2 k/uL   Comprehensive Metabolic Panel    Collection Time: 03/11/23 10:32 PM   Result Value Ref Range    Glucose 94 70 - 99 mg/dL    BUN 28 (H) 8 - 23 mg/dL    Creatinine 0.68 0.50 - 0.90 mg/dL    Est, Glom Filt Rate >60 >60 mL/min/1.73m2    Calcium 9.1 8.6 - 10.4 mg/dL    Sodium 134 (L) 135 - 144 mmol/L    Potassium 3.4 (L) 3.7 - 5.3 mmol/L    Chloride 101 98 - 107 mmol/L    CO2 23 20 - 31 mmol/L    Anion Gap 10 9 - 17 mmol/L    Alkaline Phosphatase 54 35 - 104 U/L    ALT 7 5 - 33 U/L    AST 18 <32 U/L    Total Bilirubin 0.3 0.3 - 1.2 mg/dL    Total Protein 6.3 (L) 6.4 - 8.3 g/dL    Albumin 3.9 3.5 - 5.2 g/dL   Protime-INR    Collection Time: 03/11/23 10:32 PM   Result Value Ref Range    Protime 14.3 11.8 - 14.6 sec    INR 1.1    TYPE AND SCREEN    Collection Time: 03/11/23 10:32 PM   Result Value Ref Range    Expiration Date 03/14/2023,2359     Arm Band Number [id number]     ABO/Rh O POSITIVE     Antibody Screen NEGATIVE     Blood Bank Comment       PATIENT'S ABORH CONFIRMED O POS:004  Results Verified     Basic Metabolic Panel w/ Reflex to MG    Collection Time: 03/12/23  5:12 AM   Result Value Ref Range    Glucose 89 70 - 99 mg/dL    BUN 22 8 - 23 mg/dL    Creatinine 0.65 0.50 - 0.90 mg/dL    Est, Glom Filt Rate >60 >60 mL/min/1.73m2    Calcium 8.7 8.6 - 10.4 mg/dL    Sodium 140 135 - 144 mmol/L    Potassium 3.6 (L) 3.7 - 5.3 mmol/L    Chloride 106 98 - 107 mmol/L    CO2 26 20 - 31 mmol/L    Anion Gap 8 (L) 9 - 17 mmol/L   Hemoglobin and Hematocrit    Collection Time: 03/12/23  5:12 AM   Result Value Ref Range    Hemoglobin 13.8 12.0 - 16.0 g/dL    Hematocrit 41.4 36 - 46 %   Hemoglobin and Hematocrit    Collection Time: 03/12/23 11:38 AM   Result Value Ref Range    Hemoglobin 14.8 12.0 - 16.0 g/dL    Hematocrit 43.9 36 - 46 %       Imaging/Diagnostics:  No results found.    Assessment :      Hospital Problems             Last Modified POA    * (Principal) Rectal bleeding 3/12/2023 Yes       Plan:     Patient status inpatient in the Progressive Unit/Step down    3  50-year-old female admitted to inpatient for acute GI bleeding, still having bright red blood, possible hemorrhoidal but can be diverticular bleed, hemoglobin every 6 hours, gastroenterology on board,  Current smoker advised to quit smoking  COPD, continue inhalers,  Malnourished, mild to moderate,  SCDs for DVT prophylaxis,  Full CODE STATUS  2. Disposition 2      Consultations:   IP CONSULT TO INTERNAL MEDICINE  IP CONSULT TO GI     Patient is admitted as inpatient status because of co-morbidities listed above, severity of signs and symptoms as outlined, requirement for current medical therapies and most importantly because of direct risk to patient if care not provided in a hospital setting. Expected length of stay > 48 hours. Beverly Pickering MD  3/12/2023  12:48 PM    Copy sent to Dr. Maurilio Murguia MD    Please note that this chart was generated using voice recognition Dragon dictation software. Although every effort was made to ensure the accuracy of this automated transcription, some errors in transcription may have occurred.

## 2023-03-12 NOTE — PROGRESS NOTES
[facility] Internal Medicine  Nas Cisneros MD; Osmin Brown MD; Denise Mooney MD; MD Lissa Masterson MD; Enrike Navarro MD  [facility]   [address] [facility]                 Date:   3/12/2023  Patientname:  Lidia Finch  Date of admission:  3/11/2023  9:54 PM  MRN:   [id number]  Account:  [de-identified]  YOB: 1960  PCP:    Preethi Holman MD  Room:   2099/2099-01  Code Status:    Full Code      Chief Complaint:     Chief Complaint   Patient presents with    Rectal Bleeding       History of Present Illness:     Lidia Finch is a 65 y.o. Non- / non  female who presents with Rectal Bleeding and is admitted to the hospital for the management of Rectal bleeding. Significant medical history includes HTN, HLD, emphysema, COPD, diverticulosis, history of malignant neoplasm of right upper lobe and nicotine dependence cigarettes. According to patient, she experienced rectal bleeding when having a bowel movement approximately 2 hours before presenting to the ED. Reports a large amount of blood in the toilet with a very small amount of stool. Also reports bleeding through 2 pads. She does have a history of polyp and hemorrhoid removal in the past.  She did have a colonoscopy done in 2022 by Dr. Nate Mason and is followed by rectal surgeon Dr. Maria Alejandra Ya. Denies dark or tarry stools. Denies fever, chills, chest pain, cough, abdominal pain, nausea, vomiting, diarrhea, and urinary symptoms. There are no aggravating or alleviating factors. Symptoms are reported as acute, intermittent and severe.     Past Medical History:     Past Medical History:   Diagnosis Date    Arthritis     Bronchitis, chronic (HCC)     Chronic back pain     Chronic cough     COPD (chronic obstructive pulmonary disease) (HCC)     Depression     Diverticulosis     Emphysema     Fibromyalgia     GERD (gastroesophageal reflux disease)     Hemorrhoid internal and external, they bleed    History of cocaine abuse ([facility] [address]     none since 2010, used between 2007 and 2009     History of kidney infection     Hyperlipidemia     Hyperplastic colon polyp 11/15/2017    Hypertension     Lung cancer ([facility] [address]     non-small cell H3A lung cancer right upper lobe    Marijuana use     for pain and appetite use    Meningioma (HCC)     Neuropathy     Orbital fracture (HCC)     left side ,has  pin in side    Osteoarthritis     Osteopenia     Pulmonary nodule     left lung, watching this one    Renal calculi     Renal cyst     STD (sexually transmitted disease)     unsure of type    Uterine prolapse         Past Surgical History:     Past Surgical History:   Procedure Laterality Date    BACK SURGERY  2011    thoracic laminectomy, T12, S1    CARDIAC CATHETERIZATION  10/24/14    Normal coronaries     COLONOSCOPY  04/05/2017    diverticulosis, ,poor prep    COLONOSCOPY  11/15/2017    flat polypoid lesion in the base of the cecum, about 1 cm.-hyperplastic    COLONOSCOPY  04/24/2018     diverticulosis.  Small polyp in the sigmoid colon excised with biopsy forceps    COLONOSCOPY N/A 8/2/2022    COLONOSCOPY WITH BIOPSY performed by Kathrin Hurley MD at [place] [address] Left     cleaned out infection    ENDOSCOPY, COLON, DIAGNOSTIC      ORBITAL FRACTURE SURGERY Left     had pin inserted on temple side    OK COLON CA SCRN NOT HI RSK IND N/A 4/5/2017    COLONOSCOPY performed by Kathrin Hurley MD at [address] [facility] FLX DX W/JUICE Sims 1977 PFRMD N/A 4/24/2018    COLONOSCOPY performed by Kathrin Hurley MD at [facility] N/A 11/15/2017    COLONOSCOPY POLYPECTOMY SNARE and saline injection performed by Kathrin Hurley MD at [facility] EGD TRANSORAL BIOPSY SINGLE/MULTIPLE N/A 10/6/2017    EGD BIOPSY performed by Rafia Castaneda MD at [facility] EGD TRANSORAL BIOPSY SINGLE/MULTIPLE N/A 2/27/2018    EGD ESOPHAGOGASTRODUODENOSCOPY performed by Yong Jennings MD at [facility] ESOPHAGOGASTRODUODENOSCOPY TRANSORAL DIAGNOSTIC N/A 4/24/2018    EGD ESOPHAGOGASTRODUODENOSCOPY performed by Yong Jennings MD at [facility]      TUNNELED VENOUS PORT PLACEMENT Left     UPPER GASTROINTESTINAL ENDOSCOPY  10/06/2017    Dr Davin Devine diverticulum gastric fundus, pathology inactive gastritis    UPPER GASTROINTESTINAL ENDOSCOPY  02/27/2018    retained food in the fundus of the stomach, poor peristalsis wide-open pylorus    UPPER GASTROINTESTINAL ENDOSCOPY  04/24/2018    no lesions seen that can account her CEA levels. UPPER GASTROINTESTINAL ENDOSCOPY N/A 3/10/2021    EGD BIOPSY AND PHOTOS performed by Yong Jennings MD at [facility]        Medications Prior to Admission:     Prior to Admission medications    Medication Sig Start Date End Date Taking? Authorizing Provider   citalopram (CELEXA) 20 MG tablet take 1 tablet by mouth every morning 2/10/23  Yes Kathy Servin MD   folic acid (FOLVITE) 1 MG tablet Take 1 mg by mouth in the morning. Yes Historical Provider, MD   omeprazole (PRILOSEC) 20 MG delayed release capsule take 1 capsule by mouth twice a day 5/31/22  Yes Kathy Servin MD   Ascorbic Acid (VITAMIN C) 1000 MG tablet Take 1,000 mg by mouth daily   Yes Historical Provider, MD   ondansetron (ZOFRAN) 4 MG tablet Take 4 mg by mouth every 8 hours as needed for Nausea or Vomiting   Yes Historical Provider, MD   RA CALCIUM 600/VITAMIN D-3 600-400 MG-UNIT TABS take 1 tablet by mouth twice a day 8/5/19  Yes Kathy Servin MD   umeclidinium-vilanterol (ANORO ELLIPTA) 62.5-25 MCG/INH AEPB inhaler Anoro Ellipta 62.5 mcg-25 mcg/actuation powder for inhalation   Inhale 1 puff every day by inhalation route.    Yes Historical Provider, MD   albuterol sulfate HFA (PROVENTIL HFA) 108 (90 Base) MCG/ACT inhaler Inhale 2 puffs into the lungs every 6 hours as needed for Wheezing or Shortness of Breath 3/15/18  Yes Mary Lou Seals MD   Multiple Vitamin (MULTIVITAMIN PO) Take  by mouth daily. Yes Historical Provider, MD   dicyclomine (BENTYL) 10 MG capsule Take 1 capsule by mouth every 6 hours as needed (cramps) 3/1/23   Portia Cloud, APRN - NP   denosumab (PROLIA) 60 MG/ML SOSY SC injection Inject 1 mL into the skin once for 1 dose 11/16/22 11/16/22  Denise Zuniga MD   traZODone (DESYREL) 50 MG tablet Take 1 tablet by mouth nightly 7/20/22   Denise Zuniga MD   Misc. Devices (STEP N REST II WALKER) MISC Use daily for balance problems 4/18/22   Denise Zuniga MD   fluticasone [facility]) 50 MCG/ACT nasal spray 2 sprays by Nasal route daily 6/8/20   Denise Zuniga MD   lidocaine (LMX) 4 % cream Apply topically every 8 hrs as needed for pain 9/17/19   Denise Zuniga MD   lidocaine-prilocaine (EMLA) 2.5-2.5 % cream lidocaine-prilocaine 2.5 %-2.5 % topical cream   Apply to port site and cover 30-45 minutes prior to needle access    Historical Provider, MD        Allergies:     Lovastatin and Statins    Social History:     Tobacco:    reports that she quit smoking about 7 months ago. Her smoking use included cigarettes. She has a 19.00 pack-year smoking history. She has never used smokeless tobacco.  Alcohol:      reports that she does not currently use alcohol. Drug Use:  reports current drug use. Drug: Marijuana John Paul Passy).     Family History:     Family History   Problem Relation Age of Onset    Heart Disease Mother     Cancer Mother         breast and lung cancer    Diabetes Father     Heart Disease Father         Death due to MI    Cancer Paternal Grandfather         stomach cancer     Heart Disease Paternal Grandfather     Cancer Sister         ovarian cancer     Cancer Maternal Grandmother         female cancer       Investigations:      Laboratory Testing:  Recent Results (from the past 25 hour(s))   Occult Blood Screen    Collection Time: 03/11/23 10:17 PM   Result Value Ref Range    Occult Blood, Stool #1 POSITIVE (A) NEGATIVE    Date, Stool #1 6,426,928     Time, Stool #1 Unknown    CBC with Auto Differential    Collection Time: 03/11/23 10:32 PM   Result Value Ref Range    WBC 7.5 3.5 - 11.0 k/uL    RBC 4.31 4.0 - 5.2 m/uL    Hemoglobin 13.6 12.0 - 16.0 g/dL    Hematocrit 40.1 36 - 46 %    MCV 93.0 80 - 100 fL    MCH 31.6 26 - 34 pg    MCHC 34.0 31 - 37 g/dL    RDW 15.2 (H) 11.5 - 14.9 %    Platelets 923 288 - 932 k/uL    MPV 7.8 6.0 - 12.0 fL    Seg Neutrophils 58 36 - 66 %    Lymphocytes 30 24 - 44 %    Monocytes 8 (H) 1 - 7 %    Eosinophils % 3 0 - 4 %    Basophils 1 0 - 2 %    Segs Absolute 4.40 1.3 - 9.1 k/uL    Absolute Lymph # 2.20 1.0 - 4.8 k/uL    Absolute Mono # 0.60 0.1 - 1.3 k/uL    Absolute Eos # 0.20 0.0 - 0.4 k/uL    Basophils Absolute 0.00 0.0 - 0.2 k/uL   Comprehensive Metabolic Panel    Collection Time: 03/11/23 10:32 PM   Result Value Ref Range    Glucose 94 70 - 99 mg/dL    BUN 28 (H) 8 - 23 mg/dL    Creatinine 0.68 0.50 - 0.90 mg/dL    Est, Glom Filt Rate >60 >60 mL/min/1.73m2    Calcium 9.1 8.6 - 10.4 mg/dL    Sodium 134 (L) 135 - 144 mmol/L    Potassium 3.4 (L) 3.7 - 5.3 mmol/L    Chloride 101 98 - 107 mmol/L    CO2 23 20 - 31 mmol/L    Anion Gap 10 9 - 17 mmol/L    Alkaline Phosphatase 54 35 - 104 U/L    ALT 7 5 - 33 U/L    AST 18 <32 U/L    Total Bilirubin 0.3 0.3 - 1.2 mg/dL    Total Protein 6.3 (L) 6.4 - 8.3 g/dL    Albumin 3.9 3.5 - 5.2 g/dL   Protime-INR    Collection Time: 03/11/23 10:32 PM   Result Value Ref Range    Protime 14.3 11.8 - 14.6 sec    INR 1.1    TYPE AND SCREEN    Collection Time: 03/11/23 10:32 PM   Result Value Ref Range    Expiration Date 03/14/2023,0366     Arm Band Number [id number]     ABO/Rh O POSITIVE     Antibody Screen NEGATIVE     Blood Bank Comment       PATIENT'S ABORH CONFIRMED O POS:004  Results Verified         Imaging/Diagnostics:  No results found.       Plan:     Patient status inpatient in the Progressive Unit/Step down    Rectal Bleeding  -patient reports bloody stools prior to arrival  -rectal guaiac positive in ED  -hemoglobin 13.6, 10/18/22 hgb 15.4  -monitor H&H Q6 hours   -Change protonix to IV  -Hold ASA and VTE d/t bleeding  -Consult GI Dr. Dionicio Morrissey  -Clear liquid diet  -Pain and Nausea control    Nicotine dependence  -nicotine patch  -smoking cessation counseling     Full code    EDDA Lee - NP  3/12/2023  5:15 AM      Please note that this chart was generated using voice recognition Dragon dictation software. Although every effort was made to ensure the accuracy of this automated transcription, some errors in transcription may have occurred. [address], [address] [place] [address].    Phone [phone]

## 2023-03-13 VITALS
BODY MASS INDEX: 16.66 KG/M2 | RESPIRATION RATE: 18 BRPM | HEIGHT: 65 IN | OXYGEN SATURATION: 98 % | WEIGHT: 100 LBS | SYSTOLIC BLOOD PRESSURE: 144 MMHG | HEART RATE: 60 BPM | DIASTOLIC BLOOD PRESSURE: 90 MMHG | TEMPERATURE: 97.8 F

## 2023-03-13 LAB
ANION GAP SERPL CALCULATED.3IONS-SCNC: 7 MMOL/L (ref 9–17)
BUN SERPL-MCNC: 12 MG/DL (ref 8–23)
CALCIUM SERPL-MCNC: 8.5 MG/DL (ref 8.6–10.4)
CHLORIDE SERPL-SCNC: 105 MMOL/L (ref 98–107)
CO2 SERPL-SCNC: 27 MMOL/L (ref 20–31)
CREAT SERPL-MCNC: 0.79 MG/DL (ref 0.5–0.9)
GFR SERPL CREATININE-BSD FRML MDRD: >60 ML/MIN/1.73M2
GLUCOSE SERPL-MCNC: 94 MG/DL (ref 70–99)
HCT VFR BLD AUTO: 42.5 % (ref 36–46)
HCT VFR BLD AUTO: 43.5 % (ref 36–46)
HGB BLD-MCNC: 14.2 G/DL (ref 12–16)
HGB BLD-MCNC: 14.5 G/DL (ref 12–16)
POTASSIUM SERPL-SCNC: 3.8 MMOL/L (ref 3.7–5.3)
SODIUM SERPL-SCNC: 139 MMOL/L (ref 135–144)

## 2023-03-13 PROCEDURE — 85018 HEMOGLOBIN: CPT

## 2023-03-13 PROCEDURE — 94640 AIRWAY INHALATION TREATMENT: CPT

## 2023-03-13 PROCEDURE — 99231 SBSQ HOSP IP/OBS SF/LOW 25: CPT | Performed by: INTERNAL MEDICINE

## 2023-03-13 PROCEDURE — 99239 HOSP IP/OBS DSCHRG MGMT >30: CPT | Performed by: INTERNAL MEDICINE

## 2023-03-13 PROCEDURE — 94760 N-INVAS EAR/PLS OXIMETRY 1: CPT

## 2023-03-13 PROCEDURE — C9113 INJ PANTOPRAZOLE SODIUM, VIA: HCPCS | Performed by: NURSE PRACTITIONER

## 2023-03-13 PROCEDURE — 85014 HEMATOCRIT: CPT

## 2023-03-13 PROCEDURE — APPSS30 APP SPLIT SHARED TIME 16-30 MINUTES: Performed by: NURSE PRACTITIONER

## 2023-03-13 PROCEDURE — 6370000000 HC RX 637 (ALT 250 FOR IP): Performed by: NURSE PRACTITIONER

## 2023-03-13 PROCEDURE — 96376 TX/PRO/DX INJ SAME DRUG ADON: CPT

## 2023-03-13 PROCEDURE — 2580000003 HC RX 258: Performed by: NURSE PRACTITIONER

## 2023-03-13 PROCEDURE — 80048 BASIC METABOLIC PNL TOTAL CA: CPT

## 2023-03-13 PROCEDURE — 6360000002 HC RX W HCPCS: Performed by: NURSE PRACTITIONER

## 2023-03-13 PROCEDURE — 36415 COLL VENOUS BLD VENIPUNCTURE: CPT

## 2023-03-13 PROCEDURE — G0378 HOSPITAL OBSERVATION PER HR: HCPCS

## 2023-03-13 PROCEDURE — A4216 STERILE WATER/SALINE, 10 ML: HCPCS | Performed by: NURSE PRACTITIONER

## 2023-03-13 RX ADMIN — TIOTROPIUM BROMIDE AND OLODATEROL 2 PUFF: 3.124; 2.736 SPRAY, METERED RESPIRATORY (INHALATION) at 07:40

## 2023-03-13 RX ADMIN — SODIUM CHLORIDE 40 MG: 9 INJECTION, SOLUTION INTRAMUSCULAR; INTRAVENOUS; SUBCUTANEOUS at 09:05

## 2023-03-13 RX ADMIN — SODIUM CHLORIDE, PRESERVATIVE FREE 10 ML: 5 INJECTION INTRAVENOUS at 09:07

## 2023-03-13 RX ADMIN — CITALOPRAM HYDROBROMIDE 20 MG: 20 TABLET ORAL at 09:05

## 2023-03-13 NOTE — PROGRESS NOTES
[facility]    Gastroenterology Daily Progress Note      Patient:   Paula Gutierrez   :    1960   Facility:   [facility]  Date:     3/13/2023  Consultant:   EDDA Gaffney CNP, CNP      SUBJECTIVE  65 y.o. female admitted 3/11/2023 with Diverticulosis [K57.90]  Rectal bleeding [K62.5] and seen for rectal bleeding. The pt was seen and examined. Hgb 14. 5. no further bleeding no c/o nausea or abdominal pain.         OBJECTIVE  Scheduled Meds:   citalopram  20 mg Oral Daily    pantoprazole (PROTONIX) 40 mg injection  40 mg IntraVENous Daily    tiotropium-olodaterol  2 puff Inhalation Daily    nicotine  1 patch TransDERmal Daily    sodium chloride flush  5-40 mL IntraVENous 2 times per day       Vital Signs:  BP (!) 148/94   Pulse 59   Temp 97.4 °F (36.3 °C) (Oral)   Resp 16   Ht 5' 5\" (1.651 m)   Wt 100 lb (45.4 kg)   SpO2 98%   BMI 16.64 kg/m²    Review of Systems - History obtained from chart review and the patient  General ROS: negative  Respiratory ROS: no cough, shortness of breath, or wheezing  Cardiovascular ROS: no chest pain or dyspnea on exertion  Gastrointestinal ROS: no abdominal pain, change in bowel habits, or black or bloody stools   Physical Exam:     General Appearance: alert and oriented to person, place and time, well-developed and well-nourished, in no acute distress  Skin: warm and dry, no rash or erythema  Head: normocephalic and atraumatic  Eyes: pupils equal, round, and reactive to light, extraocular eye movements intact, conjunctivae normal  ENT: hearing grossly normal bilaterally  Neck: neck supple and non tender without mass, no thyromegaly or thyroid nodules, no cervical lymphadenopathy   Pulmonary/Chest: clear to auscultation bilaterally- no wheezes, rales or rhonchi, normal air movement, no respiratory distress  Cardiovascular: normal rate, regular rhythm, normal S1 and S2, no murmurs, rubs, clicks or gallops, distal pulses intact, no carotid bruits  Abdomen: soft, non-tender, non-distended, normal bowel sounds, no masses or organomegaly  Extremities: no cyanosis, clubbing or edema  Musculoskeletal: normal range of motion, no joint swelling, deformity or tenderness  Neurologic: no cranial nerve deficit and muscle strength normal    Lab and Imaging Review     CBC  Recent Labs     03/11/23 2232 03/12/23 0512 03/12/23  1738 03/12/23  2358 03/13/23  0531   WBC 7.5  --   --   --   --    HGB 13.6   < > 14.2 14.2 14.5   HCT 40.1   < > 42.4 42.5 43.5   MCV 93.0  --   --   --   --      --   --   --   --     < > = values in this interval not displayed. BMP  Recent Labs     03/11/23 2232 03/12/23 0512 03/13/23  0531   * 140 139   K 3.4* 3.6* 3.8    106 105   CO2 23 26 27   BUN 28* 22 12   CREATININE 0.68 0.65 0.79   GLUCOSE 94 89 94   CALCIUM 9.1 8.7 8.5*       LFTS  Recent Labs     03/11/23 2232   ALKPHOS 54   ALT 7   AST 18   PROT 6.3*   BILITOT 0.3   LABALBU 3.9         PT/INR  Recent Labs     03/11/23 2232   PROTIME 14.3   INR 1.1       ASSESSMENT/plan  Rectal bleeding currently resolved and hgb stable  Will defer endoscopy at this time  Trend hh  Ppi  Diet as tolerated        Time spent reviewing chart assessing pt and d/w md around 30 minutes    This plan was formulated in collaboration with Dr. Cherri Rodriguez . Electronically signed by: EDDA Ayala CNP on 3/13/2023 at 8:08 AM     Attending Physician Statement          I have discussed the care of Ernst Montgomery and   I have examined the patient myselft independently, and taken ros and hpi , including pertinent history and exam findings,  with the author of this note . I have reviewed the key elements of all parts of the encounter with the nurse practitioner/resident.     I agree with the assessment, plan and orders as documented by the above health care provider     More than 50% of the time on this visit was spent by me   hysical Exam  Blood pressure (!) 144/90, pulse 60, temperature 97.8 °F (36.6 °C), temperature source Oral, resp. rate 18, height 5' 5\" (1.651 m), weight 100 lb (45.4 kg), SpO2 98 %, not currently breastfeeding. General Appearance: alert and oriented to person, place and time, well-developed and well-nourished, in no acute distress  Skin: warm and dry, no rash or erythema  Head: normocephalic and atraumatic  Eyes: pupils equal, round, and reactive to light, extraocular eye movements intact, conjunctivae normal  ENT: hearing grossly normal bilaterally  Neck: neck supple and non tender without mass, no thyromegaly or thyroid nodules, no cervical lymphadenopathy   Pulmonary/Chest: clear to auscultation bilaterally- no wheezes, rales or rhonchi, normal air movement, no respiratory distress  Cardiovascular: normal rate, regular rhythm, normal S1 and S2, no murmurs, rubs, clicks or gallops, distal pulses intact, no carotid bruits  Abdomen: soft, non-tender, non-distended, normal bowel sounds, no masses or organomegaly  Extremities: no cyanosis, clubbing or edema  Musculoskeletal: normal range of motion, no joint swelling, deformity or tenderness  Neurologic: no cranial nerve deficit and muscle strength normal    Data Review:    Recent Labs     03/11/23 2232 03/12/23 0512 03/12/23 1738 03/12/23 2358 03/13/23  0531   WBC 7.5  --   --   --   --    HGB 13.6   < > 14.2 14.2 14.5   HCT 40.1   < > 42.4 42.5 43.5   MCV 93.0  --   --   --   --      --   --   --   --     < > = values in this interval not displayed. Recent Labs     03/11/23 2232 03/12/23 0512 03/13/23  0531   * 140 139   K 3.4* 3.6* 3.8    106 105   CO2 23 26 27   BUN 28* 22 12   CREATININE 0.68 0.65 0.79     Recent Labs     03/11/23 2232   AST 18   ALT 7   BILITOT 0.3   ALKPHOS 54     No results for input(s): LIPASE, AMYLASE in the last 72 hours. Recent Labs     03/11/23  2232   PROTIME 14.3   INR 1.1     No results for input(s): PTT in the last 72 hours.   No results for input(s): OCCULTBLD in the last 72 hours.   CEA:    Lab Results   Component Value Date/Time    CEA 8.1 03/09/2021 12:14 PM    CEA 8.3 06/09/2020 01:19 PM     Ca 125:    Lab Results   Component Value Date/Time     20 10/10/2017 01:35 PM     Ca 19-9:    Lab Results   Component Value Date/Time     28 10/10/2017 01:35 PM     Ca 15-3:  No results found for:   AFP:  No components found for: AFAFP  Beta HCG:  No components found for: BHCG  Neuron Specific Enolase:  No results found for: NSE      Additional :    Patient's hemoglobin is very stable  No sign of bleeding  Patient is to follow with her own gastroenterologist to see if need to repeat endoscopies and following the hemorrhoids      Electronically signed by Isaiah Timmons MD

## 2023-03-13 NOTE — CARE COORDINATION
ONGOING DISCHARGE PLAN:    Patient is alert and oriented x4. Spoke with patient regarding discharge plan and patient confirms that plan is still to return to home alone. Denies VNS. HGB today 14.5. Per Previous Primary notes, No Scope at this time. Follow w/ her GI Dr as an outpatient. Anticipate DC today w/ no needs. Will continue to follow for additional discharge needs.     Electronically signed by Devika Blackburn RN on 3/13/2023 at 11:35 AM

## 2023-03-13 NOTE — PLAN OF CARE
Problem: Discharge Planning  Goal: Discharge to home or other facility with appropriate resources  3/13/2023 0156 by Donavan Weiss RN  Outcome: Progressing     Problem: Pain  Goal: Verbalizes/displays adequate comfort level or baseline comfort level  3/13/2023 0156 by Donavan Weiss RN  Outcome: Progressing     Problem: Safety - Adult  Goal: Free from fall injury  3/13/2023 0156 by Donavan Weiss RN  Outcome: Progressing     Problem: ABCDS Injury Assessment  Goal: Absence of physical injury  3/13/2023 0156 by Donavan Weiss RN  Outcome: Progressing

## 2023-03-13 NOTE — PLAN OF CARE
Problem: Discharge Planning  Goal: Discharge to home or other facility with appropriate resources  3/13/2023 1317 by Karn Olszewski, RN  Outcome: Completed  3/13/2023 0156 by Yulisa Cuellar RN  Outcome: Progressing  Flowsheets (Taken 3/12/2023 2000)  Discharge to home or other facility with appropriate resources: Identify barriers to discharge with patient and caregiver     Problem: Pain  Goal: Verbalizes/displays adequate comfort level or baseline comfort level  3/13/2023 1317 by Karn Olszewski, RN  Outcome: Completed  3/13/2023 0156 by Yulisa Cuellar RN  Outcome: Progressing     Problem: Safety - Adult  Goal: Free from fall injury  3/13/2023 1317 by Karn Olszewski, RN  Outcome: Completed  3/13/2023 0156 by Yulisa Cuellar RN  Outcome: Progressing     Problem: ABCDS Injury Assessment  Goal: Absence of physical injury  3/13/2023 1317 by Karn Olszewski, RN  Outcome: Completed  3/13/2023 0156 by Yulisa Cuellar RN  Outcome: Progressing

## 2023-03-14 ENCOUNTER — TELEPHONE (OUTPATIENT)
Dept: FAMILY MEDICINE CLINIC | Age: 63
End: 2023-03-14

## 2023-03-14 NOTE — TELEPHONE ENCOUNTER
[facility] Trans[facility] Initial Follow Up Call    Outreach made within 2 business days of discharge: Yes    Patient: Michelle Arceo Patient : 1960   MRN: [id number]  Reason for Admission: There are no discharge diagnoses documented for the most recent discharge. Discharge Date: 3/13/23       Spoke with: Laureano Watkins    If Virtual visit made for hospital follow up, advise patient to make sure to have family member present to help assist with visit. Please make sure mobile number is updated in patient chart. Discharge department/facility: [facility] Interactive Patient Contact:  Was patient able to fill all prescriptions: Yes  Was patient instructed to bring all medications to the follow-up visit: Yes  Is patient taking all medications as directed in the discharge summary?  No  Does patient understand their discharge instructions: YES  Does patient have questions or concerns that need addressed prior to 7-14 day follow up office visit: NO , WILL FOLLOW UP WITH GASTRO     Scheduled appointment with PCP within 7-14 days    Follow Up  Future Appointments   Date Time Provider Sakshi Jimenezi   3/15/2023 10:15 AM [facility]   [facility]/3[facility]2023  1:30 PM [facility]   2023  2:00 PM [facility]   2023  2:00 PM Kelsey Toro MD [address] [place]

## 2023-03-14 NOTE — TELEPHONE ENCOUNTER
Care Transitions Initial Follow Up Call    Outreach made within 2 business days of discharge:     Patient: Kaye Madrid Patient : 1960   MRN: [id number]  Reason for Admission: There are no discharge diagnoses documented for the most recent discharge. Discharge Date: 3/13/23       Spoke with: [facility]    Discharge department/facility: [address] [place] [address] Interactive Patient Contact:  Was patient able to fill all prescriptions:   Was patient instructed to bring all medications to the follow-up visit:   Is patient taking all medications as directed in the discharge summary?    Does patient understand their discharge instructions:   Does patient have questions or concerns that need addressed prior to 7-14 day follow up office visit:     Scheduled appointment with PCP within 7-14 days    Follow Up  Future Appointments   Date Time Provider Sakshi Kaur   3/15/2023 10:15 AM EDDA White - NP [facility]   5/3/2023  1:30 PM Brigid Young MD [facility]   2023  2:00 PM [facility]   2023  2:00 PM Brigid Young MD [facility] [address] [place]

## 2023-03-15 ENCOUNTER — OFFICE VISIT (OUTPATIENT)
Dept: GASTROENTEROLOGY | Age: 63
End: 2023-03-15
Payer: MEDICARE

## 2023-03-15 VITALS
BODY MASS INDEX: 17 KG/M2 | DIASTOLIC BLOOD PRESSURE: 94 MMHG | WEIGHT: 102 LBS | HEART RATE: 79 BPM | SYSTOLIC BLOOD PRESSURE: 147 MMHG | HEIGHT: 65 IN

## 2023-03-15 DIAGNOSIS — K57.90 DIVERTICULOSIS: ICD-10-CM

## 2023-03-15 DIAGNOSIS — K59.00 CONSTIPATION, UNSPECIFIED CONSTIPATION TYPE: ICD-10-CM

## 2023-03-15 DIAGNOSIS — K64.9 HEMORRHOIDS, UNSPECIFIED HEMORRHOID TYPE: Primary | ICD-10-CM

## 2023-03-15 PROCEDURE — G8482 FLU IMMUNIZE ORDER/ADMIN: HCPCS | Performed by: NURSE PRACTITIONER

## 2023-03-15 PROCEDURE — G8419 CALC BMI OUT NRM PARAM NOF/U: HCPCS | Performed by: NURSE PRACTITIONER

## 2023-03-15 PROCEDURE — 99214 OFFICE O/P EST MOD 30 MIN: CPT | Performed by: NURSE PRACTITIONER

## 2023-03-15 PROCEDURE — 1111F DSCHRG MED/CURRENT MED MERGE: CPT | Performed by: NURSE PRACTITIONER

## 2023-03-15 PROCEDURE — 3080F DIAST BP >= 90 MM HG: CPT | Performed by: NURSE PRACTITIONER

## 2023-03-15 PROCEDURE — 3017F COLORECTAL CA SCREEN DOC REV: CPT | Performed by: NURSE PRACTITIONER

## 2023-03-15 PROCEDURE — 1036F TOBACCO NON-USER: CPT | Performed by: NURSE PRACTITIONER

## 2023-03-15 PROCEDURE — G8427 DOCREV CUR MEDS BY ELIG CLIN: HCPCS | Performed by: NURSE PRACTITIONER

## 2023-03-15 PROCEDURE — 3077F SYST BP >= 140 MM HG: CPT | Performed by: NURSE PRACTITIONER

## 2023-03-15 ASSESSMENT — ENCOUNTER SYMPTOMS
ALLERGIC/IMMUNOLOGIC NEGATIVE: 1
RESPIRATORY NEGATIVE: 1
TROUBLE SWALLOWING: 1
ANAL BLEEDING: 1
CONSTIPATION: 1
ABDOMINAL PAIN: 1

## 2023-03-15 NOTE — PROGRESS NOTES
GI CLINIC FOLLOW UP    INTERVAL HISTORY:   No referring provider defined for this encounter. Chief Complaint   Patient presents with    Rectal Bleeding     Pt here for hosp f/u. Pt was having a lot of rectal bleeding and her hgb was low. States the bleeding has since stopped. Pt states she still has lower abd pain        HISTORY OF PRESENT ILLNESS:    Patient being seen for hospital follow-up. Patient recently presented to the hospital for rectal bleeding. Patient was found to have internal hemorrhoid. Hemoglobin remained stable 14.5. Bleeding stopped after 48 hours. CT of the abdomen pelvis was negative. No leukocytosis. Patient has known hemorrhoids. Had colonoscopy last year. Also has history of severe diverticulosis. Patient had hemorrhoidectomy in November 2022 with Dr. Serena Pratt. At present she is well. No rebleeding. Has intermittent constipation. Takes Metamucil daily. Past Medical,Family, and Social History reviewed and does contribute to the patient presentingcondition. Patient's PMH/PSH,SH,PSYCH Hx, MEDs, ALLERGIES, and ROS were all reviewed and updated in the appropriate sections.     PAST MEDICAL HISTORY:  Past Medical History:   Diagnosis Date    Arthritis     Bronchitis, chronic (HCC)     Chronic back pain     Chronic cough     COPD (chronic obstructive pulmonary disease) (HCC)     Depression     Diverticulosis     Emphysema     Fibromyalgia     GERD (gastroesophageal reflux disease)     Hemorrhoid     internal and external, they bleed    History of cocaine abuse ([address]     none since 2010, used between 2007 and 2009     History of kidney infection     Hyperlipidemia     Hyperplastic colon polyp 11/15/2017    Hypertension     Lung cancer ([facility]     non-small cell H3A lung cancer right upper lobe    Marijuana use     for pain and appetite use    Meningioma (HCC)     Neuropathy     Orbital fracture (HCC)     left side ,has  pin in side    Osteoarthritis     Osteopenia Pulmonary nodule     left lung, watching this one    Renal calculi     Renal cyst     STD (sexually transmitted disease)     unsure of type    Uterine prolapse        Past Surgical History:   Procedure Laterality Date    BACK SURGERY  2011    thoracic laminectomy, T12, S1    CARDIAC CATHETERIZATION  10/24/14    Normal coronaries     COLONOSCOPY  04/05/2017    diverticulosis, ,poor prep    COLONOSCOPY  11/15/2017    flat polypoid lesion in the base of the cecum, about 1 cm.-hyperplastic    COLONOSCOPY  04/24/2018     diverticulosis.  Small polyp in the sigmoid colon excised with biopsy forceps    COLONOSCOPY N/A 8/2/2022    COLONOSCOPY WITH BIOPSY performed by Casey Graham MD at [place] [address] Left     cleaned out infection    ENDOSCOPY, COLON, DIAGNOSTIC      ORBITAL FRACTURE SURGERY Left     had pin inserted on temple side    [place] COLON CA SCRN NOT HI RSK IND N/A 4/5/2017    COLONOSCOPY performed by Casey Graham MD at [address] [place] [address] DX W/COLLJ [address] PFRMD N/A 4/24/2018    COLONOSCOPY performed by Casey Graham MD at [address] [place] [address] N/A 11/15/2017    COLONOSCOPY POLYPECTOMY SNARE and saline injection performed by Casey Graham MD at [facility] EGD TRANSORAL BIOPSY SINGLE/MULTIPLE N/A 10/6/2017    EGD BIOPSY performed by Ava Marie MD at [facility] EGD TRANSORAL BIOPSY SINGLE/MULTIPLE N/A 2/27/2018    EGD ESOPHAGOGASTRODUODENOSCOPY performed by Casey Graham MD at [facility] ESOPHAGOGASTRODUODENOSCOPY TRANSORAL DIAGNOSTIC N/A 4/24/2018    EGD ESOPHAGOGASTRODUODENOSCOPY performed by Casey Graham MD at [address] [place] [address]     UPPER GASTROINTESTINAL ENDOSCOPY  10/06/2017    Dr Yanez Nose diverticulum gastric fundus, pathology inactive gastritis    UPPER GASTROINTESTINAL ENDOSCOPY  02/27/2018    retained food in the fundus of the stomach, poor peristalsis wide-open pylorus    UPPER GASTROINTESTINAL ENDOSCOPY  04/24/2018    no lesions seen that can account her CEA levels. UPPER GASTROINTESTINAL ENDOSCOPY N/A 3/10/2021    EGD BIOPSY AND PHOTOS performed by Casey Graham MD at [address]:    Current Outpatient Medications:     dicyclomine (BENTYL) 10 MG capsule, Take 1 capsule by mouth every 6 hours as needed (cramps), Disp: 60 capsule, Rfl: 2    citalopram (CELEXA) 20 MG tablet, take 1 tablet by mouth every morning, Disp: 30 tablet, Rfl: 1    folic acid (FOLVITE) 1 MG tablet, Take 1 mg by mouth in the morning., Disp: , Rfl:     traZODone (DESYREL) 50 MG tablet, Take 1 tablet by mouth nightly, Disp: 90 tablet, Rfl: 1    omeprazole (PRILOSEC) 20 MG delayed release capsule, take 1 capsule by mouth twice a day, Disp: 180 capsule, Rfl: 3    Misc.  Devices (STEP N REST II WALKER) MISC, Use daily for balance problems, Disp: 1 each, Rfl: 0    Ascorbic Acid (VITAMIN C) 1000 MG tablet, Take 1,000 mg by mouth daily, Disp: , Rfl:     fluticasone (FLONASE) 50 MCG/ACT nasal spray, 2 sprays by Nasal route daily, Disp: 1 Bottle, Rfl: 0    ondansetron (ZOFRAN) 4 MG tablet, Take 4 mg by mouth every 8 hours as needed for Nausea or Vomiting, Disp: , Rfl:     lidocaine (LMX) 4 % cream, Apply topically every 8 hrs as needed for pain, Disp: 45 g, Rfl: 3    RA CALCIUM 600/VITAMIN D-3 600-400 MG-UNIT TABS, take 1 tablet by mouth twice a day, Disp: 90 tablet, Rfl: 3    umeclidinium-vilanterol (ANORO ELLIPTA) 62.5-25 MCG/INH AEPB inhaler, Anoro Ellipta 62.5 mcg-25 mcg/actuation powder for inhalation  Inhale 1 puff every day by inhalation route., Disp: , Rfl:     lidocaine-prilocaine (EMLA) 2.5-2.5 % cream, lidocaine-prilocaine 2.5 %-2.5 % topical cream  Apply to port site and cover 30-45 minutes prior to needle access, Disp: , Rfl:     albuterol sulfate HFA (PROVENTIL HFA) 108 (90 Base) MCG/ACT inhaler, Inhale 2 puffs into the lungs every 6 hours as needed for Wheezing or Shortness of Breath, Disp: 1 Inhaler, Rfl: 11    Multiple Vitamin (MULTIVITAMIN PO), Take  by mouth daily. , Disp: , Rfl:     denosumab (PROLIA) 60 MG/ML SOSY SC injection, Inject 1 mL into the skin once for 1 dose, Disp: 1 mL, Rfl: 0    ALLERGIES:   Allergies   Allergen Reactions    Lovastatin     Statins Other (See Comments)     pain       FAMILY HISTORY:       Problem Relation Age of Onset    Heart Disease Mother     Cancer Mother         breast and lung cancer    Diabetes Father     Heart Disease Father         Death due to MI    Cancer Paternal Grandfather         stomach cancer     Heart Disease Paternal Grandfather     Cancer Sister         ovarian cancer     Cancer Maternal Grandmother         female cancer         SOCIAL HISTORY:   Social History     Socioeconomic History    Marital status:       Spouse name: Not on file    Number of children: Not on file    Years of education: Not on file    Highest education level: Not on file   Occupational History    Not on file   Tobacco Use    Smoking status: Former     Packs/day: 0.50     Years: 38.00     Pack years: 19.00     Types: Cigarettes     Quit date: 2022     Years since quittin.6    Smokeless tobacco: Never    Tobacco comments:     smokes 1 or 2 a day   Vaping Use    Vaping Use: Every day    Substances: Nicotine, Flavoring   Substance and Sexual Activity    Alcohol use: Not Currently     Alcohol/week: 0.0 standard drinks    Drug use: Yes     Types: Marijuana (Weed)     Comment: 5 joints a day marijuana for pain and appetite, she states that she stopped cocaine 2011    Sexual activity: Not Currently     Partners: Male     Birth control/protection: Post-menopausal   Other Topics Concern    Not on file   Social History Narrative    Not on file     Social Determinants of Health     Financial Resource Strain: Low Risk     Difficulty of Paying Living Expenses: Not hard at all   Food Insecurity: No Food Insecurity    Worried About Running Out of Food in the Last Year: Never true    Ran Out of Food in the Last Year: Never true   Transportation Needs: Not on file   Physical Activity: Inactive    Days of Exercise per Week: 0 days    Minutes of Exercise per Session: 0 min   Stress: Not on file   Social Connections: Not on file   Intimate Partner Violence: Not on file   Housing Stability: Not on file       REVIEW OF SYSTEMS: A 12-point review of systemswas obtained and pertinent positives and negatives were enumerated above in the history of present illness. All other reviewed systems / symptoms were negative. Review of Systems   Constitutional: Negative. HENT:  Positive for trouble swallowing (from radiation). Eyes:  Positive for visual disturbance (glasses). Respiratory: Negative. Cardiovascular: Negative. Gastrointestinal:  Positive for abdominal pain, anal bleeding (sometimes) and constipation. Endocrine: Negative. Genitourinary: Negative. Musculoskeletal: Negative. Skin: Negative. Allergic/Immunologic: Negative. Neurological: Negative. Hematological: Negative. Psychiatric/Behavioral: Negative. PHYSICAL EXAMINATION: Vital signs reviewed per the nursing documentation. BP (!) 147/94   Pulse 79   Ht 5' 5\" (1.651 m)   Wt 102 lb (46.3 kg)   BMI 16.97 kg/m²   Body mass index is 16.97 kg/m². Physical Exam  Constitutional:       Appearance: Normal appearance. Eyes:      General: No scleral icterus. Pupils: Pupils are equal, round, and reactive to light. Cardiovascular:      Rate and Rhythm: Normal rate and regular rhythm. Heart sounds: Normal heart sounds. Pulmonary:      Effort: Pulmonary effort is normal.      Breath sounds: Normal breath sounds. Abdominal:      General: Bowel sounds are normal. There is no distension. Palpations: Abdomen is soft. There is no mass. Tenderness: There is no abdominal tenderness. There is no guarding. Skin:     General: Skin is warm and dry. Coloration: Skin is not jaundiced. Neurological:      Mental Status: She is alert and oriented to person, place, and time. Mental status is at baseline. LABORATORY DATA: Reviewed  Lab Results   Component Value Date    WBC 7.5 03/11/2023    HGB 14.5 03/13/2023    HCT 43.5 03/13/2023    MCV 93.0 03/11/2023     03/11/2023     03/13/2023    K 3.8 03/13/2023     03/13/2023    CO2 27 03/13/2023    BUN 12 03/13/2023    CREATININE 0.79 03/13/2023    LABPROT 6.7 04/04/2012    LABALBU 3.9 03/11/2023    BILITOT 0.3 03/11/2023    ALKPHOS 54 03/11/2023    AST 18 03/11/2023    ALT 7 03/11/2023    INR 1.1 03/11/2023         Lab Results   Component Value Date    RBC 4.31 03/11/2023    HGB 14.5 03/13/2023    MCV 93.0 03/11/2023    MCH 31.6 03/11/2023    MCHC 34.0 03/11/2023    RDW 15.2 (H) 03/11/2023    MPV 7.8 03/11/2023    BASOPCT 1 03/11/2023    LYMPHSABS 2.20 03/11/2023    MONOSABS 0.60 03/11/2023    NEUTROABS 4.40 03/11/2023    EOSABS 0.20 03/11/2023    BASOSABS 0.00 03/11/2023         DIAGNOSTIC TESTING:     No results found. IMPRESSION: Ms. Jazmine Blanchard is a 65 y.o. female with    Diagnosis Orders   1. Hemorrhoids, unspecified hemorrhoid type        2. Diverticulosis        3. Constipation, unspecified constipation type          Hemoglobin 14.5. No recurrent bleeding. Abdominal examination benign. Advised sitz baths BID  Stool softeners, Metamucil. Increase fluids  Use baby wipes after bowel movements  Wipe gently, pat dry    May need follow-up with Dr. Dalila Sood. Patient has diverticulosis. Advised to follow high fiber diet and to take precautions to avoid constipation and straining. Thank you for allowing me to participate in the care of Ms. Jazmine Blanchard. For any further questions please do not hesitate to contact me. I have reviewed and agree with the ROS entered by the MA/MAGNOLIAN.          SELMA De La Garza    [facility]  Office #: [phone]

## 2023-03-16 NOTE — PROGRESS NOTES
Physician Progress Note      PATIENT:               Pretty NICKERSON #:                  [id number]  :                       1960  ADMIT DATE:       3/11/2023 9:54 PM  [address] [place] DATE:        3/13/2023 1:25 PM  RESPONDING  PROVIDER #:        Marquis Delilah PRIDE          QUERY TEXT:    Patient admitted with rectal bleeding, noted to have PMH of hemorrhoids &   diverticulosis. Per GI Consult Note 3/12, \"most likely bleeding is from   hemorrhoids\". If possible, please document in progress notes and discharge   summary the cause of the GI bleeding: The medical record reflects the following:  Risk Factors: PMH of hemorrhoids with previous removal, polyps with previous   removal, and diverticulosis. Clinical Indicators: ED Provider Note 3/11: Final Impression: Rectal bleeding. Diverticulosis. H&P 3/12: admitted to the hospital for the management of   Rectal bleeding. According to patient, she experienced rectal bleeding when   having a bowel movement approximately 2 hours before presenting to the ED. Reports a large amount of blood in the toilet with a very small amount of   stool. Also reports bleeding through 2 pads. She does have a history of   polyp and hemorrhoid removal in the past. still having bright red blood,   possible hemorrhoidal but can be diverticular bleed, hem  Treatment: GI Consult, serial H&H, no intervention  Options provided:  -- Rectal bleeding due to hemorrhoids  -- Rectal bleeding due to diverticulosis w/ bleeding  -- Defer to GI consultant regarding documentation of rectal bleeding most   likely d/t hemorrhoids  -- Other - I will add my own diagnosis  -- Disagree - Not applicable / Not valid  -- Disagree - Clinically unable to determine / Unknown  -- Refer to Clinical Documentation Reviewer    PROVIDER RESPONSE TEXT:    This patient has rectal bleeding due to hemorrhoids.     Query created by: Roro Mayes on 3/16/2023 12:32 PM      Electronically signed by:  Marquis Mazariegos MD 3/16/2023 2:30 PM

## 2023-03-20 ENCOUNTER — TELEPHONE (OUTPATIENT)
Dept: FAMILY MEDICINE CLINIC | Age: 63
End: 2023-03-20

## 2023-03-20 NOTE — TELEPHONE ENCOUNTER
Spoke w/pt and adv to restart BP medication and continue to monitor BP and stay hydrated per Dr. Gagan Hernandez.

## 2023-03-20 NOTE — TELEPHONE ENCOUNTER
Pt called in stating that she was starting to feel dizzy and when she took her BP reading it was 145/90,  pt states it just started going high when she was in the hospital on 3/11 for the GI bleed. Pt is inquiring if she should start taking her BP medication again. Pt confirmed that she does have some left w/refills and she took it when she got a high BP reading and it did come down a little.

## 2023-03-20 NOTE — TELEPHONE ENCOUNTER
Patient can restart her blood pressure medication and see if that will help. Her blood work looks normal from the ER. She can monitor and keep yourself hydrated.

## 2023-03-23 NOTE — DISCHARGE SUMMARY
Date/Time    PROT 6.3 03/11/2023 10:32 PM    PROT 6.6 06/15/2022 02:35 PM     CMP:    Lab Results   Component Value Date/Time    GLUCOSE 94 03/13/2023 05:31 AM    GLUCOSE 86 06/15/2022 02:35 PM     03/13/2023 05:31 AM    K 3.8 03/13/2023 05:31 AM     03/13/2023 05:31 AM    CO2 27 03/13/2023 05:31 AM    BUN 12 03/13/2023 05:31 AM    CREATININE 0.79 03/13/2023 05:31 AM    ANIONGAP 7 03/13/2023 05:31 AM    ALKPHOS 54 03/11/2023 10:32 PM    ALT 7 03/11/2023 10:32 PM    AST 18 03/11/2023 10:32 PM    BILITOT 0.3 03/11/2023 10:32 PM    LABALBU 3.9 03/11/2023 10:32 PM    LABALBU 4.1 06/15/2022 02:35 PM    ALBUMIN NOT REPORTED 03/09/2021 12:14 PM    LABGLOM >60 03/13/2023 05:31 AM    GFRAA >60 03/09/2021 12:14 PM    GFR      03/09/2021 12:14 PM    GFR NOT REPORTED 03/09/2021 12:14 PM    PROT 6.3 03/11/2023 10:32 PM    PROT 6.6 06/15/2022 02:35 PM    CALCIUM 8.5 03/13/2023 05:31 AM     PT/INR:    Lab Results   Component Value Date/Time    PROTIME 14.3 03/11/2023 10:32 PM    PROTIME 10.6 09/16/2011 03:50 PM    INR 1.1 03/11/2023 10:32 PM     PTT:   Lab Results   Component Value Date/Time    APTT 27.5 07/06/2017 11:29 AM     FLP:    Lab Results   Component Value Date/Time    CHOL 193 05/06/2022 04:58 PM    TRIG 81 05/06/2022 04:58 PM    HDL 60 05/06/2022 04:58 PM     U/A:    Lab Results   Component Value Date/Time    COLORU YELLOW 08/29/2021 05:21 PM    TURBIDITY CLEAR 09/01/2017 04:30 PM    SPECGRAV 1.015 09/01/2017 04:30 PM    HGBUR NEGATIVE 09/01/2017 04:30 PM    PHUR 6.5 09/01/2017 04:30 PM    PROTEINU NEGATIVE 09/01/2017 04:30 PM    GLUCOSEU NEGATIVE 08/29/2021 05:21 PM    KETUA NEGATIVE 08/29/2021 05:21 PM    BILIRUBINUR NEGATIVE 08/29/2021 05:21 PM    BILIRUBINUR NEGATIVE 09/16/2011 03:58 PM    UROBILINOGEN Normal 09/01/2017 04:30 PM    NITRU NEGATIVE 08/29/2021 05:21 PM    LEUKOCYTESUR NEGATIVE 08/29/2021 05:21 PM     TSH:    Lab Results   Component Value Date/Time    TSH 0.93 08/19/2017 03:20 PM

## 2023-04-16 DIAGNOSIS — R10.9 ABDOMINAL CRAMPING: ICD-10-CM

## 2023-04-17 RX ORDER — DICYCLOMINE HYDROCHLORIDE 10 MG/1
CAPSULE ORAL
Qty: 60 CAPSULE | Refills: 2 | Status: SHIPPED | OUTPATIENT
Start: 2023-04-17

## 2023-04-24 DIAGNOSIS — J43.1 PANLOBULAR EMPHYSEMA (HCC): ICD-10-CM

## 2023-04-24 RX ORDER — BENZONATATE 100 MG/1
100 CAPSULE ORAL 3 TIMES DAILY PRN
Qty: 21 CAPSULE | Refills: 0 | Status: SHIPPED | OUTPATIENT
Start: 2023-04-24 | End: 2023-05-01

## 2023-04-24 NOTE — TELEPHONE ENCOUNTER
Please Approve or Refuse.   Send to Pharmacy per Pt's Request:      Next Visit Date:  5/3/2023   Last Visit Date: 11/16/2022    Hemoglobin A1C (%)   Date Value   10/25/2016 5.5             ( goal A1C is < 7)   BP Readings from Last 3 Encounters:   03/15/23 (!) 147/94   03/13/23 (!) 144/90   03/01/23 116/81          (goal 120/80)  BUN   Date Value Ref Range Status   03/13/2023 12 8 - 23 mg/dL Final     Creatinine   Date Value Ref Range Status   03/13/2023 0.79 0.50 - 0.90 mg/dL Final     Potassium   Date Value Ref Range Status   03/13/2023 3.8 3.7 - 5.3 mmol/L Final

## 2023-05-02 DIAGNOSIS — M81.0 AGE-RELATED OSTEOPOROSIS WITHOUT CURRENT PATHOLOGICAL FRACTURE: ICD-10-CM

## 2023-05-02 RX ORDER — DENOSUMAB 60 MG/ML
60 INJECTION SUBCUTANEOUS ONCE
Qty: 1 ML | Refills: 0 | Status: SHIPPED | OUTPATIENT
Start: 2023-05-02 | End: 2023-05-02

## 2023-05-02 RX ORDER — AMLODIPINE BESYLATE 2.5 MG/1
TABLET ORAL
Qty: 30 TABLET | OUTPATIENT
Start: 2023-05-02

## 2023-05-03 DIAGNOSIS — M81.0 OSTEOPOROSIS, UNSPECIFIED OSTEOPOROSIS TYPE, UNSPECIFIED PATHOLOGICAL FRACTURE PRESENCE: Primary | ICD-10-CM

## 2023-05-03 RX ORDER — ACETAMINOPHEN 325 MG/1
650 TABLET ORAL
OUTPATIENT
Start: 2023-05-03

## 2023-05-03 RX ORDER — ALBUTEROL SULFATE 90 UG/1
4 AEROSOL, METERED RESPIRATORY (INHALATION) PRN
OUTPATIENT
Start: 2023-05-03

## 2023-05-03 RX ORDER — DIPHENHYDRAMINE HYDROCHLORIDE 50 MG/ML
50 INJECTION INTRAMUSCULAR; INTRAVENOUS
OUTPATIENT
Start: 2023-05-03

## 2023-05-03 RX ORDER — ONDANSETRON 2 MG/ML
8 INJECTION INTRAMUSCULAR; INTRAVENOUS
OUTPATIENT
Start: 2023-05-03

## 2023-05-03 RX ORDER — SODIUM CHLORIDE 9 MG/ML
INJECTION, SOLUTION INTRAVENOUS CONTINUOUS
OUTPATIENT
Start: 2023-05-03

## 2023-05-03 RX ORDER — EPINEPHRINE 1 MG/ML
0.3 INJECTION, SOLUTION, CONCENTRATE INTRAVENOUS PRN
OUTPATIENT
Start: 2023-05-03

## 2023-06-08 ENCOUNTER — TELEPHONE (OUTPATIENT)
Dept: CARDIOTHORACIC SURGERY | Age: 63
End: 2023-06-08

## 2023-06-08 NOTE — TELEPHONE ENCOUNTER
Called to reschedule patients follow up with Dr. Swati Dalton due to testing not being completed. Patient states she had a heart attack and moved to [facility] to stay with her daughter. Patient does not plan to move back to [place] and will send for her records once she establishes a new PCP.

## 2023-11-22 ENCOUNTER — OFFICE VISIT (OUTPATIENT)
Dept: FAMILY MEDICINE CLINIC | Age: 63
End: 2023-11-22
Payer: MEDICARE

## 2023-11-22 VITALS
BODY MASS INDEX: 15.96 KG/M2 | HEIGHT: 65 IN | WEIGHT: 95.8 LBS | SYSTOLIC BLOOD PRESSURE: 118 MMHG | OXYGEN SATURATION: 97 % | TEMPERATURE: 98.2 F | HEART RATE: 76 BPM | DIASTOLIC BLOOD PRESSURE: 80 MMHG

## 2023-11-22 DIAGNOSIS — Z00.00 MEDICARE ANNUAL WELLNESS VISIT, SUBSEQUENT: Primary | ICD-10-CM

## 2023-11-22 DIAGNOSIS — Z23 NEED FOR INFLUENZA VACCINATION: ICD-10-CM

## 2023-11-22 DIAGNOSIS — F32.1 CURRENT MODERATE EPISODE OF MAJOR DEPRESSIVE DISORDER WITHOUT PRIOR EPISODE (HCC): ICD-10-CM

## 2023-11-22 DIAGNOSIS — R53.83 OTHER FATIGUE: ICD-10-CM

## 2023-11-22 DIAGNOSIS — I10 PRIMARY HYPERTENSION: ICD-10-CM

## 2023-11-22 DIAGNOSIS — E78.2 MIXED HYPERLIPIDEMIA: ICD-10-CM

## 2023-11-22 DIAGNOSIS — R05.3 CHRONIC COUGH: ICD-10-CM

## 2023-11-22 DIAGNOSIS — E55.9 VITAMIN D DEFICIENCY: ICD-10-CM

## 2023-11-22 DIAGNOSIS — H65.22 LEFT CHRONIC SEROUS OTITIS MEDIA: ICD-10-CM

## 2023-11-22 DIAGNOSIS — R73.9 HYPERGLYCEMIA: ICD-10-CM

## 2023-11-22 DIAGNOSIS — M81.0 AGE-RELATED OSTEOPOROSIS WITHOUT CURRENT PATHOLOGICAL FRACTURE: ICD-10-CM

## 2023-11-22 PROCEDURE — 90674 CCIIV4 VAC NO PRSV 0.5 ML IM: CPT | Performed by: NURSE PRACTITIONER

## 2023-11-22 PROCEDURE — G0439 PPPS, SUBSEQ VISIT: HCPCS | Performed by: NURSE PRACTITIONER

## 2023-11-22 PROCEDURE — G8482 FLU IMMUNIZE ORDER/ADMIN: HCPCS | Performed by: NURSE PRACTITIONER

## 2023-11-22 PROCEDURE — 3074F SYST BP LT 130 MM HG: CPT | Performed by: NURSE PRACTITIONER

## 2023-11-22 PROCEDURE — G0008 ADMIN INFLUENZA VIRUS VAC: HCPCS | Performed by: NURSE PRACTITIONER

## 2023-11-22 PROCEDURE — 3017F COLORECTAL CA SCREEN DOC REV: CPT | Performed by: NURSE PRACTITIONER

## 2023-11-22 PROCEDURE — 3079F DIAST BP 80-89 MM HG: CPT | Performed by: NURSE PRACTITIONER

## 2023-11-22 RX ORDER — PANTOPRAZOLE SODIUM 20 MG/1
20 TABLET, DELAYED RELEASE ORAL
COMMUNITY
Start: 2023-07-18 | End: 2023-11-22 | Stop reason: SDUPTHER

## 2023-11-22 RX ORDER — EVOLOCUMAB 140 MG/ML
INJECTION, SOLUTION SUBCUTANEOUS
COMMUNITY
Start: 2023-09-19 | End: 2023-11-22 | Stop reason: SDUPTHER

## 2023-11-22 RX ORDER — METOPROLOL SUCCINATE 25 MG/1
TABLET, EXTENDED RELEASE ORAL
COMMUNITY
Start: 2023-05-27 | End: 2023-11-22 | Stop reason: SDUPTHER

## 2023-11-22 RX ORDER — DENOSUMAB 60 MG/ML
60 INJECTION SUBCUTANEOUS ONCE
Qty: 1 ML | Refills: 0 | Status: SHIPPED | OUTPATIENT
Start: 2023-11-22 | End: 2023-11-22

## 2023-11-22 RX ORDER — BENZONATATE 100 MG/1
100 CAPSULE ORAL 3 TIMES DAILY PRN
Qty: 30 CAPSULE | Refills: 0 | Status: SHIPPED | OUTPATIENT
Start: 2023-11-22 | End: 2023-12-02

## 2023-11-22 RX ORDER — SACUBITRIL AND VALSARTAN 24; 26 MG/1; MG/1
1 TABLET, FILM COATED ORAL 2 TIMES DAILY
Qty: 60 TABLET | Refills: 2 | Status: SHIPPED | OUTPATIENT
Start: 2023-11-22 | End: 2024-02-20

## 2023-11-22 RX ORDER — LOPERAMIDE HYDROCHLORIDE 2 MG/1
CAPSULE ORAL
COMMUNITY
Start: 2022-11-25 | End: 2023-11-22

## 2023-11-22 RX ORDER — CITALOPRAM 20 MG/1
20 TABLET ORAL EVERY MORNING
Qty: 30 TABLET | Refills: 2 | Status: SHIPPED | OUTPATIENT
Start: 2023-11-22 | End: 2024-02-20

## 2023-11-22 RX ORDER — CLOPIDOGREL BISULFATE 75 MG/1
TABLET ORAL
COMMUNITY
Start: 2023-09-18 | End: 2023-11-22

## 2023-11-22 RX ORDER — EVOLOCUMAB 140 MG/ML
INJECTION, SOLUTION SUBCUTANEOUS
Qty: 2.1 ML | Refills: 0 | Status: SHIPPED | OUTPATIENT
Start: 2023-11-22

## 2023-11-22 RX ORDER — FLUTICASONE PROPIONATE 50 MCG
2 SPRAY, SUSPENSION (ML) NASAL DAILY
Qty: 1 EACH | Refills: 0 | Status: SHIPPED | OUTPATIENT
Start: 2023-11-22

## 2023-11-22 RX ORDER — METOPROLOL SUCCINATE 25 MG/1
12.5 TABLET, EXTENDED RELEASE ORAL DAILY
Qty: 30 TABLET | Refills: 0 | Status: SHIPPED | OUTPATIENT
Start: 2023-11-22 | End: 2024-02-20

## 2023-11-22 RX ORDER — SACUBITRIL AND VALSARTAN 24; 26 MG/1; MG/1
TABLET, FILM COATED ORAL
COMMUNITY
Start: 2023-05-26 | End: 2023-11-22 | Stop reason: SDUPTHER

## 2023-11-22 RX ORDER — AMLODIPINE BESYLATE 2.5 MG/1
1 TABLET ORAL DAILY
COMMUNITY
End: 2023-11-22

## 2023-11-22 RX ORDER — PRAVASTATIN SODIUM 20 MG
TABLET ORAL
COMMUNITY
Start: 2023-05-27 | End: 2023-11-22

## 2023-11-22 RX ORDER — PANTOPRAZOLE SODIUM 20 MG/1
20 TABLET, DELAYED RELEASE ORAL DAILY
Qty: 30 TABLET | Refills: 2 | Status: SHIPPED | OUTPATIENT
Start: 2023-11-22 | End: 2024-02-20

## 2023-11-22 SDOH — ECONOMIC STABILITY: FOOD INSECURITY: WITHIN THE PAST 12 MONTHS, YOU WORRIED THAT YOUR FOOD WOULD RUN OUT BEFORE YOU GOT MONEY TO BUY MORE.: NEVER TRUE

## 2023-11-22 SDOH — ECONOMIC STABILITY: FOOD INSECURITY: WITHIN THE PAST 12 MONTHS, THE FOOD YOU BOUGHT JUST DIDN'T LAST AND YOU DIDN'T HAVE MONEY TO GET MORE.: NEVER TRUE

## 2023-11-22 SDOH — ECONOMIC STABILITY: INCOME INSECURITY: HOW HARD IS IT FOR YOU TO PAY FOR THE VERY BASICS LIKE FOOD, HOUSING, MEDICAL CARE, AND HEATING?: NOT HARD AT ALL

## 2023-11-22 ASSESSMENT — PATIENT HEALTH QUESTIONNAIRE - PHQ9
SUM OF ALL RESPONSES TO PHQ QUESTIONS 1-9: 0
10. IF YOU CHECKED OFF ANY PROBLEMS, HOW DIFFICULT HAVE THESE PROBLEMS MADE IT FOR YOU TO DO YOUR WORK, TAKE CARE OF THINGS AT HOME, OR GET ALONG WITH OTHER PEOPLE: 0
6. FEELING BAD ABOUT YOURSELF - OR THAT YOU ARE A FAILURE OR HAVE LET YOURSELF OR YOUR FAMILY DOWN: 0
2. FEELING DOWN, DEPRESSED OR HOPELESS: 0
SUM OF ALL RESPONSES TO PHQ9 QUESTIONS 1 & 2: 0
4. FEELING TIRED OR HAVING LITTLE ENERGY: 0
SUM OF ALL RESPONSES TO PHQ QUESTIONS 1-9: 0
1. LITTLE INTEREST OR PLEASURE IN DOING THINGS: 0
7. TROUBLE CONCENTRATING ON THINGS, SUCH AS READING THE NEWSPAPER OR WATCHING TELEVISION: 0
SUM OF ALL RESPONSES TO PHQ QUESTIONS 1-9: 0
3. TROUBLE FALLING OR STAYING ASLEEP: 0
9. THOUGHTS THAT YOU WOULD BE BETTER OFF DEAD, OR OF HURTING YOURSELF: 0
8. MOVING OR SPEAKING SO SLOWLY THAT OTHER PEOPLE COULD HAVE NOTICED. OR THE OPPOSITE, BEING SO FIGETY OR RESTLESS THAT YOU HAVE BEEN MOVING AROUND A LOT MORE THAN USUAL: 0
SUM OF ALL RESPONSES TO PHQ QUESTIONS 1-9: 0
5. POOR APPETITE OR OVEREATING: 0

## 2023-11-22 ASSESSMENT — VISUAL ACUITY
OD_CC: 20/25/
OS_CC: 20/50

## 2023-11-22 ASSESSMENT — COLUMBIA-SUICIDE SEVERITY RATING SCALE - C-SSRS
5. HAVE YOU STARTED TO WORK OUT OR WORKED OUT THE DETAILS OF HOW TO KILL YOURSELF? DO YOU INTEND TO CARRY OUT THIS PLAN?: NO
7. DID THIS OCCUR IN THE LAST THREE MONTHS: NO
4. HAVE YOU HAD THESE THOUGHTS AND HAD SOME INTENTION OF ACTING ON THEM?: NO
3. HAVE YOU BEEN THINKING ABOUT HOW YOU MIGHT KILL YOURSELF?: NO

## 2023-11-22 NOTE — PROGRESS NOTES
Visit Information    Have you changed or started any medications since your last visit including any over-the-counter medicines, vitamins, or herbal medicines? no   Have you stopped taking any of your medications? Is so, why? -  no  Are you having any side effects from any of your medications? - no    Have you seen any other physician or provider since your last visit?  no   Have you had any other diagnostic tests since your last visit?  no   Have you been seen in the emergency room and/or had an admission in a hospital since we last saw you?  no   Have you had your routine dental cleaning in the past 6 months?  no     Do you have an active MyChart account? If no, what is the barrier?   Yes    Patient Care Team:  Sameer Proctor MD as PCP - General (Family Medicine)  Sameer Proctor MD as PCP - Empaneled Provider  Ivan Vides RN as   Ousmane Acuna MD as Consulting Physician (Pulmonology)  Darling Mendez MD as Consulting Physician (Hematology and Oncology)  Joy Umaña MD as Consulting Physician (Gastroenterology)    Medical History Review  Past Medical, Family, and Social History reviewed and does contribute to the patient presenting condition    Health Maintenance   Topic Date Due    Pneumococcal 0-64 years Vaccine (2 - PCV) 03/06/2018    Lipids  05/06/2023    Flu vaccine (1) 08/01/2023    Depression Monitoring  08/16/2023    Annual Wellness Visit (AWV)  08/17/2023    COVID-19 Vaccine (3 - 2023-24 season) 09/01/2023    Breast cancer screen  09/19/2024    Colorectal Cancer Screen  08/02/2027    Cervical cancer screen  08/08/2027    DTaP/Tdap/Td vaccine (2 - Td or Tdap) 06/17/2029    Shingles vaccine  Completed    Hepatitis C screen  Completed    HIV screen  Completed    Hepatitis A vaccine  Aged Out    Hepatitis B vaccine  Aged Out    Hib vaccine  Aged Out    Meningococcal (ACWY) vaccine  Aged Out
inhaler Anoro Ellipta 62.5 mcg-25 mcg/actuation powder for inhalation   Inhale 1 puff every day by inhalation route. Yes Provider, MD Maria Luz   lidocaine-prilocaine (EMLA) 2.5-2.5 % cream lidocaine-prilocaine 2.5 %-2.5 % topical cream   Apply to port site and cover 30-45 minutes prior to needle access Yes Provider, MD Maria Luz   albuterol sulfate HFA (PROVENTIL HFA) 108 (90 Base) MCG/ACT inhaler Inhale 2 puffs into the lungs every 6 hours as needed for Wheezing or Shortness of Breath Yes Alba Prakash MD   Multiple Vitamin (MULTIVITAMIN PO) Take  by mouth daily.    Yes Provider, MD Maria Luz   lidocaine (LMX) 4 % cream Apply topically every 8 hrs as needed for pain  Patient not taking: Reported on 11/22/2023  Agustín Porter MD       [facility] (Including outside providers/suppliers regularly involved in providing care):   Patient Care Team:  Agustín Porter MD as PCP - General (Family Medicine)  Agustín Porter MD as PCP - Empaneled Provider  Radha Jaimes RN as   Alba Prakash MD as Consulting Physician (Pulmonology)  Shayla Goode MD as Consulting Physician (Hematology and Oncology)  Veronica Bauer MD as Consulting Physician (Gastroenterology)     Reviewed and updated this visit:  Tobacco  Allergies  Meds  Problems  Med Hx  Surg Hx  Soc Hx  Fam Hx
Nsaids Counseling: NSAID Counseling: I discussed with the patient that NSAIDs should be taken with food. Prolonged use of NSAIDs can result in the development of stomach ulcers.  Patient advised to stop taking NSAIDs if abdominal pain occurs.  The patient verbalized understanding of the proper use and possible adverse effects of NSAIDs.  All of the patient's questions and concerns were addressed.

## 2023-11-29 ENCOUNTER — HOSPITAL ENCOUNTER (OUTPATIENT)
Age: 63
Discharge: HOME | End: 2023-11-29
Payer: MEDICARE

## 2023-11-29 DIAGNOSIS — I10 PRIMARY HYPERTENSION: ICD-10-CM

## 2023-11-29 DIAGNOSIS — R73.9 HYPERGLYCEMIA: ICD-10-CM

## 2023-11-29 DIAGNOSIS — E55.9 VITAMIN D DEFICIENCY: ICD-10-CM

## 2023-11-29 DIAGNOSIS — E78.2 MIXED HYPERLIPIDEMIA: ICD-10-CM

## 2023-11-29 DIAGNOSIS — R53.83 OTHER FATIGUE: ICD-10-CM

## 2023-11-29 LAB
25(OH)D3 SERPL-MCNC: 62.8 NG/ML (ref 30–100)
ALBUMIN SERPL-MCNC: 4.3 G/DL (ref 3.5–5.2)
ALBUMIN/GLOB SERPL: 2 {RATIO} (ref 1–2.5)
ALP SERPL-CCNC: 59 U/L (ref 35–104)
ALT SERPL-CCNC: 10 U/L (ref 10–35)
ANION GAP SERPL CALCULATED.3IONS-SCNC: 12 MMOL/L (ref 9–16)
AST SERPL-CCNC: 27 U/L (ref 10–35)
BASOPHILS # BLD: 0.06 K/UL (ref 0–0.2)
BASOPHILS NFR BLD: 1 % (ref 0–2)
BILIRUB SERPL-MCNC: 0.5 MG/DL (ref 0–1.2)
BILIRUB UR QL STRIP: NEGATIVE
BUN SERPL-MCNC: 14 MG/DL (ref 8–23)
CALCIUM SERPL-MCNC: 10 MG/DL (ref 8.6–10.4)
CHLORIDE SERPL-SCNC: 104 MMOL/L (ref 98–107)
CHOLEST SERPL-MCNC: 210 MG/DL (ref 0–199)
CHOLESTEROL/HDL RATIO: 3
CLARITY UR: CLEAR
CO2 SERPL-SCNC: 24 MMOL/L (ref 20–31)
COLOR UR: YELLOW
COMMENT: NORMAL
CREAT SERPL-MCNC: 0.9 MG/DL (ref 0.5–0.9)
EOSINOPHIL # BLD: 0.3 K/UL (ref 0–0.44)
EOSINOPHILS RELATIVE PERCENT: 5 % (ref 1–4)
ERYTHROCYTE [DISTWIDTH] IN BLOOD BY AUTOMATED COUNT: 14 % (ref 11.8–14.4)
EST. AVERAGE GLUCOSE BLD GHB EST-MCNC: 103 MG/DL
FOLATE SERPL-MCNC: 16.7 NG/ML (ref 4.8–24.2)
GFR SERPL CREATININE-BSD FRML MDRD: >60 ML/MIN/1.73M2
GLUCOSE SERPL-MCNC: 120 MG/DL (ref 74–99)
GLUCOSE UR STRIP-MCNC: NEGATIVE MG/DL
HBA1C MFR BLD: 5.2 % (ref 4–6)
HCT VFR BLD AUTO: 50.2 % (ref 36.3–47.1)
HDLC SERPL-MCNC: 63 MG/DL
HGB BLD-MCNC: 16.4 G/DL (ref 11.9–15.1)
HGB UR QL STRIP.AUTO: NEGATIVE
IMM GRANULOCYTES # BLD AUTO: <0.03 K/UL (ref 0–0.3)
IMM GRANULOCYTES NFR BLD: 0 %
KETONES UR STRIP-MCNC: NEGATIVE MG/DL
LDLC SERPL CALC-MCNC: 124 MG/DL (ref 0–100)
LEUKOCYTE ESTERASE UR QL STRIP: NEGATIVE
LYMPHOCYTES NFR BLD: 2.57 K/UL (ref 1.1–3.7)
LYMPHOCYTES RELATIVE PERCENT: 46 % (ref 24–43)
MAGNESIUM SERPL-MCNC: 1.5 MG/DL (ref 1.6–2.4)
MCH RBC QN AUTO: 31.8 PG (ref 25.2–33.5)
MCHC RBC AUTO-ENTMCNC: 32.7 G/DL (ref 28.4–34.8)
MCV RBC AUTO: 97.5 FL (ref 82.6–102.9)
MONOCYTES NFR BLD: 0.48 K/UL (ref 0.1–1.2)
MONOCYTES NFR BLD: 9 % (ref 3–12)
NEUTROPHILS NFR BLD: 39 % (ref 36–65)
NEUTS SEG NFR BLD: 2.24 K/UL (ref 1.5–8.1)
NITRITE UR QL STRIP: NEGATIVE
NRBC BLD-RTO: 0 PER 100 WBC
PH UR STRIP: 6 [PH] (ref 5–8)
PLATELET # BLD AUTO: 305 K/UL (ref 138–453)
PMV BLD AUTO: 10 FL (ref 8.1–13.5)
POTASSIUM SERPL-SCNC: 3.4 MMOL/L (ref 3.7–5.3)
PROT SERPL-MCNC: 7 G/DL (ref 6.6–8.7)
PROT UR STRIP-MCNC: NEGATIVE MG/DL
RBC # BLD AUTO: 5.15 M/UL (ref 3.95–5.11)
SODIUM SERPL-SCNC: 140 MMOL/L (ref 136–145)
SP GR UR STRIP: 1.02 (ref 1–1.03)
TRIGL SERPL-MCNC: 117 MG/DL (ref 0–149)
URATE SERPL-MCNC: 3.3 MG/DL (ref 2.4–5.7)
UROBILINOGEN UR STRIP-ACNC: NORMAL EU/DL (ref 0–1)
VIT B12 SERPL-MCNC: 754 PG/ML (ref 232–1245)
VLDLC SERPL CALC-MCNC: 23 MG/DL
WBC OTHER # BLD: 5.7 K/UL (ref 3.5–11.3)

## 2023-11-29 PROCEDURE — 85025 COMPLETE CBC W/AUTO DIFF WBC: CPT

## 2023-11-29 PROCEDURE — 80053 COMPREHEN METABOLIC PANEL: CPT

## 2023-11-29 PROCEDURE — 36415 COLL VENOUS BLD VENIPUNCTURE: CPT

## 2023-11-29 PROCEDURE — 82607 VITAMIN B-12: CPT

## 2023-11-29 PROCEDURE — 81003 URINALYSIS AUTO W/O SCOPE: CPT

## 2023-11-29 PROCEDURE — 82746 ASSAY OF FOLIC ACID SERUM: CPT

## 2023-11-29 PROCEDURE — 84550 ASSAY OF BLOOD/URIC ACID: CPT

## 2023-11-29 PROCEDURE — 83735 ASSAY OF MAGNESIUM: CPT

## 2023-11-29 PROCEDURE — 82306 VITAMIN D 25 HYDROXY: CPT

## 2023-11-29 PROCEDURE — 83036 HEMOGLOBIN GLYCOSYLATED A1C: CPT

## 2023-11-29 PROCEDURE — 80061 LIPID PANEL: CPT

## 2023-12-01 ENCOUNTER — TELEPHONE (OUTPATIENT)
Dept: FAMILY MEDICINE CLINIC | Age: 63
End: 2023-12-01

## 2023-12-01 DIAGNOSIS — M81.0 AGE-RELATED OSTEOPOROSIS WITHOUT CURRENT PATHOLOGICAL FRACTURE: Primary | ICD-10-CM

## 2023-12-01 DIAGNOSIS — E78.2 MIXED HYPERLIPIDEMIA: ICD-10-CM

## 2023-12-01 NOTE — TELEPHONE ENCOUNTER
Also she  is now requesting for a new script of prolia to be called into [facility]  denosumab (PROLIA) 60 MG/ML SOSY SC injection [[id number]]  ENDED    Order Details  Dose: 60 mg Route: SubCUTAneous Frequency: ONCE   Dispense Quantity: 1 mL Refills: 0          Sig: Inject 1 mL into the skin once for 1 dose         Start Date: 11/22/23 End Date: 11/22/23 after 1 doses   Written Date: 11/22/23             Pt also stated that she is awaiting for approval for medication below to be completed for PA.     Medication  REPATHA SOSY syringe [id number]  REPATHA SOSY syringe [[id number]]     Order Details  Dose, Route, Frequency: As Directed   Dispense Quantity: 2.1 mL Refills: 0          Sig: inject 1 SYRINGE subcutaneously every 2 weeks         Start Date: 11/22/23 End Date: --   Written Date: 11/22/23 Expiration Date: 11/21/24       Associated Diagnoses: Mixed hyperlipidemia [E78.2]   Original Order:  Alida Agarwal syringe [[id number]]   Providers    Authorizing Provider: EDDA Bautista CNP NPI: [id number]   Ordering User:  EDDA Bautista CNP          Pharmacy

## 2023-12-03 DIAGNOSIS — E87.6 HYPOKALEMIA: Primary | ICD-10-CM

## 2023-12-03 DIAGNOSIS — E83.42 HYPOMAGNESEMIA: ICD-10-CM

## 2023-12-03 RX ORDER — POTASSIUM CHLORIDE 750 MG/1
10 TABLET, EXTENDED RELEASE ORAL DAILY
Qty: 30 TABLET | Refills: 0 | Status: SHIPPED | OUTPATIENT
Start: 2023-12-03 | End: 2024-01-02

## 2023-12-03 RX ORDER — LANOLIN ALCOHOL/MO/W.PET/CERES
400 CREAM (GRAM) TOPICAL DAILY
Qty: 30 TABLET | Refills: 0 | Status: SHIPPED | OUTPATIENT
Start: 2023-12-03 | End: 2024-01-02

## 2023-12-04 ENCOUNTER — TELEPHONE (OUTPATIENT)
Dept: FAMILY MEDICINE CLINIC | Age: 63
End: 2023-12-04

## 2023-12-04 DIAGNOSIS — K55.1 SUPERIOR MESENTERIC ARTERY STENOSIS (HCC): Primary | ICD-10-CM

## 2023-12-04 DIAGNOSIS — C34.11 MALIGNANT NEOPLASM OF UPPER LOBE OF RIGHT LUNG (HCC): ICD-10-CM

## 2023-12-04 DIAGNOSIS — R05.3 CHRONIC COUGH: ICD-10-CM

## 2023-12-04 RX ORDER — BENZONATATE 100 MG/1
100 CAPSULE ORAL 3 TIMES DAILY PRN
Qty: 21 CAPSULE | Refills: 0 | Status: SHIPPED | OUTPATIENT
Start: 2023-12-04 | End: 2024-01-03

## 2023-12-04 RX ORDER — DENOSUMAB 60 MG/ML
60 INJECTION SUBCUTANEOUS ONCE
Qty: 1 ML | Refills: 0 | Status: SHIPPED | OUTPATIENT
Start: 2023-12-04 | End: 2023-12-08 | Stop reason: SDUPTHER

## 2023-12-04 RX ORDER — EZETIMIBE 10 MG/1
10 TABLET ORAL DAILY
Qty: 90 TABLET | Refills: 1 | Status: SHIPPED | OUTPATIENT
Start: 2023-12-04

## 2023-12-04 RX ORDER — CLOPIDOGREL BISULFATE 75 MG/1
75 TABLET ORAL DAILY
Qty: 30 TABLET | Refills: 2 | Status: SHIPPED | OUTPATIENT
Start: 2023-12-04

## 2023-12-04 NOTE — TELEPHONE ENCOUNTER
Please Approve or Refuse.   Send to Pharmacy per Pt's Request: CLOPIDOGREL/UNABLE TO PEND      Next Visit Date:  2/22/2024   Last Visit Date: 11/22/2023    Hemoglobin A1C (%)   Date Value   11/29/2023 5.2   10/25/2016 5.5             ( goal A1C is < 7)   BP Readings from Last 3 Encounters:   11/22/23 118/80   03/15/23 (!) 147/94   03/13/23 (!) 144/90          (goal 120/80)  BUN   Date Value Ref Range Status   11/29/2023 14 8 - 23 mg/dL Final     Creatinine   Date Value Ref Range Status   11/29/2023 0.9 0.50 - 0.90 mg/dL Final     Potassium   Date Value Ref Range Status   11/29/2023 3.4 (L) 3.7 - 5.3 mmol/L Final

## 2023-12-04 NOTE — TELEPHONE ENCOUNTER
Called and spoke with patient. Prolia was refilled at previous visit, [place] was refilled because when patient was in [place] that is what she was on. I did call and discussed with her the plan to switch to Zetia which she is open to. In terms of the Plavix, initially she had told me that she quit taking it, but I did call does not to clarify and she says she is still taking this medication. Does have upcoming appointment with cardiology on 12/18. Plavix refilled.

## 2023-12-04 NOTE — TELEPHONE ENCOUNTER
Dori Mendez discontinued clopidogrel on previous appointment not sure what was the reason, we will forward this text to her.

## 2023-12-04 NOTE — TELEPHONE ENCOUNTER
I will order Prolia shot I have not ordered Repatha and insurance will not cover [address]. I do not think there is an indication for Repatha I will forward this message to Luis Pfeiffer who has ordered [address]. We can try Zetia I will send that prescription.

## 2023-12-06 DIAGNOSIS — M81.0 OSTEOPOROSIS, UNSPECIFIED OSTEOPOROSIS TYPE, UNSPECIFIED PATHOLOGICAL FRACTURE PRESENCE: Primary | ICD-10-CM

## 2023-12-07 ENCOUNTER — TELEPHONE (OUTPATIENT)
Dept: FAMILY MEDICINE CLINIC | Age: 63
End: 2023-12-07

## 2023-12-07 DIAGNOSIS — M81.0 AGE-RELATED OSTEOPOROSIS WITHOUT CURRENT PATHOLOGICAL FRACTURE: Primary | ICD-10-CM

## 2023-12-07 NOTE — TELEPHONE ENCOUNTER
SPOKE WITH PATIENT/AND PHARMACY/ STATED THE PHARMACY HAS NOT SENT HER THE SHOT.  PLEASE RE-ORDER MEDICATION AND WILL FAX ORDER TO INFUSION CENTER

## 2023-12-07 NOTE — TELEPHONE ENCOUNTER
----- Message from Jac Jean Baptiste sent at 12/4/2023  3:46 PM EST -----  Subject: Message to Provider    QUESTIONS  Information for Provider? [facility] Requesting   information on where this patient can receive medication for infusion   therapy. Patients medication Prolia prefilled syringes 60mg was prescribed   by [facility]. Please advise on a location where this medication can be   received, patient is not able to receive medication at home because this   is a specialty medication.   ---------------------------------------------------------------------------  --------------  Ramonita u[place]nningham  [phone]; Do not leave any message, patient will call back for answer  ---------------------------------------------------------------------------  --------------  SCRIPT ANSWERS  Relationship to Patient? Covered Entity  Covered Entity Type? Pharmacy? Representative Name?  [facility]

## 2023-12-07 NOTE — TELEPHONE ENCOUNTER
Please notify patient to go [facility] infusion center, they usually inject their Prolia shot. She needs to be monitored. If she has received shot already at home she can bring that with her or we can order the new shot and fax it to infusion center.

## 2023-12-08 DIAGNOSIS — F32.1 CURRENT MODERATE EPISODE OF MAJOR DEPRESSIVE DISORDER WITHOUT PRIOR EPISODE (HCC): ICD-10-CM

## 2023-12-08 RX ORDER — DENOSUMAB 60 MG/ML
60 INJECTION SUBCUTANEOUS ONCE
Qty: 1 ML | Refills: 0 | Status: SHIPPED | OUTPATIENT
Start: 2023-12-08 | End: 2023-12-08

## 2023-12-08 NOTE — TELEPHONE ENCOUNTER
1. Age-related osteoporosis without current pathological fracture    - denosumab (PROLIA) 60 MG/ML SOSY SC injection;  Inject 1 mL into the skin once for 1 dose  Dispense: 1 mL; Refill: 0

## 2023-12-11 RX ORDER — CITALOPRAM 20 MG/1
20 TABLET ORAL EVERY MORNING
Qty: 30 TABLET | Refills: 11 | Status: SHIPPED | OUTPATIENT
Start: 2023-12-11

## 2023-12-11 NOTE — TELEPHONE ENCOUNTER
Please Approve or Refuse.   Send to Pharmacy per Pt's Request:      Next Visit Date:  2/22/2024   Last Visit Date: 11/22/2023    Hemoglobin A1C (%)   Date Value   11/29/2023 5.2   10/25/2016 5.5             ( goal A1C is < 7)   BP Readings from Last 3 Encounters:   11/22/23 118/80   03/15/23 (!) 147/94   03/13/23 (!) 144/90          (goal 120/80)  BUN   Date Value Ref Range Status   11/29/2023 14 8 - 23 mg/dL Final     Creatinine   Date Value Ref Range Status   11/29/2023 0.9 0.50 - 0.90 mg/dL Final     Potassium   Date Value Ref Range Status   11/29/2023 3.4 (L) 3.7 - 5.3 mmol/L Final

## 2023-12-23 DIAGNOSIS — K55.1 SUPERIOR MESENTERIC ARTERY STENOSIS (HCC): ICD-10-CM

## 2023-12-26 RX ORDER — METOPROLOL SUCCINATE 25 MG/1
12.5 TABLET, EXTENDED RELEASE ORAL DAILY
Qty: 30 TABLET | Refills: 5 | Status: SHIPPED | OUTPATIENT
Start: 2023-12-26

## 2023-12-26 RX ORDER — SACUBITRIL AND VALSARTAN 24; 26 MG/1; MG/1
1 TABLET, FILM COATED ORAL 2 TIMES DAILY
Qty: 180 TABLET | Refills: 3 | Status: SHIPPED | OUTPATIENT
Start: 2023-12-26

## 2023-12-26 RX ORDER — CLOPIDOGREL BISULFATE 75 MG/1
75 TABLET ORAL DAILY
Qty: 90 TABLET | Refills: 3 | Status: SHIPPED | OUTPATIENT
Start: 2023-12-26

## 2023-12-26 RX ORDER — PANTOPRAZOLE SODIUM 20 MG/1
20 TABLET, DELAYED RELEASE ORAL DAILY
Qty: 90 TABLET | Refills: 0 | Status: SHIPPED | OUTPATIENT
Start: 2023-12-26 | End: 2024-03-25

## 2023-12-26 NOTE — TELEPHONE ENCOUNTER
Please Approve or Refuse.   Send to Pharmacy per Pt's Request: [facility]     Next Visit Date:  2/22/2024   Last Visit Date: 11/22/2023    Hemoglobin A1C (%)   Date Value   11/29/2023 5.2   10/25/2016 5.5             ( goal A1C is < 7)   BP Readings from Last 3 Encounters:   12/18/23 102/80   11/22/23 118/80   03/15/23 (!) 147/94          (goal 120/80)  BUN   Date Value Ref Range Status   11/29/2023 14 8 - 23 mg/dL Final     Creatinine   Date Value Ref Range Status   11/29/2023 0.9 0.50 - 0.90 mg/dL Final     Potassium   Date Value Ref Range Status   11/29/2023 3.4 (L) 3.7 - 5.3 mmol/L Final

## 2024-02-06 RX ORDER — PANTOPRAZOLE SODIUM 20 MG/1
20 TABLET, DELAYED RELEASE ORAL DAILY
Qty: 90 TABLET | Refills: 1 | Status: SHIPPED | OUTPATIENT
Start: 2024-02-06 | End: 2024-08-04

## 2024-02-06 NOTE — TELEPHONE ENCOUNTER
Please Approve or Refuse.  Send to Pharmacy per Pt's Request:      Next Visit Date:  2/22/2024   Last Visit Date: 11/22/2023    Hemoglobin A1C (%)   Date Value   11/29/2023 5.2   10/25/2016 5.5             ( goal A1C is < 7)   BP Readings from Last 3 Encounters:   12/18/23 102/80   11/22/23 118/80   03/15/23 (!) 147/94          (goal 120/80)  BUN   Date Value Ref Range Status   11/29/2023 14 8 - 23 mg/dL Final     Creatinine   Date Value Ref Range Status   11/29/2023 0.9 0.50 - 0.90 mg/dL Final     Potassium   Date Value Ref Range Status   11/29/2023 3.4 (L) 3.7 - 5.3 mmol/L Final

## 2024-02-15 ENCOUNTER — APPOINTMENT (OUTPATIENT)
Dept: CT IMAGING | Age: 64
End: 2024-02-15
Payer: COMMERCIAL

## 2024-02-15 ENCOUNTER — HOSPITAL ENCOUNTER (EMERGENCY)
Age: 64
Discharge: HOME | End: 2024-02-15
Attending: EMERGENCY MEDICINE
Payer: COMMERCIAL

## 2024-02-15 ENCOUNTER — APPOINTMENT (OUTPATIENT)
Dept: GENERAL RADIOLOGY | Age: 64
End: 2024-02-15
Payer: COMMERCIAL

## 2024-02-15 VITALS
SYSTOLIC BLOOD PRESSURE: 116 MMHG | HEIGHT: 66 IN | DIASTOLIC BLOOD PRESSURE: 91 MMHG | OXYGEN SATURATION: 97 % | WEIGHT: 88 LBS | BODY MASS INDEX: 14.14 KG/M2 | HEART RATE: 83 BPM | TEMPERATURE: 97.3 F | RESPIRATION RATE: 16 BRPM

## 2024-02-15 DIAGNOSIS — Z87.19 HISTORY OF RECTAL BLEEDING: Primary | ICD-10-CM

## 2024-02-15 DIAGNOSIS — R93.5 ABNORMAL COMPUTED TOMOGRAPHY OF ABDOMEN AND PELVIS: ICD-10-CM

## 2024-02-15 DIAGNOSIS — K59.00 CONSTIPATION, UNSPECIFIED CONSTIPATION TYPE: ICD-10-CM

## 2024-02-15 DIAGNOSIS — R05.9 COUGH, UNSPECIFIED TYPE: ICD-10-CM

## 2024-02-15 LAB
ALBUMIN SERPL-MCNC: 3.7 G/DL (ref 3.5–5.2)
ALP SERPL-CCNC: 66 U/L (ref 35–104)
ALT SERPL-CCNC: 31 U/L (ref 5–33)
AMYLASE SERPL-CCNC: 60 U/L (ref 28–100)
ANION GAP SERPL CALCULATED.3IONS-SCNC: 16 MMOL/L (ref 9–17)
AST SERPL-CCNC: 36 U/L
BASOPHILS # BLD: <0.03 K/UL (ref 0–0.2)
BASOPHILS NFR BLD: 0 % (ref 0–2)
BILIRUB DIRECT SERPL-MCNC: 0.2 MG/DL
BILIRUB INDIRECT SERPL-MCNC: 0.3 MG/DL (ref 0–1)
BILIRUB SERPL-MCNC: 0.5 MG/DL (ref 0.3–1.2)
BUN SERPL-MCNC: 15 MG/DL (ref 8–23)
BUN/CREAT SERPL: 21 (ref 9–20)
CALCIUM SERPL-MCNC: 9 MG/DL (ref 8.6–10.4)
CHLORIDE SERPL-SCNC: 101 MMOL/L (ref 98–107)
CO2 SERPL-SCNC: 23 MMOL/L (ref 20–31)
CREAT SERPL-MCNC: 0.7 MG/DL (ref 0.5–0.9)
DATE, STOOL #1: NORMAL
EOSINOPHIL # BLD: 0.09 K/UL (ref 0–0.44)
EOSINOPHILS RELATIVE PERCENT: 2 % (ref 1–4)
ERYTHROCYTE [DISTWIDTH] IN BLOOD BY AUTOMATED COUNT: 13.1 % (ref 11.8–14.4)
GFR SERPL CREATININE-BSD FRML MDRD: >60 ML/MIN/1.73M2
GLUCOSE SERPL-MCNC: 109 MG/DL (ref 70–99)
HCT VFR BLD AUTO: 47.2 % (ref 36.3–47.1)
HEMOCCULT SP1 STL QL: NEGATIVE
HGB BLD-MCNC: 15.8 G/DL (ref 11.9–15.1)
IMM GRANULOCYTES # BLD AUTO: 0.02 K/UL (ref 0–0.3)
IMM GRANULOCYTES NFR BLD: 0 %
INR PPP: 1.1
LACTATE BLDV-SCNC: 1.2 MMOL/L (ref 0.5–2.2)
LIPASE SERPL-CCNC: 39 U/L (ref 13–60)
LYMPHOCYTES NFR BLD: 1.59 K/UL (ref 1.1–3.7)
LYMPHOCYTES RELATIVE PERCENT: 28 % (ref 24–43)
MCH RBC QN AUTO: 32 PG (ref 25.2–33.5)
MCHC RBC AUTO-ENTMCNC: 33.5 G/DL (ref 28.4–34.8)
MCV RBC AUTO: 95.5 FL (ref 82.6–102.9)
MONOCYTES NFR BLD: 0.37 K/UL (ref 0.1–1.2)
MONOCYTES NFR BLD: 6 % (ref 3–12)
NEUTROPHILS NFR BLD: 64 % (ref 36–65)
NEUTS SEG NFR BLD: 3.67 K/UL (ref 1.5–8.1)
NRBC BLD-RTO: 0 PER 100 WBC
PARTIAL THROMBOPLASTIN TIME: 26.9 SEC (ref 23.9–33.8)
PLATELET # BLD AUTO: 203 K/UL (ref 138–453)
PMV BLD AUTO: 10.1 FL (ref 8.1–13.5)
POTASSIUM SERPL-SCNC: 3.4 MMOL/L (ref 3.7–5.3)
PROT SERPL-MCNC: 6.8 G/DL (ref 6.4–8.3)
PROTHROMBIN TIME: 14.3 SEC (ref 11.5–14.2)
RBC # BLD AUTO: 4.94 M/UL (ref 3.95–5.11)
SODIUM SERPL-SCNC: 140 MMOL/L (ref 135–144)
TIME, STOOL #1: 1612
WBC OTHER # BLD: 5.8 K/UL (ref 3.5–11.3)

## 2024-02-15 PROCEDURE — 85610 PROTHROMBIN TIME: CPT

## 2024-02-15 PROCEDURE — 82150 ASSAY OF AMYLASE: CPT

## 2024-02-15 PROCEDURE — 83605 ASSAY OF LACTIC ACID: CPT

## 2024-02-15 PROCEDURE — 71045 X-RAY EXAM CHEST 1 VIEW: CPT

## 2024-02-15 PROCEDURE — 6360000002 HC RX W HCPCS: Performed by: NURSE PRACTITIONER

## 2024-02-15 PROCEDURE — 6370000000 HC RX 637 (ALT 250 FOR IP): Performed by: NURSE PRACTITIONER

## 2024-02-15 PROCEDURE — 74177 CT ABD & PELVIS W/CONTRAST: CPT

## 2024-02-15 PROCEDURE — 6360000004 HC RX CONTRAST MEDICATION: Performed by: NURSE PRACTITIONER

## 2024-02-15 PROCEDURE — 96374 THER/PROPH/DIAG INJ IV PUSH: CPT

## 2024-02-15 PROCEDURE — 80076 HEPATIC FUNCTION PANEL: CPT

## 2024-02-15 PROCEDURE — 80048 BASIC METABOLIC PNL TOTAL CA: CPT

## 2024-02-15 PROCEDURE — 83690 ASSAY OF LIPASE: CPT

## 2024-02-15 PROCEDURE — 2580000003 HC RX 258: Performed by: NURSE PRACTITIONER

## 2024-02-15 PROCEDURE — 85730 THROMBOPLASTIN TIME PARTIAL: CPT

## 2024-02-15 PROCEDURE — 2500000003 HC RX 250 WO HCPCS: Performed by: NURSE PRACTITIONER

## 2024-02-15 PROCEDURE — 96375 TX/PRO/DX INJ NEW DRUG ADDON: CPT

## 2024-02-15 PROCEDURE — 99285 EMERGENCY DEPT VISIT HI MDM: CPT

## 2024-02-15 PROCEDURE — 82272 OCCULT BLD FECES 1-3 TESTS: CPT

## 2024-02-15 PROCEDURE — 85025 COMPLETE CBC W/AUTO DIFF WBC: CPT

## 2024-02-15 PROCEDURE — A4216 STERILE WATER/SALINE, 10 ML: HCPCS | Performed by: NURSE PRACTITIONER

## 2024-02-15 RX ORDER — BENZONATATE 100 MG/1
100 CAPSULE ORAL 3 TIMES DAILY PRN
Qty: 21 CAPSULE | Refills: 0 | Status: SHIPPED | OUTPATIENT
Start: 2024-02-15 | End: 2024-02-22

## 2024-02-15 RX ORDER — SODIUM CHLORIDE 0.9 % (FLUSH) 0.9 %
10 SYRINGE (ML) INJECTION ONCE
Status: COMPLETED | OUTPATIENT
Start: 2024-02-15 | End: 2024-02-15

## 2024-02-15 RX ORDER — 0.9 % SODIUM CHLORIDE 0.9 %
80 INTRAVENOUS SOLUTION INTRAVENOUS ONCE
Status: COMPLETED | OUTPATIENT
Start: 2024-02-15 | End: 2024-02-15

## 2024-02-15 RX ORDER — BENZONATATE 100 MG/1
100 CAPSULE ORAL ONCE
Status: COMPLETED | OUTPATIENT
Start: 2024-02-15 | End: 2024-02-15

## 2024-02-15 RX ORDER — 0.9 % SODIUM CHLORIDE 0.9 %
500 INTRAVENOUS SOLUTION INTRAVENOUS ONCE
Status: COMPLETED | OUTPATIENT
Start: 2024-02-15 | End: 2024-02-15

## 2024-02-15 RX ORDER — POTASSIUM CHLORIDE 20 MEQ/1
40 TABLET, EXTENDED RELEASE ORAL ONCE
Status: COMPLETED | OUTPATIENT
Start: 2024-02-15 | End: 2024-02-15

## 2024-02-15 RX ORDER — ONDANSETRON 2 MG/ML
4 INJECTION INTRAMUSCULAR; INTRAVENOUS ONCE
Status: COMPLETED | OUTPATIENT
Start: 2024-02-15 | End: 2024-02-15

## 2024-02-15 RX ADMIN — IOPAMIDOL 75 ML: 755 INJECTION, SOLUTION INTRAVENOUS at 18:06

## 2024-02-15 RX ADMIN — SODIUM CHLORIDE 80 ML: 9 INJECTION, SOLUTION INTRAVENOUS at 18:06

## 2024-02-15 RX ADMIN — SODIUM CHLORIDE 500 ML: 0.9 INJECTION, SOLUTION INTRAVENOUS at 16:39

## 2024-02-15 RX ADMIN — POTASSIUM CHLORIDE 40 MEQ: 1500 TABLET, EXTENDED RELEASE ORAL at 19:47

## 2024-02-15 RX ADMIN — ONDANSETRON 4 MG: 2 INJECTION INTRAMUSCULAR; INTRAVENOUS at 16:16

## 2024-02-15 RX ADMIN — BENZONATATE 100 MG: 100 CAPSULE ORAL at 16:38

## 2024-02-15 RX ADMIN — SODIUM CHLORIDE, PRESERVATIVE FREE 10 ML: 5 INJECTION INTRAVENOUS at 18:06

## 2024-02-15 RX ADMIN — FAMOTIDINE 20 MG: 10 INJECTION, SOLUTION INTRAVENOUS at 16:15

## 2024-02-15 NOTE — ED PROVIDER NOTES
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    Nontoxic single thyroid nodule E04.1    Other nonspecific abnormal finding of lung field R91.8    Other prurigo L28.2    Other specified diseases of liver K76.89    Painful micturition, unspecified R30.9    Primary lung adenocarcinoma, right (HCC) C34.91    Right temporomandibular joint disorder, unspecified M26.601    Wedge compression fracture of T11-T12 vertebra, initial encounter for closed fracture (HCC) S22.080A    Grade II hemorrhoids K64.1    Age-related osteoporosis without current pathological fracture M81.0    Osteoporosis M81.0    Rectal bleeding K62.5         DISPOSITION/PLAN   DISPOSITION Decision To Discharge 02/15/2024 07:24:38 PM      PATIENT REFERRED TO:   Rehana Patino MD  [address] [address] [phone]    Schedule an appointment as soon as possible for a visit         Please follow-up with your gastroenterologist and your oncologist within the next week.        [facility] ED  [facility] [place] [address]  [phone]    If symptoms worsen      DISCHARGE MEDICATIONS:     Discharge Medication List as of 2/15/2024  7:29 PM        START taking these medications    Details   benzonatate (TESSALON) 100 MG capsule Take 1 capsule by mouth 3 times daily as needed for Cough, Disp-21 capsule, R-0Normal                 (Please note that portions of this note were completed with a voice recognition program.  Efforts were made to edit the dictations but occasionally words are mis-transcribed.)    EDDA Theodore CNP, Eric, APRN - CNP  02/15/24 [id number]

## 2024-02-15 NOTE — ED NOTES
Pt presenting to the ED with complaints of rectal bleeding that started right after pt had to digitally remove her stool. Pt reports that she has been having it disimpact herself for a while. Pt reports she has internal and external hemorrhoids. Pt reports that the blood was bright red. Pt is A&ox4.

## 2024-02-16 ENCOUNTER — TELEPHONE (OUTPATIENT)
Dept: FAMILY MEDICINE CLINIC | Age: 64
End: 2024-02-16

## 2024-02-16 ENCOUNTER — CARE COORDINATION (OUTPATIENT)
Dept: CARE COORDINATION | Age: 64
End: 2024-02-16

## 2024-02-16 NOTE — DISCHARGE INSTRUCTIONS
Take medications as prescribed.  Continue taking MiraLAX at home and follow-up with your primary care provider within the next week.  Also follow-up with your gastroenterologist and your oncologist within the next week.  Return to emergency department for worsening or new symptoms.

## 2024-02-16 NOTE — PROGRESS NOTES
SPIRITUAL CARE DEPARTMENT [facility]  PROGRESS NOTE    Room # STA17/17   Name: Erin Hill            Advent: [employer]     Reason for visit: Spiritual Care Consult    I visited the patient.    Admit Date & Time: 2/15/2024  3:42 PM    Assessment:  Erin Hill is a 63 y.o. female in the hospital because she has rectal bleeding. Upon entering the room patient is resting on gurney. She is awake and alert.      Intervention:  I introduced myself and my title as  I offered space for patient  to express feelings, needs, and concerns and provided a ministry presence. Patient dealing with lung cancer and is in pain and in need of support. RN Bruno consults . AS  is talking with patient, other ED staff arrive for consult and  steps out of room. When  is able to return, patient is being prepared for discharge and is open to a phone call tomorrow to finish conversation with .     Outcome:  Patient hopeful.    Plan:  Chaplains will remain available to offer spiritual and emotional support as needed.    Electronically signed by Chaplain Sunitha, on 2/15/2024 at 9:42 PM.  Spiritual Care Department  [facility]      02/15/24 [id number]   Encounter Summary   Service Provided For: Patient   Referral/Consult From: Nurse   Support System Unknown   Last Encounter  02/15/24   Complexity of Encounter Moderate   Begin Time 1600   End Time  1620   Total Time Calculated 20 min   Spiritual/Emotional needs   Type Spiritual Support   Assessment/Intervention/Outcome   Assessment Compromised coping;Fearful;Impaired resilience;Powerlessness;Loneliness;Sad;Tearful;Stress overload   Intervention Active listening;Discussed belief system/Sikh practices/xavi;Discussed meaning/purpose;Explored/Affirmed feelings, thoughts, concerns;Explored Coping Skills/Resources;Nurtured Hope;Sustaining Presence/Ministry of presence   Outcome

## 2024-02-16 NOTE — CARE COORDINATION
Attempted to reach patient for ED follow up. Left a message on her voicemail with call back number.   Will try to reach her on Monday.

## 2024-02-16 NOTE — TELEPHONE ENCOUNTER
[facility] ED Follow up Call    Reason for ED visit:  RECTAL BLEEDING AND ABDOMINAL PAIN          Hi Erin , this is REDDY from Rehana Sánchez's office, just calling to see how you are doing after your recent ED visit.    Did you receive discharge instructions?  Yes  Do you understand the discharge instructions? Yes  Did the ED give you any new prescriptions? No:   Were you able to fill your prescriptions? No:       Do you have one of our red, yellow and green  Zone sheets that help you to determine when you should go to the ED?Not Applicable      Do you need or want to make a follow up appt with your PCP?No    Do you have any further needs in the home i.e. Equipment?  Not Applicable        FU appts/Provider:    Future Appointments   Date Time Provider Department Center   2/22/2024  1:00 PM [facility]   6/19/2024  1:00 PM Marvin Lima DO [facility]       V

## 2024-02-19 ENCOUNTER — CARE COORDINATION (OUTPATIENT)
Dept: CARE COORDINATION | Age: 64
End: 2024-02-19

## 2024-02-19 NOTE — CARE COORDINATION
Ambulatory Care Coordination  ED Follow up Call    Reason for ED visit:  Rectal bleeding/ abdominal pain    Status:     improved    Did you call your PCP prior to going to the ED?  No      Did you receive a discharge instructions from the Emergency Room? Yes  Review of Instructions:     Understands what to report/when to return?:  Yes   Understands discharge instructions?:  Yes   Following discharge instructions?:  Yes   If not why?     Are there any new complaints of pain? No  New Pain Meds? No    Constipation prophylaxis needed?  N/A    If you have a wound is the dressing clean, dry, and intact? N/A  Understands wound care regimen? N/A    Are there any other complaints/concerns that you wish to tell your provider?   no    FU appts/Provider:    Future Appointments   Date Time Provider Department Center   2/22/2024  1:00 PM Rehana Patino MD [facility]   6/19/2024  1:00 PM Marvin Lima, DO [facility]           New Medications?:   Yes- Tessalon      Medication Reconciliation by phone - No  Understands Medications?  Yes  Taking Medications? Yes  Can you swallow your pills?  Yes- needs a lot of liquids    Any further needs in the home i.e. Equipment?  No    Link to services in community?:  N/A   Which services:      Erin states she is feeing better. She denies any further bleeding since ED visit. She has a follow up with PCP on 2/22. She plans to call and arrange transportation after she gets off the phone with ACM. Discussed ACM program. She denies any needs. She has transportation through insurance. No medication copays. Discussed appetite and difficulty gaining weight. Offered referral to dietician. She declined. She stated that she is lactose intolerant and can't drink Boost or Ensure. She drinks a different protein drink. She gets full fast and a lot of things just don't taste good since her chemotherapy treatment. Encouraged her to keep contact number for ACM. Also reminded her that

## 2024-02-22 ENCOUNTER — OFFICE VISIT (OUTPATIENT)
Dept: FAMILY MEDICINE CLINIC | Age: 64
End: 2024-02-22
Payer: COMMERCIAL

## 2024-02-22 VITALS
WEIGHT: 88.2 LBS | OXYGEN SATURATION: 97 % | SYSTOLIC BLOOD PRESSURE: 128 MMHG | BODY MASS INDEX: 14.18 KG/M2 | HEART RATE: 83 BPM | DIASTOLIC BLOOD PRESSURE: 88 MMHG | HEIGHT: 66 IN

## 2024-02-22 DIAGNOSIS — Z23 NEED FOR PNEUMOCOCCAL VACCINATION: ICD-10-CM

## 2024-02-22 DIAGNOSIS — I70.0 ATHEROSCLEROSIS OF AORTA (HCC): ICD-10-CM

## 2024-02-22 DIAGNOSIS — J43.1 PANLOBULAR EMPHYSEMA (HCC): ICD-10-CM

## 2024-02-22 DIAGNOSIS — E83.42 HYPOMAGNESEMIA: ICD-10-CM

## 2024-02-22 DIAGNOSIS — E87.6 HYPOKALEMIA: ICD-10-CM

## 2024-02-22 DIAGNOSIS — E44.1 MILD PROTEIN-CALORIE MALNUTRITION (HCC): ICD-10-CM

## 2024-02-22 DIAGNOSIS — C34.91 PRIMARY LUNG ADENOCARCINOMA, RIGHT (HCC): Primary | ICD-10-CM

## 2024-02-22 DIAGNOSIS — E78.2 MIXED HYPERLIPIDEMIA: ICD-10-CM

## 2024-02-22 DIAGNOSIS — I10 PRIMARY HYPERTENSION: ICD-10-CM

## 2024-02-22 DIAGNOSIS — F32.1 CURRENT MODERATE EPISODE OF MAJOR DEPRESSIVE DISORDER WITHOUT PRIOR EPISODE (HCC): ICD-10-CM

## 2024-02-22 PROBLEM — L28.2 OTHER PRURIGO: Status: RESOLVED | Noted: 2021-09-27 | Resolved: 2024-02-22

## 2024-02-22 PROBLEM — A49.8 CLOSTRIDIOIDES DIFFICILE INFECTION: Status: RESOLVED | Noted: 2020-06-08 | Resolved: 2024-02-22

## 2024-02-22 PROBLEM — B88.8 INFESTATION BY BED BUG: Status: RESOLVED | Noted: 2021-09-16 | Resolved: 2024-02-22

## 2024-02-22 PROBLEM — A04.8 CLOSTRIDIAL GASTROENTERITIS: Status: RESOLVED | Noted: 2020-01-28 | Resolved: 2024-02-22

## 2024-02-22 PROBLEM — A04.72 CLOSTRIDIUM DIFFICILE COLITIS: Status: RESOLVED | Noted: 2020-01-15 | Resolved: 2024-02-22

## 2024-02-22 PROBLEM — R94.8 ABNORMAL PET SCAN OF COLON: Status: RESOLVED | Noted: 2018-04-04 | Resolved: 2024-02-22

## 2024-02-22 PROBLEM — K62.5 RECTAL BLEEDING: Status: RESOLVED | Noted: 2023-03-12 | Resolved: 2024-02-22

## 2024-02-22 PROBLEM — F41.9 ANXIETY: Status: RESOLVED | Noted: 2021-01-18 | Resolved: 2024-02-22

## 2024-02-22 PROBLEM — K76.89 OTHER SPECIFIED DISEASES OF LIVER: Status: RESOLVED | Noted: 2020-04-29 | Resolved: 2024-02-22

## 2024-02-22 PROBLEM — J45.909 ASTHMA: Status: RESOLVED | Noted: 2019-02-20 | Resolved: 2024-02-22

## 2024-02-22 PROBLEM — N28.9 DISORDER OF KIDNEY AND URETER, UNSPECIFIED: Status: RESOLVED | Noted: 2020-04-29 | Resolved: 2024-02-22

## 2024-02-22 PROBLEM — H65.22 LEFT CHRONIC SEROUS OTITIS MEDIA: Status: RESOLVED | Noted: 2020-06-08 | Resolved: 2024-02-22

## 2024-02-22 PROBLEM — R30.9 PAINFUL MICTURITION, UNSPECIFIED: Status: RESOLVED | Noted: 2020-04-16 | Resolved: 2024-02-22

## 2024-02-22 PROBLEM — M26.601 RIGHT TEMPOROMANDIBULAR JOINT DISORDER, UNSPECIFIED: Status: RESOLVED | Noted: 2021-10-27 | Resolved: 2024-02-22

## 2024-02-22 PROCEDURE — G8482 FLU IMMUNIZE ORDER/ADMIN: HCPCS | Performed by: FAMILY MEDICINE

## 2024-02-22 PROCEDURE — 3074F SYST BP LT 130 MM HG: CPT | Performed by: FAMILY MEDICINE

## 2024-02-22 PROCEDURE — 3017F COLORECTAL CA SCREEN DOC REV: CPT | Performed by: FAMILY MEDICINE

## 2024-02-22 PROCEDURE — 90677 PCV20 VACCINE IM: CPT | Performed by: FAMILY MEDICINE

## 2024-02-22 PROCEDURE — 1036F TOBACCO NON-USER: CPT | Performed by: FAMILY MEDICINE

## 2024-02-22 PROCEDURE — 3023F SPIROM DOC REV: CPT | Performed by: FAMILY MEDICINE

## 2024-02-22 PROCEDURE — G8427 DOCREV CUR MEDS BY ELIG CLIN: HCPCS | Performed by: FAMILY MEDICINE

## 2024-02-22 PROCEDURE — 99214 OFFICE O/P EST MOD 30 MIN: CPT | Performed by: FAMILY MEDICINE

## 2024-02-22 PROCEDURE — G0009 ADMIN PNEUMOCOCCAL VACCINE: HCPCS | Performed by: FAMILY MEDICINE

## 2024-02-22 PROCEDURE — 3079F DIAST BP 80-89 MM HG: CPT | Performed by: FAMILY MEDICINE

## 2024-02-22 PROCEDURE — G8419 CALC BMI OUT NRM PARAM NOF/U: HCPCS | Performed by: FAMILY MEDICINE

## 2024-02-22 RX ORDER — POTASSIUM CHLORIDE 750 MG/1
10 TABLET, EXTENDED RELEASE ORAL DAILY
Qty: 30 TABLET | Refills: 0 | Status: SHIPPED | OUTPATIENT
Start: 2024-02-22 | End: 2024-03-23

## 2024-02-22 RX ORDER — AMLODIPINE BESYLATE 2.5 MG/1
TABLET ORAL
COMMUNITY

## 2024-02-22 RX ORDER — LANOLIN ALCOHOL/MO/W.PET/CERES
400 CREAM (GRAM) TOPICAL DAILY
Qty: 30 TABLET | Refills: 1 | Status: SHIPPED | OUTPATIENT
Start: 2024-02-22 | End: 2024-04-22

## 2024-02-22 RX ORDER — EZETIMIBE 10 MG/1
10 TABLET ORAL DAILY
Qty: 90 TABLET | Refills: 1 | Status: SHIPPED | OUTPATIENT
Start: 2024-02-22

## 2024-02-22 SDOH — ECONOMIC STABILITY: FOOD INSECURITY: WITHIN THE PAST 12 MONTHS, YOU WORRIED THAT YOUR FOOD WOULD RUN OUT BEFORE YOU GOT MONEY TO BUY MORE.: NEVER TRUE

## 2024-02-22 SDOH — ECONOMIC STABILITY: FOOD INSECURITY: WITHIN THE PAST 12 MONTHS, THE FOOD YOU BOUGHT JUST DIDN'T LAST AND YOU DIDN'T HAVE MONEY TO GET MORE.: NEVER TRUE

## 2024-02-22 SDOH — ECONOMIC STABILITY: INCOME INSECURITY: HOW HARD IS IT FOR YOU TO PAY FOR THE VERY BASICS LIKE FOOD, HOUSING, MEDICAL CARE, AND HEATING?: NOT HARD AT ALL

## 2024-02-22 ASSESSMENT — ENCOUNTER SYMPTOMS
TROUBLE SWALLOWING: 0
COUGH: 1
SHORTNESS OF BREATH: 1
RECTAL PAIN: 0
COLOR CHANGE: 0
RHINORRHEA: 0
ABDOMINAL DISTENTION: 0
DIARRHEA: 0
SORE THROAT: 0
BLOOD IN STOOL: 0
STRIDOR: 0
EYE REDNESS: 0
ABDOMINAL PAIN: 0
VOMITING: 0
CHEST TIGHTNESS: 0
NAUSEA: 0
SINUS PRESSURE: 0
WHEEZING: 1
BACK PAIN: 1
CONSTIPATION: 1

## 2024-02-22 ASSESSMENT — PATIENT HEALTH QUESTIONNAIRE - PHQ9
3. TROUBLE FALLING OR STAYING ASLEEP: 3
4. FEELING TIRED OR HAVING LITTLE ENERGY: 3
SUM OF ALL RESPONSES TO PHQ QUESTIONS 1-9: 19
8. MOVING OR SPEAKING SO SLOWLY THAT OTHER PEOPLE COULD HAVE NOTICED. OR THE OPPOSITE, BEING SO FIGETY OR RESTLESS THAT YOU HAVE BEEN MOVING AROUND A LOT MORE THAN USUAL: 1
10. IF YOU CHECKED OFF ANY PROBLEMS, HOW DIFFICULT HAVE THESE PROBLEMS MADE IT FOR YOU TO DO YOUR WORK, TAKE CARE OF THINGS AT HOME, OR GET ALONG WITH OTHER PEOPLE: 2
9. THOUGHTS THAT YOU WOULD BE BETTER OFF DEAD, OR OF HURTING YOURSELF: 0
1. LITTLE INTEREST OR PLEASURE IN DOING THINGS: 3
7. TROUBLE CONCENTRATING ON THINGS, SUCH AS READING THE NEWSPAPER OR WATCHING TELEVISION: 0
SUM OF ALL RESPONSES TO PHQ QUESTIONS 1-9: 19
5. POOR APPETITE OR OVEREATING: 3
SUM OF ALL RESPONSES TO PHQ QUESTIONS 1-9: 19
2. FEELING DOWN, DEPRESSED OR HOPELESS: 3
6. FEELING BAD ABOUT YOURSELF - OR THAT YOU ARE A FAILURE OR HAVE LET YOURSELF OR YOUR FAMILY DOWN: 3
SUM OF ALL RESPONSES TO PHQ9 QUESTIONS 1 & 2: 6
SUM OF ALL RESPONSES TO PHQ QUESTIONS 1-9: 19

## 2024-02-22 ASSESSMENT — COLUMBIA-SUICIDE SEVERITY RATING SCALE - C-SSRS
2. HAVE YOU ACTUALLY HAD ANY THOUGHTS OF KILLING YOURSELF?: NO
1. WITHIN THE PAST MONTH, HAVE YOU WISHED YOU WERE DEAD OR WISHED YOU COULD GO TO SLEEP AND NOT WAKE UP?: NO

## 2024-02-22 NOTE — PROGRESS NOTES
Out    Meningococcal (ACWY) vaccine  Aged Out             
Last Year: Never true     Ran Out of Food in the Last Year: Never true   Transportation Needs: Unknown (2/22/2024)    PRAPARE - Transportation     Lack of Transportation (Medical): Not on file     Lack of Transportation (Non-Medical): No   Physical Activity: Inactive (11/22/2023)    Exercise Vital Sign     Days of Exercise per Week: 0 days     Minutes of Exercise per Session: 0 min   Stress: Not on file   Social Connections: Not on file   Intimate Partner Violence: Not on file   Housing Stability: Unknown (2/22/2024)    Housing Stability Vital Sign     Unable to Pay for Housing in the Last Year: Not on file     Number of Places Lived in the Last Year: Not on file     Unstable Housing in the Last Year: No     Counseling given: Not Answered  Tobacco comments: smokes 1 or 2 a day        Family History   Problem Relation Age of Onset    Heart Disease Mother     Cancer Mother         breast and lung cancer    Diabetes Father     Heart Disease Father         Death due to MI    Cancer Paternal Grandfather         stomach cancer     Heart Disease Paternal Grandfather     Cancer Sister         ovarian cancer     Cancer Maternal Grandmother         female cancer             -rest of complaints with corresponding details per ROS    The patient's past medical, surgical, social, and family history as well as her current medications and allergies were reviewed as documented intoday's encounter.        Review of Systems   Constitutional:  Positive for activity change and fatigue. Negative for appetite change, fever and unexpected weight change.   HENT:  Positive for congestion. Negative for ear pain, postnasal drip, rhinorrhea, sinus pressure, sore throat and trouble swallowing.    Eyes:  Negative for redness and visual disturbance.   Respiratory:  Positive for cough, shortness of breath and wheezing. Negative for chest tightness and stridor.    Cardiovascular:  Negative for chest pain, palpitations and leg swelling.

## 2024-02-22 NOTE — PATIENT INSTRUCTIONS
Dear valued patient,    We hope this message finds you in good health. At [], we are committed to providing you with the best possible healthcare experience. To further enhance your convenience and streamline your access to our services, we would like to introduce you to the benefits of utilizing direct scheduling through the [facility].    Direct scheduling is a feature within the [facility] that allows you to schedule appointments with your healthcare provider directly, without the need to make a phone call or wait for a callback. We understand that your time is bharti, and we want to ensure that you have quick and easy access to our services whenever you need them.  Here are some reasons why you should consider utilizing direct scheduling through the [facility]:    1. Convenience: With direct scheduling, you can book appointments at any time that suits you best, 24 hours a day, 7 days a week. No more waiting on hold or playing phone tag with our office staff. It puts you in control of managing your healthcare appointments effortlessly.    2. Accessibility: The [facility] is accessible through your computer, smartphone, or tablet, allowing you to schedule appointments from the comfort of your home, office, or on the go. You can access your medical records, review test results, and communicate with your healthcare provider all in one secure and user-friendly platform.    3. Timesaving: By utilizing direct scheduling, you can avoid unnecessary delays and save bharti time. You can easily browse the available appointment slots and select the one that works best for you, eliminating the back-and-forth communication typically required when scheduling via phone.    4. Reminders and notifications: [facility] sends automatic reminders for upcoming appointments, ensuring that you never miss an important visit. You can also receive notifications about test

## 2024-03-12 DIAGNOSIS — E87.6 HYPOKALEMIA: ICD-10-CM

## 2024-03-12 DIAGNOSIS — E78.2 MIXED HYPERLIPIDEMIA: ICD-10-CM

## 2024-03-13 RX ORDER — POTASSIUM CHLORIDE 750 MG/1
10 TABLET, EXTENDED RELEASE ORAL DAILY
Qty: 30 TABLET | Refills: 1 | Status: SHIPPED | OUTPATIENT
Start: 2024-03-13 | End: 2024-05-12

## 2024-03-13 RX ORDER — EZETIMIBE 10 MG/1
10 TABLET ORAL DAILY
Qty: 100 TABLET | Refills: 2 | Status: SHIPPED | OUTPATIENT
Start: 2024-03-13

## 2024-03-13 NOTE — TELEPHONE ENCOUNTER
Please Approve or Refuse.  Send to Pharmacy per Pt's Request: [facility]      Next Visit Date:  6/21/2024   Last Visit Date: 2/22/2024    Hemoglobin A1C (%)   Date Value   11/29/2023 5.2   10/25/2016 5.5             ( goal A1C is < 7)   BP Readings from Last 3 Encounters:   02/22/24 128/88   02/15/24 (!) 116/91   12/18/23 102/80          (goal 120/80)  BUN   Date Value Ref Range Status   02/15/2024 15 8 - 23 mg/dL Final     Creatinine   Date Value Ref Range Status   02/15/2024 0.7 0.5 - 0.9 mg/dL Final     Potassium   Date Value Ref Range Status   02/15/2024 3.4 (L) 3.7 - 5.3 mmol/L Final

## 2024-04-28 DIAGNOSIS — E87.6 HYPOKALEMIA: ICD-10-CM

## 2024-04-29 ENCOUNTER — HOSPITAL ENCOUNTER (INPATIENT)
Age: 64
LOS: 6 days | Discharge: HOME | DRG: 321 | End: 2024-05-05
Attending: EMERGENCY MEDICINE | Admitting: INTERNAL MEDICINE
Payer: COMMERCIAL

## 2024-04-29 ENCOUNTER — APPOINTMENT (OUTPATIENT)
Dept: GENERAL RADIOLOGY | Age: 64
DRG: 321 | End: 2024-04-29
Payer: COMMERCIAL

## 2024-04-29 DIAGNOSIS — R07.9 CHEST PAIN, UNSPECIFIED TYPE: Primary | ICD-10-CM

## 2024-04-29 DIAGNOSIS — I25.10 CORONARY ARTERY DISEASE INVOLVING NATIVE HEART WITHOUT ANGINA PECTORIS, UNSPECIFIED VESSEL OR LESION TYPE: ICD-10-CM

## 2024-04-29 DIAGNOSIS — I21.4 NSTEMI (NON-ST ELEVATED MYOCARDIAL INFARCTION) (HCC): ICD-10-CM

## 2024-04-29 LAB
ALBUMIN SERPL-MCNC: 4.2 G/DL (ref 3.5–5.2)
ALP SERPL-CCNC: 59 U/L (ref 35–104)
ALT SERPL-CCNC: 13 U/L (ref 5–33)
ANION GAP SERPL CALCULATED.3IONS-SCNC: 15 MMOL/L (ref 9–17)
ANTI-XA UNFRAC HEPARIN: <0.1 IU/L (ref 0.3–0.7)
AST SERPL-CCNC: 31 U/L
BASOPHILS # BLD: <0.03 K/UL (ref 0–0.2)
BASOPHILS NFR BLD: 0 % (ref 0–2)
BILIRUB DIRECT SERPL-MCNC: 0.1 MG/DL
BILIRUB INDIRECT SERPL-MCNC: 0.4 MG/DL (ref 0–1)
BILIRUB SERPL-MCNC: 0.5 MG/DL (ref 0.3–1.2)
BNP SERPL-MCNC: 1756 PG/ML
BUN SERPL-MCNC: 12 MG/DL (ref 8–23)
BUN/CREAT SERPL: 17 (ref 9–20)
CALCIUM SERPL-MCNC: 9.5 MG/DL (ref 8.6–10.4)
CHLORIDE SERPL-SCNC: 102 MMOL/L (ref 98–107)
CO2 SERPL-SCNC: 23 MMOL/L (ref 20–31)
CREAT SERPL-MCNC: 0.7 MG/DL (ref 0.5–0.9)
EKG ATRIAL RATE: 71 BPM
EKG P AXIS: 91 DEGREES
EKG P-R INTERVAL: 142 MS
EKG Q-T INTERVAL: 418 MS
EKG QRS DURATION: 76 MS
EKG QTC CALCULATION (BAZETT): 454 MS
EKG R AXIS: 79 DEGREES
EKG T AXIS: 90 DEGREES
EKG VENTRICULAR RATE: 71 BPM
EOSINOPHIL # BLD: 0.3 K/UL (ref 0–0.44)
EOSINOPHILS RELATIVE PERCENT: 4 % (ref 1–4)
ERYTHROCYTE [DISTWIDTH] IN BLOOD BY AUTOMATED COUNT: 13.8 % (ref 11.8–14.4)
GFR SERPL CREATININE-BSD FRML MDRD: >90 ML/MIN/1.73M2
GLUCOSE SERPL-MCNC: 182 MG/DL (ref 70–99)
HCT VFR BLD AUTO: 48.2 % (ref 36.3–47.1)
HGB BLD-MCNC: 15.5 G/DL (ref 11.9–15.1)
IMM GRANULOCYTES # BLD AUTO: 0.02 K/UL (ref 0–0.3)
IMM GRANULOCYTES NFR BLD: 0 %
INR PPP: 1.1
LIPASE SERPL-CCNC: 41 U/L (ref 13–60)
LYMPHOCYTES NFR BLD: 3.34 K/UL (ref 1.1–3.7)
LYMPHOCYTES RELATIVE PERCENT: 45 % (ref 24–43)
MCH RBC QN AUTO: 31.8 PG (ref 25.2–33.5)
MCHC RBC AUTO-ENTMCNC: 32.2 G/DL (ref 28.4–34.8)
MCV RBC AUTO: 99 FL (ref 82.6–102.9)
MONOCYTES NFR BLD: 0.53 K/UL (ref 0.1–1.2)
MONOCYTES NFR BLD: 7 % (ref 3–12)
MYOGLOBIN SERPL-MCNC: 48 NG/ML (ref 25–58)
MYOGLOBIN SERPL-MCNC: 48 NG/ML (ref 25–58)
MYOGLOBIN SERPL-MCNC: 75 NG/ML (ref 25–58)
NEUTROPHILS NFR BLD: 44 % (ref 36–65)
NEUTS SEG NFR BLD: 3.34 K/UL (ref 1.5–8.1)
NRBC BLD-RTO: 0 PER 100 WBC
PARTIAL THROMBOPLASTIN TIME: 24.6 SEC (ref 23.9–33.8)
PLATELET # BLD AUTO: 282 K/UL (ref 138–453)
PMV BLD AUTO: 9.8 FL (ref 8.1–13.5)
POTASSIUM SERPL-SCNC: 3.7 MMOL/L (ref 3.7–5.3)
PROT SERPL-MCNC: 7 G/DL (ref 6.4–8.3)
PROTHROMBIN TIME: 14 SEC (ref 11.5–14.2)
RBC # BLD AUTO: 4.87 M/UL (ref 3.95–5.11)
SODIUM SERPL-SCNC: 140 MMOL/L (ref 135–144)
TROPONIN I SERPL HS-MCNC: 135 NG/L (ref 0–14)
TROPONIN I SERPL HS-MCNC: 272 NG/L (ref 0–14)
TROPONIN I SERPL HS-MCNC: 82 NG/L (ref 0–14)
WBC OTHER # BLD: 7.6 K/UL (ref 3.5–11.3)

## 2024-04-29 PROCEDURE — 85610 PROTHROMBIN TIME: CPT

## 2024-04-29 PROCEDURE — 93005 ELECTROCARDIOGRAM TRACING: CPT | Performed by: PHYSICIAN ASSISTANT

## 2024-04-29 PROCEDURE — 2580000003 HC RX 258: Performed by: NURSE PRACTITIONER

## 2024-04-29 PROCEDURE — 99222 1ST HOSP IP/OBS MODERATE 55: CPT | Performed by: NURSE PRACTITIONER

## 2024-04-29 PROCEDURE — 84484 ASSAY OF TROPONIN QUANT: CPT

## 2024-04-29 PROCEDURE — 6370000000 HC RX 637 (ALT 250 FOR IP): Performed by: NURSE PRACTITIONER

## 2024-04-29 PROCEDURE — 83880 ASSAY OF NATRIURETIC PEPTIDE: CPT

## 2024-04-29 PROCEDURE — 2000000000 HC ICU R&B

## 2024-04-29 PROCEDURE — 80076 HEPATIC FUNCTION PANEL: CPT

## 2024-04-29 PROCEDURE — 83690 ASSAY OF LIPASE: CPT

## 2024-04-29 PROCEDURE — 85730 THROMBOPLASTIN TIME PARTIAL: CPT

## 2024-04-29 PROCEDURE — 6370000000 HC RX 637 (ALT 250 FOR IP): Performed by: PHYSICIAN ASSISTANT

## 2024-04-29 PROCEDURE — 6360000002 HC RX W HCPCS: Performed by: NURSE PRACTITIONER

## 2024-04-29 PROCEDURE — 6360000002 HC RX W HCPCS: Performed by: PHYSICIAN ASSISTANT

## 2024-04-29 PROCEDURE — 80048 BASIC METABOLIC PNL TOTAL CA: CPT

## 2024-04-29 PROCEDURE — 99285 EMERGENCY DEPT VISIT HI MDM: CPT

## 2024-04-29 PROCEDURE — 2580000003 HC RX 258: Performed by: PHYSICIAN ASSISTANT

## 2024-04-29 PROCEDURE — 2060000000 HC ICU INTERMEDIATE R&B

## 2024-04-29 PROCEDURE — 85025 COMPLETE CBC W/AUTO DIFF WBC: CPT

## 2024-04-29 PROCEDURE — 85520 HEPARIN ASSAY: CPT

## 2024-04-29 PROCEDURE — 83874 ASSAY OF MYOGLOBIN: CPT

## 2024-04-29 PROCEDURE — 94640 AIRWAY INHALATION TREATMENT: CPT

## 2024-04-29 PROCEDURE — 36415 COLL VENOUS BLD VENIPUNCTURE: CPT

## 2024-04-29 PROCEDURE — 94761 N-INVAS EAR/PLS OXIMETRY MLT: CPT

## 2024-04-29 PROCEDURE — 71045 X-RAY EXAM CHEST 1 VIEW: CPT

## 2024-04-29 RX ORDER — HEPARIN SODIUM 1000 [USP'U]/ML
30 INJECTION, SOLUTION INTRAVENOUS; SUBCUTANEOUS PRN
Status: DISCONTINUED | OUTPATIENT
Start: 2024-04-29 | End: 2024-05-03

## 2024-04-29 RX ORDER — ASPIRIN 81 MG/1
324 TABLET, CHEWABLE ORAL ONCE
Status: DISCONTINUED | OUTPATIENT
Start: 2024-04-29 | End: 2024-04-29

## 2024-04-29 RX ORDER — POTASSIUM CHLORIDE 750 MG/1
10 TABLET, EXTENDED RELEASE ORAL DAILY
Qty: 60 TABLET | Refills: 5 | Status: SHIPPED | OUTPATIENT
Start: 2024-04-29 | End: 2025-04-24

## 2024-04-29 RX ORDER — EZETIMIBE 10 MG/1
10 TABLET ORAL DAILY
Status: DISCONTINUED | OUTPATIENT
Start: 2024-04-29 | End: 2024-05-05 | Stop reason: HOSPADM

## 2024-04-29 RX ORDER — POTASSIUM CHLORIDE 20 MEQ/1
40 TABLET, EXTENDED RELEASE ORAL PRN
Status: DISCONTINUED | OUTPATIENT
Start: 2024-04-29 | End: 2024-05-05 | Stop reason: HOSPADM

## 2024-04-29 RX ORDER — ACETAMINOPHEN 650 MG/1
650 SUPPOSITORY RECTAL EVERY 6 HOURS PRN
Status: DISCONTINUED | OUTPATIENT
Start: 2024-04-29 | End: 2024-05-05 | Stop reason: HOSPADM

## 2024-04-29 RX ORDER — POTASSIUM CHLORIDE 7.45 MG/ML
10 INJECTION INTRAVENOUS PRN
Status: DISCONTINUED | OUTPATIENT
Start: 2024-04-29 | End: 2024-05-05 | Stop reason: HOSPADM

## 2024-04-29 RX ORDER — ONDANSETRON 2 MG/ML
4 INJECTION INTRAMUSCULAR; INTRAVENOUS EVERY 6 HOURS PRN
Status: DISCONTINUED | OUTPATIENT
Start: 2024-04-29 | End: 2024-05-05 | Stop reason: HOSPADM

## 2024-04-29 RX ORDER — ACETAMINOPHEN 325 MG/1
650 TABLET ORAL EVERY 6 HOURS PRN
Status: DISCONTINUED | OUTPATIENT
Start: 2024-04-29 | End: 2024-05-03 | Stop reason: SDUPTHER

## 2024-04-29 RX ORDER — HEPARIN SODIUM 1000 [USP'U]/ML
60 INJECTION, SOLUTION INTRAVENOUS; SUBCUTANEOUS ONCE
Status: COMPLETED | OUTPATIENT
Start: 2024-04-29 | End: 2024-04-29

## 2024-04-29 RX ORDER — ALBUTEROL SULFATE 90 UG/1
2 AEROSOL, METERED RESPIRATORY (INHALATION)
Status: DISCONTINUED | OUTPATIENT
Start: 2024-04-29 | End: 2024-05-03

## 2024-04-29 RX ORDER — NITROGLYCERIN 0.4 MG/1
0.4 TABLET SUBLINGUAL EVERY 5 MIN PRN
Status: DISCONTINUED | OUTPATIENT
Start: 2024-04-29 | End: 2024-05-05 | Stop reason: HOSPADM

## 2024-04-29 RX ORDER — SODIUM CHLORIDE 0.9 % (FLUSH) 0.9 %
10 SYRINGE (ML) INJECTION PRN
Status: DISCONTINUED | OUTPATIENT
Start: 2024-04-29 | End: 2024-05-05 | Stop reason: HOSPADM

## 2024-04-29 RX ORDER — MAGNESIUM SULFATE 1 G/100ML
1000 INJECTION INTRAVENOUS PRN
Status: DISCONTINUED | OUTPATIENT
Start: 2024-04-29 | End: 2024-05-05 | Stop reason: HOSPADM

## 2024-04-29 RX ORDER — ONDANSETRON 2 MG/ML
4 INJECTION INTRAMUSCULAR; INTRAVENOUS ONCE
Status: COMPLETED | OUTPATIENT
Start: 2024-04-29 | End: 2024-04-29

## 2024-04-29 RX ORDER — HEPARIN SODIUM 10000 [USP'U]/100ML
5-30 INJECTION, SOLUTION INTRAVENOUS CONTINUOUS
Status: DISCONTINUED | OUTPATIENT
Start: 2024-04-29 | End: 2024-05-03

## 2024-04-29 RX ORDER — ATORVASTATIN CALCIUM 40 MG/1
40 TABLET, FILM COATED ORAL NIGHTLY
Status: DISCONTINUED | OUTPATIENT
Start: 2024-04-29 | End: 2024-05-05 | Stop reason: HOSPADM

## 2024-04-29 RX ORDER — PANTOPRAZOLE SODIUM 20 MG/1
20 TABLET, DELAYED RELEASE ORAL DAILY
Status: DISCONTINUED | OUTPATIENT
Start: 2024-04-29 | End: 2024-05-05 | Stop reason: HOSPADM

## 2024-04-29 RX ORDER — 0.9 % SODIUM CHLORIDE 0.9 %
1000 INTRAVENOUS SOLUTION INTRAVENOUS ONCE
Status: COMPLETED | OUTPATIENT
Start: 2024-04-29 | End: 2024-04-29

## 2024-04-29 RX ORDER — CLOPIDOGREL BISULFATE 75 MG/1
75 TABLET ORAL DAILY
Status: DISCONTINUED | OUTPATIENT
Start: 2024-04-29 | End: 2024-05-03

## 2024-04-29 RX ORDER — ASPIRIN 81 MG/1
81 TABLET, CHEWABLE ORAL DAILY
Status: DISCONTINUED | OUTPATIENT
Start: 2024-04-30 | End: 2024-05-03 | Stop reason: SDUPTHER

## 2024-04-29 RX ORDER — HEPARIN SODIUM 1000 [USP'U]/ML
60 INJECTION, SOLUTION INTRAVENOUS; SUBCUTANEOUS PRN
Status: DISCONTINUED | OUTPATIENT
Start: 2024-04-29 | End: 2024-05-03

## 2024-04-29 RX ORDER — NITROGLYCERIN 20 MG/100ML
5-200 INJECTION INTRAVENOUS CONTINUOUS
Status: DISCONTINUED | OUTPATIENT
Start: 2024-04-29 | End: 2024-05-05

## 2024-04-29 RX ORDER — NITROGLYCERIN 0.4 MG/1
0.4 TABLET SUBLINGUAL EVERY 5 MIN PRN
Status: DISCONTINUED | OUTPATIENT
Start: 2024-04-29 | End: 2024-04-29

## 2024-04-29 RX ORDER — BUDESONIDE AND FORMOTEROL FUMARATE DIHYDRATE 160; 4.5 UG/1; UG/1
2 AEROSOL RESPIRATORY (INHALATION)
Status: DISCONTINUED | OUTPATIENT
Start: 2024-04-29 | End: 2024-05-03

## 2024-04-29 RX ORDER — SODIUM CHLORIDE 0.9 % (FLUSH) 0.9 %
5-40 SYRINGE (ML) INJECTION EVERY 12 HOURS SCHEDULED
Status: DISCONTINUED | OUTPATIENT
Start: 2024-04-29 | End: 2024-05-03 | Stop reason: SDUPTHER

## 2024-04-29 RX ORDER — ENOXAPARIN SODIUM 100 MG/ML
40 INJECTION SUBCUTANEOUS DAILY
Status: CANCELLED | OUTPATIENT
Start: 2024-04-29

## 2024-04-29 RX ORDER — SODIUM CHLORIDE 9 MG/ML
INJECTION, SOLUTION INTRAVENOUS PRN
Status: DISCONTINUED | OUTPATIENT
Start: 2024-04-29 | End: 2024-05-03 | Stop reason: SDUPTHER

## 2024-04-29 RX ORDER — CITALOPRAM 20 MG/1
20 TABLET ORAL EVERY MORNING
Status: DISCONTINUED | OUTPATIENT
Start: 2024-04-30 | End: 2024-05-05 | Stop reason: HOSPADM

## 2024-04-29 RX ORDER — ALBUTEROL SULFATE 90 UG/1
2 AEROSOL, METERED RESPIRATORY (INHALATION) EVERY 6 HOURS PRN
Status: DISCONTINUED | OUTPATIENT
Start: 2024-04-29 | End: 2024-04-29

## 2024-04-29 RX ORDER — MORPHINE SULFATE 4 MG/ML
4 INJECTION, SOLUTION INTRAMUSCULAR; INTRAVENOUS ONCE
Status: COMPLETED | OUTPATIENT
Start: 2024-04-29 | End: 2024-04-29

## 2024-04-29 RX ORDER — ONDANSETRON 4 MG/1
4 TABLET, ORALLY DISINTEGRATING ORAL EVERY 8 HOURS PRN
Status: DISCONTINUED | OUTPATIENT
Start: 2024-04-29 | End: 2024-05-05 | Stop reason: HOSPADM

## 2024-04-29 RX ORDER — AMLODIPINE BESYLATE 2.5 MG/1
2.5 TABLET ORAL NIGHTLY
Status: DISCONTINUED | OUTPATIENT
Start: 2024-04-29 | End: 2024-05-05

## 2024-04-29 RX ADMIN — PANTOPRAZOLE SODIUM 20 MG: 20 TABLET, DELAYED RELEASE ORAL at 21:17

## 2024-04-29 RX ADMIN — AMLODIPINE BESYLATE 2.5 MG: 2.5 TABLET ORAL at 23:13

## 2024-04-29 RX ADMIN — CLOPIDOGREL BISULFATE 75 MG: 75 TABLET ORAL at 21:18

## 2024-04-29 RX ADMIN — ONDANSETRON 4 MG: 2 INJECTION INTRAMUSCULAR; INTRAVENOUS at 17:20

## 2024-04-29 RX ADMIN — SACUBITRIL AND VALSARTAN 1 TABLET: 24; 26 TABLET, FILM COATED ORAL at 21:18

## 2024-04-29 RX ADMIN — EZETIMIBE 10 MG: 10 TABLET ORAL at 21:17

## 2024-04-29 RX ADMIN — ATORVASTATIN CALCIUM 40 MG: 40 TABLET, FILM COATED ORAL at 21:17

## 2024-04-29 RX ADMIN — MORPHINE SULFATE 4 MG: 4 INJECTION, SOLUTION INTRAMUSCULAR; INTRAVENOUS at 17:35

## 2024-04-29 RX ADMIN — NITROGLYCERIN 5 MCG/MIN: 20 INJECTION INTRAVENOUS at 17:50

## 2024-04-29 RX ADMIN — Medication 12.5 MG: at 21:18

## 2024-04-29 RX ADMIN — SODIUM CHLORIDE 1000 ML: 9 INJECTION, SOLUTION INTRAVENOUS at 14:37

## 2024-04-29 RX ADMIN — SODIUM CHLORIDE, PRESERVATIVE FREE 10 ML: 5 INJECTION INTRAVENOUS at 21:19

## 2024-04-29 RX ADMIN — HEPARIN SODIUM 12 UNITS/KG/HR: 10000 INJECTION, SOLUTION INTRAVENOUS at 18:21

## 2024-04-29 RX ADMIN — HEPARIN SODIUM 2400 UNITS: 1000 INJECTION INTRAVENOUS; SUBCUTANEOUS at 18:21

## 2024-04-29 RX ADMIN — TIOTROPIUM BROMIDE INHALATION SPRAY 2 PUFF: 3.12 SPRAY, METERED RESPIRATORY (INHALATION) at 19:55

## 2024-04-29 RX ADMIN — Medication 0.4 MG: at 17:20

## 2024-04-29 RX ADMIN — ONDANSETRON 4 MG: 2 INJECTION INTRAMUSCULAR; INTRAVENOUS at 20:35

## 2024-04-29 RX ADMIN — BUDESONIDE AND FORMOTEROL FUMARATE DIHYDRATE 2 PUFF: 160; 4.5 AEROSOL RESPIRATORY (INHALATION) at 19:55

## 2024-04-29 ASSESSMENT — PAIN SCALES - GENERAL
PAINLEVEL_OUTOF10: 8
PAINLEVEL_OUTOF10: 0

## 2024-04-29 ASSESSMENT — HEART SCORE: ECG: NORMAL

## 2024-04-29 NOTE — ED NOTES
Spoke with pt's daughter, Carmen, on the phone who reports pt had MI in May 2023, has colon issues, and has hx of lung cancer, but has been in remission for 2-3 years.

## 2024-04-29 NOTE — CARE COORDINATION
Case Management Assessment  Initial Evaluation    Date/Time of Evaluation: 4/29/2024 5:23 PM  Assessment Completed by: ALISSA Vásquez    If patient is discharged prior to next notation, then this note serves as note for discharge by case management.    Patient Name: Erin Hill                   YOB: 1960  Diagnosis: NSTEMI (non-ST elevated myocardial infarction) (HCC) [I21.4]                   Date / Time: 4/29/2024  2:02 PM    Patient Admission Status: Inpatient   Readmission Risk (Low < 19, Mod (19-27), High > 27): No data recorded  Current PCP: Rehana Patino MD  PCP verified by CM? Yes    Chart Reviewed: Yes      History Provided by: Patient  Patient Orientation: Alert and Oriented    Patient Cognition: Alert    Hospitalization in the last 30 days (Readmission):  No    If yes, Readmission Assessment in CM Navigator will be completed.    Advance Directives:      Code Status: Prior   Patient's Primary Decision Maker is: Legal Next of Kin    Primary Decision Maker: Carmen Paulino - Child - [phone]    Discharge Planning:    Patient lives with: Spouse/Significant Other Type of Home: Apartment  Primary Care Giver: Self  Patient Support Systems include: Spouse/Significant Other, Children   Current Financial resources: Medicare  Current community resources: None  Current services prior to admission: Durable Medical Equipment            Current DME: [employer]            Type of Home Care services:  None    ADLS  Prior functional level: Independent in ADLs/IADLs  Current functional level: Independent in ADLs/IADLs    PT AM-PAC:   /24  OT AM-PAC:   /24    Family can provide assistance at DC: Yes  Would you like Case Management to discuss the discharge plan with any other family members/significant others, and if so, who? No  Plans to Return to Present Housing: Yes  Other Identified Issues/Barriers to RETURNING to current housing: NSTEMI  Potential Assistance needed at discharge: N/A       Freedom of choice list with basic dialogue that supports the patient's individualized plan of care/goals and shares the quality data associated with the providers was provided to:     Patient Representative Name:       The Patient and/or Patient Representative Agree with the Discharge Plan?      ALISSA Vásquez  Case Management Department  Ph:  Fax:

## 2024-04-29 NOTE — PROGRESS NOTES
4 Eyes Skin Assessment     NAME:  Erin Hill  YOB: 1960  MEDICAL RECORD NUMBER:  [id number]    The patient is being assessed for  Admission    I agree that at least one RN has performed a thorough Head to Toe Skin Assessment on the patient. ALL assessment sites listed below have been assessed.      Areas assessed by both nurses:    Head, Face, Ears, Shoulders, Back, Chest, Arms, Elbows, Hands, Sacrum. Buttock, Coccyx, Ischium, Legs. Feet and Heels, and Under Medical Devices         Does the Patient have a Wound? No noted wound(s)       Polo Prevention initiated by RN: No  Wound Care Orders initiated by RN: No    Pressure Injury (Stage 3,4, Unstageable, DTI, NWPT, and Complex wounds) if present, place Wound referral order by RN under : No    New Ostomies, if present place, Ostomy referral order under : No     Nurse 1 eSignature: Electronically signed by Alisha Cuellar RN on 4/29/24 at 6:34 PM EDT    **SHARE this note so that the co-signing nurse can place an eSignature**    Nurse 2 eSignature: Electronically signed by Aspen Duogn RN on 4/29/24 at 6:40 PM EDT

## 2024-04-29 NOTE — ED PROVIDER NOTES
[facility] ED  eMERGENCY dEPARTMENTeNCOUnter      Pt Name: Erin Hill  MRN: [id number]  Birthdate 1960  Date ofevaluation: 4/29/2024  Provider: Augustine Najera PA-C    CHIEF COMPLAINT       Chief Complaint   Patient presents with    Chest Pain     Pt states that chest pain began this morning non radiating          HISTORY OF PRESENT ILLNESS  (Location/Symptom, Timing/Onset, Context/Setting, Quality, Duration, Modifying Factors, Severity.)   Erin Hill is a 64 y.o. female who presents to the emergency department with chest pain.  Chest pain has been ongoing but has been more noticeable throughout the day morning.  Patient reports a history of coronary artery disease.  Denies any stents.  Follows with cardiology.  Tells me that she sees Dr. Lima.        Nursing Notes were reviewed.    ALLERGIES     Lovastatin and Statins    CURRENT MEDICATIONS       Previous Medications    ALBUTEROL SULFATE HFA (PROVENTIL HFA) 108 (90 BASE) MCG/ACT INHALER    Inhale 2 puffs into the lungs every 6 hours as needed for Wheezing or Shortness of Breath    AMLODIPINE (NORVASC) 2.5 MG TABLET    take 1 tablet by mouth once daily  ( STOP CLONIDINE )    ASCORBIC ACID (VITAMIN C) 1000 MG TABLET    Take 1 tablet by mouth daily    CITALOPRAM (CELEXA) 20 MG TABLET    TAKE 1 TABLET BY MOUTH IN THE  MORNING    CLOPIDOGREL (PLAVIX) 75 MG TABLET    TAKE 1 TABLET BY MOUTH DAILY    DENOSUMAB (PROLIA) 60 MG/ML SOSY SC INJECTION    Inject 1 mL into the skin once for 1 dose    DICYCLOMINE (BENTYL) 10 MG CAPSULE    take 1 capsule by mouth every 6 hours if needed    ENTRESTO 24-26 MG PER TABLET    TAKE 1 TABLET BY MOUTH TWICE  DAILY    EZETIMIBE (ZETIA) 10 MG TABLET    TAKE 1 TABLET BY MOUTH DAILY    FLUTICASONE (FLONASE) 50 MCG/ACT NASAL SPRAY    2 sprays by Nasal route daily    FLUTICASONE-UMECLIDIN-VILANT (TRELEGY ELLIPTA) 200-62.5-25 MCG/ACT AEPB INHALER    Inhale 1 puff into the lungs daily    LIDOCAINE (LMX) 4 % CREAM      Pulse: 89  65 84   Resp:  18     SpO2: 98%  97% 99%     HEARTSCORE:6    64-year-old female presenting to the ER with chest pain that started at some point earlier this morning.  Patient reports seeing cardiology.  Heart cath was reviewed that showed a 55% blockage in 2023.  Medical management was recommended at that point in time.  Unclear if patient is compliant with her medications.  She still smokes at least half pack per day.  Patient was given a full dose of aspirin via EMS prior to arrival.    Given the patient still has chest pain along with elevated heart score we will admit to the hospital given the elevated troponins.  Second opponent also is elevated.  Will touch base with cardiology.    5:13 PM EDT  Spoke with dr hartmann.  Recommends nitro and heparin drip. Nathan reed is aware of this plan and will write order.      Evaluation and treatment course in the ED, and plan of care upon discharge was discussed in length with the patient. Patient had no further questions prior to being discharged and was instructed to return to the ED for new or worsening symptoms.          The patient was involved in his/her plan of care. The testing that was ordered was discussed with the patient. Any medications that may have been ordered were discussed with the patient.       I have reviewed the patient's previous medical records using the electronic health record that we have available.      Labs and imaging were reviewed.     I have discusssed recommendation for admission with the patient and/or family. We have discussed benefits of admission and the risks of discharge home. The patient agrees with the plan of care and admission. The patient will be admitted for further evaluation and treatment.       CONSULTS:  IP CONSULT TO HOSPITALIST  IP CONSULT TO CARDIOLOGY  IP CONSULT TO CARDIOLOGY    PROCEDURES:  Procedures  CRITICAL CARE TIME     Due to the high probability of sudden and clinically significant

## 2024-04-29 NOTE — H&P
Gastrointestinal:  Negative for abdominal pain, constipation, diarrhea, nausea and vomiting.   Endocrine: Negative for cold intolerance, heat intolerance and polyuria.   Genitourinary:  Negative for difficulty urinating and urgency.   Musculoskeletal:  Negative for arthralgias and myalgias.   Skin: Negative.  Negative for wound.   Neurological:  Positive for weakness. Negative for dizziness, syncope and light-headedness.   Hematological:  Negative for adenopathy. Does not bruise/bleed easily.   Psychiatric/Behavioral:  Negative for agitation and confusion. The patient is not nervous/anxious.        Physical Exam:   BP (!) 130/91   Pulse 84   Resp 18   Wt 40.4 kg (89 lb)   SpO2 99%   BMI 14.36 kg/m²   No data recorded.    No results for input(s): \"POCGLU\" in the last 72 hours.  No intake or output data in the 24 hours ending 04/29/24 1722    Physical Exam  Constitutional:       General: She is not in acute distress.     Appearance: Normal appearance. She is underweight. She is ill-appearing. She is not toxic-appearing.   HENT:      Head: Normocephalic and atraumatic.      Mouth/Throat:      Mouth: Mucous membranes are moist.   Eyes:      Extraocular Movements: Extraocular movements intact.      Pupils: Pupils are equal, round, and reactive to light.   Cardiovascular:      Rate and Rhythm: Normal rate and regular rhythm.      Pulses: Normal pulses.      Heart sounds: Normal heart sounds. No murmur heard.  Pulmonary:      Effort: Pulmonary effort is normal. No respiratory distress.      Breath sounds: Wheezing and rhonchi present.   Abdominal:      General: Abdomen is flat. Bowel sounds are normal. There is no distension.      Palpations: Abdomen is soft.      Tenderness: There is no abdominal tenderness.   Musculoskeletal:         General: No swelling or tenderness. Normal range of motion.      Cervical back: Normal range of motion and neck supple.   Skin:     General: Skin is warm and dry.      Capillary  Progressive Unit/Step down    NSTEMI  Consult cardiology, they expressed their intention to repeat heart catheterization 4/30  Start heparin and nitro drip  Daily aspirin, statin, beta-blocker  Personal history of hypertension, valvular heart disease  Home regiment will need to be tailored following heart catheterization  Primary lung adenocarcinoma of the right resulting in poor oral intake and mild protein calorie malnutrition and GERD  Continued outpatient follow-up with oncology  Dietary supplementation  Continue PPI  COPD without acute exacerbation and continued tobacco smoking  Education on the need for tobacco abstinence  Scheduled and as needed breathing treatments, continue Symbicort    Consultations:   IP CONSULT TO HOSPITALIST  IP CONSULT TO CARDIOLOGY  IP CONSULT TO CARDIOLOGY     Patient is admitted as inpatient status because of co-morbidities listed above, severity of signs and symptoms as outlined, requirement for current medical therapies and most importantly because of direct risk to patient if care not provided in a hospital setting.  Expected length of stay > 48 hours.    Lokesh Rodney, EDDA - NP  4/29/2024  5:22 PM    Copy sent to Rehana Sánchez MD

## 2024-04-29 NOTE — ED NOTES
Pt arrives via EMS w c/o chest pain that started this morning states that it is in the middle or her chest and does not radiate. Pt reports hx of MI and that her cardiologist is Dr. Lima. EMS reports hx of lung cancer. Pt reports smokig half a pack a day. PT received 4mg zofran, sublingual nitro x2 and 325mg aspirin. Pt also stated that she is very tired due to her boyfriend keeping her up at night because of the TV. Pt selectively answering questions so unable to complete. Upon assessment pt is clammy but unable to obtain temp due to pt being uncooperative.

## 2024-04-30 ENCOUNTER — APPOINTMENT (OUTPATIENT)
Age: 64
DRG: 321 | End: 2024-04-30
Payer: COMMERCIAL

## 2024-04-30 PROBLEM — R07.9 CHEST PAIN: Status: ACTIVE | Noted: 2024-04-30

## 2024-04-30 LAB
ANTI-XA UNFRAC HEPARIN: 0.34 IU/L (ref 0.3–0.7)
ANTI-XA UNFRAC HEPARIN: 0.37 IU/L (ref 0.3–0.7)
ANTI-XA UNFRAC HEPARIN: <0.1 IU/L (ref 0.3–0.7)
CHOLEST SERPL-MCNC: 199 MG/DL (ref 0–199)
CHOLESTEROL/HDL RATIO: 2
ECHO AO ROOT DIAM: 2.4 CM
ECHO AO ROOT INDEX: 1.66 CM/M2
ECHO AV MEAN GRADIENT: 1 MMHG
ECHO AV MEAN VELOCITY: 0.5 M/S
ECHO AV PEAK GRADIENT: 3 MMHG
ECHO AV PEAK VELOCITY: 0.8 M/S
ECHO AV VELOCITY RATIO: 0.88
ECHO AV VTI: 12.8 CM
ECHO BSA: 1.41 M2
ECHO BSA: 1.41 M2
ECHO EST RA PRESSURE: 3 MMHG
ECHO LA AREA 2C: 11.9 CM2
ECHO LA AREA 4C: 11.8 CM2
ECHO LA DIAMETER INDEX: 1.86 CM/M2
ECHO LA DIAMETER: 2.7 CM
ECHO LA MAJOR AXIS: 4.1 CM
ECHO LA MINOR AXIS: 4.4 CM
ECHO LA TO AORTIC ROOT RATIO: 1.13
ECHO LA VOL BP: 28 ML (ref 22–52)
ECHO LA VOL MOD A2C: 27 ML (ref 22–52)
ECHO LA VOL MOD A4C: 28 ML (ref 22–52)
ECHO LA VOL/BSA BIPLANE: 19 ML/M2 (ref 16–34)
ECHO LA VOLUME INDEX MOD A2C: 19 ML/M2 (ref 16–34)
ECHO LA VOLUME INDEX MOD A4C: 19 ML/M2 (ref 16–34)
ECHO LV E' LATERAL VELOCITY: 4 CM/S
ECHO LV E' SEPTAL VELOCITY: 4 CM/S
ECHO LV FRACTIONAL SHORTENING: 21 % (ref 28–44)
ECHO LV INTERNAL DIMENSION DIASTOLE INDEX: 3.31 CM/M2
ECHO LV INTERNAL DIMENSION DIASTOLIC: 4.8 CM (ref 3.9–5.3)
ECHO LV INTERNAL DIMENSION SYSTOLIC INDEX: 2.62 CM/M2
ECHO LV INTERNAL DIMENSION SYSTOLIC: 3.8 CM
ECHO LV IVSD: 0.7 CM (ref 0.6–0.9)
ECHO LV MASS 2D: 116.6 G (ref 67–162)
ECHO LV MASS INDEX 2D: 80.4 G/M2 (ref 43–95)
ECHO LV POSTERIOR WALL DIASTOLIC: 0.8 CM (ref 0.6–0.9)
ECHO LV RELATIVE WALL THICKNESS RATIO: 0.33
ECHO LVOT PEAK GRADIENT: 2 MMHG
ECHO LVOT PEAK VELOCITY: 0.7 M/S
ECHO MV A VELOCITY: 0.67 M/S
ECHO MV E DECELERATION TIME (DT): 187 MS
ECHO MV E VELOCITY: 0.47 M/S
ECHO MV E/A RATIO: 0.7
ECHO MV E/E' LATERAL: 11.75
ECHO MV E/E' RATIO (AVERAGED): 11.75
ECHO RIGHT VENTRICULAR SYSTOLIC PRESSURE (RVSP): 24 MMHG
ECHO TV REGURGITANT MAX VELOCITY: 2.27 M/S
ECHO TV REGURGITANT PEAK GRADIENT: 21 MMHG
ERYTHROCYTE [DISTWIDTH] IN BLOOD BY AUTOMATED COUNT: 13.7 % (ref 11.8–14.4)
HCT VFR BLD AUTO: 44.8 % (ref 36.3–47.1)
HDLC SERPL-MCNC: 82 MG/DL
HGB BLD-MCNC: 14.9 G/DL (ref 11.9–15.1)
LDLC SERPL CALC-MCNC: 108 MG/DL (ref 0–100)
MAGNESIUM SERPL-MCNC: 1.4 MG/DL (ref 1.6–2.6)
MCH RBC QN AUTO: 31.9 PG (ref 25.2–33.5)
MCHC RBC AUTO-ENTMCNC: 33.3 G/DL (ref 28.4–34.8)
MCV RBC AUTO: 95.9 FL (ref 82.6–102.9)
NRBC BLD-RTO: 0 PER 100 WBC
PLATELET # BLD AUTO: 289 K/UL (ref 138–453)
PMV BLD AUTO: 10 FL (ref 8.1–13.5)
RBC # BLD AUTO: 4.67 M/UL (ref 3.95–5.11)
TRIGL SERPL-MCNC: 49 MG/DL
VLDLC SERPL CALC-MCNC: 10 MG/DL
WBC OTHER # BLD: 7.4 K/UL (ref 3.5–11.3)

## 2024-04-30 PROCEDURE — 94761 N-INVAS EAR/PLS OXIMETRY MLT: CPT

## 2024-04-30 PROCEDURE — 99152 MOD SED SAME PHYS/QHP 5/>YRS: CPT | Performed by: INTERNAL MEDICINE

## 2024-04-30 PROCEDURE — 6360000004 HC RX CONTRAST MEDICATION: Performed by: INTERNAL MEDICINE

## 2024-04-30 PROCEDURE — 2709999900 HC NON-CHARGEABLE SUPPLY: Performed by: INTERNAL MEDICINE

## 2024-04-30 PROCEDURE — 4A023N7 MEASUREMENT OF CARDIAC SAMPLING AND PRESSURE, LEFT HEART, PERCUTANEOUS APPROACH: ICD-10-PCS | Performed by: INTERNAL MEDICINE

## 2024-04-30 PROCEDURE — 2500000003 HC RX 250 WO HCPCS: Performed by: INTERNAL MEDICINE

## 2024-04-30 PROCEDURE — 93454 CORONARY ARTERY ANGIO S&I: CPT | Performed by: INTERNAL MEDICINE

## 2024-04-30 PROCEDURE — 2580000003 HC RX 258: Performed by: INTERNAL MEDICINE

## 2024-04-30 PROCEDURE — 93306 TTE W/DOPPLER COMPLETE: CPT

## 2024-04-30 PROCEDURE — 94640 AIRWAY INHALATION TREATMENT: CPT

## 2024-04-30 PROCEDURE — 2580000003 HC RX 258: Performed by: NURSE PRACTITIONER

## 2024-04-30 PROCEDURE — 6360000002 HC RX W HCPCS: Performed by: NURSE PRACTITIONER

## 2024-04-30 PROCEDURE — 99232 SBSQ HOSP IP/OBS MODERATE 35: CPT | Performed by: NURSE PRACTITIONER

## 2024-04-30 PROCEDURE — 2000000000 HC ICU R&B

## 2024-04-30 PROCEDURE — 6360000002 HC RX W HCPCS: Performed by: INTERNAL MEDICINE

## 2024-04-30 PROCEDURE — 80061 LIPID PANEL: CPT

## 2024-04-30 PROCEDURE — 99223 1ST HOSP IP/OBS HIGH 75: CPT | Performed by: NURSE PRACTITIONER

## 2024-04-30 PROCEDURE — C1894 INTRO/SHEATH, NON-LASER: HCPCS | Performed by: INTERNAL MEDICINE

## 2024-04-30 PROCEDURE — B2151ZZ FLUOROSCOPY OF LEFT HEART USING LOW OSMOLAR CONTRAST: ICD-10-PCS | Performed by: INTERNAL MEDICINE

## 2024-04-30 PROCEDURE — 83735 ASSAY OF MAGNESIUM: CPT

## 2024-04-30 PROCEDURE — 6370000000 HC RX 637 (ALT 250 FOR IP): Performed by: NURSE PRACTITIONER

## 2024-04-30 PROCEDURE — 36415 COLL VENOUS BLD VENIPUNCTURE: CPT

## 2024-04-30 PROCEDURE — B2111ZZ FLUOROSCOPY OF MULTIPLE CORONARY ARTERIES USING LOW OSMOLAR CONTRAST: ICD-10-PCS | Performed by: INTERNAL MEDICINE

## 2024-04-30 PROCEDURE — 85520 HEPARIN ASSAY: CPT

## 2024-04-30 PROCEDURE — 85027 COMPLETE CBC AUTOMATED: CPT

## 2024-04-30 RX ORDER — ONDANSETRON 2 MG/ML
4 INJECTION INTRAMUSCULAR; INTRAVENOUS EVERY 6 HOURS PRN
Status: DISCONTINUED | OUTPATIENT
Start: 2024-04-30 | End: 2024-04-30 | Stop reason: SDUPTHER

## 2024-04-30 RX ORDER — NOREPINEPHRINE BITARTRATE 0.06 MG/ML
1-100 INJECTION, SOLUTION INTRAVENOUS CONTINUOUS
Status: DISCONTINUED | OUTPATIENT
Start: 2024-04-30 | End: 2024-05-03

## 2024-04-30 RX ORDER — SODIUM CHLORIDE 9 MG/ML
INJECTION, SOLUTION INTRAVENOUS CONTINUOUS
Status: DISCONTINUED | OUTPATIENT
Start: 2024-04-30 | End: 2024-05-03 | Stop reason: SDUPTHER

## 2024-04-30 RX ORDER — SODIUM CHLORIDE 0.9 % (FLUSH) 0.9 %
5-40 SYRINGE (ML) INJECTION PRN
Status: DISCONTINUED | OUTPATIENT
Start: 2024-04-30 | End: 2024-05-03 | Stop reason: SDUPTHER

## 2024-04-30 RX ORDER — GLUCAGON 1 MG/ML
5 KIT INJECTION ONCE
Status: COMPLETED | OUTPATIENT
Start: 2024-04-30 | End: 2024-04-30

## 2024-04-30 RX ORDER — ACETAMINOPHEN 325 MG/1
650 TABLET ORAL EVERY 4 HOURS PRN
Status: DISCONTINUED | OUTPATIENT
Start: 2024-04-30 | End: 2024-04-30 | Stop reason: SDUPTHER

## 2024-04-30 RX ORDER — MIDAZOLAM HYDROCHLORIDE 1 MG/ML
INJECTION INTRAMUSCULAR; INTRAVENOUS PRN
Status: DISCONTINUED | OUTPATIENT
Start: 2024-04-30 | End: 2024-04-30 | Stop reason: HOSPADM

## 2024-04-30 RX ORDER — SODIUM CHLORIDE 0.9 % (FLUSH) 0.9 %
5-40 SYRINGE (ML) INJECTION EVERY 12 HOURS SCHEDULED
Status: DISCONTINUED | OUTPATIENT
Start: 2024-04-30 | End: 2024-05-03 | Stop reason: SDUPTHER

## 2024-04-30 RX ORDER — SODIUM CHLORIDE 9 MG/ML
INJECTION, SOLUTION INTRAVENOUS PRN
Status: DISCONTINUED | OUTPATIENT
Start: 2024-04-30 | End: 2024-05-03 | Stop reason: SDUPTHER

## 2024-04-30 RX ORDER — FENTANYL CITRATE 50 UG/ML
INJECTION, SOLUTION INTRAMUSCULAR; INTRAVENOUS PRN
Status: DISCONTINUED | OUTPATIENT
Start: 2024-04-30 | End: 2024-04-30 | Stop reason: HOSPADM

## 2024-04-30 RX ADMIN — SODIUM CHLORIDE: 9 INJECTION, SOLUTION INTRAVENOUS at 21:45

## 2024-04-30 RX ADMIN — ALBUTEROL SULFATE 2 PUFF: 108 AEROSOL, METERED RESPIRATORY (INHALATION) at 08:26

## 2024-04-30 RX ADMIN — TIOTROPIUM BROMIDE INHALATION SPRAY 2 PUFF: 3.12 SPRAY, METERED RESPIRATORY (INHALATION) at 08:22

## 2024-04-30 RX ADMIN — SODIUM CHLORIDE, PRESERVATIVE FREE 10 ML: 5 INJECTION INTRAVENOUS at 09:25

## 2024-04-30 RX ADMIN — ASPIRIN 81 MG CHEWABLE TABLET 81 MG: 81 TABLET CHEWABLE at 09:24

## 2024-04-30 RX ADMIN — BUDESONIDE AND FORMOTEROL FUMARATE DIHYDRATE 2 PUFF: 160; 4.5 AEROSOL RESPIRATORY (INHALATION) at 08:22

## 2024-04-30 RX ADMIN — SODIUM CHLORIDE, PRESERVATIVE FREE 10 ML: 5 INJECTION INTRAVENOUS at 22:27

## 2024-04-30 RX ADMIN — ONDANSETRON 4 MG: 2 INJECTION INTRAMUSCULAR; INTRAVENOUS at 20:17

## 2024-04-30 RX ADMIN — SODIUM CHLORIDE: 9 INJECTION, SOLUTION INTRAVENOUS at 17:08

## 2024-04-30 RX ADMIN — CITALOPRAM HYDROBROMIDE 20 MG: 20 TABLET ORAL at 09:24

## 2024-04-30 RX ADMIN — PANTOPRAZOLE SODIUM 20 MG: 20 TABLET, DELAYED RELEASE ORAL at 09:24

## 2024-04-30 RX ADMIN — HEPARIN SODIUM 2400 UNITS: 1000 INJECTION INTRAVENOUS; SUBCUTANEOUS at 00:21

## 2024-04-30 RX ADMIN — MAGNESIUM SULFATE HEPTAHYDRATE 1000 MG: 1 INJECTION, SOLUTION INTRAVENOUS at 10:30

## 2024-04-30 RX ADMIN — MAGNESIUM SULFATE HEPTAHYDRATE 1000 MG: 1 INJECTION, SOLUTION INTRAVENOUS at 12:19

## 2024-04-30 RX ADMIN — ALBUTEROL SULFATE 2 PUFF: 108 AEROSOL, METERED RESPIRATORY (INHALATION) at 19:28

## 2024-04-30 RX ADMIN — BUDESONIDE AND FORMOTEROL FUMARATE DIHYDRATE 2 PUFF: 160; 4.5 AEROSOL RESPIRATORY (INHALATION) at 19:28

## 2024-04-30 RX ADMIN — GLUCAGON 5 MG: 1 INJECTION, POWDER, LYOPHILIZED, FOR SOLUTION INTRAMUSCULAR; INTRAVENOUS at 20:01

## 2024-04-30 RX ADMIN — CLOPIDOGREL BISULFATE 75 MG: 75 TABLET ORAL at 09:51

## 2024-04-30 RX ADMIN — Medication 5 MCG/MIN: at 22:22

## 2024-04-30 RX ADMIN — EZETIMIBE 10 MG: 10 TABLET ORAL at 09:24

## 2024-04-30 ASSESSMENT — PAIN SCALES - GENERAL: PAINLEVEL_OUTOF10: 0

## 2024-04-30 NOTE — RT PROTOCOL NOTE
RT Inhaler-Nebulizer Bronchodilator Protocol Note    There is a bronchodilator order in the chart from a provider indicating to follow the RT Bronchodilator Protocol and there is an “Initiate RT Inhaler-Nebulizer Bronchodilator Protocol” order as well (see protocol at bottom of note).    CXR Findings:  XR CHEST PORTABLE    Result Date: 4/29/2024  No acute process. Similar appearing COPD changes       The findings from the last RT Protocol Assessment were as follows:   History Pulmonary Disease: Chronic pulmonary disease  Respiratory Pattern: Regular pattern and RR 12-20 bpm  Breath Sounds: Slightly diminished and/or crackles  Cough:    Indication for Bronchodilator Therapy: Decreased or absent breath sounds, On home bronchodilators  Bronchodilator Assessment Score:      Aerosolized bronchodilator medication orders have been revised according to the RT Inhaler-Nebulizer Bronchodilator Protocol below.    Respiratory Therapist to perform RT Therapy Protocol Assessment initially then follow the protocol.  Repeat RT Therapy Protocol Assessment PRN for score 0-3 or on second treatment, BID, and PRN for scores above 3.    No Indications - adjust the frequency to every 6 hours PRN wheezing or bronchospasm, if no treatments needed after 48 hours then discontinue using Per Protocol order mode.     If indication present, adjust the RT bronchodilator orders based on the Bronchodilator Assessment Score as indicated below.  Use Inhaler orders unless patient has one or more of the following: on home nebulizer, not able to hold breath for 10 seconds, is not alert and oriented, cannot activate and use MDI correctly, or respiratory rate 25 breaths per minute or more, then use the equivalent nebulizer order(s) with same Frequency and PRN reasons based on the score.  If a patient is on this medication at home then do not decrease Frequency below that used at home.    0-3 - enter or revise RT bronchodilator order(s) to equivalent RT

## 2024-04-30 NOTE — RT PROTOCOL NOTE
RT Inhaler-Nebulizer Bronchodilator Protocol Note    There is a bronchodilator order in the chart from a provider indicating to follow the RT Bronchodilator Protocol and there is an “Initiate RT Inhaler-Nebulizer Bronchodilator Protocol” order as well (see protocol at bottom of note).    CXR Findings:  XR CHEST PORTABLE    Result Date: 4/29/2024  No acute process. Similar appearing COPD changes       The findings from the last RT Protocol Assessment were as follows:   History Pulmonary Disease: Chronic pulmonary disease  Respiratory Pattern: Regular pattern and RR 12-20 bpm  Breath Sounds: Slightly diminished and/or crackles  Cough: Strong, spontaneous, non-productive  Indication for Bronchodilator Therapy: Decreased or absent breath sounds, On home bronchodilators  Bronchodilator Assessment Score: 4    Aerosolized bronchodilator medication orders have been revised according to the RT Inhaler-Nebulizer Bronchodilator Protocol below.    Respiratory Therapist to perform RT Therapy Protocol Assessment initially then follow the protocol.  Repeat RT Therapy Protocol Assessment PRN for score 0-3 or on second treatment, BID, and PRN for scores above 3.    No Indications - adjust the frequency to every 6 hours PRN wheezing or bronchospasm, if no treatments needed after 48 hours then discontinue using Per Protocol order mode.     If indication present, adjust the RT bronchodilator orders based on the Bronchodilator Assessment Score as indicated below.  Use Inhaler orders unless patient has one or more of the following: on home nebulizer, not able to hold breath for 10 seconds, is not alert and oriented, cannot activate and use MDI correctly, or respiratory rate 25 breaths per minute or more, then use the equivalent nebulizer order(s) with same Frequency and PRN reasons based on the score.  If a patient is on this medication at home then do not decrease Frequency below that used at home.    0-3 - enter or revise RT

## 2024-04-30 NOTE — RT PROTOCOL NOTE
RT Inhaler-Nebulizer Bronchodilator Protocol Note    There is a bronchodilator order in the chart from a provider indicating to follow the RT Bronchodilator Protocol and there is an “Initiate RT Inhaler-Nebulizer Bronchodilator Protocol” order as well (see protocol at bottom of note).    CXR Findings:  XR CHEST PORTABLE    Result Date: 4/29/2024  No acute process. Similar appearing COPD changes       The findings from the last RT Protocol Assessment were as follows:   History Pulmonary Disease: Chronic pulmonary disease  Respiratory Pattern: Regular pattern and RR 12-20 bpm  Breath Sounds: Slightly diminished and/or crackles  Cough: Strong, spontaneous, non-productive  Indication for Bronchodilator Therapy: Decreased or absent breath sounds, On home bronchodilators  Bronchodilator Assessment Score: 4    Aerosolized bronchodilator medication orders have been revised according to the RT Inhaler-Nebulizer Bronchodilator Protocol below.    Respiratory Therapist to perform RT Therapy Protocol Assessment initially then follow the protocol.  Repeat RT Therapy Protocol Assessment PRN for score 0-3 or on second treatment, BID, and PRN for scores above 3.    No Indications - adjust the frequency to every 6 hours PRN wheezing or bronchospasm, if no treatments needed after 48 hours then discontinue using Per Protocol order mode.     If indication present, adjust the RT bronchodilator orders based on the Bronchodilator Assessment Score as indicated below.  Use Inhaler orders unless patient has one or more of the following: on home nebulizer, not able to hold breath for 10 seconds, is not alert and oriented, cannot activate and use MDI correctly, or respiratory rate 25 breaths per minute or more, then use the equivalent nebulizer order(s) with same Frequency and PRN reasons based on the score.  If a patient is on this medication at home then do not decrease Frequency below that used at home.    0-3 - enter or revise RT  bronchodilator order(s) to equivalent RT Bronchodilator order with Frequency of every 4 hours PRN for wheezing or increased work of breathing using Per Protocol order mode.        4-6 - enter or revise RT Bronchodilator order(s) to two equivalent RT bronchodilator orders with one order with BID Frequency and one order with Frequency of every 4 hours PRN wheezing or increased work of breathing using Per Protocol order mode.        7-10 - enter or revise RT Bronchodilator order(s) to two equivalent RT bronchodilator orders with one order with TID Frequency and one order with Frequency of every 4 hours PRN wheezing or increased work of breathing using Per Protocol order mode.       11-13 - enter or revise RT Bronchodilator order(s) to one equivalent RT bronchodilator order with QID Frequency and an Albuterol order with Frequency of every 4 hours PRN wheezing or increased work of breathing using Per Protocol order mode.      Greater than 13 - enter or revise RT Bronchodilator order(s) to one equivalent RT bronchodilator order with every 4 hours Frequency and an Albuterol order with Frequency of every 2 hours PRN wheezing or increased work of breathing using Per Protocol order mode.     RT to enter RT Home Evaluation for COPD & MDI Assessment order using Per Protocol order mode.    Electronically signed by NARENDRA RON JR, RCP on 4/29/2024 at 9:54 PM

## 2024-04-30 NOTE — PROGRESS NOTES
Patient is beginning to develop chest pain . Dr. Santillan notified and stated to restart nitro drip and maintain SBP greater than 90.

## 2024-04-30 NOTE — PROGRESS NOTES
SPIRITUAL CARE DEPARTMENT [facility]  PROGRESS NOTE    Room # 2037/2037-01   Name: Erin Hill              Reason for visit: Routine    I visited the patient.    Admit Date & Time: 4/29/2024  2:02 PM    Assessment:  Erin Hill is a 64 y.o. female.  Upon entering the room patient states well, states no major needs or prayers.  Patient kindly declines Spiritual Care visit.  leaves prayer card for patient for possible follow up as needed.      Intervention:   provided a ministry presence.    Outcome:  Patient grateful for the visit.    Plan:  Chaplains will remain available to offer spiritual and emotional support as needed.    Electronically signed by Chaplain Mao, on 4/30/2024 at 12:32 PM.  Spiritual Care Department  [facility]      04/30/24 1230   Encounter Summary   Service Provided For Patient   Referral/Consult From Rounding   Support System Unknown   Last Encounter  04/30/24   Complexity of Encounter Low   Begin Time 1143   End Time  1145   Total Time Calculated 2 min   Assessment/Intervention/Outcome   Assessment Unable to assess   Intervention Sustaining Presence/Ministry of presence   Outcome Refused/Declined

## 2024-04-30 NOTE — PLAN OF CARE
Problem: Discharge Planning  Goal: Discharge to home or other facility with appropriate resources  Outcome: Progressing     Problem: Safety - Adult  Goal: Free from fall injury  Outcome: Progressing     Problem: ABCDS Injury Assessment  Goal: Absence of physical injury  Outcome: Progressing     Problem: Respiratory - Adult  Goal: Achieves optimal ventilation and oxygenation  4/30/2024 0000 by Spencer Dubose RN  Outcome: Progressing  Flowsheets (Taken 4/29/2024 2000)  Achieves optimal ventilation and oxygenation:   Assess for changes in respiratory status   Assess for changes in mentation and behavior   Position to facilitate oxygenation and minimize respiratory effort   Respiratory therapy support as indicated  4/29/2024 1957 by Irineo Monroy RCP  Outcome: Progressing     Problem: Skin/Tissue Integrity  Goal: Absence of new skin breakdown  Description: 1.  Monitor for areas of redness and/or skin breakdown  2.  Assess vascular access sites hourly  3.  Every 4-6 hours minimum:  Change oxygen saturation probe site  4.  Every 4-6 hours:  If on nasal continuous positive airway pressure, respiratory therapy assess nares and determine need for appliance change or resting period.  Outcome: Progressing

## 2024-04-30 NOTE — PROGRESS NOTES
End Of Shift Note  [facility]  Summary of shift: Uneventful shift. Pt remains on nitro and heparin gtt. Pt has been NPO since midnight in anticipation for heart cath today. Pt has been in NSR and sinus tach, with occasional PVCs, she has remained normotensive. No BM and measured urine output was 400.     Vitals:    Vitals:    04/29/24 2300 04/30/24 0000 04/30/24 0400 04/30/24 0447   BP:  109/78 106/80    Pulse: 90 92 97 (!) 105   Resp:  20 20    Temp:  96.9 °F (36.1 °C) 97.7 °F (36.5 °C)    TempSrc:  Temporal Temporal    SpO2: 98% 95% 99%    Weight:  43.7 kg (96 lb 4.8 oz)     Height:            I&O:   Intake/Output Summary (Last 24 hours) at 4/30/2024 0542  Last data filed at 4/30/2024 0447  Gross per 24 hour   Intake --   Output 400 ml   Net -400 ml       Resp Status: Room air     Ventilator Settings:     / / /     Critical Care IV infusions:   sodium chloride      heparin (PORCINE) Infusion 16 Units/kg/hr (04/30/24 0023)    nitroGLYCERIN 5 mcg/min (04/29/24 1750)        LDA:   Peripheral IV 04/29/24 Right Antecubital (Active)   Number of days: 0       External Urinary Catheter (Active)   Number of days: 1

## 2024-04-30 NOTE — CONSULTS
Cardiothoracic Surgery  IN-PATIENT SERVICE   [facility]    CONSULTATION / HISTORY AND PHYSICAL EXAMINATION            Date:   4/30/2024  Patient name:  Erin Hill  Date of admission:  4/29/2024  2:02 PM  MRN:   [id number]  Account:  [id number]  YOB: 1960  PCP:    Rehana Patino MD  Room:   2037/2037-01  Code Status:    Full Code    Physician Requesting Consult: Crescencio Skinner DO    Reason for Consult:  MVCAD    Chief Complaint:     Chief Complaint   Patient presents with    Chest Pain     Pt states that chest pain began this morning non radiating        History Obtained From:     Patient     History of Present Illness:     Ms Hill is a pleasant 65yo female who presents to CTS for CABG consideration. Pmhx includes HTN, HLD, COPD, SMA stenosis, and previous lung cancer s/p chemo/radiation in 2019. She is a current heavy smoker, she also smokes marijuana. She follows with pulmonary for COPD, she has not had recent PFTs, she notes significant shortness of breath and is unable to walk greater than 20 feet without becoming winded. She moves slowly around her home and rarely gets out. She will use a walker if she leaves home to steady herself. She continues to follow with her oncologist who plans to repeat CT chest in May to evaluate stable spiculated rul lesion. She is on plavix, she states she was placed on it after having a heart attack a few years ago in [place].     Past Medical History:     Past Medical History:   Diagnosis Date    Arthritis     Bronchitis, chronic (HCC)     Chronic back pain     Chronic cough     COPD (chronic obstructive pulmonary disease) (HCC)     Depression     Diverticulosis     Emphysema     Fibromyalgia     GERD (gastroesophageal reflux disease)     Hemorrhoid     internal and external, they bleed    History of cocaine abuse ([facility])     none since 2010, used between 2007 and 2009     History of kidney infection     Hyperlipidemia   11.9 cm2    LA Area 4C 11.8 cm2    LA Volume MOD A2C 27 22 - 52 mL    LA Volume MOD A4C 28 22 - 52 mL    LA Volume BP 28 22 - 52 mL    LA Diameter 2.7 cm    AV Mean Velocity 0.5 m/s    AV Mean Gradient 1 mmHg    AV VTI 12.8 cm    AV Peak Velocity 0.8 m/s    AV Peak Gradient 3 mmHg    Aortic Root 2.4 cm    IVSd 0.7 0.6 - 0.9 cm    LVIDd 4.8 3.9 - 5.3 cm    LVIDs 3.8 cm    LVOT Peak Velocity 0.7 m/s    LVOT Peak Gradient 2 mmHg    LVPWd 0.8 0.6 - 0.9 cm    LV E' Lateral Velocity 4 cm/s    LV E' Septal Velocity 4 cm/s    MV E Wave Deceleration Time 187.0 ms    MV A Velocity 0.67 m/s    MV E Velocity 0.47 m/s    TR Max Velocity 2.27 m/s    TR Peak Gradient 21 mmHg    Body Surface Area 1.41 m2    Fractional Shortening 2D 21 28 - 44 %    LVIDd Index 3.31 cm/m2    LVIDs Index 2.62 cm/m2    LV RWT Ratio 0.33     LV Mass 2D 116.6 67 - 162 g    LV Mass 2D Index 80.4 43 - 95 g/m2    MV E/A 0.70     E/E' Ratio (Averaged) 11.75     E/E' Lateral 11.75     LA Volume Index BP 19 16 - 34 ml/m2    LA Volume Index MOD A2C 19 16 - 34 ml/m2    LA Volume Index MOD A4C 19 16 - 34 ml/m2    LA Size Index 1.86 cm/m2    LA/AO Root Ratio 1.13     Ao Root Index 1.66 cm/m2    AV Velocity Ratio 0.88     Est. RA Pressure 3 mmHg    RVSP 24 mmHg   Anti-Xa, Unfractionated Heparin    Collection Time: 04/30/24 12:11 PM   Result Value Ref Range    Anti-XA Unfrac Heparin 0.34 0.30 - 0.70 IU/L   Cardiac procedure    Collection Time: 04/30/24  3:55 PM   Result Value Ref Range    Body Surface Area 1.41 m2       Imaging/Diagonstics:  XR CHEST PORTABLE    Result Date: 4/29/2024  No acute process. Similar appearing COPD changes       Assessment :      Hospital Problems             Last Modified POA    * (Principal) NSTEMI (non-ST elevated myocardial infarction) ([facility]) 4/29/2024 Yes    Chest pain 4/30/2024 Yes    Valvular heart disease 4/29/2024 Yes    Primary lung adenocarcinoma, right (HCC) 4/29/2024 Yes    Osteoporosis 4/29/2024 Yes    Pulmonary nodule

## 2024-04-30 NOTE — PROGRESS NOTES
[facility]  Office: [phone]  Roque Bonilla DO, Myke Bauer DO, Rodger Giles DO, Crescencio Skinner DO, Albania Degroot MD, Mary Mathis MD, Bill Patino MD, Gege Dyer MD,  Jevon Francis MD, Lex Chase MD, Braden Vann MD,  Varun Love DO, Sandi Frazier MD, Jaspal Rhodes MD, Artemio Bonilla DO, Cristiana Keenan MD,  Mayo Flores DO, Annabelle Soares MD, Gloria Salguero MD, Modesta West MD, Moody Islas MD,  Bj Lisa MD, Adriana Romero MD, Vannesa Gudino MD, Pritesh Nathan MD, Yosi Sands MD, Min Cottrell MD, Leon Jacobson DO, Eddie Charles DO, Marino Rolon MD,  Harpal Schultz MD, Shirley Waterhouse, CNP,  Odilia Shafer CNP, Lokesh Rodney, CNP,  Emilee Strickland, DNP, Eden Guzman, CNP, Nichole Callejas, CNP, Desirae Hein CNP, Savana Velásquez, CNP, Rani Mcclure, PA-C, Arlene Villalta PA-C, Paola Singer, CNP, Jessica Cervantes, CNP, Kuldeep Rodrigues, CNP, Vandana Mac, CNP, Natalya Barnes, CNP, Chastity Cantor, CNS, Maryanne Crystal, CNP, Karen Barron CNP, Tracy Schwab, CNP         [facility]   IN-PATIENT SERVICE   [facility]    Progress Note    4/30/2024    9:51 AM    Name:   Erin Hill  MRN:     [id number]     Acct:      [id number]   Room:   2037/2037-01   Day:  1  Admit Date:  4/29/2024  2:02 PM    PCP:   Rehana Patino MD  Code Status:  Full Code    Subjective:     C/C:   Chief Complaint   Patient presents with    Chest Pain     Pt states that chest pain began this morning non radiating      Interval History Status: improved.     Patient reports feeling much better today.  Troponins continue upward trend, remains on heparin drip.  Off nitro as she is currently pain-free.  Cardiology reported their intention to return to Cath Lab today.    Brief History:     4/29 - Patient reports to the emergency department with an 18-hour history of midsternal chest pain. Patient has a known history of CAD as well as COPD. Patient

## 2024-04-30 NOTE — PROGRESS NOTES
Patient returned to room 2037 from cath lab post cardiac cath with Dr. Patterson. Patient is alert and oriented. Vitals as charted. Patient reoriented to room and call light. Patient instructed to keep right wrist immobile. Family at bedside. Will continue to assess and monitor.

## 2024-04-30 NOTE — PLAN OF CARE
Problem: Respiratory - Adult  Goal: Achieves optimal ventilation and oxygenation  4/30/2024 0833 by Anabelle Payne RCP  Outcome: Progressing  4/30/2024 0000 by Spencer Dubose RN  Outcome: Progressing  Flowsheets (Taken 4/29/2024 2000)  Achieves optimal ventilation and oxygenation:   Assess for changes in respiratory status   Assess for changes in mentation and behavior   Position to facilitate oxygenation and minimize respiratory effort   Respiratory therapy support as indicated  4/29/2024 1957 by Irineo Monroy RCP  Outcome: Progressing

## 2024-04-30 NOTE — CONSULTS
[facility]    Consult                        Today's Date: 4/30/2024  Patient Name: Erin Hill  Date of admission: 4/29/2024  2:02 PM  Patient's age: 64 y.o., 1960  Admission Dx: NSTEMI (non-ST elevated myocardial infarction) (HCC) [I21.4]  Chest pain, unspecified type [R07.9]    Reason for Consult:  Cardiac evaluation    Requesting Physician: Crescencio Skinner DO    CHIEF COMPLAINT:  NSTEMI     History Obtained From:  patient, electronic medical record    HISTORY OF PRESENT ILLNESS:      Erin Hill is a 64 y.o. Non- / non  female who presents with Chest Pain (Pt states that chest pain began this morning non radiating )   and is admitted to the hospital for the management of NSTEMI (non-ST elevated myocardial infarction) ([facility]).     Patient reports to the emergency department with an 18-hour history of midsternal chest pain.  Patient has a known history of CAD as well as COPD.  Patient continues to smoke.  She advised that she was diagnosed approximately 1 year ago with coronary artery disease and had a nonstentable lesion at that time.  She was started on medical management.  Patient reports that she has not been compliant with her oral medication regiment and will often miss up to 3-5 dosage of her medications per month.  She also continues to smoke.  No ST segment elevation is noted on twelve-lead however patient's troponin are rising and she is high risk based on her health history.  Cardiology was consulted and they expressed their intention to perform heart catheterization tomorrow.     Pt states that the pain is gone now. Nitro gtt has been turned off.  Pt denies any sob.  She states that she gets this pain regularly. It is midsternal pain.  She did have a cath last year.  Pt is very thin.  She states that she eats at home.      Past Medical History:   has a past medical history of Arthritis, Bronchitis, chronic (HCC), Chronic back pain, Chronic cough, COPD  Take 1 tablet by mouth daily Take with food 2/22/24 4/22/24  Rehana Patino MD   pantoprazole (PROTONIX) 20 MG tablet TAKE 1 TABLET BY MOUTH DAILY 2/6/24 8/4/24  Rehana Patino MD   clopidogrel (PLAVIX) 75 MG tablet TAKE 1 TABLET BY MOUTH DAILY 12/26/23   Rehana Patino MD   ENTRESTO 24-26 MG per tablet TAKE 1 TABLET BY MOUTH TWICE  DAILY 12/26/23   Rehana Patino MD   metoprolol succinate (TOPROL XL) 25 MG extended release tablet TAKE ONE-HALF TABLET BY MOUTH  DAILY 12/26/23   Rehana Patino MD   fluticasone-umeclidin-vilant (TRELEGY ELLIPTA) 200-62.5-25 MCG/ACT AEPB inhaler Inhale 1 puff into the lungs daily 11/30/23   ProviderMaria Luz MD   citalopram (CELEXA) 20 MG tablet TAKE 1 TABLET BY MOUTH IN THE  MORNING 12/11/23   Rehana Patino MD   denosumab (PROLIA) 60 MG/ML SOSY SC injection Inject 1 mL into the skin once for 1 dose 12/8/23 12/18/23  Rehana Patino MD   fluticasone (FLONASE) 50 MCG/ACT nasal spray 2 sprays by Nasal route daily 11/22/23   Harjinder Dooley APRN - CNP   dicyclomine (BENTYL) 10 MG capsule take 1 capsule by mouth every 6 hours if needed 4/17/23   Rustam Poe APRN - NP   Misc. Devices (STEP N REST II WALKER) MISC Use daily for balance problems 4/18/22   Rehana Patino MD   Ascorbic Acid (VITAMIN C) 1000 MG tablet Take 1 tablet by mouth daily    ProviderMaria Luz MD   ondansetron (ZOFRAN) 4 MG tablet Take 1 tablet by mouth every 8 hours as needed for Nausea or Vomiting    ProviderMaria Luz MD   lidocaine (LMX) 4 % cream Apply topically every 8 hrs as needed for pain  Patient not taking: Reported on 11/22/2023 9/17/19   Rehana Patino MD   RA CALCIUM 600/VITAMIN D-3 600-400 MG-UNIT TABS take 1 tablet by mouth twice a day 8/5/19   Rehana Patino MD   lidocaine-prilocaine (EMLA) 2.5-2.5 % cream lidocaine-prilocaine 2.5 %-2.5 % topical cream   Apply to port site and cover 30-45 minutes prior to needle access    Provider, MD Maria Luz   albuterol sulfate HFA

## 2024-04-30 NOTE — PLAN OF CARE
Problem: Respiratory - Adult  Goal: Achieves optimal ventilation and oxygenation  4/30/2024 1931 by Irineo Monroy RCP  Outcome: Progressing

## 2024-05-01 ENCOUNTER — APPOINTMENT (OUTPATIENT)
Dept: VASCULAR LAB | Age: 64
DRG: 321 | End: 2024-05-01
Payer: COMMERCIAL

## 2024-05-01 ENCOUNTER — APPOINTMENT (OUTPATIENT)
Dept: CT IMAGING | Age: 64
DRG: 321 | End: 2024-05-01
Payer: COMMERCIAL

## 2024-05-01 PROBLEM — I25.10 CORONARY ARTERY DISEASE DUE TO LIPID RICH PLAQUE: Status: ACTIVE | Noted: 2024-05-01

## 2024-05-01 PROBLEM — I25.83 CORONARY ARTERY DISEASE DUE TO LIPID RICH PLAQUE: Status: ACTIVE | Noted: 2024-05-01

## 2024-05-01 LAB
ANION GAP SERPL CALCULATED.3IONS-SCNC: 8 MMOL/L (ref 9–17)
ANTI-XA UNFRAC HEPARIN: 0.19 IU/L (ref 0.3–0.7)
ANTI-XA UNFRAC HEPARIN: 0.3 IU/L (ref 0.3–0.7)
ANTI-XA UNFRAC HEPARIN: 0.35 IU/L (ref 0.3–0.7)
BUN SERPL-MCNC: 15 MG/DL (ref 8–23)
BUN/CREAT SERPL: 21 (ref 9–20)
CALCIUM SERPL-MCNC: 8.2 MG/DL (ref 8.6–10.4)
CHLORIDE SERPL-SCNC: 109 MMOL/L (ref 98–107)
CO2 SERPL-SCNC: 22 MMOL/L (ref 20–31)
CREAT SERPL-MCNC: 0.7 MG/DL (ref 0.5–0.9)
ECHO BSA: 1.41 M2
ERYTHROCYTE [DISTWIDTH] IN BLOOD BY AUTOMATED COUNT: 13.8 % (ref 11.8–14.4)
GFR, ESTIMATED: >90 ML/MIN/1.73M2
GLUCOSE SERPL-MCNC: 109 MG/DL (ref 70–99)
HCT VFR BLD AUTO: 43.9 % (ref 36.3–47.1)
HGB BLD-MCNC: 14.1 G/DL (ref 11.9–15.1)
MAGNESIUM SERPL-MCNC: 2 MG/DL (ref 1.6–2.6)
MCH RBC QN AUTO: 31.9 PG (ref 25.2–33.5)
MCHC RBC AUTO-ENTMCNC: 32.1 G/DL (ref 28.4–34.8)
MCV RBC AUTO: 99.3 FL (ref 82.6–102.9)
NRBC BLD-RTO: 0 PER 100 WBC
PLATELET # BLD AUTO: 283 K/UL (ref 138–453)
PMV BLD AUTO: 10 FL (ref 8.1–13.5)
POTASSIUM SERPL-SCNC: 3.4 MMOL/L (ref 3.7–5.3)
RBC # BLD AUTO: 4.42 M/UL (ref 3.95–5.11)
SODIUM SERPL-SCNC: 139 MMOL/L (ref 135–144)
VAS LEFT AX A MID PSV: 49.7 CM/S
VAS LEFT BRACHIAL A PROX EDV: 0 CM/S
VAS LEFT BRACHIAL A PROX PSV: 38.7 CM/S
VAS LEFT BULB EDV: 10.6 CM/S
VAS LEFT BULB PSV: 29.1 CM/S
VAS LEFT CCA DIST EDV: 17.5 CM/S
VAS LEFT CCA DIST PSV: 40.6 CM/S
VAS LEFT CCA PROX EDV: 17.5 CM/S
VAS LEFT CCA PROX PSV: 52.7 CM/S
VAS LEFT ECA EDV: 9.91 CM/S
VAS LEFT ECA PSV: 51.3 CM/S
VAS LEFT GSV BK DIST DIAM: 1 MM
VAS LEFT GSV BK MID DIAM: 1.1 MM
VAS LEFT GSV BK PROX DIAM: 1.9 MM
VAS LEFT GSV JUNC DIAM: 4.4 MM
VAS LEFT GSV THIGH DIST DIAM: 3.3 MM
VAS LEFT GSV THIGH MID DIAM: 2.7 MM
VAS LEFT GSV THIGH PROX DIAM: 3.1 MM
VAS LEFT ICA DIST EDV: 27.4 CM/S
VAS LEFT ICA DIST PSV: 69.2 CM/S
VAS LEFT ICA PROX EDV: 20.6 CM/S
VAS LEFT ICA PROX PSV: 47 CM/S
VAS LEFT ICA/CCA PSV: 1.7 NO UNITS
VAS LEFT RADIAL A DIST PSV: 43.1 CM/S
VAS LEFT RADIAL A MID PSV: 44.2 CM/S
VAS LEFT RADIAL A PROX PSV: 39.8 CM/S
VAS LEFT RADIAL DIST AP DIAM: 0.2 CM
VAS LEFT RADIAL MID AP DIAM: 0.22 CM
VAS LEFT RADIAL PROX AP DIAM: 0.17 CM
VAS LEFT ULNAR A DIST PSV: 46.4 CM/S
VAS LEFT ULNAR A MID PSV: 32.9 CM/S
VAS LEFT ULNAR A PROX PSV: 24.3 CM/S
VAS LEFT ULNAR DIST AP DIAM: 0.24 CM
VAS LEFT ULNAR MID AP DIAM: 0.24 CM
VAS LEFT ULNAR PROX AP DIAM: 0.3 CM
VAS LEFT VERTEBRAL EDV: 18.59 CM/S
VAS LEFT VERTEBRAL PSV: 38.4 CM/S
VAS RIGHT AX A MID PSV: 61.5 CM/S
VAS RIGHT BRACHIAL A PROX PSV: 50.8 CM/S
VAS RIGHT BULB EDV: 16.4 CM/S
VAS RIGHT BULB PSV: 38.4 CM/S
VAS RIGHT CCA DIST EDV: 19.7 CM/S
VAS RIGHT CCA DIST PSV: 58.2 CM/S
VAS RIGHT CCA PROX EDV: 17.5 CM/S
VAS RIGHT CCA PROX PSV: 53.8 CM/S
VAS RIGHT ECA EDV: 7.59 CM/S
VAS RIGHT ECA PSV: 50.5 CM/S
VAS RIGHT GSV BK DIST DIAM: 1 MM
VAS RIGHT GSV BK MID DIAM: 1.8 MM
VAS RIGHT GSV BK PROX DIAM: 1.6 MM
VAS RIGHT GSV JUNC DIAM: 7.1 MM
VAS RIGHT GSV THIGH DIST DIAM: 2.6 MM
VAS RIGHT GSV THIGH MID DIAM: 2.4 MM
VAS RIGHT GSV THIGH PROX DIAM: 3.3 MM
VAS RIGHT ICA DIST EDV: 34 CM/S
VAS RIGHT ICA DIST PSV: 76.9 CM/S
VAS RIGHT ICA PROX EDV: 32.9 CM/S
VAS RIGHT ICA PROX PSV: 78 CM/S
VAS RIGHT ICA/CCA PSV: 1.3 NO UNITS
VAS RIGHT RADIAL A DIST PSV: 26.4 CM/S
VAS RIGHT RADIAL A MID PSV: 36.5 CM/S
VAS RIGHT RADIAL A PROX PSV: 36.5 CM/S
VAS RIGHT RADIAL DIST AP DIAM: 0.27 CM
VAS RIGHT RADIAL MID AP DIAM: 0.21 CM
VAS RIGHT RADIAL PROX AP DIAM: 0.33 CM
VAS RIGHT ULNAR A DIST PSV: 44.2 CM/S
VAS RIGHT ULNAR A MID PSV: 50.8 CM/S
VAS RIGHT ULNAR A PROX PSV: 50.8 CM/S
VAS RIGHT ULNAR DIST AP DIAM: 0.28 CM
VAS RIGHT ULNAR MID AP DIAM: 0.22 CM
VAS RIGHT ULNAR PROX AP DIAM: 0.28 CM
VAS RIGHT VERTEBRAL EDV: 9.79 CM/S
VAS RIGHT VERTEBRAL PSV: 34 CM/S
WBC OTHER # BLD: 13.4 K/UL (ref 3.5–11.3)

## 2024-05-01 PROCEDURE — 83735 ASSAY OF MAGNESIUM: CPT

## 2024-05-01 PROCEDURE — 99232 SBSQ HOSP IP/OBS MODERATE 35: CPT | Performed by: NURSE PRACTITIONER

## 2024-05-01 PROCEDURE — 99233 SBSQ HOSP IP/OBS HIGH 50: CPT | Performed by: NURSE PRACTITIONER

## 2024-05-01 PROCEDURE — 93970 EXTREMITY STUDY: CPT

## 2024-05-01 PROCEDURE — 94761 N-INVAS EAR/PLS OXIMETRY MLT: CPT

## 2024-05-01 PROCEDURE — 85027 COMPLETE CBC AUTOMATED: CPT

## 2024-05-01 PROCEDURE — 93880 EXTRACRANIAL BILAT STUDY: CPT

## 2024-05-01 PROCEDURE — 6360000002 HC RX W HCPCS: Performed by: NURSE PRACTITIONER

## 2024-05-01 PROCEDURE — 85520 HEPARIN ASSAY: CPT

## 2024-05-01 PROCEDURE — 36415 COLL VENOUS BLD VENIPUNCTURE: CPT

## 2024-05-01 PROCEDURE — 94640 AIRWAY INHALATION TREATMENT: CPT

## 2024-05-01 PROCEDURE — 80048 BASIC METABOLIC PNL TOTAL CA: CPT

## 2024-05-01 PROCEDURE — 2000000000 HC ICU R&B

## 2024-05-01 PROCEDURE — 6370000000 HC RX 637 (ALT 250 FOR IP): Performed by: NURSE PRACTITIONER

## 2024-05-01 PROCEDURE — 71250 CT THORAX DX C-: CPT

## 2024-05-01 PROCEDURE — 2580000003 HC RX 258: Performed by: INTERNAL MEDICINE

## 2024-05-01 PROCEDURE — 93930 UPPER EXTREMITY STUDY: CPT

## 2024-05-01 RX ADMIN — POTASSIUM CHLORIDE 40 MEQ: 1500 TABLET, EXTENDED RELEASE ORAL at 08:30

## 2024-05-01 RX ADMIN — BUDESONIDE AND FORMOTEROL FUMARATE DIHYDRATE 2 PUFF: 160; 4.5 AEROSOL RESPIRATORY (INHALATION) at 07:55

## 2024-05-01 RX ADMIN — ALBUTEROL SULFATE 2 PUFF: 108 AEROSOL, METERED RESPIRATORY (INHALATION) at 07:55

## 2024-05-01 RX ADMIN — CITALOPRAM HYDROBROMIDE 20 MG: 20 TABLET ORAL at 08:30

## 2024-05-01 RX ADMIN — PANTOPRAZOLE SODIUM 20 MG: 20 TABLET, DELAYED RELEASE ORAL at 08:29

## 2024-05-01 RX ADMIN — TIOTROPIUM BROMIDE INHALATION SPRAY 2 PUFF: 3.12 SPRAY, METERED RESPIRATORY (INHALATION) at 07:55

## 2024-05-01 RX ADMIN — SODIUM CHLORIDE: 9 INJECTION, SOLUTION INTRAVENOUS at 22:17

## 2024-05-01 RX ADMIN — ALBUTEROL SULFATE 2 PUFF: 108 AEROSOL, METERED RESPIRATORY (INHALATION) at 19:58

## 2024-05-01 RX ADMIN — HEPARIN SODIUM 18 UNITS/KG/HR: 10000 INJECTION, SOLUTION INTRAVENOUS at 15:35

## 2024-05-01 RX ADMIN — EZETIMIBE 10 MG: 10 TABLET ORAL at 08:30

## 2024-05-01 RX ADMIN — HEPARIN SODIUM 1200 UNITS: 1000 INJECTION INTRAVENOUS; SUBCUTANEOUS at 00:39

## 2024-05-01 RX ADMIN — BUDESONIDE AND FORMOTEROL FUMARATE DIHYDRATE 2 PUFF: 160; 4.5 AEROSOL RESPIRATORY (INHALATION) at 19:57

## 2024-05-01 RX ADMIN — ASPIRIN 81 MG CHEWABLE TABLET 81 MG: 81 TABLET CHEWABLE at 08:29

## 2024-05-01 ASSESSMENT — PAIN SCALES - GENERAL: PAINLEVEL_OUTOF10: 0

## 2024-05-01 NOTE — FLOWSHEET NOTE
04/30/24 2050   Treatment Team Notification   Reason for Communication Abnormal vitals   Name of Team Member Notified Dr. Almanzar   Treatment Team Role Consulting Provider   Method of Communication Call   Response See orders   Notification Time 2050     Writer notified provider on patient status update. Asked provider if we wanted to turn patient icu status to monitor more closely. Pt now ICU status

## 2024-05-01 NOTE — PLAN OF CARE
Problem: Discharge Planning  Goal: Discharge to home or other facility with appropriate resources  5/1/2024 1023 by Dania Chan RN  Outcome: Progressing  5/1/2024 1023 by Dania Chan RN  Outcome: Progressing  5/1/2024 0052 by Swapnil Mtz RN  Outcome: Progressing     Problem: Safety - Adult  Goal: Free from fall injury  5/1/2024 1023 by Dania Chan RN  Outcome: Progressing  5/1/2024 1023 by Dania Chan RN  Outcome: Progressing  5/1/2024 0052 by Swapnil Mtz RN  Outcome: Progressing     Problem: ABCDS Injury Assessment  Goal: Absence of physical injury  5/1/2024 1023 by Dania Chan RN  Outcome: Progressing  5/1/2024 1023 by Dania Chan RN  Outcome: Progressing  5/1/2024 0052 by Swapnil Mtz RN  Outcome: Progressing     Problem: Respiratory - Adult  Goal: Achieves optimal ventilation and oxygenation  5/1/2024 1023 by Dania Chan RN  Outcome: Progressing  5/1/2024 1023 by Dania Chan RN  Outcome: Progressing  5/1/2024 0052 by Swapnil Mtz RN  Outcome: Progressing     Problem: Skin/Tissue Integrity  Goal: Absence of new skin breakdown  Description: 1.  Monitor for areas of redness and/or skin breakdown  2.  Assess vascular access sites hourly  3.  Every 4-6 hours minimum:  Change oxygen saturation probe site  4.  Every 4-6 hours:  If on nasal continuous positive airway pressure, respiratory therapy assess nares and determine need for appliance change or resting period.  5/1/2024 1023 by Dania Chan RN  Outcome: Progressing  5/1/2024 1023 by Dania Chan RN  Outcome: Progressing  5/1/2024 0052 by Swapnil Mtz RN  Outcome: Progressing     Problem: Hematologic - Adult  Goal: Maintains hematologic stability  Outcome: Progressing

## 2024-05-01 NOTE — PLAN OF CARE
Problem: Respiratory - Adult  Goal: Achieves optimal ventilation and oxygenation  5/1/2024 0052 by Swapnil Mtz RN  Outcome: Progressing  4/30/2024 1931 by Irineo Monroy RCP  Outcome: Progressing

## 2024-05-01 NOTE — PROGRESS NOTES
[facility] Cardiothoracic Surgery  Progress Note  5/1/2024    12:34 PM    Name:   Erin Hill      Day:  2  Admit Date:  4/29/2024  2:02 PM      Subjective:     No chest pain. Very nervous about surgery. Concerned she may not be strong enough to recover.    Physical Examination:        BP 96/69   Pulse 84   Temp 98 °F (36.7 °C) (Temporal)   Resp 16   Ht 1.676 m (5' 6\")   Wt 43.5 kg (95 lb 14.4 oz)   SpO2 98%   BMI 15.48 kg/m²   General appearance:  thin AAO x 3  Lungs: clear  Heart: RR  Abdomen: benign  Extremities: no edema    Medications:     Current Meds:   Scheduled Meds:    sodium chloride flush  5-40 mL IntraVENous 2 times per day    [Held by provider] amLODIPine  2.5 mg Oral Nightly    [Held by provider] clopidogrel  75 mg Oral Daily    [Held by provider] sacubitril-valsartan  1 tablet Oral BID    [Held by provider] metoprolol succinate  12.5 mg Oral Daily    ezetimibe  10 mg Oral Daily    citalopram  20 mg Oral QAM    pantoprazole  20 mg Oral Daily    sodium chloride flush  5-40 mL IntraVENous 2 times per day    atorvastatin  40 mg Oral Nightly    aspirin  81 mg Oral Daily    budesonide-formoterol  2 puff Inhalation BID RT    tiotropium  2 puff Inhalation Daily RT    albuterol sulfate HFA  2 puff Inhalation BID RT     Continuous Infusions:    sodium chloride 75 mL/hr at 04/30/24 2249    sodium chloride      norepinephrine 10 mcg/min (05/01/24 0651)    sodium chloride      heparin (PORCINE) Infusion 18 Units/kg/hr (05/01/24 0651)    nitroGLYCERIN Stopped (04/30/24 0710)     PRN Meds: sodium chloride flush, sodium chloride, sodium chloride flush, sodium chloride, potassium chloride **OR** potassium alternative oral replacement **OR** potassium chloride, potassium chloride, magnesium sulfate, ondansetron **OR** ondansetron, acetaminophen **OR** acetaminophen, magnesium hydroxide, nitroGLYCERIN, perflutren lipid microspheres, heparin (porcine), heparin (porcine)    I/O     I/O (24Hr):    Intake/Output        Assessment:        Hospital Problems             Last Modified POA    * (Principal) NSTEMI (non-ST elevated myocardial infarction) (HCC) 4/29/2024 Yes    Chest pain 4/30/2024 Yes    Valvular heart disease 4/29/2024 Yes    Primary lung adenocarcinoma, right (HCC) 4/29/2024 Yes    Osteoporosis 4/29/2024 Yes    Pulmonary nodule 4/29/2024 Yes    Chronic obstructive pulmonary disease (HCC) 4/29/2024 Yes    Smoking 4/29/2024 Yes    Primary hypertension 4/29/2024 Yes    Mild protein-calorie malnutrition (HCC) 4/29/2024 Yes    Gastroesophageal reflux disease without esophagitis 4/29/2024 Yes    Superior mesenteric artery stenosis (HCC) 4/29/2024 Yes    Coronary artery disease due to lipid rich plaque 5/1/2024 Yes       Plan:        Patient seen chart reviewed. Order PFT's as well as CT chest. Patient was to have CT chest at [facility] today for her history of lung cancer will get while here for preoperative workup. Patient anxious about surgery. If to high risk for surgery the point she might choose just medications and go home. Will continue with pre op workup. Cardiology will discuss any PCI options.      FLAQUITO Myers  5/1/2024  12:34 PM

## 2024-05-01 NOTE — FLOWSHEET NOTE
05/01/24 1348   Treatment Team Notification   Reason for Communication Evaluate;Review case  (New consult)   Name of Team Member Notified Dr. Jordan   Treatment Team Role Consulting Provider   Method of Communication Secure Message   Response Waiting for response   Notification Time 1348       Accepts consult

## 2024-05-01 NOTE — PROGRESS NOTES
function remains stable.  EF was discovered to be less than 30% on echo.    Brief History:     4/29 - Patient reports to the emergency department with an 18-hour history of midsternal chest pain. Patient has a known history of CAD as well as COPD. Patient continues to smoke. She advised that she was diagnosed approximately 1 year ago with coronary artery disease and had a nonstentable lesion at that time. She was started on medical management. Patient reports that she has not been compliant with her oral medication regiment and will often miss up to 3-5 dosage of her medications per month. She also continues to smoke. No ST segment elevation is noted on twelve-lead however patient's troponin are rising and she is high risk based on her health history. Cardiology was consulted and they expressed their intention to perform heart catheterization tomorrow.     4/30 - Patient reports feeling much better today.  Troponins continue upward trend, remains on heparin drip.  Off nitro as she is currently pain-free.  Cardiology reported their intention to return to Cath Lab today.    Review of Systems:     Review of Systems   Constitutional:  Positive for fatigue. Negative for activity change, chills and fever.   HENT:  Negative for sinus pressure and sinus pain.    Eyes:  Negative for photophobia and visual disturbance.   Respiratory:  Positive for cough and shortness of breath. Negative for wheezing.    Cardiovascular:  Positive for chest pain. Negative for palpitations and leg swelling.   Gastrointestinal:  Negative for abdominal pain, constipation, diarrhea, nausea and vomiting.   Endocrine: Negative for cold intolerance, heat intolerance and polyuria.   Genitourinary:  Negative for difficulty urinating and urgency.   Musculoskeletal:  Negative for arthralgias and myalgias.   Skin: Negative.  Negative for wound.   Neurological:  Negative for dizziness, syncope, weakness and light-headedness.   Hematological:  Negative for  04/29/24  1410 04/29/24  1615 04/29/24  1819 04/30/24  0632 05/01/24  0627     --   --   --  139   K 3.7  --   --   --  3.4*     --   --   --  109*   CO2 23  --   --   --  22   GLUCOSE 182*  --   --   --  109*   BUN 12  --   --   --  15   CREATININE 0.7  --   --   --  0.7   MG  --   --   --  1.4* 2.0   ANIONGAP 15  --   --   --  8*   CALCIUM 9.5  --   --   --  8.2*   PROBNP 1,756*  --   --   --   --    TROPHS 82* 135* 272*  --   --    MYOGLOBIN 48 48 75*  --   --        Recent Labs     04/29/24  1410 04/30/24  0632   AST 31  --    ALT 13  --    ALKPHOS 59  --    BILITOT 0.5  --    BILIDIR 0.1  --    LIPASE 41  --    CHOL  --  199   HDL  --  82   CHOLHDLRATIO  --  2.0   TRIG  --  49   VLDL  --  10       ABG:  Lab Results   Component Value Date/Time    PH 7.42 01/25/2022 01:05 PM    PCO2 36 01/25/2022 01:05 PM    PO2 93 01/25/2022 01:05 PM    HCO3 23 01/25/2022 01:05 PM    BE -1 01/25/2022 01:05 PM    FIO2 0 01/25/2022 01:05 PM     Lab Results   Component Value Date/Time    SPECIAL NOT REPORTED 11/20/2017 09:11 AM     Lab Results   Component Value Date/Time    CULTURE NORMAL RESPIRATORY MAIK MODERATE GROWTH 11/19/2017 10:56 PM    CULTURE  11/19/2017 10:56 PM     Performed at [facility] [address] [address] ([phone]       Radiology:  XR CHEST PORTABLE    Result Date: 4/29/2024  No acute process. Similar appearing COPD changes       Physical Examination:        Physical Exam  Constitutional:       General: She is not in acute distress.     Appearance: Normal appearance. She is cachectic. She is not toxic-appearing.   HENT:      Head: Normocephalic and atraumatic.      Mouth/Throat:      Mouth: Mucous membranes are moist.   Eyes:      Extraocular Movements: Extraocular movements intact.      Pupils: Pupils are equal, round, and reactive to light.   Cardiovascular:      Rate and Rhythm: Normal rate and regular rhythm.      Pulses: Normal pulses.      Heart sounds: Normal heart sounds.

## 2024-05-01 NOTE — PROGRESS NOTES
End Of Shift Note  [facility]  Summary of shift: Began shift on heparin and nitro however patient had no chest pain at beginning of shift so nitro was turned off. Patient had a echo and cardiac cath today. CT Surgery consulted. Patient restarted on heparin drip 2 hours post cath. Patients site looks great however around 1800 she became hypotensive. Fluid bolus given and glucagon ordered per Dr. Almanzar. Patient did not respond much to fluids but did increase BP after glucagon. Bedside handoff given to Wilfred BENSON.     Vitals:    Vitals:    04/30/24 1745 04/30/24 1800 04/30/24 1815 04/30/24 1931   BP: 97/78 102/80     Pulse: 86 79 76 81   Resp:       Temp:       TempSrc:       SpO2:    96%   Weight:       Height:            I&O:   Intake/Output Summary (Last 24 hours) at 4/30/2024 2044  Last data filed at 4/30/2024 1811  Gross per 24 hour   Intake 236.89 ml   Output 710 ml   Net -473.11 ml       Resp Status: RA    Ventilator Settings:     / / /     Critical Care IV infusions:   sodium chloride 75 mL/hr at 04/30/24 1708    sodium chloride      sodium chloride      heparin (PORCINE) Infusion 16 Units/kg/hr (04/30/24 1811)    nitroGLYCERIN Stopped (04/30/24 0710)        LDA:   Implantable Port Left Subclavian (Active)   Number of days:        Peripheral IV 04/29/24 Right Antecubital (Active)   Number of days: 1       Peripheral IV 04/30/24 Left;Dorsal Basilic (Active)   Number of days: 0       External Urinary Catheter (Active)   Number of days: 1

## 2024-05-01 NOTE — FLOWSHEET NOTE
04/30/24 2208   Treatment Team Notification   Reason for Communication Abnormal vitals   Name of Team Member Notified Dr. Almanzar   Treatment Team Role Consulting Provider   Method of Communication Call   Response See orders   Notification Time 2208     Pt continues to be hypotensive. Writer given order for Levo drip

## 2024-05-01 NOTE — PROGRESS NOTES
End Of Shift Note  [facility]  Summary of shift: Pt had eventful beginning of shift. Started levo drip due to low blood pressures. Titrated appropriately. Denies chest pain. Continues heparin gtt. Goal today is to continue POC    Vitals:    Vitals:    05/01/24 0600 05/01/24 0615 05/01/24 0630 05/01/24 0645   BP: 90/67 94/76 99/76 127/80   Pulse: 66 70 78 80   Resp:       Temp:       TempSrc:       SpO2:       Weight:       Height:            I&O:   Intake/Output Summary (Last 24 hours) at 5/1/2024 0651  Last data filed at 5/1/2024 0651  Gross per 24 hour   Intake 1417.94 ml   Output 510 ml   Net 907.94 ml       Resp Status: Room air     Ventilator Settings:     / / /     Critical Care IV infusions:   sodium chloride 75 mL/hr at 04/30/24 2249    sodium chloride      norepinephrine 10 mcg/min (05/01/24 0651)    sodium chloride      heparin (PORCINE) Infusion 18 Units/kg/hr (05/01/24 0651)    nitroGLYCERIN Stopped (04/30/24 0710)        LDA:   Implantable Port Left Subclavian (Active)   Number of days:        Peripheral IV 04/29/24 Right Antecubital (Active)   Number of days: 1       Peripheral IV 04/30/24 Left;Dorsal Basilic (Active)   Number of days: 0       External Urinary Catheter (Active)   Number of days: 2

## 2024-05-01 NOTE — PLAN OF CARE
Problem: Discharge Planning  Goal: Discharge to home or other facility with appropriate resources  Outcome: Progressing     Problem: Safety - Adult  Goal: Free from fall injury  Outcome: Progressing     Problem: ABCDS Injury Assessment  Goal: Absence of physical injury  Outcome: Progressing     Problem: Respiratory - Adult  Goal: Achieves optimal ventilation and oxygenation  5/1/2024 0052 by Swapnil Mtz, RN  Outcome: Progressing  4/30/2024 1931 by Irineo Monroy RCP  Outcome: Progressing     Problem: Skin/Tissue Integrity  Goal: Absence of new skin breakdown  Description: 1.  Monitor for areas of redness and/or skin breakdown  2.  Assess vascular access sites hourly  3.  Every 4-6 hours minimum:  Change oxygen saturation probe site  4.  Every 4-6 hours:  If on nasal continuous positive airway pressure, respiratory therapy assess nares and determine need for appliance change or resting period.  Outcome: Progressing

## 2024-05-01 NOTE — PROGRESS NOTES
[facility]  Progress Note                   Date:   5/1/2024  Patient name: Erin Hill  Date of admission:  4/29/2024  2:02 PM  MRN:   [id number]  YOB: 1960  PCP: Rehana Patino MD    Reason for Admission: NSTEMI (non-ST elevated myocardial infarction) (HCC) [I21.4]  Chest pain, unspecified type [R07.9]    Subjective:       Clinical Changes /Abnormalities: Stable overnight. No acute CV issues/concerns. Labs, vitals,  tele reviewed. Remains SR    Review of Systems    Medications:   Scheduled Meds:   sodium chloride flush  5-40 mL IntraVENous 2 times per day    [Held by provider] amLODIPine  2.5 mg Oral Nightly    [Held by provider] clopidogrel  75 mg Oral Daily    [Held by provider] sacubitril-valsartan  1 tablet Oral BID    [Held by provider] metoprolol succinate  12.5 mg Oral Daily    ezetimibe  10 mg Oral Daily    citalopram  20 mg Oral QAM    pantoprazole  20 mg Oral Daily    sodium chloride flush  5-40 mL IntraVENous 2 times per day    atorvastatin  40 mg Oral Nightly    aspirin  81 mg Oral Daily    budesonide-formoterol  2 puff Inhalation BID RT    tiotropium  2 puff Inhalation Daily RT    albuterol sulfate HFA  2 puff Inhalation BID RT     Continuous Infusions:   sodium chloride 75 mL/hr at 04/30/24 2249    sodium chloride      norepinephrine 10 mcg/min (05/01/24 0651)    sodium chloride      heparin (PORCINE) Infusion 18 Units/kg/hr (05/01/24 0651)    nitroGLYCERIN Stopped (04/30/24 0710)     CBC:   Recent Labs     04/29/24  1410 04/30/24  0632 05/01/24  0627   WBC 7.6 7.4 13.4*   HGB 15.5* 14.9 14.1    289 283     BMP:    Recent Labs     04/29/24  1410 05/01/24  0627    139   K 3.7 3.4*    109*   CO2 23 22   BUN 12 15   CREATININE 0.7 0.7   GLUCOSE 182* 109*     Hepatic:  Recent Labs     04/29/24  1410   AST 31   ALT 13   BILITOT 0.5   ALKPHOS 59     Troponin:   Recent Labs     04/29/24  1410 04/29/24  1615 04/29/24  1819   TROPHS 82* 135* 272*

## 2024-05-01 NOTE — PROGRESS NOTES
End Of Shift Note  [facility]  Summary of shift: This shift, pt completed CT scan, vascular studies, and PFT ordered. Levo remains running and working on weaning down, however pt not tolerating lower dose. CT surgery rounded and pt deemed not a good candidate for open heart, pt in agreement. [facility] looking into transfer for high risk PCI. Pulmonology consulted per CT surgery request. Pt remains bedrest.    Vitals:    Vitals:    05/01/24 1415 05/01/24 1435 05/01/24 1505 05/01/24 1515   BP:  113/77 93/73 (!) 86/60   Pulse: 78 71 71 71   Resp:       Temp:       TempSrc:       SpO2:       Weight:       Height:            I&O:   Intake/Output Summary (Last 24 hours) at 5/1/2024 1517  Last data filed at 5/1/2024 1501  Gross per 24 hour   Intake 1928.75 ml   Output 510 ml   Net 1418.75 ml       Resp Status: Room air    Ventilator Settings:     / / /     Critical Care IV infusions:   sodium chloride 75 mL/hr at 04/30/24 2249    sodium chloride      norepinephrine 8 mcg/min (05/01/24 1501)    sodium chloride      heparin (PORCINE) Infusion 18 Units/kg/hr (05/01/24 1501)    nitroGLYCERIN Stopped (04/30/24 0710)        LDA:   Implantable Port Left Subclavian (Active)   Number of days:        Peripheral IV 04/30/24 Left;Dorsal Basilic (Active)   Number of days: 1       External Urinary Catheter (Active)   Number of days: 2

## 2024-05-02 LAB
ALLEN TEST: POSITIVE
ANTI-XA UNFRAC HEPARIN: 0.28 IU/L (ref 0.3–0.7)
ANTI-XA UNFRAC HEPARIN: 0.4 IU/L (ref 0.3–0.7)
ANTI-XA UNFRAC HEPARIN: 0.44 IU/L (ref 0.3–0.7)
ANTI-XA UNFRAC HEPARIN: 0.48 IU/L (ref 0.3–0.7)
CO2 BLD CALC-SCNC: 22 MMOL/L (ref 22–30)
EKG ATRIAL RATE: 92 BPM
EKG P AXIS: 84 DEGREES
EKG P-R INTERVAL: 120 MS
EKG Q-T INTERVAL: 390 MS
EKG QRS DURATION: 68 MS
EKG QTC CALCULATION (BAZETT): 482 MS
EKG R AXIS: 59 DEGREES
EKG T AXIS: -134 DEGREES
EKG VENTRICULAR RATE: 92 BPM
FIO2: 28
NEGATIVE BASE EXCESS, ART: 0.7 MMOL/L (ref 0–2)
O2 DELIVERY DEVICE: ABNORMAL
PATIENT TEMP: 37
POC HCO3: 22.3 MMOL/L (ref 21–28)
POC O2 SATURATION: 90.8 % (ref 94–98)
POC PCO2: 30.9 MM HG (ref 35–48)
POC PH: 7.47 (ref 7.35–7.45)
POC PO2: 55.8 MM HG (ref 83–108)
POTASSIUM SERPL-SCNC: 3.6 MMOL/L (ref 3.7–5.3)
SAMPLE SITE: ABNORMAL

## 2024-05-02 PROCEDURE — 99233 SBSQ HOSP IP/OBS HIGH 50: CPT | Performed by: NURSE PRACTITIONER

## 2024-05-02 PROCEDURE — 93005 ELECTROCARDIOGRAM TRACING: CPT | Performed by: NURSE PRACTITIONER

## 2024-05-02 PROCEDURE — 36600 WITHDRAWAL OF ARTERIAL BLOOD: CPT

## 2024-05-02 PROCEDURE — 6370000000 HC RX 637 (ALT 250 FOR IP): Performed by: INTERNAL MEDICINE

## 2024-05-02 PROCEDURE — 2000000000 HC ICU R&B

## 2024-05-02 PROCEDURE — 82374 ASSAY BLOOD CARBON DIOXIDE: CPT

## 2024-05-02 PROCEDURE — 6370000000 HC RX 637 (ALT 250 FOR IP): Performed by: NURSE PRACTITIONER

## 2024-05-02 PROCEDURE — 99232 SBSQ HOSP IP/OBS MODERATE 35: CPT | Performed by: NURSE PRACTITIONER

## 2024-05-02 PROCEDURE — 2500000003 HC RX 250 WO HCPCS: Performed by: INTERNAL MEDICINE

## 2024-05-02 PROCEDURE — 82803 BLOOD GASES ANY COMBINATION: CPT

## 2024-05-02 PROCEDURE — 6360000002 HC RX W HCPCS: Performed by: NURSE PRACTITIONER

## 2024-05-02 PROCEDURE — 36415 COLL VENOUS BLD VENIPUNCTURE: CPT

## 2024-05-02 PROCEDURE — 2700000000 HC OXYGEN THERAPY PER DAY

## 2024-05-02 PROCEDURE — 85520 HEPARIN ASSAY: CPT

## 2024-05-02 PROCEDURE — 2580000003 HC RX 258: Performed by: INTERNAL MEDICINE

## 2024-05-02 PROCEDURE — 84132 ASSAY OF SERUM POTASSIUM: CPT

## 2024-05-02 PROCEDURE — 94761 N-INVAS EAR/PLS OXIMETRY MLT: CPT

## 2024-05-02 PROCEDURE — 94640 AIRWAY INHALATION TREATMENT: CPT

## 2024-05-02 RX ORDER — POTASSIUM CHLORIDE 750 MG/1
10 CAPSULE, EXTENDED RELEASE ORAL DAILY
Status: DISCONTINUED | OUTPATIENT
Start: 2024-05-02 | End: 2024-05-02

## 2024-05-02 RX ORDER — POTASSIUM CHLORIDE 750 MG/1
10 CAPSULE, EXTENDED RELEASE ORAL DAILY
Status: DISCONTINUED | OUTPATIENT
Start: 2024-05-02 | End: 2024-05-05 | Stop reason: HOSPADM

## 2024-05-02 RX ORDER — GUAIFENESIN 600 MG/1
600 TABLET, EXTENDED RELEASE ORAL 2 TIMES DAILY
Status: DISCONTINUED | OUTPATIENT
Start: 2024-05-02 | End: 2024-05-05 | Stop reason: HOSPADM

## 2024-05-02 RX ADMIN — HEPARIN SODIUM 1200 UNITS: 1000 INJECTION INTRAVENOUS; SUBCUTANEOUS at 04:52

## 2024-05-02 RX ADMIN — BUDESONIDE AND FORMOTEROL FUMARATE DIHYDRATE 2 PUFF: 160; 4.5 AEROSOL RESPIRATORY (INHALATION) at 20:02

## 2024-05-02 RX ADMIN — GUAIFENESIN 600 MG: 600 TABLET ORAL at 20:49

## 2024-05-02 RX ADMIN — HEPARIN SODIUM 20 UNITS/KG/HR: 10000 INJECTION, SOLUTION INTRAVENOUS at 20:52

## 2024-05-02 RX ADMIN — POTASSIUM CHLORIDE 10 MEQ: 10 CAPSULE, COATED, EXTENDED RELEASE ORAL at 05:08

## 2024-05-02 RX ADMIN — MAGNESIUM HYDROXIDE 30 ML: 400 SUSPENSION ORAL at 09:29

## 2024-05-02 RX ADMIN — GUAIFENESIN 600 MG: 600 TABLET ORAL at 04:14

## 2024-05-02 RX ADMIN — ALBUTEROL SULFATE 2 PUFF: 108 AEROSOL, METERED RESPIRATORY (INHALATION) at 06:26

## 2024-05-02 RX ADMIN — Medication 6 MCG/MIN: at 02:31

## 2024-05-02 RX ADMIN — BUDESONIDE AND FORMOTEROL FUMARATE DIHYDRATE 2 PUFF: 160; 4.5 AEROSOL RESPIRATORY (INHALATION) at 06:26

## 2024-05-02 RX ADMIN — PANTOPRAZOLE SODIUM 20 MG: 20 TABLET, DELAYED RELEASE ORAL at 09:17

## 2024-05-02 RX ADMIN — EZETIMIBE 10 MG: 10 TABLET ORAL at 09:17

## 2024-05-02 RX ADMIN — CITALOPRAM HYDROBROMIDE 20 MG: 20 TABLET ORAL at 09:18

## 2024-05-02 RX ADMIN — ASPIRIN 81 MG CHEWABLE TABLET 81 MG: 81 TABLET CHEWABLE at 09:18

## 2024-05-02 RX ADMIN — ALBUTEROL SULFATE 2 PUFF: 108 AEROSOL, METERED RESPIRATORY (INHALATION) at 20:02

## 2024-05-02 RX ADMIN — TIOTROPIUM BROMIDE INHALATION SPRAY 2 PUFF: 3.12 SPRAY, METERED RESPIRATORY (INHALATION) at 06:27

## 2024-05-02 RX ADMIN — SODIUM CHLORIDE: 9 INJECTION, SOLUTION INTRAVENOUS at 20:55

## 2024-05-02 RX ADMIN — ATORVASTATIN CALCIUM 40 MG: 40 TABLET, FILM COATED ORAL at 20:49

## 2024-05-02 ASSESSMENT — PAIN DESCRIPTION - LOCATION
LOCATION: CHEST

## 2024-05-02 ASSESSMENT — PAIN SCALES - GENERAL
PAINLEVEL_OUTOF10: 2
PAINLEVEL_OUTOF10: 0
PAINLEVEL_OUTOF10: 2

## 2024-05-02 ASSESSMENT — PAIN - FUNCTIONAL ASSESSMENT
PAIN_FUNCTIONAL_ASSESSMENT: ACTIVITIES ARE NOT PREVENTED

## 2024-05-02 ASSESSMENT — PAIN DESCRIPTION - DESCRIPTORS
DESCRIPTORS: DISCOMFORT
DESCRIPTORS: DISCOMFORT;DULL

## 2024-05-02 ASSESSMENT — PAIN DESCRIPTION - ORIENTATION
ORIENTATION: MID

## 2024-05-02 NOTE — PROGRESS NOTES
Patient c/o dull mid chest discomfort/heaviness exacerbated by swallowing.  Levophed drip off.  Patient stated this happens to her at home.  Also c/o constipation, medicated per orders. Patient re-iterated to writer she does not want OHS, wants stent placement preferably locally but ok with [place].  Continue to monitor.

## 2024-05-02 NOTE — FLOWSHEET NOTE
05/02/24 0448   Treatment Team Notification   Reason for Communication Evaluate   Name of Team Member Notified AYAN Hein   Treatment Team Role Advanced Practice Nurse   Method of Communication Secure Message   Response See orders   Notification Time 0448     Pt had potassium level of 3.6 this AM and pt stated she takes potassium pills at home. Writer contacted on call NP and was given orders to restart potassium pills

## 2024-05-02 NOTE — PROGRESS NOTES
[facility]  Office: [phone]  Roque Bonilla DO, Myke Bauer DO, Rodger Giles DO, Crescencio Skinner, DO, Albania Degroot MD, Mary Mathis MD, Bill Patino MD, Gege Dyer MD,  Jevon Francis MD, Lex Chase MD, Braden Vann MD,  Varun Love DO, Sandi Frazier MD, Jaspal Rhodes MD, Artemio Bonilla DO, Cristiana Keenan MD,  Mayo Flores DO, Annabelle Soares MD, Gloria Salguero MD, Modesta West MD, Moody Islas MD,  Bj Lisa MD, Adriana Romero MD, Vannesa Gudino MD, Pritesh Nathan MD, Yosi Sands MD, Min Cottrell MD, Leon Jacobson DO, Eddie Charles DO, Marino Rolon MD,  Harpal Schultz MD, Shirley Waterhouse, CNP,  Odilia Shafer CNP, Lokesh Rodney, CNP,  Emilee Strickland, DNP, Eden Guzman, CNP, Nichole Callejas, CNP, Desirae Hein CNP, Savana Velásquez, CNP, Rani Mcclure, PA-C, Arlene Villalta PA-C, Paola Singer, CNP, Jessica Cervantes, CNP, Kuldeep Rodrigues, CNP, Vandana Mac, CNP, Natalya Barnes, CNP, Chastity Cantor, CNS, Maryanne Crystal, CNP, Karen Barron CNP, Tracy Schwab, CNP         [facility]   IN-PATIENT SERVICE   [facility]    Progress Note    5/2/2024    3:45 PM    Name:   Erin Hill  MRN:     [id number]     Acct:      [id number]   Room:   2041/2041-01  IP Day:  3  Admit Date:  4/29/2024  2:02 PM    PCP:   Rehana Patino MD  Code Status:  Full Code    Subjective:     C/C:   Chief Complaint   Patient presents with    Chest Pain     Pt states that chest pain began this morning non radiating      Interval History Status: improved.     Plan for high risk PCI at cardiology discretion.    Pulmonology on board, follow with pulmonology and oncology in the outpatient study for likely recurrent lung cancer.    Brief History:     4/29 - Patient reports to the emergency department with an 18-hour history of midsternal chest pain. Patient has a known history of CAD as well as COPD. Patient continues to smoke. She advised that she was

## 2024-05-02 NOTE — FLOWSHEET NOTE
05/02/24 0355   Treatment Team Notification   Reason for Communication Evaluate   Name of Team Member Notified AYAN Hein   Treatment Team Role Advanced Practice Nurse   Method of Communication Secure Message   Response See orders   Notification Time 0355     Pt had productive cough and requested Mucinex. Writer reached out to on call NP and was given an order for Mucinex BID

## 2024-05-02 NOTE — FLOWSHEET NOTE
05/02/24 1300   Treatment Team Notification   Reason for Communication Evaluate   Name of Team Member Notified Dr. Jordan   Treatment Team Role Consulting Provider   Method of Communication Face to face   Response See orders;At bedside

## 2024-05-02 NOTE — PROGRESS NOTES
End Of Shift Note  [facility]  Summary of shift: Pt continues levophed and heparin gtt. Productive cough noted, mucinex ordered. Pt had increase in episodes of SOB. Given inhaler due to SOB. EKG completed. Goal today is palliative meeting and determine goal of care    Vitals:    Vitals:    05/02/24 0530 05/02/24 0545 05/02/24 0600 05/02/24 0615   BP: 107/80 (!) 132/97 (!) 128/93 (!) 134/100   Pulse: 79 90 80 91   Resp:       Temp:       TempSrc:       SpO2:       Weight:       Height:            I&O:   Intake/Output Summary (Last 24 hours) at 5/2/2024 0628  Last data filed at 5/2/2024 0628  Gross per 24 hour   Intake 3181.75 ml   Output 1050 ml   Net 2131.75 ml       Resp Status: Room air     Ventilator Settings:     / / /     Critical Care IV infusions:   sodium chloride 75 mL/hr at 05/02/24 0606    sodium chloride      norepinephrine 4 mcg/min (05/02/24 0606)    sodium chloride      heparin (PORCINE) Infusion 20 Units/kg/hr (05/02/24 0606)    nitroGLYCERIN Stopped (04/30/24 0710)        LDA:   Implantable Port Left Subclavian (Active)   Number of days:        Peripheral IV 04/30/24 Left;Dorsal Basilic (Active)   Number of days: 1       External Urinary Catheter (Active)   Number of days: 3

## 2024-05-02 NOTE — PLAN OF CARE
Problem: Discharge Planning  Goal: Discharge to home or other facility with appropriate resources  5/1/2024 2027 by Swapnil Mtz RN  Outcome: Progressing  5/1/2024 1023 by Dania Chan RN  Outcome: Progressing  5/1/2024 1023 by Dania Chan RN  Outcome: Progressing     Problem: Safety - Adult  Goal: Free from fall injury  5/1/2024 2027 by Swapnil Mtz RN  Outcome: Progressing  5/1/2024 1023 by Dania Chan RN  Outcome: Progressing  5/1/2024 1023 by Dania Chan RN  Outcome: Progressing     Problem: ABCDS Injury Assessment  Goal: Absence of physical injury  5/1/2024 2027 by Swapnil Mtz RN  Outcome: Progressing  5/1/2024 1023 by Dania Chan RN  Outcome: Progressing  5/1/2024 1023 by Dania Chan RN  Outcome: Progressing     Problem: Respiratory - Adult  Goal: Able to breathe comfortably  5/1/2024 1955 by Maryanne Lee RCP  Outcome: Progressing  Goal: Patient's breath sounds will be clear and equal  5/1/2024 1955 by Maryanne Lee RCP  Outcome: Progressing     Problem: Skin/Tissue Integrity  Goal: Absence of new skin breakdown  Description: 1.  Monitor for areas of redness and/or skin breakdown  2.  Assess vascular access sites hourly  3.  Every 4-6 hours minimum:  Change oxygen saturation probe site  4.  Every 4-6 hours:  If on nasal continuous positive airway pressure, respiratory therapy assess nares and determine need for appliance change or resting period.  5/1/2024 2027 by Swapnil Mtz RN  Outcome: Progressing  5/1/2024 1023 by Dania Chan RN  Outcome: Progressing  5/1/2024 1023 by Dania Chan RN  Outcome: Progressing     Problem: Hematologic - Adult  Goal: Maintains hematologic stability  5/1/2024 2027 by Swapnil Mtz RN  Outcome: Progressing  5/1/2024 1023 by Dania Chan RN  Outcome: Progressing     Problem: Pain  Goal: Verbalizes/displays adequate comfort level or baseline comfort level  Outcome: Progressing

## 2024-05-02 NOTE — FLOWSHEET NOTE
05/02/24 0940   Treatment Team Notification   Reason for Communication Evaluate   Name of Team Member Notified Natalya SHEEHAN   Treatment Team Role Consulting Provider   Method of Communication Face to face   Response See orders;At bedside

## 2024-05-02 NOTE — CONSULTS
Pulmonary Medicine and Critical Care Consult    Patient - Erin Hill   MRN -  [id number]   [id number]   - 1960      Date of Admission -  2024  2:02 PM  Date of evaluation -  2024  Room - -   Hospital Day - 3  Consulting - Crescencio Skinner DO Primary Care Physician - Rehana Patino MD     Reason for Consult      COPD high risk PCI planned  Assessment/recommendation   Acute hypoxic respiratory insufficiency  Oxygen by nasal cannula  Incentive spirometry every hour lower  ABG    COPD/emphysema/smoker  Albuterol  Symbicort  Spiriva   Smoking cessation advised  PFT outpatient    Lungs CA in remission 2 to 3 years/lung nodule  Hematology oncology on consult  Follow-up CT/PET outpatient within 3 months    Non-STEMI/CAD/new onset systolic heart failure  Heparin drip/plan for high risk PCI for cardiology  Cardiothoracic surgery on consult    discussed with RN  Peptic ulcer disease prophylaxis  DVT prophylaxis  Problem List      Patient Active Problem List   Diagnosis    Renal calculi    Diverticulosis    Prolapsed uterus    Hyperlipidemia    Depression    Hemorrhoids, internal    Renal cyst    Centrilobular emphysema (HCC)    Knee pain, right    Lumbar degenerative disc disease    Other affections of shoulder region, not elsewhere classified    Degeneration of cervical intervertebral disc    Spinal stenosis in cervical region    Carpal tunnel syndrome    Shoulder impingement    Fibromyalgia    Pulmonary nodule    Chronic obstructive pulmonary disease (HCC)    Smoking    Primary hypertension    History of diverticulitis    Chronic midline low back pain with bilateral sciatica    DDD (degenerative disc disease), cervical    Family history of breast cancer    Elevated CEA of 6.6 and OVA-1 of 4.5    Tetrahydrocannabinol (THC) use disorder, mild, abuse    Meningioma (HCC)    Hyperplastic colon polyp    Osteopenia of multiple sites    Mixed simple and mucopurulent chronic bronchitis  COLONOSCOPY WITH BIOPSY performed by Kevin Lares MD at [facility] ENDO    EAR SURGERY Left     cleaned out infection    ENDOSCOPY, COLON, DIAGNOSTIC      ORBITAL FRACTURE SURGERY Left     had pin inserted on temple side    AR COLON CA SCRN NOT HI RSK IND N/A 4/5/2017    COLONOSCOPY performed by Kevin Lares MD at [facility] OR    AR COLONOSCOPY FLX DX W/COLLJ SPEC WHEN PFRMD N/A 4/24/2018    COLONOSCOPY performed by Kevin Lares MD at [facility] OR    AR COLSC FLX W/REMOVAL LESION BY HOT BX FORCEPS N/A 11/15/2017    COLONOSCOPY POLYPECTOMY SNARE and saline injection performed by Kevin Lares MD at [facility] OR    AR EGD TRANSORAL BIOPSY SINGLE/MULTIPLE N/A 10/6/2017    EGD BIOPSY performed by Serafin Carter MD at [facility] OR    AR EGD TRANSORAL BIOPSY SINGLE/MULTIPLE N/A 2/27/2018    EGD ESOPHAGOGASTRODUODENOSCOPY performed by Kvein Lares MD at [facility] OR    AR ESOPHAGOGASTRODUODENOSCOPY TRANSORAL DIAGNOSTIC N/A 4/24/2018    EGD ESOPHAGOGASTRODUODENOSCOPY performed by Kevin Lares MD at [facility] OR    TONSILLECTOMY      TUBAL LIGATION      TUNNELED VENOUS PORT PLACEMENT Left     UPPER GASTROINTESTINAL ENDOSCOPY  10/06/2017    Dr Carter--large diverticulum gastric fundus, pathology inactive gastritis    UPPER GASTROINTESTINAL ENDOSCOPY  02/27/2018    retained food in the fundus of the stomach, poor peristalsis wide-open pylorus    UPPER GASTROINTESTINAL ENDOSCOPY  04/24/2018    no lesions seen that can account her CEA levels.    UPPER GASTROINTESTINAL ENDOSCOPY N/A 3/10/2021    EGD BIOPSY AND PHOTOS performed by Kevin Lares MD at [facility] ENDO       Meds    Current Medications    guaiFENesin  600 mg Oral BID    potassium chloride  10 mEq Oral Daily    sodium chloride flush  5-40 mL IntraVENous 2 times per day    [Held by provider] amLODIPine  2.5 mg Oral Nightly    [Held by provider] clopidogrel  75 mg Oral Daily    [Held by provider] sacubitril-valsartan  1 tablet Oral BID    [Held by

## 2024-05-02 NOTE — PROGRESS NOTES
[facility]  Progress Note                   Date:   5/2/2024  Patient name: Erin Hill  Date of admission:  4/29/2024  2:02 PM  MRN:   [id number]  YOB: 1960  PCP: Rehana Patino MD    Reason for Admission: NSTEMI (non-ST elevated myocardial infarction) (HCC) [I21.4]  Chest pain, unspecified type [R07.9]    Subjective:       Clinical Changes /Abnormalities: Pt seen and examined in the room. Patient resting in bed.  Pt denies any CP or worsening sob.  Labs, vitals and tele reviewed- SR.    Medications:   Scheduled Meds:   guaiFENesin  600 mg Oral BID    potassium chloride  10 mEq Oral Daily    sodium chloride flush  5-40 mL IntraVENous 2 times per day    [Held by provider] amLODIPine  2.5 mg Oral Nightly    [Held by provider] clopidogrel  75 mg Oral Daily    [Held by provider] sacubitril-valsartan  1 tablet Oral BID    [Held by provider] metoprolol succinate  12.5 mg Oral Daily    ezetimibe  10 mg Oral Daily    citalopram  20 mg Oral QAM    pantoprazole  20 mg Oral Daily    sodium chloride flush  5-40 mL IntraVENous 2 times per day    atorvastatin  40 mg Oral Nightly    aspirin  81 mg Oral Daily    budesonide-formoterol  2 puff Inhalation BID RT    tiotropium  2 puff Inhalation Daily RT    albuterol sulfate HFA  2 puff Inhalation BID RT     Continuous Infusions:   sodium chloride 75 mL/hr at 05/02/24 0606    sodium chloride      norepinephrine 2 mcg/min (05/02/24 0804)    sodium chloride      heparin (PORCINE) Infusion 20 Units/kg/hr (05/02/24 0606)    nitroGLYCERIN Stopped (04/30/24 0710)     CBC:   Recent Labs     04/29/24  1410 04/30/24  0632 05/01/24  0627   WBC 7.6 7.4 13.4*   HGB 15.5* 14.9 14.1    289 283       BMP:    Recent Labs     04/29/24  1410 05/01/24  0627 05/02/24  0336    139  --    K 3.7 3.4* 3.6*    109*  --    CO2 23 22  --    BUN 12 15  --    CREATININE 0.7 0.7  --    GLUCOSE 182* 109*  --        Hepatic:  Recent Labs     04/29/24  1410

## 2024-05-02 NOTE — PLAN OF CARE
Problem: Discharge Planning  Goal: Discharge to home or other facility with appropriate resources  Outcome: Progressing     Problem: Safety - Adult  Goal: Free from fall injury  Outcome: Progressing     Problem: ABCDS Injury Assessment  Goal: Absence of physical injury  Outcome: Progressing     Problem: Skin/Tissue Integrity  Goal: Absence of new skin breakdown  Description: 1.  Monitor for areas of redness and/or skin breakdown  2.  Assess vascular access sites hourly  3.  Every 4-6 hours minimum:  Change oxygen saturation probe site  4.  Every 4-6 hours:  If on nasal continuous positive airway pressure, respiratory therapy assess nares and determine need for appliance change or resting period.  Outcome: Progressing     Problem: Hematologic - Adult  Goal: Maintains hematologic stability  Outcome: Progressing     Problem: Pain  Goal: Verbalizes/displays adequate comfort level or baseline comfort level  Outcome: Progressing

## 2024-05-03 ENCOUNTER — APPOINTMENT (OUTPATIENT)
Dept: GENERAL RADIOLOGY | Age: 64
DRG: 321 | End: 2024-05-03
Payer: COMMERCIAL

## 2024-05-03 PROBLEM — Z51.5 PALLIATIVE CARE ENCOUNTER: Status: ACTIVE | Noted: 2024-05-03

## 2024-05-03 PROBLEM — Z71.89 ACP (ADVANCE CARE PLANNING): Status: ACTIVE | Noted: 2024-05-03

## 2024-05-03 LAB
ALBUMIN SERPL-MCNC: 3.3 G/DL (ref 3.5–5.2)
ALP SERPL-CCNC: 54 U/L (ref 35–104)
ALT SERPL-CCNC: 22 U/L (ref 5–33)
ANION GAP SERPL CALCULATED.3IONS-SCNC: 15 MMOL/L (ref 9–17)
ANION GAP SERPL CALCULATED.3IONS-SCNC: 6 MMOL/L (ref 9–17)
ANTI-XA UNFRAC HEPARIN: 0.38 IU/L (ref 0.3–0.7)
AST SERPL-CCNC: 28 U/L
BILIRUB SERPL-MCNC: 0.8 MG/DL (ref 0.3–1.2)
BNP SERPL-MCNC: ABNORMAL PG/ML
BUN SERPL-MCNC: 10 MG/DL (ref 8–23)
BUN SERPL-MCNC: 9 MG/DL (ref 8–23)
BUN/CREAT SERPL: 13 (ref 9–20)
BUN/CREAT SERPL: 14 (ref 9–20)
CA-I BLD-SCNC: 1.17 MMOL/L (ref 1.13–1.33)
CALCIUM SERPL-MCNC: 7.7 MG/DL (ref 8.6–10.4)
CALCIUM SERPL-MCNC: 8 MG/DL (ref 8.6–10.4)
CHLORIDE SERPL-SCNC: 104 MMOL/L (ref 98–107)
CHLORIDE SERPL-SCNC: 110 MMOL/L (ref 98–107)
CO2 SERPL-SCNC: 17 MMOL/L (ref 20–31)
CO2 SERPL-SCNC: 24 MMOL/L (ref 20–31)
CREAT SERPL-MCNC: 0.7 MG/DL (ref 0.5–0.9)
CREAT SERPL-MCNC: 0.7 MG/DL (ref 0.5–0.9)
ECHO BSA: 1.41 M2
EKG ATRIAL RATE: 133 BPM
EKG ATRIAL RATE: 93 BPM
EKG P AXIS: 82 DEGREES
EKG P AXIS: 83 DEGREES
EKG P-R INTERVAL: 130 MS
EKG P-R INTERVAL: 132 MS
EKG Q-T INTERVAL: 200 MS
EKG Q-T INTERVAL: 304 MS
EKG Q-T INTERVAL: 410 MS
EKG QRS DURATION: 66 MS
EKG QRS DURATION: 66 MS
EKG QRS DURATION: 74 MS
EKG QTC CALCULATION (BAZETT): 366 MS
EKG QTC CALCULATION (BAZETT): 452 MS
EKG QTC CALCULATION (BAZETT): 509 MS
EKG R AXIS: 63 DEGREES
EKG R AXIS: 64 DEGREES
EKG R AXIS: 64 DEGREES
EKG T AXIS: 126 DEGREES
EKG T AXIS: 79 DEGREES
EKG T AXIS: 93 DEGREES
EKG VENTRICULAR RATE: 133 BPM
EKG VENTRICULAR RATE: 201 BPM
EKG VENTRICULAR RATE: 93 BPM
ERYTHROCYTE [DISTWIDTH] IN BLOOD BY AUTOMATED COUNT: 14.2 % (ref 11.8–14.4)
GFR, ESTIMATED: >90 ML/MIN/1.73M2
GFR, ESTIMATED: >90 ML/MIN/1.73M2
GLUCOSE BLD-MCNC: 109 MG/DL (ref 65–105)
GLUCOSE SERPL-MCNC: 144 MG/DL (ref 70–99)
GLUCOSE SERPL-MCNC: 233 MG/DL (ref 70–99)
HCT VFR BLD AUTO: 36.9 % (ref 36.3–47.1)
HGB BLD-MCNC: 12.1 G/DL (ref 11.9–15.1)
MAGNESIUM SERPL-MCNC: 2.3 MG/DL (ref 1.6–2.6)
MCH RBC QN AUTO: 32.4 PG (ref 25.2–33.5)
MCHC RBC AUTO-ENTMCNC: 32.8 G/DL (ref 28.4–34.8)
MCV RBC AUTO: 98.7 FL (ref 82.6–102.9)
NRBC BLD-RTO: 0 PER 100 WBC
PLATELET # BLD AUTO: 176 K/UL (ref 138–453)
PMV BLD AUTO: 10.6 FL (ref 8.1–13.5)
POTASSIUM SERPL-SCNC: 4.1 MMOL/L (ref 3.7–5.3)
POTASSIUM SERPL-SCNC: 4.2 MMOL/L (ref 3.7–5.3)
PROT SERPL-MCNC: 5.3 G/DL (ref 6.4–8.3)
RBC # BLD AUTO: 3.74 M/UL (ref 3.95–5.11)
SARS-COV-2 RDRP RESP QL NAA+PROBE: NOT DETECTED
SODIUM SERPL-SCNC: 136 MMOL/L (ref 135–144)
SODIUM SERPL-SCNC: 140 MMOL/L (ref 135–144)
SPECIMEN DESCRIPTION: NORMAL
TROPONIN I SERPL HS-MCNC: 172 NG/L (ref 0–14)
TSH SERPL DL<=0.05 MIU/L-ACNC: 2.22 UIU/ML (ref 0.3–5)
WBC OTHER # BLD: 9 K/UL (ref 3.5–11.3)

## 2024-05-03 PROCEDURE — 84484 ASSAY OF TROPONIN QUANT: CPT

## 2024-05-03 PROCEDURE — 85520 HEPARIN ASSAY: CPT

## 2024-05-03 PROCEDURE — B2111ZZ FLUOROSCOPY OF MULTIPLE CORONARY ARTERIES USING LOW OSMOLAR CONTRAST: ICD-10-PCS | Performed by: INTERNAL MEDICINE

## 2024-05-03 PROCEDURE — 36415 COLL VENOUS BLD VENIPUNCTURE: CPT

## 2024-05-03 PROCEDURE — 99232 SBSQ HOSP IP/OBS MODERATE 35: CPT | Performed by: INTERNAL MEDICINE

## 2024-05-03 PROCEDURE — 2700000000 HC OXYGEN THERAPY PER DAY

## 2024-05-03 PROCEDURE — 6360000002 HC RX W HCPCS: Performed by: INTERNAL MEDICINE

## 2024-05-03 PROCEDURE — 2000000000 HC ICU R&B

## 2024-05-03 PROCEDURE — 87635 SARS-COV-2 COVID-19 AMP PRB: CPT

## 2024-05-03 PROCEDURE — C1769 GUIDE WIRE: HCPCS | Performed by: INTERNAL MEDICINE

## 2024-05-03 PROCEDURE — 93005 ELECTROCARDIOGRAM TRACING: CPT | Performed by: INTERNAL MEDICINE

## 2024-05-03 PROCEDURE — C1874 STENT, COATED/COV W/DEL SYS: HCPCS | Performed by: INTERNAL MEDICINE

## 2024-05-03 PROCEDURE — 2580000003 HC RX 258: Performed by: INTERNAL MEDICINE

## 2024-05-03 PROCEDURE — 6370000000 HC RX 637 (ALT 250 FOR IP): Performed by: NURSE PRACTITIONER

## 2024-05-03 PROCEDURE — 93005 ELECTROCARDIOGRAM TRACING: CPT | Performed by: NURSE PRACTITIONER

## 2024-05-03 PROCEDURE — 85027 COMPLETE CBC AUTOMATED: CPT

## 2024-05-03 PROCEDURE — 6370000000 HC RX 637 (ALT 250 FOR IP): Performed by: INTERNAL MEDICINE

## 2024-05-03 PROCEDURE — 2709999900 HC NON-CHARGEABLE SUPPLY: Performed by: INTERNAL MEDICINE

## 2024-05-03 PROCEDURE — 027137Z DILATION OF CORONARY ARTERY, TWO ARTERIES WITH FOUR OR MORE DRUG-ELUTING INTRALUMINAL DEVICES, PERCUTANEOUS APPROACH: ICD-10-PCS | Performed by: INTERNAL MEDICINE

## 2024-05-03 PROCEDURE — 82330 ASSAY OF CALCIUM: CPT

## 2024-05-03 PROCEDURE — 92929 PR PRQ TRLUML CORONARY STENT W/ANGIO ADDL ART/BRNCH: CPT | Performed by: INTERNAL MEDICINE

## 2024-05-03 PROCEDURE — 99223 1ST HOSP IP/OBS HIGH 75: CPT | Performed by: NURSE PRACTITIONER

## 2024-05-03 PROCEDURE — 2500000003 HC RX 250 WO HCPCS: Performed by: INTERNAL MEDICINE

## 2024-05-03 PROCEDURE — 99152 MOD SED SAME PHYS/QHP 5/>YRS: CPT | Performed by: INTERNAL MEDICINE

## 2024-05-03 PROCEDURE — 94761 N-INVAS EAR/PLS OXIMETRY MLT: CPT

## 2024-05-03 PROCEDURE — 6360000002 HC RX W HCPCS: Performed by: NURSE PRACTITIONER

## 2024-05-03 PROCEDURE — 2500000003 HC RX 250 WO HCPCS

## 2024-05-03 PROCEDURE — C1887 CATHETER, GUIDING: HCPCS | Performed by: INTERNAL MEDICINE

## 2024-05-03 PROCEDURE — 92928 PRQ TCAT PLMT NTRAC ST 1 LES: CPT | Performed by: INTERNAL MEDICINE

## 2024-05-03 PROCEDURE — 71045 X-RAY EXAM CHEST 1 VIEW: CPT

## 2024-05-03 PROCEDURE — 80048 BASIC METABOLIC PNL TOTAL CA: CPT

## 2024-05-03 PROCEDURE — 80053 COMPREHEN METABOLIC PANEL: CPT

## 2024-05-03 PROCEDURE — 6360000004 HC RX CONTRAST MEDICATION: Performed by: INTERNAL MEDICINE

## 2024-05-03 PROCEDURE — 82947 ASSAY GLUCOSE BLOOD QUANT: CPT

## 2024-05-03 PROCEDURE — C1725 CATH, TRANSLUMIN NON-LASER: HCPCS | Performed by: INTERNAL MEDICINE

## 2024-05-03 PROCEDURE — 83880 ASSAY OF NATRIURETIC PEPTIDE: CPT

## 2024-05-03 PROCEDURE — C1894 INTRO/SHEATH, NON-LASER: HCPCS | Performed by: INTERNAL MEDICINE

## 2024-05-03 PROCEDURE — C1760 CLOSURE DEV, VASC: HCPCS | Performed by: INTERNAL MEDICINE

## 2024-05-03 PROCEDURE — C9600 PERC DRUG-EL COR STENT SING: HCPCS | Performed by: INTERNAL MEDICINE

## 2024-05-03 PROCEDURE — 99153 MOD SED SAME PHYS/QHP EA: CPT | Performed by: INTERNAL MEDICINE

## 2024-05-03 PROCEDURE — 94640 AIRWAY INHALATION TREATMENT: CPT

## 2024-05-03 PROCEDURE — 83735 ASSAY OF MAGNESIUM: CPT

## 2024-05-03 PROCEDURE — 84443 ASSAY THYROID STIM HORMONE: CPT

## 2024-05-03 DEVICE — STENT RONYX25022UX RESOLUTE ONYX 2.50X22
Type: IMPLANTABLE DEVICE | Status: FUNCTIONAL
Brand: RESOLUTE ONYX™

## 2024-05-03 DEVICE — STENT ONYXNG22534UX ONYX 2.25X34RX
Type: IMPLANTABLE DEVICE | Status: FUNCTIONAL
Brand: ONYX FRONTIER™

## 2024-05-03 DEVICE — STENT ONYXNG22538UX ONYX 2.25X38RX
Type: IMPLANTABLE DEVICE | Status: FUNCTIONAL
Brand: ONYX FRONTIER™

## 2024-05-03 DEVICE — STENT ONYXNG30015UX ONYX 3.00X15RX
Type: IMPLANTABLE DEVICE | Status: FUNCTIONAL
Brand: ONYX FRONTIER™

## 2024-05-03 RX ORDER — METOPROLOL TARTRATE 1 MG/ML
5 INJECTION, SOLUTION INTRAVENOUS EVERY 5 MIN PRN
Status: DISCONTINUED | OUTPATIENT
Start: 2024-05-03 | End: 2024-05-05 | Stop reason: HOSPADM

## 2024-05-03 RX ORDER — METOPROLOL TARTRATE 1 MG/ML
INJECTION, SOLUTION INTRAVENOUS
Status: COMPLETED
Start: 2024-05-03 | End: 2024-05-03

## 2024-05-03 RX ORDER — SODIUM CHLORIDE 9 MG/ML
INJECTION, SOLUTION INTRAVENOUS PRN
Status: DISCONTINUED | OUTPATIENT
Start: 2024-05-03 | End: 2024-05-05 | Stop reason: HOSPADM

## 2024-05-03 RX ORDER — SODIUM CHLORIDE 0.9 % (FLUSH) 0.9 %
5-40 SYRINGE (ML) INJECTION EVERY 12 HOURS SCHEDULED
Status: DISCONTINUED | OUTPATIENT
Start: 2024-05-03 | End: 2024-05-05 | Stop reason: HOSPADM

## 2024-05-03 RX ORDER — NITROGLYCERIN 20 MG/100ML
INJECTION INTRAVENOUS PRN
Status: DISCONTINUED | OUTPATIENT
Start: 2024-05-03 | End: 2024-05-03 | Stop reason: HOSPADM

## 2024-05-03 RX ORDER — ASPIRIN 81 MG/1
81 TABLET ORAL DAILY
Status: DISCONTINUED | OUTPATIENT
Start: 2024-05-04 | End: 2024-05-05 | Stop reason: HOSPADM

## 2024-05-03 RX ORDER — BUDESONIDE 0.5 MG/2ML
0.5 INHALANT ORAL
Status: DISCONTINUED | OUTPATIENT
Start: 2024-05-03 | End: 2024-05-05 | Stop reason: HOSPADM

## 2024-05-03 RX ORDER — AMIODARONE HYDROCHLORIDE 150 MG/3ML
300 INJECTION, SOLUTION INTRAVENOUS ONCE
Status: COMPLETED | OUTPATIENT
Start: 2024-05-03 | End: 2024-05-03

## 2024-05-03 RX ORDER — MAGNESIUM SULFATE IN WATER 40 MG/ML
2000 INJECTION, SOLUTION INTRAVENOUS ONCE
Status: DISCONTINUED | OUTPATIENT
Start: 2024-05-03 | End: 2024-05-05 | Stop reason: HOSPADM

## 2024-05-03 RX ORDER — FENTANYL CITRATE 0.05 MG/ML
INJECTION, SOLUTION INTRAMUSCULAR; INTRAVENOUS
Status: DISCONTINUED
Start: 2024-05-03 | End: 2024-05-03 | Stop reason: WASHOUT

## 2024-05-03 RX ORDER — FENTANYL CITRATE 50 UG/ML
INJECTION, SOLUTION INTRAMUSCULAR; INTRAVENOUS PRN
Status: DISCONTINUED | OUTPATIENT
Start: 2024-05-03 | End: 2024-05-03 | Stop reason: HOSPADM

## 2024-05-03 RX ORDER — 0.9 % SODIUM CHLORIDE 0.9 %
INTRAVENOUS SOLUTION INTRAVENOUS CONTINUOUS PRN
Status: COMPLETED | OUTPATIENT
Start: 2024-05-03 | End: 2024-05-03

## 2024-05-03 RX ORDER — BIVALIRUDIN 250 MG/5ML
INJECTION, POWDER, LYOPHILIZED, FOR SOLUTION INTRAVENOUS PRN
Status: DISCONTINUED | OUTPATIENT
Start: 2024-05-03 | End: 2024-05-03 | Stop reason: HOSPADM

## 2024-05-03 RX ORDER — SODIUM CHLORIDE 0.9 % (FLUSH) 0.9 %
5-40 SYRINGE (ML) INJECTION PRN
Status: DISCONTINUED | OUTPATIENT
Start: 2024-05-03 | End: 2024-05-05 | Stop reason: HOSPADM

## 2024-05-03 RX ORDER — LEVALBUTEROL 1.25 MG/.5ML
1.25 SOLUTION, CONCENTRATE RESPIRATORY (INHALATION) 4 TIMES DAILY
Status: DISCONTINUED | OUTPATIENT
Start: 2024-05-03 | End: 2024-05-05 | Stop reason: HOSPADM

## 2024-05-03 RX ORDER — METOPROLOL TARTRATE 1 MG/ML
5 INJECTION, SOLUTION INTRAVENOUS EVERY 6 HOURS
Status: DISCONTINUED | OUTPATIENT
Start: 2024-05-03 | End: 2024-05-04

## 2024-05-03 RX ORDER — LIDOCAINE HYDROCHLORIDE 10 MG/ML
INJECTION, SOLUTION INFILTRATION; PERINEURAL PRN
Status: DISCONTINUED | OUTPATIENT
Start: 2024-05-03 | End: 2024-05-03 | Stop reason: HOSPADM

## 2024-05-03 RX ORDER — LEVALBUTEROL 1.25 MG/.5ML
1.25 SOLUTION, CONCENTRATE RESPIRATORY (INHALATION) EVERY 4 HOURS PRN
Status: DISCONTINUED | OUTPATIENT
Start: 2024-05-03 | End: 2024-05-05 | Stop reason: HOSPADM

## 2024-05-03 RX ORDER — LORAZEPAM 2 MG/ML
1 INJECTION INTRAMUSCULAR
Status: ACTIVE | OUTPATIENT
Start: 2024-05-03 | End: 2024-05-04

## 2024-05-03 RX ORDER — ASPIRIN 81 MG/1
TABLET ORAL PRN
Status: DISCONTINUED | OUTPATIENT
Start: 2024-05-03 | End: 2024-05-03 | Stop reason: HOSPADM

## 2024-05-03 RX ORDER — MIDAZOLAM HYDROCHLORIDE 1 MG/ML
INJECTION INTRAMUSCULAR; INTRAVENOUS PRN
Status: DISCONTINUED | OUTPATIENT
Start: 2024-05-03 | End: 2024-05-03 | Stop reason: HOSPADM

## 2024-05-03 RX ORDER — METOPROLOL TARTRATE 1 MG/ML
INJECTION, SOLUTION INTRAVENOUS
Status: COMPLETED | OUTPATIENT
Start: 2024-05-03 | End: 2024-05-03

## 2024-05-03 RX ORDER — FUROSEMIDE 10 MG/ML
20 INJECTION INTRAMUSCULAR; INTRAVENOUS ONCE
Status: COMPLETED | OUTPATIENT
Start: 2024-05-03 | End: 2024-05-03

## 2024-05-03 RX ORDER — MAGNESIUM SULFATE 1 G/100ML
INJECTION INTRAVENOUS CONTINUOUS PRN
Status: COMPLETED | OUTPATIENT
Start: 2024-05-03 | End: 2024-05-03

## 2024-05-03 RX ORDER — SODIUM CHLORIDE 9 MG/ML
INJECTION, SOLUTION INTRAVENOUS CONTINUOUS
Status: DISCONTINUED | OUTPATIENT
Start: 2024-05-03 | End: 2024-05-03

## 2024-05-03 RX ORDER — ACETAMINOPHEN 325 MG/1
650 TABLET ORAL EVERY 4 HOURS PRN
Status: DISCONTINUED | OUTPATIENT
Start: 2024-05-03 | End: 2024-05-05 | Stop reason: HOSPADM

## 2024-05-03 RX ORDER — ONDANSETRON 2 MG/ML
4 INJECTION INTRAMUSCULAR; INTRAVENOUS EVERY 6 HOURS PRN
Status: DISCONTINUED | OUTPATIENT
Start: 2024-05-03 | End: 2024-05-03 | Stop reason: SDUPTHER

## 2024-05-03 RX ADMIN — ONDANSETRON 4 MG: 2 INJECTION INTRAMUSCULAR; INTRAVENOUS at 02:23

## 2024-05-03 RX ADMIN — AMIODARONE HYDROCHLORIDE 150 MG: 50 INJECTION, SOLUTION INTRAVENOUS at 02:19

## 2024-05-03 RX ADMIN — ONDANSETRON 4 MG: 2 INJECTION INTRAMUSCULAR; INTRAVENOUS at 16:48

## 2024-05-03 RX ADMIN — SODIUM CHLORIDE: 9 INJECTION, SOLUTION INTRAVENOUS at 09:40

## 2024-05-03 RX ADMIN — PANTOPRAZOLE SODIUM 20 MG: 20 TABLET, DELAYED RELEASE ORAL at 09:52

## 2024-05-03 RX ADMIN — TIOTROPIUM BROMIDE INHALATION SPRAY 2 PUFF: 3.12 SPRAY, METERED RESPIRATORY (INHALATION) at 10:36

## 2024-05-03 RX ADMIN — METOPROLOL TARTRATE 5 MG: 5 INJECTION INTRAVENOUS at 15:20

## 2024-05-03 RX ADMIN — SODIUM CHLORIDE, PRESERVATIVE FREE 10 ML: 5 INJECTION INTRAVENOUS at 20:30

## 2024-05-03 RX ADMIN — ALBUTEROL SULFATE 2 PUFF: 108 AEROSOL, METERED RESPIRATORY (INHALATION) at 10:36

## 2024-05-03 RX ADMIN — Medication 0.4 MG: at 01:43

## 2024-05-03 RX ADMIN — Medication 0.4 MG: at 01:54

## 2024-05-03 RX ADMIN — METOPROLOL TARTRATE 5 MG: 5 INJECTION INTRAVENOUS at 02:19

## 2024-05-03 RX ADMIN — CITALOPRAM HYDROBROMIDE 20 MG: 20 TABLET ORAL at 09:52

## 2024-05-03 RX ADMIN — EZETIMIBE 10 MG: 10 TABLET ORAL at 09:52

## 2024-05-03 RX ADMIN — MAGNESIUM SULFATE HEPTAHYDRATE 1000 MG: 1 INJECTION, SOLUTION INTRAVENOUS at 02:22

## 2024-05-03 RX ADMIN — AMIODARONE HYDROCHLORIDE 1 MG/MIN: 50 INJECTION, SOLUTION INTRAVENOUS at 02:52

## 2024-05-03 RX ADMIN — METOPROLOL TARTRATE 5 MG: 5 INJECTION INTRAVENOUS at 02:45

## 2024-05-03 RX ADMIN — BUDESONIDE INHALATION 500 MCG: 0.5 SUSPENSION RESPIRATORY (INHALATION) at 17:51

## 2024-05-03 RX ADMIN — WATER 40 MG: 1 INJECTION INTRAMUSCULAR; INTRAVENOUS; SUBCUTANEOUS at 16:32

## 2024-05-03 RX ADMIN — GUAIFENESIN 600 MG: 600 TABLET ORAL at 20:30

## 2024-05-03 RX ADMIN — BUDESONIDE AND FORMOTEROL FUMARATE DIHYDRATE 2 PUFF: 160; 4.5 AEROSOL RESPIRATORY (INHALATION) at 10:36

## 2024-05-03 RX ADMIN — MAGNESIUM SULFATE HEPTAHYDRATE 1000 MG: 1 INJECTION, SOLUTION INTRAVENOUS at 03:25

## 2024-05-03 RX ADMIN — TICAGRELOR 90 MG: 90 TABLET ORAL at 20:30

## 2024-05-03 RX ADMIN — ATORVASTATIN CALCIUM 40 MG: 40 TABLET, FILM COATED ORAL at 20:30

## 2024-05-03 RX ADMIN — FUROSEMIDE 20 MG: 10 INJECTION, SOLUTION INTRAMUSCULAR; INTRAVENOUS at 16:32

## 2024-05-03 RX ADMIN — LEVALBUTEROL 1.25 MG: 1.25 SOLUTION, CONCENTRATE RESPIRATORY (INHALATION) at 17:52

## 2024-05-03 RX ADMIN — POTASSIUM CHLORIDE 10 MEQ: 10 CAPSULE, COATED, EXTENDED RELEASE ORAL at 09:53

## 2024-05-03 RX ADMIN — METOPROLOL TARTRATE 5 MG: 5 INJECTION INTRAVENOUS at 09:53

## 2024-05-03 RX ADMIN — LIDOCAINE HYDROCHLORIDE: 20 SOLUTION ORAL at 22:26

## 2024-05-03 RX ADMIN — MAGNESIUM SULFATE HEPTAHYDRATE 1000 MG: 1 INJECTION, SOLUTION INTRAVENOUS at 02:18

## 2024-05-03 RX ADMIN — METOPROLOL TARTRATE 5 MG: 5 INJECTION INTRAVENOUS at 20:30

## 2024-05-03 RX ADMIN — METOPROLOL TARTRATE 5 MG: 5 INJECTION INTRAVENOUS at 02:13

## 2024-05-03 RX ADMIN — AMIODARONE HYDROCHLORIDE 0.5 MG/MIN: 50 INJECTION, SOLUTION INTRAVENOUS at 09:56

## 2024-05-03 ASSESSMENT — PAIN SCALES - GENERAL: PAINLEVEL_OUTOF10: 0

## 2024-05-03 NOTE — PLAN OF CARE
Problem: Discharge Planning  Goal: Discharge to home or other facility with appropriate resources  5/3/2024 0828 by Manda Ruiz RN  Outcome: Progressing  5/3/2024 0552 by Lidia Jurado RN  Outcome: Progressing  Flowsheets  Taken 5/3/2024 0552  Discharge to home or other facility with appropriate resources:   Identify barriers to discharge with patient and caregiver   Identify discharge learning needs (meds, wound care, etc)   Refer to discharge planning if patient needs post-hospital services based on physician order or complex needs related to functional status, cognitive ability or social support system   Arrange for needed discharge resources and transportation as appropriate   Arrange for interpreters to assist at discharge as needed  Taken 5/2/2024 1935  Discharge to home or other facility with appropriate resources:   Identify barriers to discharge with patient and caregiver   Arrange for needed discharge resources and transportation as appropriate   Identify discharge learning needs (meds, wound care, etc)   Refer to discharge planning if patient needs post-hospital services based on physician order or complex needs related to functional status, cognitive ability or social support system  Note: Ongoing assessment. Cardiology for potential DC with return for planned cardiac stent placement.     Problem: Safety - Adult  Goal: Free from fall injury  5/3/2024 0828 by Manda Ruiz RN  Outcome: Progressing  5/3/2024 0552 by Lidia Jurado RN  Outcome: Progressing  Flowsheets (Taken 5/3/2024 0552)  Free From Fall Injury:   Instruct family/caregiver on patient safety   Based on caregiver fall risk screen, instruct family/caregiver to ask for assistance with transferring infant if caregiver noted to have fall risk factors  Note: Safety measures in place, no injury at this time.     Problem: ABCDS Injury Assessment  Goal: Absence of physical injury  5/3/2024 0828 by Manda Ruiz, MARCELINO  Outcome:

## 2024-05-03 NOTE — SIGNIFICANT EVENT
Rapid response called after patient reported worsening substernal cp, tele shows fluctuations of SVT and nonsustained VT . Nitro x2 with mild relief but her cath report shows 80 % in rca and lad. Metop pushes have temporarily helped but only with addition of amiodarone . Spoke with  overnight who has been watching closely. Case discussed. Empiric mg given but concern for more frequent and prolonged episodes indicating ongoing ischemia. Overnight interventional cardiologist paged over Perfect serve, consideration for pci overnight given concern for fatal arrhythmia development. Will continue to watch closely at bedside. All nursing staff has been at bedside working together in conjunction. Condition remains guarded

## 2024-05-03 NOTE — FLOWSHEET NOTE
05/03/24 0400   Vitals   Pulse 70   Heart Rate Source Monitor   Respirations 16   BP 96/77   MAP (Calculated) 83   MAP (mmHg) 84   Oxygen Therapy   SpO2 93 %     Patient is resting, denies c/o pain @ present. Lidia Jurado RN

## 2024-05-03 NOTE — PROGRESS NOTES
SPIRITUAL CARE DEPARTMENT - [facility]  PROGRESS NOTE    Room # 2029/2029-01   Name: Erin Hill               Reason for visit:  Pall Care    I visited the patient.    Admit Date & Time: 4/29/2024  2:02 PM    Assessment:  Erin Hill is a 64 y.o. female in the hospital do to NStemi. Writer was informed by RN that the patient was not available; down for a procedure.      Intervention:  Writer provided a silent prayer of continued healing, comfort, rest, and inner strength. Writer also left a prayer card as additional support.    Plan:  Chaplains will remain available to offer spiritual and emotional support as needed.    Electronically signed by Denise Boyce Jrlain, on 5/3/2024 at 8:47 AM.  Spiritual Care Department  [facility]     05/03/24 0842   Encounter Summary   Service Provided For Patient not available   Referral/Consult From Palliative Care   Last Encounter  05/03/24   Complexity of Encounter Low   Begin Time 0815   End Time  0820   Total Time Calculated 5 min   Palliative Care   Type Palliative Care, Initial/Spiritual Assessment   Assessment/Intervention/Outcome   Assessment Unable to assess  (not available do to procedure.)   Intervention Prayer (assurance of)/[place];Read/Provided Scripture   Plan and Referrals   Plan/Referrals Continue Support (comment);Continue to visit, (comment)

## 2024-05-03 NOTE — PROGRESS NOTES
[facility]  Office: [phone]  Roque Bonilla DO, Myke Bauer, DO, Rodger Giles DO, Crescencio Skinner, DO, Albania Degroot MD, Mary Mathis MD, Bill Patino MD, Gege Dyer MD,  Jevon Francis MD, Lex Chase MD, Braden Vann MD,  Varun Love DO, Sandi Frazier MD, Jaspal Rhodes MD, Artemio Bonilla DO, Cristiana Keenan MD,  Mayo Flores DO, Annabelle Soares MD, Gloria Salguero MD, Modesta West MD, Moody Islas MD,  Bj Lisa MD, Adriana Romero MD, Vannesa Gudino MD, Pritesh Nathan MD, Yosi Sands MD, Min Cottrell MD, Leon Jacobson DO, Eddie Charles DO, Marino Rolon MD,  Harpal Schultz MD, Shirley Waterhouse, CNP,  Odilia Shafer CNP, Lokesh Rodney, CNP,  Emilee Strickland, DNP, Eden Guzman, CNP, Nichole Callejas, CNP, Desirae Hein, CNP, Savana Velásquez, CNP, Rani Mcclure, PA-C, Arlene Villalta, PA-C, Paola Singer, CNP, Jessica Cervantes, CNP, Kuldeep Rodrigues, CNP, Vandana Mac, CNP, Natalya Barnes, CNP, Chastity Cantor, CNS, Maryanne Crystal, CNP, Karen Barron CNP, Tracy Schwab, CNP         [facility]   IN-PATIENT SERVICE   [facility]    Progress Note    5/3/2024    12:49 PM    Name:   Erin Hill  MRN:     [id number]     Acct:      [id number]   Room:   2029/2029-01  IP Day:  4  Admit Date:  4/29/2024  2:02 PM    PCP:   Rehana Patino MD  Code Status:  DNR-CCA    Subjective:     C/C:   Chief Complaint   Patient presents with    Chest Pain     Pt states that chest pain began this morning non radiating      Interval History Status: improved.     Had VT overnight  Seen post cath  Denies cp/n/v/abd pain  Intermittent sob    Brief History:     Per my TIERA:  \"Patient reports to the emergency department with an 18-hour history of midsternal chest pain. Patient has a known history of CAD as well as COPD. Patient continues to smoke. She advised that she was diagnosed approximately 1 year ago with coronary artery disease and had a nonstentable

## 2024-05-03 NOTE — CONSULTS
Palliative Care Inpatient Consult    NAME:  Erin Hill  MEDICAL RECORD NUMBER:  [id number]  AGE: 64 y.o.   GENDER: female  : 1960  TODAY'S DATE:  5/3/2024    Reasons for Consultation:    Symptom and/or pain management  Provision of information regarding PC and/or hospice philosophies  Complex, time-intensive communication and interdisciplinary psychosocial support  Clarification of goals of care and/or assistance with difficult decision-making  Guidance in regards to resources and transition(s)    Members of PC team contributing to this consultation are: MARY Wadsworth    Plan      Palliative Interaction:    I met with patient at the bedside this morning on rounds. After introductions, I explained my role with palliative care. Patient had just returned from cath lab where she received multiple stents.     Patient tells me that she lives with her boyfriend. Her daughter lives out of state. Patient has history of lung cancer and follows with [facility] oncology. She presented to the hospital with complaints of chest pain. Patient tells me that she is feeling okay after stent placement. She has chronic shortness of breath but states that it is stable. She is having back pain which is chronic since surgery several months ago.     Patient tells me that they saw a nodule on her CT scan but this has been stable since the previous CT scan. She is aware that she will need life vest at discharge.    I discussed healthcare power of  paper work with patient. She tells me that it is on the chart naming her daughter as primary decision maker. I did review chart and paper work present.     I did discuss code status with patient. She tells me that she does not want CPR under any circumstance. She is wanting intubation and tells me that her daughter will make the decision to remove ventilator if necessary. I did explain that there is no DNR paper work completed. I changed code status and DNR form

## 2024-05-03 NOTE — PROGRESS NOTES
Pulmonary Critical Care Progress Note    Patient seen for the follow up of NSTEMI (non-ST elevated myocardial infarction) (HCC)     Subjective:    She requires oxygen at liters nasal cannula.  She has occasional dry cough.  She feels short of breath.  She had shortness of breath yesterday with substernal chest pain.  She had SVT and nonsustained V. tach and was started on amiodarone drip per hospital after receiving Lopressor IV she had stent placement this morning.  She tolerated some oral intake has poor appetite.  She still have some chest pain    Examination:    Vitals: /78   Pulse 74   Temp 98.7 °F (37.1 °C) (Oral)   Resp 16   Ht 1.676 m (5' 6\")   Wt 44.5 kg (98 lb)   SpO2 99%   BMI 15.82 kg/m²   SpO2  Av %  Min: 89 %  Max: 100 %  General appearance: alert and cooperative with exam  Neck: No JVD  Lungs: Decreased breath sound mild wheeze  Heart: regular rate and rhythm, S1, S2 normal, no gallop  Abdomen: Soft, non tender, + BS  Extremities: no cyanosis or clubbing. No significant edema    LABs:    CBC:   Recent Labs     24  0627 24  0616   WBC 13.4* 9.0   HGB 14.1 12.1   HCT 43.9 36.9    176     BMP:   Recent Labs     24  0319 24  0616    140   K 4.1 4.2   CO2 17* 24   BUN 9 10   CREATININE 0.7 0.7   LABGLOM >90 >90   GLUCOSE 233* 144*     LIVER PROFILE:  Recent Labs     24  0616   AST 28   ALT 22       Radiology:  Chest x-ray /3  New right parahilar and basilar opacities, bilateral pleural effusions right  greater than left     Findings could represent multifocal pneumonia     Severe emphysematous changes persist     Linear opacity right upper lobe with nodular mass within it described on  recent chest CT, stable      Impression/recommendations:  Acute hypoxic respiratory insufficiency  Oxygen by nasal cannula  Incentive spirometry every hour lower    COPD exacerbation/emphysema/mild pulmonary/smoker  switch Albuterol to Xopenex given V.  tach  Discontinue Symbicort-start Pulmicort  Spiriva   Solu-Medrol 40 IV every 12 hours  Smoking cessation advised  PFT outpatient  Lasix 20 IV x 1/check BNP follow-up chest x-ray     Lungs CA in remission 2 to 3 years/lung nodule  Hematology oncology on consult  Follow-up CT/PET outpatient within 3 months     Non-STEMI/CAD status post 4 stent placement high risk PCI/new onset systolic heart failure/V. tach  Off heparin drip  Cardiothoracic surgery on consult  Amiodarone per cardiology    Discharge planning with ZOLL LifeVest  peptic ulcer disease prophylaxis  DVT prophylaxis    Elieser Jordan MD, MD, [facility]  Pulmonary Critical Care and Sleep Medicine,  [facility]  Cell: [phone]  Office: [phone]

## 2024-05-03 NOTE — ACP (ADVANCE CARE PLANNING)
Advance Care Planning      Palliative Medicine Provider (MD/NP)  Advance Care Planning (ACP) Conversation      Date of Conversation: 05/03/24  The patient and/or authorized decision maker consented to a voluntary Advance Care Planning conversation.   Individuals present for the conversation:   Patient with decision making capacity    Legal Healthcare Agent(s):    Primary Decision Maker: Carmen Paulino - Child - [phone]    ACP documents available in EMR prior to discussion:  -Power of  for Healthcare    Primary Palliative Diagnosis(es):  CAD  Lung cancer  COPD    Conversation Summary:  I met with patient this morning. Patient states she has power of  paper work completed in the chart. I did review chart and paper work present naming her daughter Carmen as primary POA. No alternate or secondary named.   Code status discussed - patient does not want CPR. Patient does wish for intubation if it were needed. If she were unable to come off the vent, her daughter would make the decision.   Code status changed and paper work completed. Patient signed DNR and provided her the paper work. Copy placed in patient's chart.     Resuscitation Status:    Code Status: DNR-CCA    Outcomes / Completed Documentation:  An explanation of advance directives and their importance was provided and the following forms completed:    -Portable DNR    If new document completed, original was provided to patient and/or family member.    Copy was placed for scanning into the [facility] EMR.      I spent 10 minutes providing separately identifiable ACP services with the patient and/or surrogate decision maker in a voluntary, in-person conversation discussing the patient's wishes and goals as detailed in the above note.       Renae Patel, APRN - CNP

## 2024-05-03 NOTE — FLOWSHEET NOTE
05/03/24 0151   Vitals   Pulse (!) 110   Heart Rate Source Monitor   Respirations 22   BP (!) 135/102   MAP (Calculated) 113   BP Location Right upper arm   BP Upper/Lower Upper   BP Method Automatic   Patient Position Semi fowlers   Oxygen Therapy   SpO2 91 %     States she is having chest pressure 8/10 which is radiating into the right neck. Secong NTG given. Lidia Jurado, RN

## 2024-05-03 NOTE — PROGRESS NOTES
Called to patient room for patient in V-tach and short of breath. O2 had been increased to 4L and SpO2 was 94%. Patient does not have any wheezing and good aeration throughout lung fields. Noted improvement in patient after nitro. RN awaiting response from Cardiology.

## 2024-05-03 NOTE — PROGRESS NOTES
End Of Shift Note  [facility]  Summary of shift: Patient had episode of SVT vs Afib RVR in the 200's, 12 Lead shows Afib RVR. NTG x2 doses for midsternal chest pain/pressure. Spoke with Dr Powers regarding POC & clinical condition, Dr Brown @ bedside for RRT call. Amio 150 mg IVP, metoprolol 5 mg IVP given per Dr Powers orders. Amio drip infusing, HR controlled, SR @ this time.     Vitals:    Vitals:    05/03/24 0400 05/03/24 0500 05/03/24 0539 05/03/24 0600   BP: 96/77  (!) 88/69    Pulse: 70 77 71 71   Resp: 16      Temp: 97.3 °F (36.3 °C)      TempSrc: Temporal      SpO2: 93% 94% 95% 95%   Weight:       Height:            I&O:   Intake/Output Summary (Last 24 hours) at 5/3/2024 0641  Last data filed at 5/2/2024 0702  Gross per 24 hour   Intake 3.51 ml   Output --   Net 3.51 ml       Resp Status: ***    Ventilator Settings:     / / /     Critical Care IV infusions:   amiodarone (CORDARONE) 450 mg in dextrose 5 % 250 mL infusion 1 mg/min (05/03/24 0252)    sodium chloride 75 mL/hr at 05/02/24 2055    sodium chloride      sodium chloride      heparin (PORCINE) Infusion 20 Units/kg/hr (05/02/24 2052)    nitroGLYCERIN Stopped (04/30/24 0710)        LDA:   Implantable Port Left Subclavian (Active)   Number of days:        Peripheral IV 04/30/24 Left;Dorsal Basilic (Active)   Number of days: 2       Peripheral IV 05/03/24 Distal;Posterior;Right Forearm (Active)   Number of days: 0       External Urinary Catheter (Active)   Number of days: 4

## 2024-05-03 NOTE — FLOWSHEET NOTE
05/03/24 0145   Vitals   Pulse (!) 108   Heart Rate Source Monitor   BP (!) 138/106   MAP (Calculated) 117   MAP (mmHg) 117   Oxygen Therapy   SpO2 94 %     States she feels somewhat better after first dose of NTG. Is breathing better, using her phone at this time to face time. Cardiology paged. Lidia Jurado RN

## 2024-05-03 NOTE — RT PROTOCOL NOTE
RT Inhaler-Nebulizer Bronchodilator Protocol Note    There is a bronchodilator order in the chart from a provider indicating to follow the RT Bronchodilator Protocol and there is an “Initiate RT Inhaler-Nebulizer Bronchodilator Protocol” order as well (see protocol at bottom of note).    CXR Findings:  No results found.    The findings from the last RT Protocol Assessment were as follows:   History Pulmonary Disease: Chronic pulmonary disease  Respiratory Pattern: Dyspnea on exertion or RR 21-25 bpm  Breath Sounds: Slightly diminished and/or crackles  Cough: Strong, spontaneous, non-productive  Indication for Bronchodilator Therapy: Decreased or absent breath sounds  Bronchodilator Assessment Score: 6    Aerosolized bronchodilator medication orders have been revised according to the RT Inhaler-Nebulizer Bronchodilator Protocol below.    Respiratory Therapist to perform RT Therapy Protocol Assessment initially then follow the protocol.  Repeat RT Therapy Protocol Assessment PRN for score 0-3 or on second treatment, BID, and PRN for scores above 3.    No Indications - adjust the frequency to every 6 hours PRN wheezing or bronchospasm, if no treatments needed after 48 hours then discontinue using Per Protocol order mode.     If indication present, adjust the RT bronchodilator orders based on the Bronchodilator Assessment Score as indicated below.  Use Inhaler orders unless patient has one or more of the following: on home nebulizer, not able to hold breath for 10 seconds, is not alert and oriented, cannot activate and use MDI correctly, or respiratory rate 25 breaths per minute or more, then use the equivalent nebulizer order(s) with same Frequency and PRN reasons based on the score.  If a patient is on this medication at home then do not decrease Frequency below that used at home.    0-3 - enter or revise RT bronchodilator order(s) to equivalent RT Bronchodilator order with Frequency of every 4 hours PRN for wheezing  or increased work of breathing using Per Protocol order mode.        4-6 - enter or revise RT Bronchodilator order(s) to two equivalent RT bronchodilator orders with one order with BID Frequency and one order with Frequency of every 4 hours PRN wheezing or increased work of breathing using Per Protocol order mode.        7-10 - enter or revise RT Bronchodilator order(s) to two equivalent RT bronchodilator orders with one order with TID Frequency and one order with Frequency of every 4 hours PRN wheezing or increased work of breathing using Per Protocol order mode.       11-13 - enter or revise RT Bronchodilator order(s) to one equivalent RT bronchodilator order with QID Frequency and an Albuterol order with Frequency of every 4 hours PRN wheezing or increased work of breathing using Per Protocol order mode.      Greater than 13 - enter or revise RT Bronchodilator order(s) to one equivalent RT bronchodilator order with every 4 hours Frequency and an Albuterol order with Frequency of every 2 hours PRN wheezing or increased work of breathing using Per Protocol order mode.     RT to enter RT Home Evaluation for COPD & MDI Assessment order using Per Protocol order mode.    Electronically signed by BIB LINDSAY RCP on 5/3/2024 at 10:44 AM

## 2024-05-03 NOTE — PROGRESS NOTES
Sister, Carmen, calls from [place] ([phone]) and is updated about patient's clinical condition. 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

## 2024-05-03 NOTE — PROGRESS NOTES
End Of Shift Note  [facility]  Summary of shift: Patient remains NSR, on Amio 0.5 mg/min, emerg cath today x4DES, loaded with brlinta and asa, CXR done, 20 iv lasix and iv solumedrol given, c/o nausea, zofran x1 given, coughing up thick/brown, c/o food getting caught in throat (since radiation), off bedrest, code status changed to DNR CCA, no CPR, but ok with intubation.    Vitals:    Vitals:    05/03/24 1600 05/03/24 1620 05/03/24 1700 05/03/24 1800   BP: (!) 111/99  108/74 113/88   Pulse: 71  69 71   Resp: 16  16 16   Temp:  97.8 °F (36.6 °C)     TempSrc:  Oral     SpO2: 95%  97% 100%   Weight:       Height:            I&O:   Intake/Output Summary (Last 24 hours) at 5/3/2024 1817  Last data filed at 5/3/2024 1812  Gross per 24 hour   Intake 3323.8 ml   Output 510 ml   Net 2813.8 ml       Resp Status: 2L NC, dyspnea at rest, diminished    Ventilator Settings:     / / /     Critical Care IV infusions:   amiodarone (CORDARONE) 450 mg in dextrose 5 % 250 mL infusion 0.5 mg/min (05/03/24 0956)    sodium chloride      nitroGLYCERIN Stopped (04/30/24 0710)        LDA:   Implantable Port Left Subclavian (Active)   Number of days:        Peripheral IV 04/30/24 Left;Dorsal Basilic (Active)   Number of days: 3       External Urinary Catheter (Active)   Number of days: 0

## 2024-05-03 NOTE — PLAN OF CARE
Patient currently off floor for PCI this AM due to significant event around 0200 - SVT/Nonsustained VT. Recent cardiac cath revealed MVD - PCI today with Dr. Patterson.     Discussed SVT/Nonsustained VT with Dr. Powers. Remains on Amiodarone gtt currently at 0.5mg/min. Dr. Powers will evaluate patient this afternoon/evening.     Echocardiogram reviewed. LVEF reduced 25-30%. Optimize GDMT. ICMP. Patient will need to continue medical management, med compliance, diet, exercise, and recommendations for Lifevest. Repeat limited echo in 90 days on OMT to reassess LVEF. Will order wearable defibrillator. NO ACE/ARB/ARNI at this time due to hypotension.     Electronically signed by EDDA Love CNP on 5/3/2024 at 9:20 AM    [facility]

## 2024-05-03 NOTE — PROGRESS NOTES
Patient arrived to unit from cath lab at this time.     Per Report:   Patient underwent left heart cath with Dr. Patterson.  Access via Right Femoral.   X4 KENDALL deployed in cath lab.   Pt received 1 of Versed & 37.5 of Fentanyl.     No complications noted to site at this time and distal pulses remain palpable.     Pt denies all pain and complaints at this time.     See flowsheets for further info.      Writer will continue to monitor.

## 2024-05-03 NOTE — PLAN OF CARE
Problem: Discharge Planning  Goal: Discharge to home or other facility with appropriate resources  Outcome: Progressing  Flowsheets (Taken 5/3/2024 0552)  Discharge to home or other facility with appropriate resources:   Identify barriers to discharge with patient and caregiver   Identify discharge learning needs (meds, wound care, etc)   Refer to discharge planning if patient needs post-hospital services based on physician order or complex needs related to functional status, cognitive ability or social support system   Arrange for needed discharge resources and transportation as appropriate   Arrange for interpreters to assist at discharge as needed  Note: Ongoing assessment. Cardiology for potential DC with return for planned cardiac stent placement.     Problem: Safety - Adult  Goal: Free from fall injury  Outcome: Progressing  Flowsheets (Taken 5/3/2024 0552)  Free From Fall Injury:   Instruct family/caregiver on patient safety   Based on caregiver fall risk screen, instruct family/caregiver to ask for assistance with transferring infant if caregiver noted to have fall risk factors  Note: Safety measures in place, no injury at this time.     Problem: ABCDS Injury Assessment  Goal: Absence of physical injury  Outcome: Progressing  Flowsheets (Taken 5/3/2024 0552)  Absence of Physical Injury: Implement safety measures based on patient assessment  Note: Safety measures in place, no injury at this time.     Problem: Respiratory - Adult  Goal: Able to breathe comfortably  5/2/2024 2000 by Cherelle Forde RCP  Outcome: Progressing  Goal: Patient's breath sounds will be clear and equal  5/2/2024 2000 by Cherelle Forde RCP  Outcome: Progressing     Problem: Skin/Tissue Integrity  Goal: Absence of new skin breakdown  Description: 1.  Monitor for areas of redness and/or skin breakdown  2.  Assess vascular access sites hourly  3.  Every 4-6 hours minimum:  Change oxygen saturation probe site  4.  Every 4-6 hours:

## 2024-05-03 NOTE — FLOWSHEET NOTE
05/03/24 0139   Vitals   Pulse (!) 145   Heart Rate Source Monitor   BP (!) 144/117   MAP (Calculated) 126   MAP (mmHg) 128   Oxygen Therapy   SpO2 92 %     States she cannot breathe, RT called to bedside, had a run of VT,  called to bedside. Patient is diaphoretic, air hungry, tachypneic, and tachycardic. Midsternal chest pressure. Lidia Jurado RN

## 2024-05-03 NOTE — CARE COORDINATION
Discharge planning    Emergent heart cath yesterday with 4 stents placed. Plan is home with [employer] life vest. Pt. Information faxed to makayla and Nick is working on approval. Pt. Will be fitted today or tomorrow.     1300 - Patient approved for life vest. Will be out to fit this afternoon.

## 2024-05-03 NOTE — RT PROTOCOL NOTE
RT Inhaler-Nebulizer Bronchodilator Protocol Note    There is a bronchodilator order in the chart from a provider indicating to follow the RT Bronchodilator Protocol and there is an “Initiate RT Inhaler-Nebulizer Bronchodilator Protocol” order as well (see protocol at bottom of note).    CXR Findings:  No results found.    The findings from the last RT Protocol Assessment were as follows:   History Pulmonary Disease: Chronic pulmonary disease  Respiratory Pattern: Dyspnea on exertion or RR 21-25 bpm  Breath Sounds: Slightly diminished and/or crackles  Cough: Strong, productive  Indication for Bronchodilator Therapy: Decreased or absent breath sounds  Bronchodilator Assessment Score: 7    Aerosolized bronchodilator medication orders have been revised according to the RT Inhaler-Nebulizer Bronchodilator Protocol below.    Respiratory Therapist to perform RT Therapy Protocol Assessment initially then follow the protocol.  Repeat RT Therapy Protocol Assessment PRN for score 0-3 or on second treatment, BID, and PRN for scores above 3.    No Indications - adjust the frequency to every 6 hours PRN wheezing or bronchospasm, if no treatments needed after 48 hours then discontinue using Per Protocol order mode.     If indication present, adjust the RT bronchodilator orders based on the Bronchodilator Assessment Score as indicated below.  Use Inhaler orders unless patient has one or more of the following: on home nebulizer, not able to hold breath for 10 seconds, is not alert and oriented, cannot activate and use MDI correctly, or respiratory rate 25 breaths per minute or more, then use the equivalent nebulizer order(s) with same Frequency and PRN reasons based on the score.  If a patient is on this medication at home then do not decrease Frequency below that used at home.    0-3 - enter or revise RT bronchodilator order(s) to equivalent RT Bronchodilator order with Frequency of every 4 hours PRN for wheezing or increased work  of breathing using Per Protocol order mode.        4-6 - enter or revise RT Bronchodilator order(s) to two equivalent RT bronchodilator orders with one order with BID Frequency and one order with Frequency of every 4 hours PRN wheezing or increased work of breathing using Per Protocol order mode.        7-10 - enter or revise RT Bronchodilator order(s) to two equivalent RT bronchodilator orders with one order with TID Frequency and one order with Frequency of every 4 hours PRN wheezing or increased work of breathing using Per Protocol order mode.       11-13 - enter or revise RT Bronchodilator order(s) to one equivalent RT bronchodilator order with QID Frequency and an Albuterol order with Frequency of every 4 hours PRN wheezing or increased work of breathing using Per Protocol order mode.      Greater than 13 - enter or revise RT Bronchodilator order(s) to one equivalent RT bronchodilator order with every 4 hours Frequency and an Albuterol order with Frequency of every 2 hours PRN wheezing or increased work of breathing using Per Protocol order mode.     RT to enter RT Home Evaluation for COPD & MDI Assessment order using Per Protocol order mode.    Electronically signed by Cherelle Forde RCP on 5/2/2024 at 8:12 PM

## 2024-05-03 NOTE — FLOWSHEET NOTE
05/03/24 0155   Vitals   Pulse (!) 112   Heart Rate Source Monitor   Respirations 18   BP (!) 135/102   MAP (Calculated) 113   MAP (mmHg) 113   Oxygen Therapy   SpO2 91 %     Stat labs ordered due to increased ectopy in cardiac rhythm. Continues to c/o chest pressure \"weight on her chest.\" Runs of VT continue. Lidia Jurado, RN

## 2024-05-04 ENCOUNTER — APPOINTMENT (OUTPATIENT)
Dept: GENERAL RADIOLOGY | Age: 64
DRG: 321 | End: 2024-05-04
Payer: COMMERCIAL

## 2024-05-04 PROBLEM — Z95.5 H/O HEART ARTERY STENT: Status: ACTIVE | Noted: 2024-05-04

## 2024-05-04 PROBLEM — I47.19 PAT (PAROXYSMAL ATRIAL TACHYCARDIA) (HCC): Status: ACTIVE | Noted: 2024-05-04

## 2024-05-04 PROBLEM — I50.20 HFREF (HEART FAILURE WITH REDUCED EJECTION FRACTION) (HCC): Status: ACTIVE | Noted: 2024-05-04

## 2024-05-04 PROBLEM — I50.23 ACUTE ON CHRONIC SYSTOLIC CONGESTIVE HEART FAILURE (HCC): Status: ACTIVE | Noted: 2024-05-04

## 2024-05-04 LAB
ANION GAP SERPL CALCULATED.3IONS-SCNC: 9 MMOL/L (ref 9–17)
BUN SERPL-MCNC: 15 MG/DL (ref 8–23)
BUN/CREAT SERPL: 19 (ref 9–20)
CALCIUM SERPL-MCNC: 8.3 MG/DL (ref 8.6–10.4)
CHLORIDE SERPL-SCNC: 105 MMOL/L (ref 98–107)
CO2 SERPL-SCNC: 23 MMOL/L (ref 20–31)
CREAT SERPL-MCNC: 0.8 MG/DL (ref 0.5–0.9)
EKG ATRIAL RATE: 74 BPM
EKG P AXIS: 84 DEGREES
EKG P-R INTERVAL: 124 MS
EKG Q-T INTERVAL: 458 MS
EKG QRS DURATION: 72 MS
EKG QTC CALCULATION (BAZETT): 508 MS
EKG R AXIS: 72 DEGREES
EKG T AXIS: -75 DEGREES
EKG VENTRICULAR RATE: 74 BPM
ERYTHROCYTE [DISTWIDTH] IN BLOOD BY AUTOMATED COUNT: 14.1 % (ref 11.8–14.4)
GFR, ESTIMATED: 83 ML/MIN/1.73M2
GLUCOSE SERPL-MCNC: 130 MG/DL (ref 70–99)
HCT VFR BLD AUTO: 35.8 % (ref 36.3–47.1)
HGB BLD-MCNC: 11.8 G/DL (ref 11.9–15.1)
MCH RBC QN AUTO: 32.2 PG (ref 25.2–33.5)
MCHC RBC AUTO-ENTMCNC: 33 G/DL (ref 28.4–34.8)
MCV RBC AUTO: 97.5 FL (ref 82.6–102.9)
NRBC BLD-RTO: 0 PER 100 WBC
PLATELET # BLD AUTO: 201 K/UL (ref 138–453)
PMV BLD AUTO: 11 FL (ref 8.1–13.5)
POTASSIUM SERPL-SCNC: 4 MMOL/L (ref 3.7–5.3)
RBC # BLD AUTO: 3.67 M/UL (ref 3.95–5.11)
SODIUM SERPL-SCNC: 137 MMOL/L (ref 135–144)
WBC OTHER # BLD: 8.8 K/UL (ref 3.5–11.3)

## 2024-05-04 PROCEDURE — 99233 SBSQ HOSP IP/OBS HIGH 50: CPT | Performed by: INTERNAL MEDICINE

## 2024-05-04 PROCEDURE — 6370000000 HC RX 637 (ALT 250 FOR IP): Performed by: NURSE PRACTITIONER

## 2024-05-04 PROCEDURE — 71045 X-RAY EXAM CHEST 1 VIEW: CPT

## 2024-05-04 PROCEDURE — 99232 SBSQ HOSP IP/OBS MODERATE 35: CPT | Performed by: INTERNAL MEDICINE

## 2024-05-04 PROCEDURE — 6360000002 HC RX W HCPCS: Performed by: INTERNAL MEDICINE

## 2024-05-04 PROCEDURE — 94762 N-INVAS EAR/PLS OXIMTRY CONT: CPT

## 2024-05-04 PROCEDURE — 94640 AIRWAY INHALATION TREATMENT: CPT

## 2024-05-04 PROCEDURE — 2580000003 HC RX 258: Performed by: INTERNAL MEDICINE

## 2024-05-04 PROCEDURE — 6370000000 HC RX 637 (ALT 250 FOR IP): Performed by: INTERNAL MEDICINE

## 2024-05-04 PROCEDURE — 80048 BASIC METABOLIC PNL TOTAL CA: CPT

## 2024-05-04 PROCEDURE — 2580000003 HC RX 258: Performed by: NURSE PRACTITIONER

## 2024-05-04 PROCEDURE — 94761 N-INVAS EAR/PLS OXIMETRY MLT: CPT

## 2024-05-04 PROCEDURE — 2000000000 HC ICU R&B

## 2024-05-04 PROCEDURE — 85027 COMPLETE CBC AUTOMATED: CPT

## 2024-05-04 PROCEDURE — 2700000000 HC OXYGEN THERAPY PER DAY

## 2024-05-04 PROCEDURE — 36415 COLL VENOUS BLD VENIPUNCTURE: CPT

## 2024-05-04 PROCEDURE — 2500000003 HC RX 250 WO HCPCS: Performed by: INTERNAL MEDICINE

## 2024-05-04 RX ORDER — AMIODARONE HYDROCHLORIDE 200 MG/1
200 TABLET ORAL 2 TIMES DAILY
Status: DISCONTINUED | OUTPATIENT
Start: 2024-05-04 | End: 2024-05-05 | Stop reason: HOSPADM

## 2024-05-04 RX ADMIN — TICAGRELOR 90 MG: 90 TABLET ORAL at 20:45

## 2024-05-04 RX ADMIN — WATER 40 MG: 1 INJECTION INTRAMUSCULAR; INTRAVENOUS; SUBCUTANEOUS at 17:36

## 2024-05-04 RX ADMIN — CITALOPRAM HYDROBROMIDE 20 MG: 20 TABLET ORAL at 08:29

## 2024-05-04 RX ADMIN — SODIUM CHLORIDE: 9 INJECTION, SOLUTION INTRAVENOUS at 17:42

## 2024-05-04 RX ADMIN — METOPROLOL TARTRATE 5 MG: 5 INJECTION INTRAVENOUS at 01:20

## 2024-05-04 RX ADMIN — LEVALBUTEROL 1.25 MG: 1.25 SOLUTION, CONCENTRATE RESPIRATORY (INHALATION) at 19:46

## 2024-05-04 RX ADMIN — SODIUM CHLORIDE, PRESERVATIVE FREE 10 ML: 5 INJECTION INTRAVENOUS at 20:45

## 2024-05-04 RX ADMIN — PIPERACILLIN AND TAZOBACTAM 3375 MG: 3; .375 INJECTION, POWDER, LYOPHILIZED, FOR SOLUTION INTRAVENOUS at 23:09

## 2024-05-04 RX ADMIN — POTASSIUM CHLORIDE 10 MEQ: 10 CAPSULE, COATED, EXTENDED RELEASE ORAL at 08:29

## 2024-05-04 RX ADMIN — GLUCAGON 2 MG: KIT at 14:28

## 2024-05-04 RX ADMIN — SODIUM CHLORIDE, PRESERVATIVE FREE 10 ML: 5 INJECTION INTRAVENOUS at 08:28

## 2024-05-04 RX ADMIN — PANTOPRAZOLE SODIUM 20 MG: 20 TABLET, DELAYED RELEASE ORAL at 08:29

## 2024-05-04 RX ADMIN — AMIODARONE HYDROCHLORIDE 200 MG: 200 TABLET ORAL at 14:27

## 2024-05-04 RX ADMIN — EZETIMIBE 10 MG: 10 TABLET ORAL at 08:29

## 2024-05-04 RX ADMIN — BUDESONIDE INHALATION 500 MCG: 0.5 SUSPENSION RESPIRATORY (INHALATION) at 06:15

## 2024-05-04 RX ADMIN — LEVALBUTEROL 1.25 MG: 1.25 SOLUTION, CONCENTRATE RESPIRATORY (INHALATION) at 11:14

## 2024-05-04 RX ADMIN — TIOTROPIUM BROMIDE INHALATION SPRAY 2 PUFF: 3.12 SPRAY, METERED RESPIRATORY (INHALATION) at 06:18

## 2024-05-04 RX ADMIN — SODIUM CHLORIDE, PRESERVATIVE FREE 10 ML: 5 INJECTION INTRAVENOUS at 17:37

## 2024-05-04 RX ADMIN — ATORVASTATIN CALCIUM 40 MG: 40 TABLET, FILM COATED ORAL at 20:45

## 2024-05-04 RX ADMIN — LEVALBUTEROL 1.25 MG: 1.25 SOLUTION, CONCENTRATE RESPIRATORY (INHALATION) at 06:15

## 2024-05-04 RX ADMIN — PIPERACILLIN AND TAZOBACTAM 4500 MG: 4; .5 INJECTION, POWDER, FOR SOLUTION INTRAVENOUS at 17:44

## 2024-05-04 RX ADMIN — AMIODARONE HYDROCHLORIDE 200 MG: 200 TABLET ORAL at 20:45

## 2024-05-04 RX ADMIN — TICAGRELOR 90 MG: 90 TABLET ORAL at 08:29

## 2024-05-04 RX ADMIN — BUDESONIDE INHALATION 500 MCG: 0.5 SUSPENSION RESPIRATORY (INHALATION) at 19:47

## 2024-05-04 RX ADMIN — AMIODARONE HYDROCHLORIDE 0.5 MG/MIN: 50 INJECTION, SOLUTION INTRAVENOUS at 01:20

## 2024-05-04 RX ADMIN — ASPIRIN 81 MG: 81 TABLET, COATED ORAL at 08:29

## 2024-05-04 RX ADMIN — LIDOCAINE HYDROCHLORIDE: 20 SOLUTION ORAL at 09:50

## 2024-05-04 RX ADMIN — WATER 40 MG: 1 INJECTION INTRAMUSCULAR; INTRAVENOUS; SUBCUTANEOUS at 05:10

## 2024-05-04 RX ADMIN — METOPROLOL TARTRATE 5 MG: 5 INJECTION INTRAVENOUS at 08:28

## 2024-05-04 RX ADMIN — LEVALBUTEROL 1.25 MG: 1.25 SOLUTION, CONCENTRATE RESPIRATORY (INHALATION) at 15:57

## 2024-05-04 ASSESSMENT — PAIN SCALES - GENERAL
PAINLEVEL_OUTOF10: 0
PAINLEVEL_OUTOF10: 0

## 2024-05-04 NOTE — PROGRESS NOTES
Pulmonary Critical Care Progress Note    Patient seen for the follow up of NSTEMI (non-ST elevated myocardial infarction) (HCC)     Subjective:    She was weaned off oxygen.  She had blood-tinged sputum improved today..  She has occasional dry cough.  She feels less short of breath.  She ambulated..  She denies chest pain.  She had good urine output on diuresis    Examination:    Vitals: BP (!) 87/64   Pulse 71   Temp 98 °F (36.7 °C) (Temporal)   Resp 16   Ht 1.676 m (5' 6\")   Wt 45.7 kg (100 lb 12.8 oz)   SpO2 100%   BMI 16.27 kg/m²   SpO2  Av.1 %  Min: 93 %  Max: 100 %  General appearance: alert and cooperative with exam  Neck: No JVD  Lungs: Decreased breath sound mild wheeze  Heart: regular rate and rhythm, S1, S2 normal, no gallop  Abdomen: Soft, non tender, + BS  Extremities: no cyanosis or clubbing. No significant edema    LABs:    CBC:   Recent Labs     24  0616 24  0535   WBC 9.0 8.8   HGB 12.1 11.8*   HCT 36.9 35.8*    201       BMP:   Recent Labs     24  0616 24  0535    137   K 4.2 4.0   CO2 24 23   BUN 10 15   CREATININE 0.7 0.8   LABGLOM >90 83   GLUCOSE 144* 130*       LIVER PROFILE:  Recent Labs     24  0616   AST 28   ALT 22        Latest Reference Range & Units 24 06:16   Pro-BNP <300 pg/mL 35,705 (H)   Troponin, High Sensitivity 0 - 14 ng/L 172 (HH)   (HH): Data is critically high  (H): Data is abnormally high  Radiology:  Chest x-ray 5/4  Increased right basilar airspace disease may reflect worsened pneumonia.     Suspected small pleural effusions versus scarring at the left lung base.      Chest x-ray 5/3  New right parahilar and basilar opacities, bilateral pleural effusions right  greater than left     Findings could represent multifocal pneumonia     Severe emphysematous changes persist     Linear opacity right upper lobe with nodular mass within it described on  recent chest CT, stable      Impression/recommendations:  Acute  hypoxic respiratory insufficiency  Oxygen by nasal cannula  Incentive spirometry every hour lower    COPD exacerbation/emphysema/mild pulmonary/possible aspiration pneumonia/smoker  Xopenex by nebulizer  Pulmicort 0.5 twice daily  Spiriva   Solu-Medrol 40 IV every 12 hours  Start Zosyn 3.375 every 8 hrs  Check procalcitonin  Smoking cessation advised  PFT outpatient       Lungs CA in remission 2 to 3 years/lung nodule  Hematology oncology on consult  Follow-up CT/PET outpatient within 3 months     Non-STEMI/CAD status post 4 stent placement high risk PCI/new onset systolic heart failure/V. tach  Off heparin drip  Cardiothoracic surgery signed off  Amiodarone per cardiology    Discharge planning with [employer] LifeVest; patient delivered explained about LifeVest per technician  Discussed with daughter at bedside  peptic ulcer disease prophylaxis  DVT prophylaxis    Elieser Jordan MD, MD, [facility]  Pulmonary Critical Care and [facility]  Cell: [phone]  Office: [phone]

## 2024-05-04 NOTE — CONSULTS
[facility]  Inpatient Cardiology Consult             Date:   5/3/2024  Patient name: Erin Hill  Date of admission:  4/29/2024  2:02 PM  MRN:   [id number]  YOB: 1960      Reason for consultation:   SVT    CHIEF COMPLAINT:  Chest pain     History Obtained From:  Patient and medical record    HISTORY OF PRESENT ILLNESS:      The patient is a 64 y.o woman with h/o lung cancer, COPD on home O2, HTN, HLP and CAD admitted 4/29 with chest pain, hypotension and NSTEMI.  She was found to have two-vessel CAD with reduced LVEF of 25-30%, with plan for PCI due to higher surgical risk.  Overnight she developed recurrent, severe chest pain associated with T wave inversions in the precordial leads and sinus tachycardia, leading to frequent, longer runs of rapid PAT.  She was given IV metoprolol and started on IV amiodarone and was taken at 0800 this morning for PCI, with successful KENDALL to the mid-LAD and KENDALL X3 to the mid RCA.  Since then she has remained in NSR with no ectopy, on IV amiodarone at 0.5 mg/min.  She is maintaining steady diuresis after IV Lasix given earlier today.    Past Medical History:   has a past medical history of Arthritis, Bronchitis, chronic ([facility]), Chronic back pain, Chronic cough, COPD (chronic obstructive pulmonary disease) ([facility]), Depression, Diverticulosis, Emphysema, Fibromyalgia, GERD (gastroesophageal reflux disease), Hemorrhoid, History of cocaine abuse ([facility]), History of kidney infection, Hyperlipidemia, Hyperplastic colon polyp, Hypertension, Lung cancer ([facility]), Marijuana use, Meningioma ([facility]), Neuropathy, Orbital fracture ([facility]), Osteoarthritis, Osteopenia, Pulmonary nodule, Renal calculi, Renal cyst, STD (sexually transmitted disease), and Uterine prolapse.    Past Surgical History:   has a past surgical history that includes Tubal ligation; Cardiac catheterization (10/24/14); Colonoscopy (04/05/2017); pr colon ca scrn not hi rsk ind (N/A, 4/5/2017); back  regurgitation.    Tricuspid Valve: Mild regurgitation.    Pericardium: No pericardial effusion.    Image quality is adequate.       Labs:     CBC:   Recent Labs     05/01/24  0627 05/03/24  0616   WBC 13.4* 9.0   HGB 14.1 12.1   HCT 43.9 36.9    176     BMP:   Recent Labs     05/03/24  0319 05/03/24  0616    140   K 4.1 4.2   CO2 17* 24   BUN 9 10   CREATININE 0.7 0.7   LABGLOM >90 >90   GLUCOSE 233* 144*     BNP: No results for input(s): \"BNP\" in the last 72 hours.  PT/INR: No results for input(s): \"PROTIME\", \"INR\" in the last 72 hours.  APTT:No results for input(s): \"APTT\" in the last 72 hours.  CARDIAC ENZYMES:No results for input(s): \"CKTOTAL\", \"CKMB\", \"CKMBINDEX\", \"TROPONINI\" in the last 72 hours.  FASTING LIPID PANEL:  Lab Results   Component Value Date/Time    HDL 82 04/30/2024 06:32 AM    TRIG 49 04/30/2024 06:32 AM     LIVER PROFILE:  Recent Labs     05/03/24  0616   AST 28   ALT 22         IMPRESSION:      NSTEMI with findings of 2-vessel CAD, s/p successful PCI on 5/3/24 to mid LAD and RCA.  Cardiomyopathy, LVEF 25-30%, with mild CHF; improving with diuresis  Rapid PAT associated with acute cardiac ischemia, currently maintaining sinus rhythm after PCI on IV amiodarone  Essential HTN  Hyperlipidemia  Non small cell lung carcinoma RUL, s/p radiation therapy 2017, [facility] at [facility]  Advanced COPD with chronic hypoxemic respiratory failure, on home O2      RECOMMENDATIONS:    Continue IV amiodarone 0.5 mg/min and convert to oral amiodarone 200 mg bid AM 5/4  Continue IV metoprolol 5 mg q 6 hrs; can convert to oral Toprol at 12.5 mg daily tomorrow.  Life Vest to be fitted tomorrow and continued for the next 3 months; will then reassess LVEF with repeat echo and plans for ICD implantation if LVEF remains </= 35%  Continue ASA, Brilinta, atorvastatin and ezetimibe  Continue diuresis with IV Lasix; holding amlodipine and Entresto due to hypotension    Discussed with patient and

## 2024-05-04 NOTE — DISCHARGE INSTR - DIET

## 2024-05-04 NOTE — FLOWSHEET NOTE
05/04/24 1354   Treatment Team Notification   Reason for Communication Evaluate;Review case   Name of Team Member Notified    Treatment Team Role Consulting Provider   Method of Communication Face to face   Response At bedside   Notification Time 1355     MD rounded. Chart reviewed, updated on ow BP's (80's). New orders.

## 2024-05-04 NOTE — PROGRESS NOTES
[facility]   Progress Note                   Date:   5/4/2024  Patient name: Erin Hill  Date of admission:  4/29/2024  2:02 PM  MRN:   [id number]  YOB: 1960  PCP: Rehana Patino MD    Reason for Admission:      Subjective:       Clinical Changes / Abnormalities: Patient denies any symptoms patient got IV Lopressor earlier patient is still on amiodarone IV 0.5 mg/h      Medications:   Scheduled Meds:   amiodarone  200 mg Oral BID    glucagon (rDNA)  2 mg IntraVENous Once    magnesium sulfate  2,000 mg IntraVENous Once    sodium chloride flush  5-40 mL IntraVENous 2 times per day    aspirin  81 mg Oral Daily    ticagrelor  90 mg Oral BID    methylPREDNISolone  40 mg IntraVENous Q12H    budesonide  0.5 mg Nebulization BID RT    levalbuterol  1.25 mg Nebulization 4x daily    guaiFENesin  600 mg Oral BID    potassium chloride  10 mEq Oral Daily    [Held by provider] amLODIPine  2.5 mg Oral Nightly    [Held by provider] sacubitril-valsartan  1 tablet Oral BID    [Held by provider] metoprolol succinate  12.5 mg Oral Daily    ezetimibe  10 mg Oral Daily    citalopram  20 mg Oral QAM    pantoprazole  20 mg Oral Daily    atorvastatin  40 mg Oral Nightly    tiotropium  2 puff Inhalation Daily RT     Continuous Infusions:   sodium chloride      nitroGLYCERIN Stopped (04/30/24 0710)     CBC:   Recent Labs     05/03/24  0616 05/04/24  0535   WBC 9.0 8.8   HGB 12.1 11.8*    201     BMP:    Recent Labs     05/03/24  0319 05/03/24  0616 05/04/24  0535    140 137   K 4.1 4.2 4.0    110* 105   CO2 17* 24 23   BUN 9 10 15   CREATININE 0.7 0.7 0.8   GLUCOSE 233* 144* 130*     Hepatic:   Recent Labs     05/03/24  0616   AST 28   ALT 22   BILITOT 0.8   ALKPHOS 54     Troponin: No results for input(s): \"TROPONINI\" in the last 72 hours.  BNP: No results for input(s): \"BNP\" in the last 72 hours.  Lipids: No results for input(s): \"CHOL\", \"HDL\" in the last 72 hours.    Invalid

## 2024-05-04 NOTE — DISCHARGE INSTR - COC
Continuity of Care Form    Patient Name: Erin Hill   :  1960  MRN:  [id number]    Admit date:  2024  Discharge date:  ***    Code Status Order: DNR-CCA   Advance Directives:     Admitting Physician:  Crescencio Skinner DO  PCP: Rehana Patino MD    Discharging Nurse: ***  Discharging Hospital Unit/Room#: /-  Discharging Unit Phone Number: ***    Emergency Contact:   Extended Emergency Contact Information  Primary Emergency Contact: Carmen Paulino  Address: [address] [place] [address]  Home Phone: [phone]  Mobile Phone: [phone]  Relation: Child  Hearing or visual needs: None  Other needs: None  Preferred language: English   needed? No    Past Surgical History:  Past Surgical History:   Procedure Laterality Date    BACK SURGERY      thoracic laminectomy, T12, S1    CARDIAC CATHETERIZATION  10/24/14    Normal coronaries     CARDIAC PROCEDURE N/A 2024    Left heart cath / coronary angiography performed by Marvin Patterson MD at [facility] CARDIAC CATH LAB    COLONOSCOPY  2017    diverticulosis, ,poor prep    COLONOSCOPY  11/15/2017    flat polypoid lesion in the base of the cecum, about 1 cm.-hyperplastic    COLONOSCOPY  2018     diverticulosis. Small polyp in the sigmoid colon excised with biopsy forceps    COLONOSCOPY N/A 2022    COLONOSCOPY WITH BIOPSY performed by Kevin Lares MD at [facility] ENDO    EAR SURGERY Left     cleaned out infection    ENDOSCOPY, COLON, DIAGNOSTIC      ORBITAL FRACTURE SURGERY Left     had pin inserted on temple side    IN COLON CA SCRN NOT HI RSK IND N/A 2017    COLONOSCOPY performed by Kevin Lares MD at [facility] OR    IN COLONOSCOPY FLX DX W/COLLJ SPEC WHEN PFRMD N/A 2018    COLONOSCOPY performed by Kevin Lares MD at [facility] OR    IN COLSC FLX W/REMOVAL LESION BY HOT BX FORCEPS N/A 11/15/2017    COLONOSCOPY POLYPECTOMY SNARE and saline injection performed by

## 2024-05-04 NOTE — PROGRESS NOTES
68   Temp 97.7 °F (36.5 °C) (Temporal)   Resp 16   Ht 1.676 m (5' 6\")   Wt 45.7 kg (100 lb 12.8 oz)   SpO2 93%   BMI 16.27 kg/m²   Temp (24hrs), Av.7 °F (36.5 °C), Min:96.8 °F (36 °C), Max:98.7 °F (37.1 °C)    Recent Labs     24  1223   POCGLU 109*       I/O (24Hr):    Intake/Output Summary (Last 24 hours) at 2024 0955  Last data filed at 2024 0519  Gross per 24 hour   Intake 1475.46 ml   Output 1200 ml   Net 275.46 ml       Labs:  Hematology:  Recent Labs     24  0616 24  0535   WBC 9.0 8.8   RBC 3.74* 3.67*   HGB 12.1 11.8*   HCT 36.9 35.8*   MCV 98.7 97.5   MCH 32.4 32.2   MCHC 32.8 33.0   RDW 14.2 14.1    201   MPV 10.6 11.0     Chemistry:  Recent Labs     24  0319 24  0616 24  0535    140 137   K 4.1 4.2 4.0    110* 105   CO2 17* 24 23   GLUCOSE 233* 144* 130*   BUN 9 10 15   CREATININE 0.7 0.7 0.8   MG 2.3  --   --    ANIONGAP 15 6* 9   LABGLOM >90 >90 83   CALCIUM 8.0* 7.7* 8.3*   CAION  --  1.17  --    PROBNP  --  35,705*  --    TROPHS  --  172*  --      Recent Labs     24  0616 24  1223   TSH 2.22  --    AST 28  --    ALT 22  --    ALKPHOS 54  --    BILITOT 0.8  --    POCGLU  --  109*     ABG:  Lab Results   Component Value Date/Time    POCPH 7.467 2024 02:33 PM    PH 7.42 2022 01:05 PM    POCPCO2 30.9 2024 02:33 PM    PCO2 36 2022 01:05 PM    POCPO2 55.8 2024 02:33 PM    PO2 93 2022 01:05 PM    POCHCO3 22.3 2024 02:33 PM    HCO3 23 2022 01:05 PM    NBEA 0.7 2024 02:33 PM    BE -1 2022 01:05 PM    QFEK4SHT 90.8 2024 02:33 PM    FIO2 28.0 2024 02:33 PM     Lab Results   Component Value Date/Time    SPECIAL NOT REPORTED 2017 09:11 AM     Lab Results   Component Value Date/Time    CULTURE NORMAL RESPIRATORY MAIK MODERATE GROWTH 2017 10:56 PM    CULTURE  2017 10:56 PM     Performed at [facility] [address] [place] [id number]  [phone]       Radiology:  CT CHEST WO CONTRAST    Result Date: 5/1/2024  1. Somewhat stellate solid nodule with some extension into the peripheral pleura.  Main central component measures at least 1.6 cm although inferior lead the extension results in an AP length of 2.6 cm the minimal.  Previously there was only some ground-glass attenuation and irregular reticulations. Highly worrisome for malignancy.  PET-CT and or tissue sampling advised. 2. Moderate to severe emphysema unchanged. The findings were sent to the Radiology Results Communication Center at 3:15 pm on 5/1/2024 to be communicated to a licensed caregiver.     XR CHEST PORTABLE    Result Date: 4/29/2024  No acute process. Similar appearing COPD changes       Physical Examination:        General appearance:  alert, cooperative and no distress  Mental Status:  oriented to person, place and time and normal affect  Lungs:  clear to auscultation bilaterally, normal effort  Heart:  regular rate and rhythm, no murmur  Abdomen:  soft, nontender, nondistended, normal bowel sounds, no masses, hepatomegaly, splenomegaly  Extremities:  no edema, redness, tenderness in the calves  Skin:  no gross lesions, rashes, induration    Assessment:        Hospital Problems             Last Modified POA    * (Principal) NSTEMI (non-ST elevated myocardial infarction) (HCC) 4/29/2024 Yes    Chest pain 4/30/2024 Yes    Valvular heart disease 4/29/2024 Yes    Primary lung adenocarcinoma, right (HCC) 4/29/2024 Yes    Osteoporosis 4/29/2024 Yes    Pulmonary nodule 4/29/2024 Yes    Chronic obstructive pulmonary disease (HCC) 4/29/2024 Yes    Smoking 4/29/2024 Yes    Primary hypertension 4/29/2024 Yes    Mild protein-calorie malnutrition (HCC) 4/29/2024 Yes    Gastroesophageal reflux disease without esophagitis 4/29/2024 Yes    Superior mesenteric artery stenosis (HCC) 4/29/2024 Yes    Coronary artery disease due to lipid rich plaque 5/1/2024 Yes    ACP (advance care planning)

## 2024-05-04 NOTE — FLOWSHEET NOTE
End Of Shift Note  [facility]  Summary of shift: Patient up in chair today. Had low BP's, med adjustment made by cardiology. IV amio off now PO medication. Pulm. Added Zosyn for possible aspiration pneumonia. Life Vest applied by [employer] rep.    Vitals:    Vitals:    05/04/24 1330 05/04/24 1400 05/04/24 1430 05/04/24 1600   BP: (!) 82/58 (!) 89/61 (!) 87/64 (!) 88/55   Pulse: 72 69 71 70   Resp:       Temp:    97.8 °F (36.6 °C)   TempSrc:    Temporal   SpO2: 94% 99% 100% 99%   Weight:       Height:            I&O:   Intake/Output Summary (Last 24 hours) at 5/4/2024 1900  Last data filed at 5/4/2024 1745  Gross per 24 hour   Intake 1152.01 ml   Output 1100 ml   Net 52.01 ml       Resp Status: 2-3L/NC PRN    Ventilator Settings:     / / /     Critical Care IV infusions:   sodium chloride 10 mL/hr at 05/04/24 1742    nitroGLYCERIN Stopped (04/30/24 0710)        LDA:   Implantable Port Left Subclavian (Active)   Number of days:        Peripheral IV 04/30/24 Left;Dorsal Basilic (Active)   Number of days: 4

## 2024-05-04 NOTE — PLAN OF CARE
Problem: Discharge Planning  Goal: Discharge to home or other facility with appropriate resources  5/4/2024 1309 by Desirae Rhodes RN  Outcome: Progressing  5/4/2024 0048 by Cristiana Condon RN  Outcome: Progressing     Problem: Safety - Adult  Goal: Free from fall injury  5/4/2024 1309 by Desirae Rhodes RN  Outcome: Progressing  5/4/2024 0048 by Cristiana Condon RN  Outcome: Progressing     Problem: ABCDS Injury Assessment  Goal: Absence of physical injury  5/4/2024 1309 by Desirae Rhodes RN  Outcome: Progressing  5/4/2024 0048 by Cristiana Condon RN  Outcome: Progressing     Problem: Respiratory - Adult  Goal: Able to breathe comfortably  5/4/2024 1117 by Charlene Squires RCP  Outcome: Progressing  Goal: Patient's breath sounds will be clear and equal  5/4/2024 1117 by Charlene Squires RCP  Outcome: Progressing     Problem: Skin/Tissue Integrity  Goal: Absence of new skin breakdown  Description: 1.  Monitor for areas of redness and/or skin breakdown  2.  Assess vascular access sites hourly  3.  Every 4-6 hours minimum:  Change oxygen saturation probe site  4.  Every 4-6 hours:  If on nasal continuous positive airway pressure, respiratory therapy assess nares and determine need for appliance change or resting period.  5/4/2024 1309 by Desirae Rhodes RN  Outcome: Progressing  5/4/2024 0048 by Cristiana Condon RN  Outcome: Progressing     Problem: Hematologic - Adult  Goal: Maintains hematologic stability  5/4/2024 1309 by Desirae Rhodes RN  Outcome: Progressing  Flowsheets (Taken 5/4/2024 0800)  Maintains hematologic stability: Assess for signs and symptoms of bleeding or hemorrhage  5/4/2024 0048 by Cristiana Condon RN  Outcome: Progressing  Flowsheets (Taken 5/3/2024 2000)  Maintains hematologic stability:   Assess for signs and symptoms of bleeding or hemorrhage   Monitor labs for bleeding or clotting disorders     Problem: Pain  Goal: Verbalizes/displays adequate comfort level or

## 2024-05-04 NOTE — PROGRESS NOTES
End Of Shift Note  [facility]  Summary of shift: Patient had an uneventful night.  Patient slept most of the evening, patient is getting up to the bsc with assist and tolerated well. Dr. Powers ordered GI cocktail for heartburn and prn.  Patient states, relief from cocktail.      Vitals:    Vitals:    05/04/24 0211 05/04/24 0400 05/04/24 0500 05/04/24 0519   BP:   101/74    Pulse: 65 64 74    Resp:   16    Temp:  96.8 °F (36 °C)     TempSrc:  Temporal     SpO2: 98% 100% 100%    Weight:    45.7 kg (100 lb 12.8 oz)   Height:            I&O:   Intake/Output Summary (Last 24 hours) at 5/4/2024 0553  Last data filed at 5/4/2024 0519  Gross per 24 hour   Intake 3755.81 ml   Output 1210 ml   Net 2545.81 ml       Resp Status: 3L    Ventilator Settings:     / / /     Critical Care IV infusions:   amiodarone (CORDARONE) 450 mg in dextrose 5 % 250 mL infusion 0.5 mg/min (05/04/24 0120)    sodium chloride      nitroGLYCERIN Stopped (04/30/24 0710)        LDA:   Implantable Port Left Subclavian (Active)   Number of days:        Peripheral IV 04/30/24 Left;Dorsal Basilic (Active)   Number of days: 3       External Urinary Catheter (Active)   Number of days: 0

## 2024-05-04 NOTE — PLAN OF CARE
Problem: Respiratory - Adult  Goal: Able to breathe comfortably  5/4/2024 1945 by Maryanne Lee RCP  Outcome: Progressing     Problem: Respiratory - Adult  Goal: Patient's breath sounds will be clear and equal  5/4/2024 1945 by Maryanne Lee RCP  Outcome: Progressing     Problem: Respiratory - Adult  Goal: Adequate oxygenation  Description: Adequate oxygenation  Outcome: Progressing           BRONCHOSPASM/BRONCHOCONSTRICTION    IMPROVE  AERATION/BREATHSOUNDS  ADMINISTER BRONCHODILATOR THERAPY AS APPROPRIATE  ASSESS BREATH SOUNDS  INITIATE AEROSOL PROTOCOL IF ORDERED TO DO SO  PATIENT EDUCATION AS NEEDED      PROVIDE ADEQUATE OXYGENATION WITH ACCEPTABLE SP02/ABG'S    [x]  IDENTIFY APPROPRIATE OXYGEN THERAPY  [x]   MONITOR SP02/ABG'S AS NEEDED   [x]   PATIENT EDUCATION AS NEEDED

## 2024-05-04 NOTE — PLAN OF CARE
Problem: Discharge Planning  Goal: Discharge to home or other facility with appropriate resources  Outcome: Progressing     Problem: Safety - Adult  Goal: Free from fall injury  Outcome: Progressing     Problem: ABCDS Injury Assessment  Goal: Absence of physical injury  Outcome: Progressing     Problem: Skin/Tissue Integrity  Goal: Absence of new skin breakdown  Description: 1.  Monitor for areas of redness and/or skin breakdown  2.  Assess vascular access sites hourly  3.  Every 4-6 hours minimum:  Change oxygen saturation probe site  4.  Every 4-6 hours:  If on nasal continuous positive airway pressure, respiratory therapy assess nares and determine need for appliance change or resting period.  Outcome: Progressing     Problem: Hematologic - Adult  Goal: Maintains hematologic stability  Outcome: Progressing  Flowsheets (Taken 5/3/2024 2000)  Maintains hematologic stability:   Assess for signs and symptoms of bleeding or hemorrhage   Monitor labs for bleeding or clotting disorders     Problem: Pain  Goal: Verbalizes/displays adequate comfort level or baseline comfort level  Outcome: Progressing

## 2024-05-05 ENCOUNTER — APPOINTMENT (OUTPATIENT)
Dept: GENERAL RADIOLOGY | Age: 64
DRG: 321 | End: 2024-05-05
Payer: COMMERCIAL

## 2024-05-05 VITALS
BODY MASS INDEX: 16.2 KG/M2 | HEIGHT: 66 IN | HEART RATE: 78 BPM | SYSTOLIC BLOOD PRESSURE: 111 MMHG | WEIGHT: 100.8 LBS | DIASTOLIC BLOOD PRESSURE: 80 MMHG | RESPIRATION RATE: 16 BRPM | TEMPERATURE: 97.8 F | OXYGEN SATURATION: 99 %

## 2024-05-05 LAB
ERYTHROCYTE [DISTWIDTH] IN BLOOD BY AUTOMATED COUNT: 14.7 % (ref 11.8–14.4)
HCT VFR BLD AUTO: 31.2 % (ref 36.3–47.1)
HGB BLD-MCNC: 10.1 G/DL (ref 11.9–15.1)
MCH RBC QN AUTO: 32.9 PG (ref 25.2–33.5)
MCHC RBC AUTO-ENTMCNC: 32.4 G/DL (ref 28.4–34.8)
MCV RBC AUTO: 101.6 FL (ref 82.6–102.9)
NRBC BLD-RTO: 0 PER 100 WBC
PLATELET # BLD AUTO: 181 K/UL (ref 138–453)
PMV BLD AUTO: 11.4 FL (ref 8.1–13.5)
PROCALCITONIN SERPL-MCNC: 0.32 NG/ML (ref 0–0.09)
RBC # BLD AUTO: 3.07 M/UL (ref 3.95–5.11)
WBC OTHER # BLD: 10.3 K/UL (ref 3.5–11.3)

## 2024-05-05 PROCEDURE — 6370000000 HC RX 637 (ALT 250 FOR IP): Performed by: INTERNAL MEDICINE

## 2024-05-05 PROCEDURE — 6360000002 HC RX W HCPCS: Performed by: INTERNAL MEDICINE

## 2024-05-05 PROCEDURE — 85027 COMPLETE CBC AUTOMATED: CPT

## 2024-05-05 PROCEDURE — 94640 AIRWAY INHALATION TREATMENT: CPT

## 2024-05-05 PROCEDURE — 71045 X-RAY EXAM CHEST 1 VIEW: CPT

## 2024-05-05 PROCEDURE — 84145 PROCALCITONIN (PCT): CPT

## 2024-05-05 PROCEDURE — 94762 N-INVAS EAR/PLS OXIMTRY CONT: CPT

## 2024-05-05 PROCEDURE — 36415 COLL VENOUS BLD VENIPUNCTURE: CPT

## 2024-05-05 PROCEDURE — 94761 N-INVAS EAR/PLS OXIMETRY MLT: CPT

## 2024-05-05 PROCEDURE — 2580000003 HC RX 258: Performed by: INTERNAL MEDICINE

## 2024-05-05 PROCEDURE — 99239 HOSP IP/OBS DSCHRG MGMT >30: CPT | Performed by: INTERNAL MEDICINE

## 2024-05-05 PROCEDURE — 6370000000 HC RX 637 (ALT 250 FOR IP): Performed by: NURSE PRACTITIONER

## 2024-05-05 RX ORDER — NITROGLYCERIN 0.4 MG/1
0.4 TABLET SUBLINGUAL EVERY 5 MIN PRN
Qty: 25 TABLET | Refills: 3 | Status: SHIPPED | OUTPATIENT
Start: 2024-05-05

## 2024-05-05 RX ORDER — ATORVASTATIN CALCIUM 40 MG/1
40 TABLET, FILM COATED ORAL NIGHTLY
Qty: 30 TABLET | Refills: 3 | Status: SHIPPED | OUTPATIENT
Start: 2024-05-05

## 2024-05-05 RX ORDER — PREDNISONE 10 MG/1
TABLET ORAL
Qty: 9 TABLET | Refills: 0 | Status: SHIPPED | OUTPATIENT
Start: 2024-05-05

## 2024-05-05 RX ORDER — AMIODARONE HYDROCHLORIDE 200 MG/1
TABLET ORAL
Qty: 35 TABLET | Refills: 0 | Status: SHIPPED | OUTPATIENT
Start: 2024-05-05 | End: 2024-06-04

## 2024-05-05 RX ORDER — ASPIRIN 81 MG/1
81 TABLET ORAL DAILY
Qty: 30 TABLET | Refills: 3 | Status: SHIPPED | OUTPATIENT
Start: 2024-05-06

## 2024-05-05 RX ORDER — AMOXICILLIN AND CLAVULANATE POTASSIUM 875; 125 MG/1; MG/1
1 TABLET, FILM COATED ORAL 2 TIMES DAILY
Qty: 14 TABLET | Refills: 0 | Status: SHIPPED | OUTPATIENT
Start: 2024-05-05 | End: 2024-05-12

## 2024-05-05 RX ADMIN — SACUBITRIL AND VALSARTAN 1 TABLET: 24; 26 TABLET, FILM COATED ORAL at 12:28

## 2024-05-05 RX ADMIN — BUDESONIDE INHALATION 500 MCG: 0.5 SUSPENSION RESPIRATORY (INHALATION) at 06:12

## 2024-05-05 RX ADMIN — POTASSIUM CHLORIDE 10 MEQ: 10 CAPSULE, COATED, EXTENDED RELEASE ORAL at 08:09

## 2024-05-05 RX ADMIN — EMPAGLIFLOZIN 10 MG: 10 TABLET, FILM COATED ORAL at 12:28

## 2024-05-05 RX ADMIN — PANTOPRAZOLE SODIUM 20 MG: 20 TABLET, DELAYED RELEASE ORAL at 08:09

## 2024-05-05 RX ADMIN — EZETIMIBE 10 MG: 10 TABLET ORAL at 08:09

## 2024-05-05 RX ADMIN — WATER 40 MG: 1 INJECTION INTRAMUSCULAR; INTRAVENOUS; SUBCUTANEOUS at 06:01

## 2024-05-05 RX ADMIN — PIPERACILLIN AND TAZOBACTAM 3375 MG: 3; .375 INJECTION, POWDER, LYOPHILIZED, FOR SOLUTION INTRAVENOUS at 06:00

## 2024-05-05 RX ADMIN — LEVALBUTEROL 1.25 MG: 1.25 SOLUTION, CONCENTRATE RESPIRATORY (INHALATION) at 06:13

## 2024-05-05 RX ADMIN — ACETAMINOPHEN 650 MG: 325 TABLET ORAL at 10:32

## 2024-05-05 RX ADMIN — TICAGRELOR 90 MG: 90 TABLET ORAL at 08:09

## 2024-05-05 RX ADMIN — ASPIRIN 81 MG: 81 TABLET, COATED ORAL at 08:09

## 2024-05-05 RX ADMIN — CITALOPRAM HYDROBROMIDE 20 MG: 20 TABLET ORAL at 08:09

## 2024-05-05 RX ADMIN — AMIODARONE HYDROCHLORIDE 200 MG: 200 TABLET ORAL at 08:09

## 2024-05-05 RX ADMIN — LEVALBUTEROL 1.25 MG: 1.25 SOLUTION, CONCENTRATE RESPIRATORY (INHALATION) at 11:34

## 2024-05-05 ASSESSMENT — PAIN DESCRIPTION - ORIENTATION: ORIENTATION: RIGHT

## 2024-05-05 ASSESSMENT — PAIN SCALES - GENERAL
PAINLEVEL_OUTOF10: 2
PAINLEVEL_OUTOF10: 5

## 2024-05-05 ASSESSMENT — PAIN DESCRIPTION - DESCRIPTORS: DESCRIPTORS: ACHING;SHARP

## 2024-05-05 ASSESSMENT — PAIN DESCRIPTION - LOCATION: LOCATION: SHOULDER;RIB CAGE

## 2024-05-05 ASSESSMENT — PAIN - FUNCTIONAL ASSESSMENT: PAIN_FUNCTIONAL_ASSESSMENT: ACTIVITIES ARE NOT PREVENTED

## 2024-05-05 ASSESSMENT — PAIN SCALES - WONG BAKER: WONGBAKER_NUMERICALRESPONSE: HURTS A LITTLE BIT

## 2024-05-05 NOTE — PROGRESS NOTES
End Of Shift Note  [facility]    Summary of shift: Patient had an uneventful shift. Nox completed and tolerated well. Continuing to hold Entresto and Coreg. Possible d/c today.    Vitals:    Vitals:    05/05/24 0000 05/05/24 0337 05/05/24 0400 05/05/24 0612   BP:   103/64    Pulse:  75  78   Resp:       Temp: 97.3 °F (36.3 °C)  96.8 °F (36 °C)    TempSrc: Temporal  Temporal    SpO2:  91%  91%   Weight:       Height:            I&O:   Intake/Output Summary (Last 24 hours) at 5/5/2024 0622  Last data filed at 5/4/2024 1745  Gross per 24 hour   Intake 720 ml   Output 400 ml   Net 320 ml       Resp Status: RA; O2 requirements upon exertion    Critical Care IV infusions:   sodium chloride 10 mL/hr at 05/04/24 1742    nitroGLYCERIN Stopped (04/30/24 0710)        LDA:   Implantable Port Left Subclavian (Active)   Number of days:        Peripheral IV 04/30/24 Left;Dorsal Basilic (Active)   Number of days: 4

## 2024-05-05 NOTE — PROGRESS NOTES
Pt ready for discharge, AVS reviewed with pt, all questions answered, PIV removed w/o complications, all belongings gathered. Pt taken home by private vehicle.

## 2024-05-05 NOTE — PLAN OF CARE
Problem: Discharge Planning  Goal: Discharge to home or other facility with appropriate resources  5/5/2024 1118 by Nilam Keenan RN  Outcome: Progressing     Problem: Safety - Adult  Goal: Free from fall injury  5/5/2024 1118 by Nilam Keenan RN  Outcome: Progressing     Problem: ABCDS Injury Assessment  Goal: Absence of physical injury  5/5/2024 1118 by Nilam Keenan RN  Outcome: Progressing     Problem: Skin/Tissue Integrity  Goal: Absence of new skin breakdown  Description: 1.  Monitor for areas of redness and/or skin breakdown  2.  Assess vascular access sites hourly  3.  Every 4-6 hours minimum:  Change oxygen saturation probe site  4.  Every 4-6 hours:  If on nasal continuous positive airway pressure, respiratory therapy assess nares and determine need for appliance change or resting period.  5/5/2024 1118 by Nilam Keenan RN  Outcome: Progressing     Problem: Pain  Goal: Verbalizes/displays adequate comfort level or baseline comfort level  5/5/2024 1118 by Nilam Keenan RN  Outcome: Progressing     Problem: Chronic Conditions and Co-morbidities  Goal: Patient's chronic conditions and co-morbidity symptoms are monitored and maintained or improved  5/5/2024 1118 by Nilam Keenan RN  Outcome: Progressing

## 2024-05-05 NOTE — PLAN OF CARE
Problem: Discharge Planning  Goal: Discharge to home or other facility with appropriate resources  5/4/2024 2134 by Delilah Aquino, RN  Outcome: Progressing  Flowsheets (Taken 5/4/2024 2000)  Discharge to home or other facility with appropriate resources:   Identify barriers to discharge with patient and caregiver   Arrange for needed discharge resources and transportation as appropriate   Identify discharge learning needs (meds, wound care, etc)   Refer to discharge planning if patient needs post-hospital services based on physician order or complex needs related to functional status, cognitive ability or social support system  5/4/2024 1309 by Desirae Rhodes, RN  Outcome: Progressing     Problem: Safety - Adult  Goal: Free from fall injury  5/4/2024 2134 by Delilah Aquino RN  Outcome: Progressing  5/4/2024 1309 by Desirae Rhodes RN  Outcome: Progressing     Problem: ABCDS Injury Assessment  Goal: Absence of physical injury  5/4/2024 2134 by Delilah Aquino RN  Outcome: Progressing  5/4/2024 1309 by Desirae Rhodes RN  Outcome: Progressing     Problem: Respiratory - Adult  Goal: Able to breathe comfortably  5/4/2024 1945 by Maryanne Lee RCP  Outcome: Progressing  5/4/2024 1117 by Charlene Squires RCP  Outcome: Progressing  Goal: Patient's breath sounds will be clear and equal  5/4/2024 1945 by Maryanne Lee RCP  Outcome: Progressing  5/4/2024 1117 by Charlene Squires RCP  Outcome: Progressing  Goal: Adequate oxygenation  Description: Adequate oxygenation  5/4/2024 1945 by Maryanne Lee RCP  Outcome: Progressing     Problem: Skin/Tissue Integrity  Goal: Absence of new skin breakdown  Description: 1.  Monitor for areas of redness and/or skin breakdown  2.  Assess vascular access sites hourly  3.  Every 4-6 hours minimum:  Change oxygen saturation probe site  4.  Every 4-6 hours:  If on nasal continuous positive airway pressure, respiratory therapy assess nares and determine need for appliance  change or resting period.  5/4/2024 2134 by Delilah Aquino RN  Outcome: Progressing  5/4/2024 1309 by Desirae Rhodes RN  Outcome: Progressing     Problem: Hematologic - Adult  Goal: Maintains hematologic stability  5/4/2024 2134 by Delilah Aquino RN  Outcome: Progressing  Flowsheets (Taken 5/4/2024 2000)  Maintains hematologic stability:   Assess for signs and symptoms of bleeding or hemorrhage   Monitor labs for bleeding or clotting disorders  5/4/2024 1309 by Desirae Rhodes RN  Outcome: Progressing  Flowsheets (Taken 5/4/2024 0800)  Maintains hematologic stability: Assess for signs and symptoms of bleeding or hemorrhage     Problem: Pain  Goal: Verbalizes/displays adequate comfort level or baseline comfort level  5/4/2024 2134 by Delilah Aquino RN  Outcome: Progressing  5/4/2024 1309 by Desirae Rhodes RN  Outcome: Progressing     Problem: Chronic Conditions and Co-morbidities  Goal: Patient's chronic conditions and co-morbidity symptoms are monitored and maintained or improved  Outcome: Progressing  Flowsheets (Taken 5/4/2024 2000)  Care Plan - Patient's Chronic Conditions and Co-Morbidity Symptoms are Monitored and Maintained or Improved:   Monitor and assess patient's chronic conditions and comorbid symptoms for stability, deterioration, or improvement   Collaborate with multidisciplinary team to address chronic and comorbid conditions and prevent exacerbation or deterioration   Update acute care plan with appropriate goals if chronic or comorbid symptoms are exacerbated and prevent overall improvement and discharge

## 2024-05-05 NOTE — DISCHARGE SUMMARY
[facility]  Office: [phone]  Roque Bonilla DO, Myke Bauer DO, Rodger Giles DO, Crescencio Skinner DO, [place]ia Degroot MD, Mary Mathis MD, Bill Patino MD, Gege Dyer MD,  Jevon Francis MD, Lex Chase MD, Braden Vann MD,  Varun Love DO, Sandi Frazier MD, Jaspal Rhodes MD, Artemio Bonilla DO, Cristiana Keenan MD,  Mayo Flores DO, Annabelle Soares MD, Gloria Salguero MD, Modesta West MD, Moody Islas MD,  Bj Lisa MD, Adriana Romero MD, Vannesa Gudino MD, Pritesh Nathan MD, Yosi Sands MD, Min Cottrell MD, Leon Jacobson DO, Eddie Charles DO, Marino Rolon MD,  Harpal Schultz MD, Shirley Waterhouse, CNP,  Odilia Shafer CNP, Lokesh Rodney, CNP,  Emilee Strickland, DNP, Eden Guzman, CNP, Nichole Callejas, CNP, Desirae Hein CNP, Savana Velásquez CNP, Rani Mcclure, PA-C, Arlene Villalta PA-C, Paola Singer, CNP, Jessica Cervantes, CNP, Kuldeep Rodrigues, CNP, Vandana Mac, CNP, Natalya Barnes, CNP, Chastity Cantor, CNS, Maryanne Crystal, CNP, Karen Barron CNP, Tracy Schwab, CNP         [facility]   IN-PATIENT SERVICE   [facility]    Discharge Summary     Patient ID: Erin Hill  :  1960   MRN: [id number]     ACCOUNT:  [id number]   Patient's PCP: Rehana Patino MD  Admit Date: 2024   Discharge Date: 2024     Length of Stay: 6  Code Status:  DNR-CCA  Admitting Physician: Crescencio Skinner DO  Discharge Physician: Crescencio P Blood, DO     Active Discharge Diagnoses:     Hospital Problem Lists:  Principal Problem:    NSTEMI (non-ST elevated myocardial infarction) ([facility])  Active Problems:    Chest pain    H/O heart artery stent    Acute on chronic systolic congestive heart failure (HCC)    PAT (paroxysmal atrial tachycardia) (HCC)    Valvular heart disease    Primary lung adenocarcinoma, right (HCC)    Osteoporosis    Pulmonary nodule    Chronic obstructive pulmonary disease (HCC)    Smoking    Primary hypertension     tablet  Commonly known as: NORVASC     clopidogrel 75 MG tablet  Commonly known as: PLAVIX     ezetimibe 10 MG tablet  Commonly known as: ZETIA     lidocaine 4 % cream  Commonly known as: LMX     metoprolol succinate 25 MG extended release tablet  Commonly known as: TOPROL XL               Where to Get Your Medications        These medications were sent to [facility] #[id number] - [facility] [address] - P [phone] - F [phone] [address] [facility] [address]      Phone: [phone]   amiodarone 200 MG tablet  amoxicillin-clavulanate 875-125 MG per tablet  aspirin 81 MG EC tablet  atorvastatin 40 MG tablet  empagliflozin 10 MG tablet  nitroGLYCERIN 0.4 MG SL tablet  predniSONE 10 MG tablet  ticagrelor 90 MG Tabs tablet         No discharge procedures on file.    Time Spent on discharge is  35 mins in patient examination, evaluation, counseling as well as medication reconciliation, prescriptions for required medications, discharge plan and follow up.    Electronically signed by   Crescencio Skinner DO  5/5/2024  11:50 AM      Thank you Rehana Sánchez MD for the opportunity to be involved in this patient's care.

## 2024-05-05 NOTE — PROGRESS NOTES
Nocturnal Pulse Ox (NOX) set up on patient at this time.  Patient on room air.  RN and patient aware to keep oxygen off t/o study.  Low SaO2 alarm set at 85%.  RN to call RT if patient condition warrants oxygen placement.  Room air SaO2 94%.      (06:10) NOX completed. Pt remained on RA t/o study.

## 2024-05-05 NOTE — PROGRESS NOTES
[facility]  Office: [phone]  Roque Bonilla DO, Myke Bauer, DO, Rodger Giles DO, Crescencio Skinner, DO, Albania Degroot MD, Mary Mathis MD, Bill Patino MD, Gege Dyer MD,  Jevon Francis MD, Lex Chase MD, Braden Vann MD,  Varun Love DO, Sandi Frazier MD, Jaspal Rhodes MD, Artemio Bonilla DO, Cristiana Keenan MD,  Mayo Flores DO, Annabelle Soares MD, Gloria Salguero MD, Modesta West MD, Moody Islas MD,  Bj Lisa MD, Adriana Romero MD, Vannesa Gudino MD, Pritesh Nathan MD, Yosi Sands MD, Min Cottrell MD, Leon Jacobson DO, Eddie Charles DO, Marino Rolon MD,  Harpal Schultz MD, Shirley Waterhouse, CNP,  Odilia Shafer CNP, Lokesh Rodney, CNP,  Emilee Strickland, DNP, Eden Guzman, CNP, Nichole Callejas, CNP, Desirae Hein, CNP, Savana Velásquez, CNP, Rani Mcclure, PA-C, Arlene Villalta PA-C, Paola Singer, CNP, Jessica Cervantes, CNP, Kuldeep Rodrigues, CNP, Vandana Mac, CNP, Natalya Barnes, CNP, Chastity Cantor, CNS, Maryanne Crystal, CNP, Karen Barron CNP, Tracy Schwab, CNP         [facility]   IN-PATIENT SERVICE   [facility]    Progress Note    5/5/2024    11:32 AM    Name:   Erin Hill  MRN:     [id number]     Acct:      [id number]   Room:   2041/2041-01  IP Day:  6  Admit Date:  4/29/2024  2:02 PM    PCP:   Rehana Patino MD  Code Status:  DNR-CCA    Subjective:     C/C:   Chief Complaint   Patient presents with    Chest Pain     Pt states that chest pain began this morning non radiating      Interval History Status: improved.     Had VT the other night    Denies cp/n/v/abd pain  No more sob    Feels better today, wants to go home    Brief History:     Per my TIERA:  \"Patient reports to the emergency department with an 18-hour history of midsternal chest pain. Patient has a known history of CAD as well as COPD. Patient continues to smoke. She advised that she was diagnosed approximately 1 year ago with coronary artery disease  [address] [place] [address] [phone]       Radiology:  CT CHEST WO CONTRAST    Result Date: 5/1/2024  1. Somewhat stellate solid nodule with some extension into the peripheral pleura.  Main central component measures at least 1.6 cm although inferior lead the extension results in an AP length of 2.6 cm the minimal.  Previously there was only some ground-glass attenuation and irregular reticulations. Highly worrisome for malignancy.  PET-CT and or tissue sampling advised. 2. Moderate to severe emphysema unchanged. The findings were sent to the Radiology Results Communication Center at 3:15 pm on 5/1/2024 to be communicated to a licensed caregiver.     XR CHEST PORTABLE    Result Date: 4/29/2024  No acute process. Similar appearing COPD changes       Physical Examination:        General appearance:  alert, cooperative and no distress  Mental Status:  oriented to person, place and time and normal affect  Lungs:  clear to auscultation bilaterally, normal effort  Heart:  regular rate and rhythm, no murmur  Abdomen:  soft, nontender, nondistended, normal bowel sounds, no masses, hepatomegaly, splenomegaly  Extremities:  no edema, redness, tenderness in the calves  Skin:  no gross lesions, rashes, induration    Assessment:        Hospital Problems             Last Modified POA    * (Principal) NSTEMI (non-ST elevated myocardial infarction) ([facility]) 4/29/2024 Yes    Chest pain 4/30/2024 Yes    H/O heart artery stent 5/4/2024 Yes    Acute on chronic systolic congestive heart failure ([facility]) 5/4/2024 Yes    PAT (paroxysmal atrial tachycardia) ([facility]) 5/4/2024 Yes    Valvular heart disease 4/29/2024 Yes    Primary lung adenocarcinoma, right ([facility]) 4/29/2024 Yes    Osteoporosis 4/29/2024 Yes    Pulmonary nodule 4/29/2024 Yes    Chronic obstructive pulmonary disease (HCC) 4/29/2024 Yes    Smoking 4/29/2024 Yes    Primary hypertension 4/29/2024 Yes    Mild protein-calorie malnutrition ([facility]) 4/29/2024 Yes    Gastroesophageal

## 2024-05-05 NOTE — PROGRESS NOTES
Pulmonary Critical Care Progress Note    Patient seen for the follow up of NSTEMI (non-ST elevated myocardial infarction) (HCC)     Subjective:    She was weaned off oxygen.  She had  no hemoptysis today..  She has occasional dry cough.  She feels less short of breath.  She ambulated..  She denies chest pain.  She had good urine output on diuresis    Examination:    Vitals: /80   Pulse 78   Temp 97.8 °F (36.6 °C) (Temporal)   Resp 16   Ht 1.676 m (5' 6\")   Wt 45.7 kg (100 lb 12.8 oz)   SpO2 99%   BMI 16.27 kg/m²   SpO2  Av.8 %  Min: 91 %  Max: 99 %  General appearance: alert and cooperative with exam  Neck: No JVD  Lungs: Decreased breath sound mild wheeze  Heart: regular rate and rhythm, S1, S2 normal, no gallop  Abdomen: Soft, non tender, + BS  Extremities: no cyanosis or clubbing. No significant edema    LABs:    CBC:   Recent Labs     24  0535 24  0347   WBC 8.8 10.3   HGB 11.8* 10.1*   HCT 35.8* 31.2*    181       BMP:   Recent Labs     24  0616 24  0535    137   K 4.2 4.0   CO2 24 23   BUN 10 15   CREATININE 0.7 0.8   LABGLOM >90 83   GLUCOSE 144* 130*       LIVER PROFILE:  Recent Labs     24  0616   AST 28   ALT 22        Latest Reference Range & Units 24 06:16   Pro-BNP <300 pg/mL 35,705 (H)   Troponin, High Sensitivity 0 - 14 ng/L 172 (HH)   (HH): Data is critically high  (H): Data is abnormally high     Latest Reference Range & Units 24 03:47   Procalcitonin 0.00 - 0.09 ng/mL 0.32 (H)   (H): Data is abnormally high  Radiology:   Chest x-ray   reviewed  Effusions with right mid and lower lung infiltrates.     Chest x-ray   Increased right basilar airspace disease may reflect worsened pneumonia.     Suspected small pleural effusions versus scarring at the left lung base.      Chest x-ray 5/3  New right parahilar and basilar opacities, bilateral pleural effusions right  greater than left     Findings could represent multifocal

## 2024-05-06 ENCOUNTER — TELEPHONE (OUTPATIENT)
Dept: FAMILY MEDICINE CLINIC | Age: 64
End: 2024-05-06

## 2024-05-06 DIAGNOSIS — E78.2 MIXED HYPERLIPIDEMIA: Primary | ICD-10-CM

## 2024-05-06 RX ORDER — EZETIMIBE 10 MG/1
10 TABLET ORAL DAILY
Qty: 90 TABLET | Refills: 1 | Status: SHIPPED | OUTPATIENT
Start: 2024-05-06

## 2024-05-06 NOTE — TELEPHONE ENCOUNTER
Pt was put back on lipitor following a hospital stay and is requesting to be put back on zetia because she states she cannot tolerate statins due to muscle pain I did offer her a hospital follow up and she states she is now living in [facility] and is currently trying to find a pcp down there -please advise

## 2024-06-05 ENCOUNTER — TELEPHONE (OUTPATIENT)
Dept: FAMILY MEDICINE CLINIC | Age: 64
End: 2024-06-05

## 2024-06-05 NOTE — TELEPHONE ENCOUNTER
Called patient to reschedule her appointment and she states she had a Heart Attack and staying with son in [facility].    She also states she has a PCP their in  [place] and will be following up with them.

## 2024-12-26 DIAGNOSIS — E87.6 HYPOKALEMIA: ICD-10-CM

## 2024-12-27 RX ORDER — POTASSIUM CHLORIDE 750 MG/1
10 TABLET, EXTENDED RELEASE ORAL DAILY
Qty: 100 TABLET | Refills: 2 | Status: SHIPPED | OUTPATIENT
Start: 2024-12-27 | End: 2025-12-22

## 2024-12-27 NOTE — TELEPHONE ENCOUNTER
Please Approve or Refuse.  Send to Pharmacy per Pt's Request:      Next Visit Date:  Visit date not found   Last Visit Date: 2/22/2024    Hemoglobin A1C (%)   Date Value   11/29/2023 5.2   10/25/2016 5.5             ( goal A1C is < 7)   BP Readings from Last 3 Encounters:   05/05/24 111/80   02/22/24 128/88   02/15/24 (!) 116/91          (goal 120/80)  BUN   Date Value Ref Range Status   05/04/2024 15 8 - 23 mg/dL Final     Creatinine   Date Value Ref Range Status   05/04/2024 0.8 0.5 - 0.9 mg/dL Final     Potassium   Date Value Ref Range Status   05/04/2024 4.0 3.7 - 5.3 mmol/L Final

## 2025-08-31 DIAGNOSIS — E87.6 HYPOKALEMIA: ICD-10-CM

## 2025-09-02 RX ORDER — POTASSIUM CHLORIDE 750 MG/1
10 TABLET, EXTENDED RELEASE ORAL DAILY
Qty: 100 TABLET | Refills: 2 | Status: SHIPPED | OUTPATIENT
Start: 2025-09-02 | End: 2026-08-28

## (undated) DEVICE — STRAP ARMBRD W1.5XL32IN FOAM STR YET SFT W/ HK AND LOOP

## (undated) DEVICE — AIRLIFE™ NASAL OXYGEN CANNULA CURVED, FLARED TIP, WITH 7 FEET (2.1 M) CRUSH RESISTANT TUBING, OVER-THE-EAR STYLE: Brand: AIRLIFE™

## (undated) DEVICE — FORCEPS BX L240CM WRK CHN 2.8MM STD CAP W/ NDL MIC MESH

## (undated) DEVICE — CATHETER 5FR JL3.5 CORDIS 100CM

## (undated) DEVICE — CATH BLLN ANGIO 2.75X15MM NC EUPHORIA RX

## (undated) DEVICE — DEFENDO AIR WATER SUCTION AND BIOPSY VALVE KIT FOR  OLYMPUS: Brand: DEFENDO AIR/WATER/SUCTION AND BIOPSY VALVE

## (undated) DEVICE — CATHETER 5FR JR4 CORDIS 100CM

## (undated) DEVICE — GLIDESHEATH SLENDER STAINLESS STEEL KIT: Brand: GLIDESHEATH SLENDER

## (undated) DEVICE — NEEDLE SCLERO 25GA L240CM OD0.51MM ID0.24MM EXTN L4MM SHTH

## (undated) DEVICE — ANGIO-SEAL VIP VASCULAR CLOSURE DEVICE: Brand: ANGIO-SEAL

## (undated) DEVICE — SURGICAL PROCEDURE TRAY CRD CATH SVMMC

## (undated) DEVICE — SNARE ENDOSCP L240CM LOOP W13MM DIA2.4MM SHT THROW SM OVL

## (undated) DEVICE — GLOVE ORANGE PI 7   MSG9070

## (undated) DEVICE — CANNULA NSL AD L2IN ETCO2 SAMP SFT CRUSH RESIST FEM AIRLFE

## (undated) DEVICE — BITEBLOCK 54FR W/ DENT RIM BLOX

## (undated) DEVICE — ENDO KIT W/SYRINGE: Brand: MEDLINE INDUSTRIES, INC.

## (undated) DEVICE — JELLY,LUBE,STERILE,FLIP TOP,TUBE,2-OZ: Brand: MEDLINE

## (undated) DEVICE — POLYP TRAP: Brand: TRAPEASE®

## (undated) DEVICE — CATH BLLN ANGIO 2.50X15MM SC EUPHORA RX

## (undated) DEVICE — CATH BLLN ANGIO 2X15MM SC EUPHORA RX

## (undated) DEVICE — INTRODUCER SHTH 6FR L11CM 0.038IN STD SIDEPRT EXTN 3 W

## (undated) DEVICE — DUP USE 393023 JELLY LUBRICATING EZ 2OZ

## (undated) DEVICE — CATH BLLN ANGIO 2.75X20MM NC EUPHORIA RX

## (undated) DEVICE — GUIDE 6FR XB3 CORDIS 100CM

## (undated) DEVICE — CATH BLLN ANGIO 3X15MM NC EUPHORIA RX

## (undated) DEVICE — CATH BLLN ANGIO 2.50X15MM NC EUPHORIA RX

## (undated) DEVICE — RUNTHROUGH NS EXTRA FLOPPY PTCA GUIDEWIRE: Brand: RUNTHROUGH

## (undated) DEVICE — VASC-BAND REG

## (undated) DEVICE — UNIVERSAL ANESTHESIA SET, MALE LUER LOCK ADAPTER

## (undated) DEVICE — CANNULA NSL AD TBNG L7FT PVC STR NONFLARED PRNG O2 DEL W STD

## (undated) DEVICE — Device